# Patient Record
Sex: FEMALE | Race: WHITE | NOT HISPANIC OR LATINO | Employment: OTHER | URBAN - METROPOLITAN AREA
[De-identification: names, ages, dates, MRNs, and addresses within clinical notes are randomized per-mention and may not be internally consistent; named-entity substitution may affect disease eponyms.]

---

## 2017-01-05 ENCOUNTER — DOCUMENTATION ONLY (OUTPATIENT)
Dept: FAMILY MEDICINE | Facility: CLINIC | Age: 67
End: 2017-01-05

## 2017-01-06 ENCOUNTER — HOSPITAL ENCOUNTER (OUTPATIENT)
Dept: RADIOLOGY | Facility: CLINIC | Age: 67
Discharge: HOME | End: 2017-01-06
Attending: FAMILY MEDICINE
Payer: MEDICARE

## 2017-01-06 ENCOUNTER — OFFICE VISIT (OUTPATIENT)
Dept: FAMILY MEDICINE | Facility: CLINIC | Age: 67
End: 2017-01-06
Payer: MEDICARE

## 2017-01-06 VITALS
TEMPERATURE: 98 F | BODY MASS INDEX: 30.43 KG/M2 | HEART RATE: 70 BPM | HEIGHT: 60 IN | SYSTOLIC BLOOD PRESSURE: 130 MMHG | WEIGHT: 155 LBS | DIASTOLIC BLOOD PRESSURE: 78 MMHG

## 2017-01-06 DIAGNOSIS — Z23 FLU VACCINE NEED: ICD-10-CM

## 2017-01-06 DIAGNOSIS — Z12.31 ENCOUNTER FOR SCREENING MAMMOGRAM FOR MALIGNANT NEOPLASM OF BREAST: ICD-10-CM

## 2017-01-06 DIAGNOSIS — Z23 NEED FOR PNEUMOCOCCAL VACCINE: ICD-10-CM

## 2017-01-06 DIAGNOSIS — E66.9 OBESITY (BMI 30-39.9): ICD-10-CM

## 2017-01-06 DIAGNOSIS — K21.9 GASTROESOPHAGEAL REFLUX DISEASE WITHOUT ESOPHAGITIS: Primary | ICD-10-CM

## 2017-01-06 DIAGNOSIS — J31.0 CHRONIC RHINITIS: ICD-10-CM

## 2017-01-06 DIAGNOSIS — E78.5 HYPERLIPIDEMIA, UNSPECIFIED HYPERLIPIDEMIA TYPE: ICD-10-CM

## 2017-01-06 DIAGNOSIS — I10 ESSENTIAL HYPERTENSION: ICD-10-CM

## 2017-01-06 DIAGNOSIS — M85.80 AGE-RELATED BONE LOSS: ICD-10-CM

## 2017-01-06 DIAGNOSIS — E87.6 HYPOKALEMIA: ICD-10-CM

## 2017-01-06 DIAGNOSIS — Z96.22: ICD-10-CM

## 2017-01-06 PROCEDURE — 77067 SCR MAMMO BI INCL CAD: CPT | Mod: 26,,, | Performed by: RADIOLOGY

## 2017-01-06 PROCEDURE — 77067 SCR MAMMO BI INCL CAD: CPT | Mod: TC

## 2017-01-06 PROCEDURE — 99214 OFFICE O/P EST MOD 30 MIN: CPT | Mod: S$PBB,,, | Performed by: FAMILY MEDICINE

## 2017-01-06 PROCEDURE — 99999 PR PBB SHADOW E&M-EST. PATIENT-LVL III: CPT | Mod: PBBFAC,,, | Performed by: FAMILY MEDICINE

## 2017-01-06 PROCEDURE — 77063 BREAST TOMOSYNTHESIS BI: CPT | Mod: 26,,, | Performed by: RADIOLOGY

## 2017-01-06 RX ORDER — LOSARTAN POTASSIUM 100 MG/1
100 TABLET ORAL DAILY
Qty: 90 TABLET | Refills: 3 | Status: SHIPPED | OUTPATIENT
Start: 2017-01-06 | End: 2017-12-27 | Stop reason: SDUPTHER

## 2017-01-06 RX ORDER — POTASSIUM CHLORIDE 750 MG/1
10 TABLET, EXTENDED RELEASE ORAL 2 TIMES DAILY
Qty: 180 TABLET | Refills: 4 | Status: SHIPPED | OUTPATIENT
Start: 2017-01-06 | End: 2018-03-14 | Stop reason: SDUPTHER

## 2017-01-06 RX ORDER — RALOXIFENE HYDROCHLORIDE 60 MG/1
60 TABLET, FILM COATED ORAL DAILY
Qty: 90 TABLET | Refills: 3 | Status: SHIPPED | OUTPATIENT
Start: 2017-01-06 | End: 2017-10-03 | Stop reason: SDUPTHER

## 2017-01-06 RX ORDER — OMEPRAZOLE 20 MG/1
20 CAPSULE, DELAYED RELEASE ORAL 2 TIMES DAILY
Qty: 180 CAPSULE | Refills: 1 | Status: SHIPPED | OUTPATIENT
Start: 2017-01-06 | End: 2017-07-01 | Stop reason: SDUPTHER

## 2017-01-06 RX ORDER — FLUTICASONE PROPIONATE 50 MCG
1 SPRAY, SUSPENSION (ML) NASAL DAILY
Qty: 16 G | Status: SHIPPED | OUTPATIENT
Start: 2017-01-06 | End: 2017-03-06

## 2017-01-06 RX ORDER — METOPROLOL SUCCINATE 50 MG/1
50 TABLET, EXTENDED RELEASE ORAL DAILY
Qty: 90 TABLET | Refills: 3 | Status: SHIPPED | OUTPATIENT
Start: 2017-01-06 | End: 2017-10-03 | Stop reason: SDUPTHER

## 2017-01-06 RX ORDER — SIMVASTATIN 40 MG/1
40 TABLET, FILM COATED ORAL NIGHTLY
Qty: 90 TABLET | Refills: 3 | Status: SHIPPED | OUTPATIENT
Start: 2017-01-06 | End: 2017-10-03 | Stop reason: SDUPTHER

## 2017-01-06 NOTE — PROGRESS NOTES
Subjective:       Patient ID: Ella Canales is a 66 y.o. female.    Chief Complaint: Establish Care and Hypertension    HPIPatient is here to establish care and f/u chronic conditions  Patient Active Problem List   Diagnosis    HTN (hypertension)    Hyperlipidemia    Age-related bone loss    GERD (gastroesophageal reflux disease)    At risk for heart disease    Hypokalemia    Obesity (BMI 30-39.9)    Anemia    Arthritis    Post-traumatic osteoarthritis of right knee    Post-traumatic osteoarthritis of left knee    Genu varum of right lower extremity    Genu varum of left lower extremity    Chronic rhinitis    Patent pressure equalization tube on right side   Patient c/o persistent chronic sinus drainage, PND and cough for > 1 year.  No h/o previous sinus problems.  Had allergy testing-negative.  Has seen ENT Dr. Aurelio Lu and had sinus surgery summer 2016 which did not help.  Uses flonase and astelin NS.  Allegra was helping but told to stop it after sinus surgery.  Had PFTs wnl.  Has tube right ear recently.  Has chronic GERD-uncontrolled on once daily omeprazole    Card Dr. Ellis did eval for abnormal ecg before sinus surgery.  Had angiogram showing small vessels but no blockages    Review of Systems   Constitutional: Negative for fatigue and unexpected weight change.   Respiratory: Negative for chest tightness and shortness of breath.    Cardiovascular: Negative for chest pain, palpitations and leg swelling.   Gastrointestinal: Negative for abdominal pain.   Endocrine: Negative for polydipsia, polyphagia and polyuria.   Musculoskeletal: Negative for arthralgias.   Neurological: Negative for dizziness, syncope, light-headedness and headaches.       Objective:      Physical Exam   Constitutional: She appears well-developed and well-nourished.   HENT:   Right Ear: Tympanic membrane and ear canal normal.   Left Ear: Tympanic membrane and ear canal normal.   Ears:    Nose: Mucosal edema and  rhinorrhea present. Right sinus exhibits no maxillary sinus tenderness and no frontal sinus tenderness. Left sinus exhibits no maxillary sinus tenderness and no frontal sinus tenderness.   Mouth/Throat: Posterior oropharyngeal erythema present. No oropharyngeal exudate, posterior oropharyngeal edema or tonsillar abscesses.   Cardiovascular: Normal rate, regular rhythm and normal heart sounds.    Pulmonary/Chest: Effort normal and breath sounds normal.   Skin: Skin is warm and dry.   Psychiatric: She has a normal mood and affect.   Nursing note and vitals reviewed.      Assessment:       1. Gastroesophageal reflux disease without esophagitis    2. Essential hypertension    3. Hypokalemia    4. Hyperlipidemia, unspecified hyperlipidemia type    5. Obesity (BMI 30-39.9)    6. Chronic rhinitis    7. Age-related bone loss    8. Encounter for screening mammogram for malignant neoplasm of breast    9. Flu vaccine need    10. Need for pneumococcal vaccine    11. Patent pressure equalization tube on right side        Plan:       1. Essential hypertension  Controlled on current medications.  Continue current medications.    - losartan (COZAAR) 100 MG tablet; Take 1 tablet (100 mg total) by mouth once daily.  Dispense: 90 tablet; Refill: 3  - metoprolol succinate (TOPROL-XL) 50 MG 24 hr tablet; Take 1 tablet (50 mg total) by mouth once daily.  Dispense: 90 tablet; Refill: 3    2. Hypokalemia  Stable condition.  Continue current medications.  Will adjust based on lab findings or if condition changes.    - potassium chloride SA (K-DUR,KLOR-CON) 10 MEQ tablet; Take 1 tablet (10 mEq total) by mouth 2 (two) times daily.  Dispense: 180 tablet; Refill: 4    3. Hyperlipidemia, unspecified hyperlipidemia type  Stable condition.  Continue current medications.  Will adjust based on lab findings or if condition changes.    - simvastatin (ZOCOR) 40 MG tablet; Take 1 tablet (40 mg total) by mouth every evening.  Dispense: 90 tablet; Refill:  3  - CBC auto differential; Future  - Comprehensive metabolic panel; Future  - Lipid panel; Future    4. Gastroesophageal reflux disease without esophagitis  Uncontrolled.  Increase to  - omeprazole (PRILOSEC) 20 MG capsule; Take 1 capsule (20 mg total) by mouth 2 (two) times daily.  Dispense: 180 capsule; Refill: 1  Counseled patient on prevention of reflux with changes in diet and behavior.  I recommended avoidance of greasy and spicy foods, caffeine and eating within 3 hours of bedtime.  I counseled the patient to avoid eating large meals and instead eating more frequent small meals.  I also recommended weight loss and elevation of the head of the bed by 6 inches.  If symptoms persist after these changes medication may be needed to control GERD.      5. Obesity (BMI 30-39.9)  Counseled patient on his ideal body weight, health consequences of being obese and current recommendations including weekly exercise and a heart healthy diet.  Current BMI is:Estimated body mass index is 30.27 kg/(m^2) as calculated from the following:    Height as of this encounter: 5' (1.524 m).    Weight as of this encounter: 70.3 kg (154 lb 15.7 oz)..  Patient is aware that ideal BMI < 25 or Weight in (lb) to have BMI = 25: 127.7.        6. Chronic rhinitis  Suspect GERD related -laryngopharyngeal reflux .  Control GERD with Bid ppi.  Cont astelin and :  - fluticasone (FLONASE) 50 mcg/actuation nasal spray; 1 spray by Each Nare route once daily.  Dispense: 16 g; Refill: prn    7. Age-related bone loss  Screen and treat as indicated:      8. Encounter for screening mammogram for malignant neoplasm of breast  Screen and treat as indicated:      9. Flu vaccine need  Immunize today.  Counseled patient on risks, benefits and side effects.  Patient elected to proceed with vaccination.    - Influenza - High Dose (65+) (PF) (IM)    10. Need for pneumococcal vaccine  Immunize today.  Counseled patient on risks, benefits and side effects.  Patient  elected to proceed with vaccination.    - Pneumococcal Conjugate Vaccine (13 Valent) (IM)    11. Patent pressure equalization tube on right side  Cont care under ENT    [facility] goal documentation:  Patient readiness: acceptance and barriers:readiness  During the course of the visit the patient was educated and counseled about the following: Hypertension:   Dietary sodium restriction.  Regular aerobic exercise.  Obesity:   General weight loss/lifestyle modification strategies discussed (elicit support from others; identify saboteurs; non-food rewards, etc).  Goals: Hypertension: Reduce Blood Pressure and Obesity: Reduce calorie intake and BMI  Goal/Outcomes met:Hypertension  The following self management tools provided:none  Patient Instructions (the written plan) was given to the patient/family: Yes  Time spent with patient: 40 minutes    Patient with be reevaluated in 3 months or sooner prn.  4 weeks with Winsome Damon PA-C to f/u rhinitis/cough due to LPR disease uncontrolled and trial of PPI bid.  If sx do not improve consider GI referral for endoscopy    Greater than 50% of this visit was spent counseling as described in above documentation:Yes

## 2017-01-06 NOTE — PATIENT INSTRUCTIONS
Controlling High Blood Pressure  High blood pressure (hypertension) is often called the silent killer. This is because many people who have it dont know it. High blood pressure is defined as 140/90 mm Hg or higher. Know your blood pressure and remember to check it regularly. Doing so can save your life. Here are some things you can do to help control your blood pressure.    Choose heart-healthy foods  · Select low-salt, low-fat foods. Limit sodium intake to 2,400 mg per day or the amount suggested by your healthcare provider.  · Limit canned, dried, cured, packaged, and fast foods. These can contain a lot of salt.  · Eat 8 to 10 servings of fruits and vegetables every day.  · Choose lean meats, fish, or chicken.  · Eat whole-grain pasta, brown rice, and beans.  · Eat 2 to 3 servings of low-fat or fat-free dairy products.  · Ask your doctor about the DASH eating plan. This plan helps reduce blood pressure.  · When you go to a restaurant, ask that your meal be prepared with no added salt.  Maintain a healthy weight  · Ask your healthcare provider how many calories to eat a day. Then stick to that number.  · Ask your healthcare provider what weight range is healthiest for you. If you are overweight, a weight loss of only 3% to 5% of your body weight can help lower blood pressure. Generally, a good weight loss goal is to lose 10% of your body weight in a year.  · Limit snacks and sweets.  · Get regular exercise.  Get up and get active  · Choose activities you enjoy. Find ones you can do with friends or family. This includes bicycling, dancing, walking, and jogging.  · Park farther away from building entrances.  · Use stairs instead of the elevator.  · When you can, walk or bike instead of driving.  · [place]oit leaves, garden, or do household repairs.  · Be active at a moderate to vigorous level of physical activity for at least 40 minutes for a minimum of 3 to 4 days a week.   Manage stress  · Make time to relax and enjoy  life. Find time to laugh.  · Communicate your concerns with your loved ones and your healthcare provider.  · Visit with family and friends, and keep up with hobbies.  Limit alcohol and quit smoking  · Men should have no more than 2 drinks per day.  · Women should have no more than 1 drink per day.  · Talk with your healthcare provider about quitting smoking. Smoking significantly increases your risk for heart disease and stroke. Ask your healthcare provider about community smoking cessation programs and other options.  Medicines  If lifestyle changes arent enough, your healthcare provider may prescribe high blood pressure medicine. Take all medicines as prescribed. If you have any questions about your medicines, ask your healthcare provider before stopping or changing them.   © 7529-3720 [employer]. [address] [place] [address], [place] [address]. All rights reserved. This information is not intended as a substitute for professional medical care. Always follow your healthcare professional's instructions.        Walking for Fitness  Fitness walking has something for everyone, even people who are already fit. Walking is one of the safest ways to condition your body aerobically. It can boost energy, help you lose weight, and reduce stress.    Physical benefits  · Walking strengthens your heart and lungs, and tones your muscles.  · When walking, your feet land with less impact than in other sports. This reduces chances of muscle, bone, and joint injury.  · Regular walking improves your cholesterol levels and lowers your risk of heart disease. And it helps you control your blood sugar if you have diabetes.  · Walking is a weight-bearing activity, which helps maintain bone density. This can help prevent osteoporosis.  Personal rewards  · Taking walks can help you relax and manage stress. And fitness walking may make you feel better about yourself.  · Walking can help you sleep better at night and make you less  likely to be depressed.  · Regular walking may help maintain your memory as you get older.  · Walking is a great way to spend extra time with friends and family members. Be sure to invite your dog along!  Q&A about fitness walking  Q: Will walking keep me fit?  A: Yes. Regular walking at the right pace gives you all the benefits of other aerobic activities, such as jogging and swimming.  Q: Will walking help me lose weight and keep it off?  A: Yes. Per mile, walking can burn as many calories as jogging. Your health care provider can help work walking into your weight-loss plan.  Q: Is walking safe for my health?  A: Yes. Walking is safe if you have high blood pressure, diabetes, heart disease, or other conditions. Talk to your health care provider before you start.  © 1409-5414 [employer]. [address] [place] [address]. All rights reserved. This information is not intended as a substitute for professional medical care. Always follow your healthcare professional's instructions.        Weight Management: Getting Started  Healthy bodies come in all shapes and sizes. Not all bodies are made to be thin. For some people, a healthy weight is higher than the average weight listed on weight charts. Your healthcare provider can help you decide on a healthy weight for you.    Reasons to lose weight  Losing weight can help with some health problems, such as high blood pressure, heart disease, diabetes, sleep apnea, and arthritis. You may also feel more energy.  Set your long-term goal  Your goal doesn't even have to be a specific weight. You may decide on a fitness goal (such as being able to walk 10 miles a week), or a health goal (such as lowering your blood pressure). Choose a goal that is measurable and reasonable, so you know when you've reached it. A goal of reaching a BMI of less than 25 is not always reasonable (or possible).   Make an action plan  Habits dont change overnight. Setting your  goals too high can leave you feeling discouraged if you cant reach them. Be realistic. Choose one or two small changes you can make now. Set an action plan for how you are going to make these changes. When you can stick to this plan, keep making a few more small changes. Taking small steps will help you stay on the path to success.  Track your progress  Write down your goals. Then, keep a daily record of your progress. Write down what you eat and how active you are. This record lets you look back on how much youve done. It may also help when youre feeling frustrated. Reward yourself for success. Even if you dont reach every goal, give yourself credit for what you do get done.  Get support  Encouragement from others can help make losing weight easier. Ask your family members and friends for support. They may even want to join you. Also look to your healthcare provider, registered dietitian, and  for help. Your local hospital can give you more information about nutrition, exercise, and weight loss.  © 2468-5967 [employer]. [address] [place] [address], [place] [address]. All rights reserved. This information is not intended as a substitute for professional medical care. Always follow your healthcare professional's instructions.

## 2017-01-06 NOTE — MR AVS SNAPSHOT
[facility]  [address]  [place] [address]  Phone: [phone]  Fax: [phone]                  Ella Canales   2017 9:00 AM   Office Visit    Description:  Female : 1950   Provider:  Adry Damian MD   Department:  [place] [facility]           Reason for Visit     Establish Care     Hypertension           Diagnoses this Visit        Comments    Gastroesophageal reflux disease without esophagitis    -  Primary     Essential hypertension         Hypokalemia         Hyperlipidemia, unspecified hyperlipidemia type         Obesity (BMI 30-39.9)         Chronic rhinitis         Age-related bone loss         Encounter for screening mammogram for malignant neoplasm of breast         Flu vaccine need         Need for pneumococcal vaccine                To Do List           Future Appointments        Provider Department Dept Phone    2017 10:00 AM SLIC MAMMO1 [facility] [place] [facility]- Mammography [phone]    2017 1:00 PM LAB, ERIC SAT [facility] [place] [facility] - Lab [phone]    2017 8:00 AM Britni Turpin PT [facility] [phone]    2/3/2017 8:40 AM Winsome Damon PA-C [facility] [phone]    2017 11:30 AM Ronel Urban PA-C [place] [facility] [phone]      Your Future Surgeries/Procedures     2017   Surgery with Angie Mulligan MD   [facility]    [address] [place] [id number] [phone]              Goals (5 Years of Data)     None      Follow-Up and Disposition     Return in about 3 months (around 2017).       These Medications        Disp Refills Start End    fluticasone (FLONASE) 50 mcg/actuation nasal spray 16 g prn 2017     1 spray by Each Nare route once daily. - Each Nare    Pharmacy: [facility] [id number] - [place] - [address] AT  [facility] Ph #: [phone]       losartan  (COZAAR) 100 MG tablet 90 tablet 3 1/6/2017     Take 1 tablet (100 mg total) by mouth once daily. - Oral    Pharmacy: [facility] [address] [place] [address] [address] AT [facility] Ph #: [phone]       metoprolol succinate (TOPROL-XL) 50 MG 24 hr tablet 90 tablet 3 1/6/2017     Take 1 tablet (50 mg total) by mouth once daily. - Oral    Pharmacy: [facility] [address] AT [facility] Ph #: [phone]       omeprazole (PRILOSEC) 20 MG capsule 180 capsule 1 1/6/2017     Take 1 capsule (20 mg total) by mouth 2 (two) times daily. - Oral    Pharmacy: [facility] [address] [facility] Ph #: [phone]       raloxifene (EVISTA) 60 mg tablet 90 tablet 3 1/6/2017     Take 1 tablet (60 mg total) by mouth once daily. - Oral    Pharmacy: [facility] [address] [facility] Ph #: [phone]       potassium chloride SA (K-DUR,KLOR-CON) 10 MEQ tablet 180 tablet 4 1/6/2017     Take 1 tablet (10 mEq total) by mouth 2 (two) times daily. - Oral    Pharmacy: [facility] [address] [facility] Ph #: [phone]       simvastatin (ZOCOR) 40 MG tablet 90 tablet 3 1/6/2017     Take 1 tablet (40 mg total) by mouth every evening. - Oral    Pharmacy: [facility] [address] [place] [address] [address] AT [facility] Ph #: [phone]         [facility]     [facility] On Call Nurse Care Line - 24/7 Assistance  Registered nurses in the [facility] On Call Center provide clinical advisement, health education, appointment booking, and other advisory services.  Call for this free service at [phone].             Medications           Message regarding Medications     Verify the changes and/or additions to your medication regime listed below are the same  as discussed with your clinician today.  If any of these changes or additions are incorrect, please notify your healthcare provider.        CHANGE how you are taking these medications     Start Taking Instead of    fluticasone (FLONASE) 50 mcg/actuation nasal spray fluticasone (FLONASE) 50 mcg/actuation nasal spray    Dosage:  1 spray by Each Nare route once daily. Dosage:  SPRAY ONCE IN EACH NOSTRIL EVERY DAY    Reason for Change:  Reorder     losartan (COZAAR) 100 MG tablet losartan (COZAAR) 100 MG tablet    Dosage:  Take 1 tablet (100 mg total) by mouth once daily. Dosage:  TAKE 1 TABLET BY MOUTH EVERY DAY    Reason for Change:  Reorder     metoprolol succinate (TOPROL-XL) 50 MG 24 hr tablet metoprolol succinate (TOPROL-XL) 50 MG 24 hr tablet    Dosage:  Take 1 tablet (50 mg total) by mouth once daily. Dosage:  TAKE 1 TABLET BY MOUTH EVERY DAY    Reason for Change:  Reorder     omeprazole (PRILOSEC) 20 MG capsule omeprazole (PRILOSEC) 20 MG capsule    Dosage:  Take 1 capsule (20 mg total) by mouth 2 (two) times daily. Dosage:  TAKE 1 CAPSULE BY MOUTH EVERY DAY    Reason for Change:  Reorder     raloxifene (EVISTA) 60 mg tablet raloxifene (EVISTA) 60 mg tablet    Dosage:  Take 1 tablet (60 mg total) by mouth once daily. Dosage:  TAKE 1 TABLET BY MOUTH EVERY DAY    Reason for Change:  Reorder       STOP taking these medications     UNABLE TO FIND Compound Antibiotic Gentamicin/mupirocin/mometasone           Verify that the below list of medications is an accurate representation of the medications you are currently taking.  If none reported, the list may be blank. If incorrect, please contact your healthcare provider. Carry this list with you in case of emergency.           Current Medications     fluticasone (FLONASE) 50 mcg/actuation nasal spray 1 spray by Each Nare route once daily.    glucosamine-chondroit-vit C-Mn (GLUCOSAMINE-CHONDROITIN MAX ST) 500-400 mg Cap Twice a day    losartan (COZAAR) 100 MG tablet  Take 1 tablet (100 mg total) by mouth once daily.    metoprolol succinate (TOPROL-XL) 50 MG 24 hr tablet Take 1 tablet (50 mg total) by mouth once daily.    omeprazole (PRILOSEC) 20 MG capsule Take 1 capsule (20 mg total) by mouth 2 (two) times daily.    potassium chloride SA (K-DUR,KLOR-CON) 10 MEQ tablet Take 1 tablet (10 mEq total) by mouth 2 (two) times daily.    raloxifene (EVISTA) 60 mg tablet Take 1 tablet (60 mg total) by mouth once daily.    simvastatin (ZOCOR) 40 MG tablet Take 1 tablet (40 mg total) by mouth every evening.           Clinical Reference Information           Vital Signs - Last Recorded  Most recent update: 1/6/2017  9:11 AM by Anjali Baez MA    BP Pulse Temp Ht Wt LMP    130/78 (BP Location: Left arm, Patient Position: Sitting, BP Method: Manual) 70 98 °F (36.7 °C) (Oral) 5' (1.524 m) 70.3 kg (154 lb 15.7 oz) 04/03/2012    BMI                30.27 kg/m2          Blood Pressure          Most Recent Value    BP  130/78      Allergies as of 1/6/2017     Ace Inhibitors      Immunizations Administered on Date of Encounter - 1/6/2017     Name Date Dose VIS Date Route    Influenza - High Dose 1/6/2017 0.5 mL 8/7/2015 Intramuscular    Pneumococcal Conjugate - 13 Valent 1/6/2017 0.5 mL 11/5/2015 Intramuscular      Orders Placed During Today's Visit      Normal Orders This Visit    Influenza - High Dose (65+) (PF) (IM)     Pneumococcal Conjugate Vaccine (13 Valent) (IM)     Future Labs/Procedures Expected by Expires    CBC auto differential  1/6/2017 3/7/2018    Comprehensive metabolic panel  1/6/2017 3/7/2018    Lipid panel  1/6/2017 3/7/2018    Mammo Digital Screening Bilateral With CAD  1/6/2017 3/6/2018      Instructions      Controlling High Blood Pressure  High blood pressure (hypertension) is often called the silent killer. This is because many people who have it dont know it. High blood pressure is defined as 140/90 mm Hg or higher. Know your blood pressure and remember to check it  regularly. Doing so can save your life. Here are some things you can do to help control your blood pressure.    Choose heart-healthy foods  · Select low-salt, low-fat foods. Limit sodium intake to 2,400 mg per day or the amount suggested by your healthcare provider.  · Limit canned, dried, cured, packaged, and fast foods. These can contain a lot of salt.  · Eat 8 to 10 servings of fruits and vegetables every day.  · Choose lean meats, fish, or chicken.  · Eat whole-grain pasta, brown rice, and beans.  · Eat 2 to 3 servings of low-fat or fat-free dairy products.  · Ask your doctor about the DASH eating plan. This plan helps reduce blood pressure.  · When you go to a restaurant, ask that your meal be prepared with no added salt.  Maintain a healthy weight  · Ask your healthcare provider how many calories to eat a day. Then stick to that number.  · Ask your healthcare provider what weight range is healthiest for you. If you are overweight, a weight loss of only 3% to 5% of your body weight can help lower blood pressure. Generally, a good weight loss goal is to lose 10% of your body weight in a year.  · Limit snacks and sweets.  · Get regular exercise.  Get up and get active  · Choose activities you enjoy. Find ones you can do with friends or family. This includes bicycling, dancing, walking, and jogging.  · Park farther away from building entrances.  · Use stairs instead of the elevator.  · When you can, walk or bike instead of driving.  · Paulina leaves, garden, or do household repairs.  · Be active at a moderate to vigorous level of physical activity for at least 40 minutes for a minimum of 3 to 4 days a week.   Manage stress  · Make time to relax and enjoy life. Find time to laugh.  · Communicate your concerns with your loved ones and your healthcare provider.  · Visit with family and friends, and keep up with hobbies.  Limit alcohol and quit smoking  · Men should have no more than 2 drinks per day.  · Women should  have no more than 1 drink per day.  · Talk with your healthcare provider about quitting smoking. Smoking significantly increases your risk for heart disease and stroke. Ask your healthcare provider about community smoking cessation programs and other options.  Medicines  If lifestyle changes arent enough, your healthcare provider may prescribe high blood pressure medicine. Take all medicines as prescribed. If you have any questions about your medicines, ask your healthcare provider before stopping or changing them.   © 4958-3009 [employer]. [address] [place] [address], [place] [address]. All rights reserved. This information is not intended as a substitute for professional medical care. Always follow your healthcare professional's instructions.        Walking for Fitness  Fitness walking has something for everyone, even people who are already fit. Walking is one of the safest ways to condition your body aerobically. It can boost energy, help you lose weight, and reduce stress.    Physical benefits  · Walking strengthens your heart and lungs, and tones your muscles.  · When walking, your feet land with less impact than in other sports. This reduces chances of muscle, bone, and joint injury.  · Regular walking improves your cholesterol levels and lowers your risk of heart disease. And it helps you control your blood sugar if you have diabetes.  · Walking is a weight-bearing activity, which helps maintain bone density. This can help prevent osteoporosis.  Personal rewards  · Taking walks can help you relax and manage stress. And fitness walking may make you feel better about yourself.  · Walking can help you sleep better at night and make you less likely to be depressed.  · Regular walking may help maintain your memory as you get older.  · Walking is a great way to spend extra time with friends and family members. Be sure to invite your dog along!  Q&A about fitness walking  Q: Will walking keep me fit?  A:  Yes. Regular walking at the right pace gives you all the benefits of other aerobic activities, such as jogging and swimming.  Q: Will walking help me lose weight and keep it off?  A: Yes. Per mile, walking can burn as many calories as jogging. Your health care provider can help work walking into your weight-loss plan.  Q: Is walking safe for my health?  A: Yes. Walking is safe if you have high blood pressure, diabetes, heart disease, or other conditions. Talk to your health care provider before you start.  © 9330-5037 [employer]. [address] [place] [address]. All rights reserved. This information is not intended as a substitute for professional medical care. Always follow your healthcare professional's instructions.        Weight Management: Getting Started  Healthy bodies come in all shapes and sizes. Not all bodies are made to be thin. For some people, a healthy weight is higher than the average weight listed on weight charts. Your healthcare provider can help you decide on a healthy weight for you.    Reasons to lose weight  Losing weight can help with some health problems, such as high blood pressure, heart disease, diabetes, sleep apnea, and arthritis. You may also feel more energy.  Set your long-term goal  Your goal doesn't even have to be a specific weight. You may decide on a fitness goal (such as being able to walk 10 miles a week), or a health goal (such as lowering your blood pressure). Choose a goal that is measurable and reasonable, so you know when you've reached it. A goal of reaching a BMI of less than 25 is not always reasonable (or possible).   Make an action plan  Habits dont change overnight. Setting your goals too high can leave you feeling discouraged if you cant reach them. Be realistic. Choose one or two small changes you can make now. Set an action plan for how you are going to make these changes. When you can stick to this plan, keep making a few more small  changes. Taking small steps will help you stay on the path to success.  Track your progress  Write down your goals. Then, keep a daily record of your progress. Write down what you eat and how active you are. This record lets you look back on how much youve done. It may also help when youre feeling frustrated. Reward yourself for success. Even if you dont reach every goal, give yourself credit for what you do get done.  Get support  Encouragement from others can help make losing weight easier. Ask your family members and friends for support. They may even want to join you. Also look to your healthcare provider, registered dietitian, and  for help. Your local hospital can give you more information about nutrition, exercise, and weight loss.  © 8577-8999 [employer]. [address] [place] [address], [place] [address]. All rights reserved. This information is not intended as a substitute for professional medical care. Always follow your healthcare professional's instructions.

## 2017-01-06 NOTE — PROGRESS NOTES
2 patient identifiers used (name and ). Administered Flu vaccine IM. Patient tolerated well, no bleeding at insertion site noted. Pain scale 0/10. Aseptic technique maintained. Immunization information given to patient. Advised patient to remain in clinic for 15 minutes to monitor for reaction. No AR noted. 2 patient identifiers used (name and ). Administered Prevnar 13 vaccine IM. Patient tolerated well, no bleeding at insertion site noted. Pain scale 1/10. Aseptic technique maintained. Immunization information given to patient.

## 2017-01-09 ENCOUNTER — CLINICAL SUPPORT (OUTPATIENT)
Dept: REHABILITATION | Facility: HOSPITAL | Age: 67
End: 2017-01-09
Attending: ORTHOPAEDIC SURGERY
Payer: MEDICARE

## 2017-01-09 DIAGNOSIS — M17.31 POST-TRAUMATIC OSTEOARTHRITIS OF RIGHT KNEE: ICD-10-CM

## 2017-01-09 DIAGNOSIS — M25.561 RIGHT KNEE PAIN, UNSPECIFIED CHRONICITY: Primary | ICD-10-CM

## 2017-01-09 DIAGNOSIS — M17.32 POST-TRAUMATIC OSTEOARTHRITIS OF LEFT KNEE: ICD-10-CM

## 2017-01-09 DIAGNOSIS — M25.562 LEFT KNEE PAIN, UNSPECIFIED CHRONICITY: ICD-10-CM

## 2017-01-09 PROCEDURE — G8978 MOBILITY CURRENT STATUS: HCPCS | Mod: CL,PN

## 2017-01-09 PROCEDURE — G8979 MOBILITY GOAL STATUS: HCPCS | Mod: CK,PN

## 2017-01-09 PROCEDURE — 97161 PT EVAL LOW COMPLEX 20 MIN: CPT | Mod: PN

## 2017-01-09 NOTE — PLAN OF CARE
TIME RECORD    01/09/2017    Start Time:  0810  Stop Time:  0900    PROCEDURES:    TIMED  Procedure Time Min.    Start:  Stop:     Start:  Stop:     Start:  Stop:     Start:  Stop:          UNTIMED  Procedure Time Min.   Evaluation Start:0810  Stop:0900 50    Start:  Stop:      Total Timed Minutes:  0  Total Timed Units:  0  Total Untimed Units:  1  Charges Billed/# of units:  1    OUTPATIENT PHYSICAL THERAPY   PATIENT EVALUATION  Onset Date: chronic  Problem List Items Addressed This Visit     Right knee pain - Primary    Post-traumatic osteoarthritis of right knee    Left knee pain    Post-traumatic osteoarthritis of left knee        Treatment Diagnosis: Impaired functional mobility with bilateral knee pain    Past Medical History   Diagnosis Date    Arthritis      bilateral knees    GERD (gastroesophageal reflux disease)     Hyperlipidemia     Hypertension     Osteopenia        Past Surgical History   Procedure Laterality Date    Fracture surgery       ORIF right arm.    Ectopic pregnancy surgery      Tubal ligation         has a current medication list which includes the following prescription(s): fluticasone, glucosamine-chondroit-vit c-mn, losartan, metoprolol succinate, omeprazole, potassium chloride sa, raloxifene, and simvastatin.    Precautions: HTN, hx of skin CA; left knee scope 2000  Surgical history: see above  Prior Therapy: yes, for arm  History of Present Illness: Patient is a 65 yo female who presents to physical therapy with c/o chronic bilateral knee pain. States will be getting left knee replacement in February followed by eventual right knee replacement.   Prior Level of Function: Independent  Social History:    Living environment: 2 story house with bedroom on first floor    Stairs to enter home: none    Railings:    Bathroom setup: walk-in shower with built in seat   Employment: none   Transportation: drives SUV   Leisure activities/hobbies: none at this time, used to walk or run,  "plays    Current level of function: Independent  Current equipment: none  Functional Deficits Leading to Referral/Nature of Injury: walking, squatting, standing, sitting too long, too much activity  Patient Therapy Goals: "My goal is to be able to function. To be able to walk distances. Do everyday things without pain."      Subjective     Ella Canales states she is hoping both her knees can be replaced this year.    Pain:  Location: bilateral medial knees  Description: dull, sharp, stiffness  Activities Which Increase Pain: Sitting, Standing, Bending, Walking and Getting out of bed/chair  Activities Which Decrease Pain: ice and rest  Pain Scale: 2/10 at best 3-4/10 now  8/10 at worst    Numbness/Paraesthesias: none    Objective     Posture: Fwd head position, rounded shoulders, decreased WB LLE vs RLE in standing with decreased bilateral knee extensin.  Palpation: Tender to palpation medial joint line of bilateral knees  Sensation: intact to LT  DTRs:   Range of Motion/Strength:    Lower Extremity Range of Motion:  Right Lower Extremity: knee PROM 8-115*  Left Lower Extremity: *    Lower Extremity Strength:  Right Lower Extremity: hip flex 4+/5, knee ext 4+/5, knee flex 4+/5, ankle 5/5  Left Lower Extremity: hip flex 4+/5, knee ext 4/5, knee flex 4+/5, ankle 5/5        Flexibility: Tightness present in bilateral gastrocs, hamstrings left 157*, right 160*  Gait: antalgic with decreased floor clearance, decreased knee ROM bilateral  Bed Mobility: Supine<>sit Independent  Transfers: Sit<>stand Independent    Functional Outcome Measure: Lower Extremity Functional Scale (LEFS): 27/80  G code tool used: LEFS  Current modifier: CL  Goal modifier: CK  Treatment: Educated on role/goal PT, POC.  Pt verbalizing understanding and agreement.     Assessment       Initial Assessment (Pertinent finding, problem list and factors affecting outcome): Patient is a 65 yo female presenting to PT with c/o chronic bilateral " knee pain. Will be getting left TKA in February followed by eventual right TKA. Notable impairments include decreased ROM, decreased strength, gait abnormality, and impaired functional mobility. Patient would benefit from skilled PT to address notable impairments and improve functional mobility to PLOF.    Rehab Potiential: good    Short Term Goals (1 Weeks): 1) Pt will initiate HEP 2) Patient will improve hamstring flexibility by 3*   Long Term Goals (3 Weeks): 1) Pt will be I with HEP 2) Pt will return to community ambulation with strength 4+ or >    Plan     Certification Period: 01/09/ to 02/02/17   Recommended Treatment Pl/an: 2 times per week for 3 weeks: Gait Training, Group Therapy, Manual Therapy, Moist Heat/ Ice, Patient Education, Therapeutic Activites and Therapeutic Exercise  Other Recommendations:    Thank you for referral.    Therapist: Britni Turpin, PT    I CERTIFY THE NEED FOR THESE SERVICES FURNISHED UNDER THIS PLAN OF TREATMENT AND WHILE UNDER MY CARE    Physician's comments: ________________________________________________________________________________________________________________________________________________      Physician's Name: ___________________________________

## 2017-01-11 ENCOUNTER — CLINICAL SUPPORT (OUTPATIENT)
Dept: REHABILITATION | Facility: HOSPITAL | Age: 67
End: 2017-01-11
Attending: ORTHOPAEDIC SURGERY
Payer: MEDICARE

## 2017-01-11 DIAGNOSIS — M17.31 POST-TRAUMATIC OSTEOARTHRITIS OF RIGHT KNEE: ICD-10-CM

## 2017-01-11 DIAGNOSIS — M25.562 LEFT KNEE PAIN, UNSPECIFIED CHRONICITY: ICD-10-CM

## 2017-01-11 DIAGNOSIS — M25.561 RIGHT KNEE PAIN, UNSPECIFIED CHRONICITY: ICD-10-CM

## 2017-01-11 DIAGNOSIS — M17.32 POST-TRAUMATIC OSTEOARTHRITIS OF LEFT KNEE: ICD-10-CM

## 2017-01-11 PROCEDURE — 97110 THERAPEUTIC EXERCISES: CPT | Mod: PN

## 2017-01-12 NOTE — PROGRESS NOTES
"Name: Ella Santana [facility] Number: [id number]  Date of Treatment: 1/11/2017   Diagnosis:   Encounter Diagnoses   Name Primary?    Left knee pain, unspecified chronicity     Right knee pain, unspecified chronicity     Post-traumatic osteoarthritis of left knee     Post-traumatic osteoarthritis of right knee        Time in: 0910  Time Out: 0950  Total Treatment Time: 40  Group Time: 0      Subjective:    Ella reports knee pain with mobility.  Patient reports their pain to be 1-2/10 on a 0-10 scale with 0 being no pain and 10 being the worst pain imaginable.    Objective    Ella received therapeutic exercises to develop strength, endurance and flexibility for 40 minutes including:     Bike Lv3 10'  Hamstring stretch 3x30" in sit  Gastroc stretch 3x30" 1/2 roll  Knee flexion stretch 3x30" in sit B  LAQ 3x10 B  Quad sets with towel under ankles 3x10  Heel slide 3x10 B  SLR 3x10 B      Written Home Exercises Provided: Yes    Pt demo good understanding of the education provided. Ella demonstrated good return demonstration of activities.     Assessment:     Pt will continue to benefit from skilled PT intervention. Medical Necessity is demonstrated by:  Pain limits function of effected part for some activities, Requires skilled supervision to complete and progress HEP and Weakness.    Plan:    Continue with established Plan of Care towards PT goals.   "

## 2017-01-17 ENCOUNTER — CLINICAL SUPPORT (OUTPATIENT)
Dept: REHABILITATION | Facility: HOSPITAL | Age: 67
End: 2017-01-17
Attending: ORTHOPAEDIC SURGERY
Payer: MEDICARE

## 2017-01-17 DIAGNOSIS — M25.562 LEFT KNEE PAIN, UNSPECIFIED CHRONICITY: ICD-10-CM

## 2017-01-17 DIAGNOSIS — M25.561 RIGHT KNEE PAIN, UNSPECIFIED CHRONICITY: ICD-10-CM

## 2017-01-17 DIAGNOSIS — M17.31 POST-TRAUMATIC OSTEOARTHRITIS OF RIGHT KNEE: ICD-10-CM

## 2017-01-17 DIAGNOSIS — M17.32 POST-TRAUMATIC OSTEOARTHRITIS OF LEFT KNEE: ICD-10-CM

## 2017-01-17 PROCEDURE — 97110 THERAPEUTIC EXERCISES: CPT | Mod: PN

## 2017-01-17 NOTE — PROGRESS NOTES
"Name: Ella Santana [facility] Number: [id number]  Date of Treatment: 01/17/2017   Diagnosis:   Encounter Diagnoses   Name Primary?    Left knee pain, unspecified chronicity     Right knee pain, unspecified chronicity     Post-traumatic osteoarthritis of left knee     Post-traumatic osteoarthritis of right knee        Time in: 0900  Time Out: 0940  Total Treatment Time: 40  Group Time: 0      Subjective:    Ella reports increased B knee pain, reports been packing daily as well as HEP.  Patient reports their pain to be 9/10 on a 0-10 scale with 0 being no pain and 10 being the worst pain imaginable.    Objective    Ella received therapeutic exercises to develop strength, endurance and flexibility for 40 minutes including:     Bike Lv3 10'  Hamstring stretch 3x30" in sit  Gastroc stretch 3x30" 1/2 roll  Knee flexion stretch 3x30" in sit B  Hamstring stretch supine with strap 1x30" B  IT band stretch supine with strap 3x30" B  LAQ 3x10 B  Quad sets with small bolster under knees 3x10  Heel slide 3x10 1# B  SLR 3x10 1# B    Written Home Exercises Provided: Yes    Pt demo good understanding of the education provided. Ella demonstrated good return demonstration of activities.     Assessment:     Pt will continue to benefit from skilled PT intervention. Medical Necessity is demonstrated by:  Pain limits function of effected part for some activities, Unable to participate fully in daily activities, Requires skilled supervision to complete and progress HEP and Weakness.    Patient is making good progress towards established goals.    Plan:    Continue with established Plan of Care towards PT goals.   "

## 2017-01-19 ENCOUNTER — CLINICAL SUPPORT (OUTPATIENT)
Dept: REHABILITATION | Facility: HOSPITAL | Age: 67
End: 2017-01-19
Attending: ORTHOPAEDIC SURGERY
Payer: MEDICARE

## 2017-01-19 DIAGNOSIS — M17.31 POST-TRAUMATIC OSTEOARTHRITIS OF RIGHT KNEE: ICD-10-CM

## 2017-01-19 DIAGNOSIS — M25.562 LEFT KNEE PAIN, UNSPECIFIED CHRONICITY: ICD-10-CM

## 2017-01-19 DIAGNOSIS — M25.561 RIGHT KNEE PAIN, UNSPECIFIED CHRONICITY: ICD-10-CM

## 2017-01-19 DIAGNOSIS — M17.32 POST-TRAUMATIC OSTEOARTHRITIS OF LEFT KNEE: ICD-10-CM

## 2017-01-19 PROCEDURE — 97150 GROUP THERAPEUTIC PROCEDURES: CPT | Mod: PN

## 2017-01-23 ENCOUNTER — CLINICAL SUPPORT (OUTPATIENT)
Dept: REHABILITATION | Facility: HOSPITAL | Age: 67
End: 2017-01-23
Attending: ORTHOPAEDIC SURGERY
Payer: MEDICARE

## 2017-01-23 DIAGNOSIS — M25.561 RIGHT KNEE PAIN, UNSPECIFIED CHRONICITY: ICD-10-CM

## 2017-01-23 DIAGNOSIS — M17.31 POST-TRAUMATIC OSTEOARTHRITIS OF RIGHT KNEE: ICD-10-CM

## 2017-01-23 DIAGNOSIS — M25.562 LEFT KNEE PAIN, UNSPECIFIED CHRONICITY: ICD-10-CM

## 2017-01-23 DIAGNOSIS — M17.32 POST-TRAUMATIC OSTEOARTHRITIS OF LEFT KNEE: ICD-10-CM

## 2017-01-23 PROCEDURE — 97110 THERAPEUTIC EXERCISES: CPT | Mod: PN

## 2017-01-23 NOTE — PROGRESS NOTES
"Name: Ella Santana [facility] Number: [id number]  Date of Treatment: 01/23/2017   Diagnosis:   Encounter Diagnoses   Name Primary?    Left knee pain, unspecified chronicity     Right knee pain, unspecified chronicity     Post-traumatic osteoarthritis of left knee     Post-traumatic osteoarthritis of right knee        Time in: 0900  Time Out: 0944  Total Treatment Time: 44  Group Time: 0      Subjective:    Ella reports knee pain and back pain, and doing HEP almost daily.  Patient reports their pain to be 6/10 on a 0-10 scale with 0 being no pain and 10 being the worst pain imaginable.    Objective    Ella received therapeutic exercises to develop strength, endurance and flexibility for 44 minutes including:     Bike Lv3 10'  Hamstring stretch 3x30" in sit  Gastroc stretch 3x30" 1/2 roll  HR/TR // bars 3x10  LAQ 3x10 2# B  Quad sets with small bolster under knees 3x10 1# B  SLR and hip abd/add 3x10 1# B  Piriformis stretch in sit 3x30"  PPT 3x10  LTR 3x10 without Tball    Written Home Exercises Provided: yes    Pt demo good understanding of the education provided. Ella demonstrated good return demonstration of activities.     Assessment:     Pt will continue to benefit from skilled PT intervention. Medical Necessity is demonstrated by:  Pain limits function of effected part for some activities, Unable to participate fully in daily activities, Requires skilled supervision to complete and progress HEP and Weakness.    Patient is making good progress towards established goals.    Plan:    Continue with established Plan of Care towards PT goals.   "

## 2017-01-25 NOTE — PROGRESS NOTES
"Name: Ella Canales  Date:   01/19/2017  Primary Diagnosis: .  Problem List Items Addressed This Visit     Right knee pain    Post-traumatic osteoarthritis of right knee    Left knee pain    Post-traumatic osteoarthritis of left knee          Time in: 0910  Time Out: 0945  Total Treatment Time: 35  Group Time: 35      Subjective:    Patient reports painful knees since she has been doing a lot of packing and moving around the last few days.  Patient reports their pain to be 8/10 in the knees on a 0-10 scale with 0 being no pain and 10 being the worst pain imaginable.    Objective    Patient received group therapy to address strength, endurance, ROM and flexibility with 1 other patients with activities as follows:     Patient received therapeutic exercises to develop strength, endurance, ROM and flexibility for 35 minutes including:   Standing HR/TR 3x10  Bike x 10 min  Seated hamstring stretch 3/30" B  Standing gastroc stretch 3/30" B  QS 3x10 with towel roll for feedback  SLR 1# 3x10  Supine hamstring stretch c/ strap 3/30" B    Assessment:     Patient tolerating treatment fairly. Pt not feeling well and leaving early from session.    Pt will continue to benefit from skilled PT intervention. Medical Necessity is demonstrated by:  Pain limits function of effected part for some activities and Weakness.    Patient is making good progress towards established goals.    Plan:  Continue with established Plan of Care towards PT goals.     "

## 2017-01-30 ENCOUNTER — DOCUMENTATION ONLY (OUTPATIENT)
Dept: FAMILY MEDICINE | Facility: CLINIC | Age: 67
End: 2017-01-30

## 2017-01-30 NOTE — PROGRESS NOTES
Pre-Visit Chart Review  For Appointment Scheduled on 02/03/2017    Health Maintenance Due   Topic Date Due    Colonoscopy  04/03/2017

## 2017-02-02 ENCOUNTER — DOCUMENTATION ONLY (OUTPATIENT)
Dept: FAMILY MEDICINE | Facility: CLINIC | Age: 67
End: 2017-02-02

## 2017-02-02 ENCOUNTER — TELEPHONE (OUTPATIENT)
Dept: SPORTS MEDICINE | Facility: CLINIC | Age: 67
End: 2017-02-02

## 2017-02-02 NOTE — TELEPHONE ENCOUNTER
Called patient to see if she received surgery clearance patient stated she was calling family doctor to see. I also informed patient she will not be able to do pre-op without it..

## 2017-02-06 ENCOUNTER — OFFICE VISIT (OUTPATIENT)
Dept: SPORTS MEDICINE | Facility: CLINIC | Age: 67
End: 2017-02-06
Payer: MEDICARE

## 2017-02-06 ENCOUNTER — HOSPITAL ENCOUNTER (OUTPATIENT)
Dept: PREADMISSION TESTING | Facility: OTHER | Age: 67
Discharge: HOME | End: 2017-02-06
Attending: ORTHOPAEDIC SURGERY
Payer: MEDICARE

## 2017-02-06 ENCOUNTER — ANESTHESIA EVENT (OUTPATIENT)
Dept: SURGERY | Facility: OTHER | Age: 67
DRG: 470 | End: 2017-02-06
Payer: MEDICARE

## 2017-02-06 VITALS
TEMPERATURE: 98 F | RESPIRATION RATE: 16 BRPM | OXYGEN SATURATION: 99 % | BODY MASS INDEX: 30.23 KG/M2 | WEIGHT: 154 LBS | HEART RATE: 72 BPM | SYSTOLIC BLOOD PRESSURE: 140 MMHG | DIASTOLIC BLOOD PRESSURE: 82 MMHG | HEIGHT: 60 IN

## 2017-02-06 VITALS
SYSTOLIC BLOOD PRESSURE: 149 MMHG | HEIGHT: 60 IN | HEART RATE: 78 BPM | WEIGHT: 154 LBS | DIASTOLIC BLOOD PRESSURE: 86 MMHG | BODY MASS INDEX: 30.23 KG/M2

## 2017-02-06 DIAGNOSIS — M12.562 TRAUMATIC ARTHRITIS OF LEFT KNEE: Primary | ICD-10-CM

## 2017-02-06 DIAGNOSIS — M21.162 GENU VARUM OF LEFT LOWER EXTREMITY: ICD-10-CM

## 2017-02-06 LAB
ABO + RH BLD: NORMAL
BLD GP AB SCN CELLS X3 SERPL QL: NORMAL

## 2017-02-06 PROCEDURE — 86901 BLOOD TYPING SEROLOGIC RH(D): CPT

## 2017-02-06 PROCEDURE — 86900 BLOOD TYPING SEROLOGIC ABO: CPT

## 2017-02-06 PROCEDURE — 99499 UNLISTED E&M SERVICE: CPT | Mod: S$PBB,,, | Performed by: PHYSICIAN ASSISTANT

## 2017-02-06 PROCEDURE — 99999 PR PBB SHADOW E&M-EST. PATIENT-LVL IV: CPT | Mod: PBBFAC,,, | Performed by: PHYSICIAN ASSISTANT

## 2017-02-06 PROCEDURE — 36415 COLL VENOUS BLD VENIPUNCTURE: CPT

## 2017-02-06 RX ORDER — MIDAZOLAM HYDROCHLORIDE 5 MG/ML
5 INJECTION INTRAMUSCULAR; INTRAVENOUS
Status: DISCONTINUED | OUTPATIENT
Start: 2017-02-06 | End: 2017-02-07 | Stop reason: HOSPADM

## 2017-02-06 RX ORDER — FAMOTIDINE 20 MG/1
20 TABLET, FILM COATED ORAL
Status: CANCELLED | OUTPATIENT
Start: 2017-02-06 | End: 2017-02-06

## 2017-02-06 RX ORDER — SODIUM CHLORIDE, SODIUM LACTATE, POTASSIUM CHLORIDE, CALCIUM CHLORIDE 600; 310; 30; 20 MG/100ML; MG/100ML; MG/100ML; MG/100ML
INJECTION, SOLUTION INTRAVENOUS CONTINUOUS
Status: CANCELLED | OUTPATIENT
Start: 2017-02-06

## 2017-02-06 RX ORDER — FEXOFENADINE HCL 60 MG
60 TABLET ORAL DAILY
COMMUNITY
End: 2023-02-05

## 2017-02-06 RX ORDER — OXYCODONE AND ACETAMINOPHEN 10; 325 MG/1; MG/1
1 TABLET ORAL EVERY 6 HOURS PRN
Qty: 60 TABLET | Refills: 0 | Status: ON HOLD | OUTPATIENT
Start: 2017-02-06 | End: 2017-02-28

## 2017-02-06 RX ORDER — PROMETHAZINE HYDROCHLORIDE 25 MG/1
25 TABLET ORAL EVERY 6 HOURS PRN
Qty: 30 TABLET | Refills: 0 | Status: SHIPPED | OUTPATIENT
Start: 2017-02-06 | End: 2017-03-06

## 2017-02-06 NOTE — ANESTHESIA PREPROCEDURE EVALUATION
02/06/2017  Ella Canales is a 66 y.o., female.    [facility] Anesthesia Evaluation    I have reviewed the Patient Summary Reports.    I have reviewed the Nursing Notes.   I have reviewed the Medications.     Review of Systems  Anesthesia Hx:  No problems with previous Anesthesia  Denies Family Hx of Anesthesia complications.   Denies Personal Hx of Anesthesia complications.   Social:  Non-Smoker    Hematology/Oncology:  Hematology Normal   Oncology Normal     EENT/Dental:   chronic allergic rhinitis   Cardiovascular:   Hypertension, well controlled Normal angiogram 2016   Pulmonary:  Pulmonary Normal    Renal/:  Renal/ Normal     Hepatic/GI:   GERD, well controlled    Musculoskeletal:  Musculoskeletal Normal    Neurological:  Neurology Normal    Endocrine:  Endocrine Normal        Physical Exam  General:  Well nourished    Airway/Jaw/Neck:  Airway Findings: Mouth Opening: Normal Tongue: Normal  General Airway Assessment: Adult  Mallampati: II  TM Distance: Normal, at least 6 cm         Dental:  Dental Findings: upper front caps             Anesthesia Plan  Type of Anesthesia, risks & benefits discussed:  Anesthesia Type:  general  Patient's Preference:   Intra-op Monitoring Plan:   Intra-op Monitoring Plan Comments:   Post Op Pain Control Plan: single-shot nerve block  Post Op Pain Control Plan Comments:   Induction:   IV  Beta Blocker:         Informed Consent: Patient understands risks and agrees with Anesthesia plan.  Questions answered. Anesthesia consent signed with patient.  ASA Score: 2     Day of Surgery Review of History & Physical:    H&P update referred to the surgeon.         Ready For Surgery From Anesthesia Perspective.

## 2017-02-06 NOTE — H&P
Ella Canales  is here for a completion of her perioperative paperwork. she  Is scheduled to undergo 1. Left total knee replacement (Conformis iTotal) on 2/14/2017.  She is a healthy individual and does need clearance for this procedure. Cleared by from Dr. Fredy Ellis , cardiology . Dr. Mulligan states that he is ok with this clearance and not a PCP clearance as well.    Risks, indications and benefits of the surgical procedure were discussed with the patient. All questions with regard to surgery, rehab, expected return to functional activities, activities of daily living and recreational endeavors were answered to her satisfaction.    Once no other questions were asked, a brief history and physical exam was then performed.      PHYSICAL EXAM:  GEN: A&Ox3, WD WN NAD  HEENT: WNL  CHEST: CTAB, no W/R/R  HEART: RRR, no M/R/G  ABD: Soft, NT ND, BS x4 QUADS  MS; See Epic  NEURO: CN II-XII intact       The surgical consent was then reviewed with the patient, who agreed with all the contents of the consent form and it was signed. she was then given the [facility] surgery packet to bring with her to [facility] for the anesthesia portion of her perioperative paperwork.     PHYSICAL THERAPY:  She was also instructed regarding physical therapy and will begin on  POD #1 at [facility].     POST OP CARE:instructions were reviewed including care of the wound and dressing after surgery and when she can shower.     PAIN MANAGEMENT: Ella Canales was also given her pain management regime, which includes the TENS unit given to her by Raheel Hammonds along with the education required for its use. She was also instructed regarding the Polar ice unit that will be in place after surgery and her postoperative pain medications.     PAIN MEDICATION:  Percocet 10/325mg 1 po q 4-6 hours prn pain  Phenergan 25 mg one p.o. q.4-6 hours p.r.n. nausea and vomiting.  Cannot take ASA due to ulcer    As there were no other questions to be asked,  she was given my business card along with Angie Mulligan MD business card if she has any questions or concerns prior to surgery or in the postop period.

## 2017-02-06 NOTE — DISCHARGE INSTRUCTIONS
PRE-ADMIT TESTING -  [phone]    [address]  [facility]        OUTPATIENT SURGERY UNIT - [phone]    Your surgery has been scheduled at [facility]. We are pleased to have the opportunity to serve you. For Further Information please call [phone].    On the day of surgery please report to the Information Desk on the 1st floor.    CONTACT YOUR PHYSICIAN'S OFFICE THE DAY PRIOR TO YOUR SURGERY TO OBTAIN YOUR ARRIVAL TIME.     The evening before surgery do not eat anything after 9 p.m. ( this includes hard candy, chewing gum and mints).  You may have GATORADE, POWERADE AND WATER FROM 9 p.m. until leaving home to come to the hospital.   DO NOT DRINK ANY LIQUIDS ON THE WAY TO THE HOSPITAL.     SPECIAL MEDICATION INSTRUCTIONS: TAKE medications checked off by the Anesthesiologist on your Medication List.    Angiogram Patients: Take medications as instructed by your physician, including aspirin.     Surgery Patients:    If you take ASPIRIN - Your PHYSICIAN/SURGEON will need to inform you IF/OR when you need to stop taking aspirin prior to your surgery.     Do Not take any medications containing IBUPROFEN.  Do Not Wear any make-up or dark nail polish   (especially eye make-up) to surgery. If you come to surgery with makeup on you will be required to remove the makeup or nail polish.    Do not shave your surgical area at least 5 days prior to your surgery. The surgical prep will be performed at the hospital according to Infection Control regulations.    Leave all valuables at home.   Do Not wear any jewelry or watches, including any metal in body piercings.  Contact Lens must be removed before surgery. Either do not wear the contact lens or bring a case and solution for storage.  Please bring a container for eyeglasses or dentures as required.  Bring any paperwork your physician has provided, such as consent forms,  history and physicals, doctor's orders, etc.   Bring comfortable  clothes that are loose fitting to wear upon discharge. Take into consideration the type of surgery being performed.  Maintain your diet as advised per your physician the day prior to surgery.      Adequate rest the night before surgery is advised.   Park in the Parking lot behind the hospital or in the [facility] across the street from the parking lot. Parking is complimentary.  If you will be discharged the same day as your procedure, please arrange for a responsible adult to drive you home or to accompany you if traveling by taxi.   YOU WILL NOT BE PERMITTED TO DRIVE OR TO LEAVE THE HOSPITAL ALONE AFTER SURGERY.   It is strongly recommended that you arrange for someone to remain with you for the first 24 hrs following your surgery.       Thank you for your cooperation.  The Staff of [facility].        Bathing Instructions                                                                 Please shower the evening before and morning of your procedure with    ANTIBACTERIAL SOAP. ( DIAL, etc )  Concentrate on the surgical area   for at least 3 minutes and rinse completely. Dry off as usual.                            No lotions or creams.

## 2017-02-06 NOTE — MR AVS SNAPSHOT
[facility]  [address]  [facility] [address]  Phone: [phone]                  Ella Canales   2017 11:30 AM   Appointment    Description:  Female : 1950   Provider:  Ronel Urban PA-C   Department:  [facility]                To Do List           Future Appointments        Provider Department Dept Phone    2017 11:30 AM Ronel Urban PA-C [facility] [phone]    2017 1:00 PM PRE-ADMIT, [facility] [phone]    2017 2:00 PM Adry Damian MD [facility] [phone]    2017 11:15 AM Ronel Urban PA-C [facility] [phone]    3/27/2017 11:15 AM Angie Mulligan MD [facility] [phone]      Your Future Surgeries/Procedures     2017   Surgery with Angie Mulligan MD   [facility] ([facility])    [address]  [facility] [address] [phone]              Goals (5 Years of Data)     None      [facility]     [facility] Care [facility] - 24/7 Assistance  Registered nurses in the [facility] provide clinical advisement, health education, appointment booking, and other advisory services.  Call for this free service at [phone].             Medications           Message regarding Medications     Verify the changes and/or additions to your medication regime listed below are the same as discussed with your clinician today.  If any of these changes or additions are incorrect, please notify your healthcare provider.             Verify that the below list of medications is an accurate representation of the medications you are currently taking.  If none reported, the list may be blank. If incorrect, please contact your healthcare provider. Carry this list with you in case of emergency.           Current Medications     fluticasone (FLONASE) 50  mcg/actuation nasal spray 1 spray by Each Nare route once daily.    glucosamine-chondroit-vit C-Mn (GLUCOSAMINE-CHONDROITIN MAX ST) 500-400 mg Cap Twice a day    losartan (COZAAR) 100 MG tablet Take 1 tablet (100 mg total) by mouth once daily.    metoprolol succinate (TOPROL-XL) 50 MG 24 hr tablet Take 1 tablet (50 mg total) by mouth once daily.    omeprazole (PRILOSEC) 20 MG capsule Take 1 capsule (20 mg total) by mouth 2 (two) times daily.    potassium chloride SA (K-DUR,KLOR-CON) 10 MEQ tablet Take 1 tablet (10 mEq total) by mouth 2 (two) times daily.    raloxifene (EVISTA) 60 mg tablet Take 1 tablet (60 mg total) by mouth once daily.    simvastatin (ZOCOR) 40 MG tablet Take 1 tablet (40 mg total) by mouth every evening.           Clinical Reference Information           Your Vitals Were     Last Period                   04/03/2012           Allergies as of 2/6/2017     Ace Inhibitors      Immunizations Administered on Date of Encounter - 2/6/2017     None      Language Assistance Services     ATTENTION: Language assistance services are available, free of charge. Please call [phone].      ATENCIÓN: Si spikela dalia, tiene a priest disposición servicios gratuitos de asistencia lingüística. Llame al [phone].     MARCE Ý: N?u b?n nói Ti?ng Vi?t, có các d?ch v? h? tr? ngôn ng? mi?n phí dành cho b?n. G?i s? [phone].         [facility] complies with applicable Federal civil rights laws and does not discriminate on the basis of race, color, national origin, age, disability, or sex.

## 2017-02-06 NOTE — IP AVS SNAPSHOT
[facility] [place] [facility] ([place])  [address] [place], [place] [id number]  Phone: [phone]           Patient Discharge Instructions    Our goal is to set you up for success. This packet includes information on your condition, medications, and your home care. It will help you to care for yourself so you don't get sicker.     Please ask your nurse if you have any questions.        There are many details to remember when preparing for your surgery. Here is what you will need to do, please ask your nurse if there are more specific instructions and if you have any questions:    1. 24 hours before procedure Do not smoke or drink alcoholic beverages 24 hours prior to your procedure    2. Eating before procedure Do not eat or drink anything 8 hours before your procedure - this includes gum, mints, and candy.     3. Day of procedure Please remove all jewelry for the procedure. If you wear contact lenses, dentures, hearing aids or glasses, bring a container to put them in during your surgery and give to a family member for safekeeping.  If your doctor has scheduled you for an overnight stay, bring a small overnight bag with any personal items that you need.    4. After procedure Make arrangements in advance for transportation home by a responsible adult. It is not safe to drive a vehicle during the 24 hours following surgery.     PLEASE NOTE: You may be contacted the day before your surgery to confirm your surgery date and arrival time. The Surgery schedule has many variables which may affect the time of your surgery case. Family members should be available if your surgery time changes.                [facility]  Unless otherwise directed by your provider, please contact [facility], our nurse care line that is available for 24/7 assistance.     [phone] (toll-free)    Registered nurses in the [facility] provide clinical advisement, health education, appointment booking, and other  advisory services.                    ** Verify the list of medication(s) below is accurate and up to date. Carry this with you in case of emergency. If your medications have changed, please notify your healthcare provider.             Medication List      TAKE these medications        Additional Info                      fexofenadine 60 MG tablet   Commonly known as:  ALLEGRA   Refills:  0   Dose:  60 mg    Instructions:  Take 60 mg by mouth once daily.     Begin Date    AM    Noon    PM    Bedtime       fluticasone 50 mcg/actuation nasal spray   Commonly known as:  FLONASE   Quantity:  16 g   Refills:  prn   Dose:  1 spray    Instructions:  1 spray by Each Nare route once daily.     Begin Date    AM    Noon    PM    Bedtime       GLUCOSAMINE-CHONDROITIN MAX -400 mg Cap   Refills:  0   Generic drug:  glucosamine-chondroit-vit C-Mn    Instructions:  Twice a day     Begin Date    AM    Noon    PM    Bedtime       losartan 100 MG tablet   Commonly known as:  COZAAR   Quantity:  90 tablet   Refills:  3   Dose:  100 mg    Instructions:  Take 1 tablet (100 mg total) by mouth once daily.     Begin Date    AM    Noon    PM    Bedtime       metoprolol succinate 50 MG 24 hr tablet   Commonly known as:  TOPROL-XL   Quantity:  90 tablet   Refills:  3   Dose:  50 mg    Instructions:  Take 1 tablet (50 mg total) by mouth once daily.     Begin Date    AM    Noon    PM    Bedtime       omeprazole 20 MG capsule   Commonly known as:  PRILOSEC   Quantity:  180 capsule   Refills:  1   Dose:  20 mg    Instructions:  Take 1 capsule (20 mg total) by mouth 2 (two) times daily.     Begin Date    AM    Noon    PM    Bedtime       oxycodone-acetaminophen  mg per tablet   Commonly known as:  PERCOCET   Quantity:  60 tablet   Refills:  0   Dose:  1 tablet    Instructions:  Take 1 tablet by mouth every 6 (six) hours as needed for Pain.     Begin Date    AM    Noon    PM    Bedtime       potassium chloride SA 10 MEQ tablet   Commonly  known as:  K-DUR,KLOR-CON   Quantity:  180 tablet   Refills:  4   Dose:  10 mEq    Instructions:  Take 1 tablet (10 mEq total) by mouth 2 (two) times daily.     Begin Date    AM    Noon    PM    Bedtime       promethazine 25 MG tablet   Commonly known as:  PHENERGAN   Quantity:  30 tablet   Refills:  0   Dose:  25 mg    Instructions:  Take 1 tablet (25 mg total) by mouth every 6 (six) hours as needed for Nausea.     Begin Date    AM    Noon    PM    Bedtime       raloxifene 60 mg tablet   Commonly known as:  EVISTA   Quantity:  90 tablet   Refills:  3   Dose:  60 mg    Instructions:  Take 1 tablet (60 mg total) by mouth once daily.     Begin Date    AM    Noon    PM    Bedtime       simvastatin 40 MG tablet   Commonly known as:  ZOCOR   Quantity:  90 tablet   Refills:  3   Dose:  40 mg    Instructions:  Take 1 tablet (40 mg total) by mouth every evening.     Begin Date    AM    Noon    PM    Bedtime                  Please bring to all follow up appointments:    1. A copy of your discharge instructions.  2. All medicines you are currently taking in their original bottles.  3. Identification and insurance card.    Please arrive 15 minutes ahead of scheduled appointment time.    Please call 24 hours in advance if you must reschedule your appointment and/or time.        Your Scheduled Appointments     Feb 09, 2017  2:00 PM CST   Established Patient Visit with Adry Damian MD   [address] [address]   [phone]            Feb 27, 2017 11:15 AM CST   Post OP with Ronel Urban PA-C   [facility]    [address]  [facility] [address]   [phone]            Mar 27, 2017 11:15 AM CDT   Post OP with Angie Mulligan MD   [facility] ([place])    [address]  [facility] [address]   [phone]            Apr 24, 2017  8:40 AM CDT   Established Patient Visit with Adry Damian MD    [facility] [place]    [address] [place] [address] [phone]              Your Future Surgeries/Procedures     Feb 14, 2017   Surgery with Angie Mulligan MD   [facility] ([facility]    [address]  [place] [address]   [phone]                  Discharge Instructions       PRE-ADMIT TESTING -  [phone]    [address]  [facility]        OUTPATIENT SURGERY UNIT - [phone]    Your surgery has been scheduled at [facility]. We are pleased to have the opportunity to serve you. For Further Information please call [phone].    On the day of surgery please report to the Information Desk on the 1st floor.    CONTACT YOUR PHYSICIAN'S OFFICE THE DAY PRIOR TO YOUR SURGERY TO OBTAIN YOUR ARRIVAL TIME.     The evening before surgery do not eat anything after 9 p.m. ( this includes hard candy, chewing gum and mints).  You may have GATORADE, POWERADE AND WATER FROM 9 p.m. until leaving home to come to the hospital.   DO NOT DRINK ANY LIQUIDS ON THE WAY TO THE HOSPITAL.     SPECIAL MEDICATION INSTRUCTIONS: TAKE medications checked off by the Anesthesiologist on your Medication List.    Angiogram Patients: Take medications as instructed by your physician, including aspirin.     Surgery Patients:    If you take ASPIRIN - Your PHYSICIAN/SURGEON will need to inform you IF/OR when you need to stop taking aspirin prior to your surgery.     Do Not take any medications containing IBUPROFEN.  Do Not Wear any make-up or dark nail polish   (especially eye make-up) to surgery. If you come to surgery with makeup on you will be required to remove the makeup or nail polish.    Do not shave your surgical area at least 5 days prior to your surgery. The surgical prep will be performed at the hospital according to Infection Control regulations.    Leave all valuables at home.   Do Not wear any jewelry or watches, including any  metal in body piercings.  Contact Lens must be removed before surgery. Either do not wear the contact lens or bring a case and solution for storage.  Please bring a container for eyeglasses or dentures as required.  Bring any paperwork your physician has provided, such as consent forms,  history and physicals, doctor's orders, etc.   Bring comfortable clothes that are loose fitting to wear upon discharge. Take into consideration the type of surgery being performed.  Maintain your diet as advised per your physician the day prior to surgery.      Adequate rest the night before surgery is advised.   Park in the Parking lot behind the hospital or in the [employer] across the street from the parking lot. Parking is complimentary.  If you will be discharged the same day as your procedure, please arrange for a responsible adult to drive you home or to accompany you if traveling by taxi.   YOU WILL NOT BE PERMITTED TO DRIVE OR TO LEAVE THE HOSPITAL ALONE AFTER SURGERY.   It is strongly recommended that you arrange for someone to remain with you for the first 24 hrs following your surgery.       Thank you for your cooperation.  The Staff of [facility].        Bathing Instructions                                                                 Please shower the evening before and morning of your procedure with    ANTIBACTERIAL SOAP. ( DIAL, etc )  Concentrate on the surgical area   for at least 3 minutes and rinse completely. Dry off as usual.                            No lotions or creams.                        Admission Information     Date & Time Provider Department CSN    2/6/2017  1:00 PM Angie Mulligan MD [facility] [id number]      Care Providers     Provider Role Specialty Primary office phone    Angie Mulligan MD Attending Provider Sports Medicine [phone]      Your Vitals Were     BP Pulse Temp Resp Height Weight    140/82 72 98 °F (36.7 °C) 16 5' (1.524 m)  69.9 kg (154 lb)    Last Period SpO2 BMI          04/03/2012 99% 30.08 kg/m2        Recent Lab Values     No lab values to display.      Allergies as of 2/6/2017        Reactions    Ace Inhibitors     Other reaction(s): cough      Advance Directives     An advance directive is a document which, in the event you are no longer able to make decisions for yourself, tells your healthcare team what kind of treatment you do or do not want to receive, or who you would like to make those decisions for you.  If you do not currently have an advance directive, [facility] encourages you to create one.  For more information call:  [phone]), [phone] ([phone]),  or log on to [email or web address].        Language Assistance Services     ATTENTION: Language assistance services are available, free of charge. Please call [phone].      ATENCIÓN: Si habla español, tiene a priest disposición servicios gratuitos de asistencia lingüística. Llame al [phone].     CHÚ Ý: N?u b?n nói Ti?ng Vi?t, có các d?ch v? h? tr? ngôn ng? mi?n phí dành cho b?n. G?i s? [phone].         [facility] complies with applicable Federal civil rights laws and does not discriminate on the basis of race, color, national origin, age, disability, or sex.

## 2017-02-07 ENCOUNTER — TELEPHONE (OUTPATIENT)
Dept: SPORTS MEDICINE | Facility: CLINIC | Age: 67
End: 2017-02-07

## 2017-02-07 NOTE — TELEPHONE ENCOUNTER
----- Message from Laura Maynard MA sent at 2/7/2017 12:16 PM CST -----  Contact: self/home      ----- Message -----     From: Dm Ayers     Sent: 2/7/2017  10:13 AM       To: Wilmar Rodney Staff    Pt would like to speak with you regarding paperwork that she needs for her upcoming sx.

## 2017-02-08 ENCOUNTER — TELEPHONE (OUTPATIENT)
Dept: FAMILY MEDICINE | Facility: CLINIC | Age: 67
End: 2017-02-08

## 2017-02-08 ENCOUNTER — TELEPHONE (OUTPATIENT)
Dept: SPORTS MEDICINE | Facility: CLINIC | Age: 67
End: 2017-02-08

## 2017-02-08 ENCOUNTER — DOCUMENTATION ONLY (OUTPATIENT)
Dept: FAMILY MEDICINE | Facility: CLINIC | Age: 67
End: 2017-02-08

## 2017-02-08 NOTE — TELEPHONE ENCOUNTER
----- Message from Cris Mulligan LPN sent at 2/7/2017  9:58 AM CST -----  Contact: same  She just saw sports medicine for preop stuff. I think she needs a note from you.   ----- Message -----     From: Carl TOMPKINS Yeimy     Sent: 2/2/2017   3:01 PM       To: Nati NUNEZ Staff    Patient called in and wanted to make sure that Dr. Damian had the chance to put her pre op clearance in system so patient can have her surgery 2/14/17 at [facility].  Patient stated that she had spoken to Dr. Damian about that and would need it in system by Monday 2/6/17.    Patient call back number is [phone]

## 2017-02-08 NOTE — TELEPHONE ENCOUNTER
----- Message from Robert Mulligan sent at 2/8/2017  2:52 PM CST -----  Contact: patient  Pt request a call regarding her PRE OP appointments for her sx

## 2017-02-08 NOTE — TELEPHONE ENCOUNTER
----- Message from Cris Mulligan LPN sent at 2/7/2017  9:58 AM CST -----  Contact: same  She just saw sports medicine for preop stuff. I think she needs a note from you.   ----- Message -----     From: Carl TOMPKINS Yeimy     Sent: 2/2/2017   3:01 PM       To: Nati NUNEZ Staff    Patient called in and wanted to make sure that Dr. Damian had the chance to put her pre op clearance in system so patient can have her surgery 2/14/17 at [facility].  Patient stated that she had spoken to Dr. Damian about that and would need it in system by Monday 2/6/17.    Patient call back number is [phone]

## 2017-02-08 NOTE — PROGRESS NOTES
Pre-Visit Chart Review  For Appointment Scheduled on 2-9-17    Health Maintenance Due   Topic Date Due    Colonoscopy  04/03/2017

## 2017-02-09 ENCOUNTER — TELEPHONE (OUTPATIENT)
Dept: FAMILY MEDICINE | Facility: CLINIC | Age: 67
End: 2017-02-09

## 2017-02-09 NOTE — TELEPHONE ENCOUNTER
Advised patient pre-op letter was ready. She requested I fax it to Dr. Mulligan at [facility]. I did fax it to them today.

## 2017-02-14 ENCOUNTER — ANESTHESIA (OUTPATIENT)
Dept: SURGERY | Facility: OTHER | Age: 67
DRG: 470 | End: 2017-02-14
Payer: MEDICARE

## 2017-02-14 PROBLEM — M12.562 TRAUMATIC ARTHRITIS OF LEFT KNEE: Status: ACTIVE | Noted: 2017-02-14

## 2017-02-14 PROCEDURE — 63600175 PHARM REV CODE 636 W HCPCS: Performed by: PHYSICIAN ASSISTANT

## 2017-02-14 PROCEDURE — 63600175 PHARM REV CODE 636 W HCPCS: Performed by: NURSE ANESTHETIST, CERTIFIED REGISTERED

## 2017-02-14 PROCEDURE — 25000003 PHARM REV CODE 250: Performed by: ANESTHESIOLOGY

## 2017-02-14 PROCEDURE — 25000003 PHARM REV CODE 250: Performed by: NURSE ANESTHETIST, CERTIFIED REGISTERED

## 2017-02-14 PROCEDURE — 63600175 PHARM REV CODE 636 W HCPCS: Performed by: ANESTHESIOLOGY

## 2017-02-14 RX ORDER — GLYCOPYRROLATE 0.2 MG/ML
INJECTION INTRAMUSCULAR; INTRAVENOUS
Status: DISCONTINUED | OUTPATIENT
Start: 2017-02-14 | End: 2017-02-14

## 2017-02-14 RX ORDER — NEOSTIGMINE METHYLSULFATE 1 MG/ML
INJECTION, SOLUTION INTRAVENOUS
Status: DISCONTINUED | OUTPATIENT
Start: 2017-02-14 | End: 2017-02-14

## 2017-02-14 RX ORDER — LIDOCAINE HCL/PF 100 MG/5ML
SYRINGE (ML) INTRAVENOUS
Status: DISCONTINUED | OUTPATIENT
Start: 2017-02-14 | End: 2017-02-14

## 2017-02-14 RX ORDER — MIDAZOLAM HYDROCHLORIDE 1 MG/ML
INJECTION INTRAMUSCULAR; INTRAVENOUS
Status: DISCONTINUED | OUTPATIENT
Start: 2017-02-14 | End: 2017-02-14

## 2017-02-14 RX ORDER — TRANEXAMIC ACID 100 MG/ML
INJECTION, SOLUTION INTRAVENOUS
Status: DISCONTINUED | OUTPATIENT
Start: 2017-02-14 | End: 2017-02-14

## 2017-02-14 RX ORDER — ONDANSETRON 2 MG/ML
INJECTION INTRAMUSCULAR; INTRAVENOUS
Status: DISCONTINUED | OUTPATIENT
Start: 2017-02-14 | End: 2017-02-14

## 2017-02-14 RX ORDER — PROPOFOL 10 MG/ML
VIAL (ML) INTRAVENOUS
Status: DISCONTINUED | OUTPATIENT
Start: 2017-02-14 | End: 2017-02-14

## 2017-02-14 RX ORDER — FENTANYL CITRATE 50 UG/ML
INJECTION, SOLUTION INTRAMUSCULAR; INTRAVENOUS
Status: DISCONTINUED | OUTPATIENT
Start: 2017-02-14 | End: 2017-02-14

## 2017-02-14 RX ORDER — ROCURONIUM BROMIDE 10 MG/ML
INJECTION, SOLUTION INTRAVENOUS
Status: DISCONTINUED | OUTPATIENT
Start: 2017-02-14 | End: 2017-02-14

## 2017-02-14 RX ADMIN — ROCURONIUM BROMIDE 10 MG: 10 INJECTION, SOLUTION INTRAVENOUS at 04:02

## 2017-02-14 RX ADMIN — CEFAZOLIN SODIUM 2 G: 2 SOLUTION INTRAVENOUS at 04:02

## 2017-02-14 RX ADMIN — FENTANYL CITRATE 50 MCG: 50 INJECTION, SOLUTION INTRAMUSCULAR; INTRAVENOUS at 06:02

## 2017-02-14 RX ADMIN — FENTANYL CITRATE 100 MCG: 50 INJECTION, SOLUTION INTRAMUSCULAR; INTRAVENOUS at 02:02

## 2017-02-14 RX ADMIN — GLYCOPYRROLATE 0.8 MG: 0.2 INJECTION, SOLUTION INTRAMUSCULAR; INTRAVENOUS at 06:02

## 2017-02-14 RX ADMIN — LIDOCAINE HYDROCHLORIDE 75 MG: 20 INJECTION, SOLUTION INTRAVENOUS at 04:02

## 2017-02-14 RX ADMIN — GLYCOPYRROLATE 0.2 MG: 0.2 INJECTION, SOLUTION INTRAMUSCULAR; INTRAVENOUS at 04:02

## 2017-02-14 RX ADMIN — MIDAZOLAM HYDROCHLORIDE 2 MG: 1 INJECTION, SOLUTION INTRAMUSCULAR; INTRAVENOUS at 02:02

## 2017-02-14 RX ADMIN — CARBOXYMETHYLCELLULOSE SODIUM 2 DROP: 2.5 SOLUTION/ DROPS OPHTHALMIC at 04:02

## 2017-02-14 RX ADMIN — ONDANSETRON 4 MG: 2 INJECTION INTRAMUSCULAR; INTRAVENOUS at 05:02

## 2017-02-14 RX ADMIN — TRANEXAMIC ACID 1000 MG: 100 INJECTION, SOLUTION INTRAVENOUS at 04:02

## 2017-02-14 RX ADMIN — MIDAZOLAM HYDROCHLORIDE 2 MG: 1 INJECTION, SOLUTION INTRAMUSCULAR; INTRAVENOUS at 03:02

## 2017-02-14 RX ADMIN — ROCURONIUM BROMIDE 40 MG: 10 INJECTION, SOLUTION INTRAVENOUS at 04:02

## 2017-02-14 RX ADMIN — PROPOFOL 150 MG: 10 INJECTION, EMULSION INTRAVENOUS at 04:02

## 2017-02-14 RX ADMIN — NEOSTIGMINE METHYLSULFATE 5 MG: 1 INJECTION INTRAVENOUS at 06:02

## 2017-02-14 RX ADMIN — FENTANYL CITRATE 100 MCG: 50 INJECTION, SOLUTION INTRAMUSCULAR; INTRAVENOUS at 06:02

## 2017-02-14 RX ADMIN — SODIUM CHLORIDE, SODIUM LACTATE, POTASSIUM CHLORIDE, AND CALCIUM CHLORIDE: 600; 310; 30; 20 INJECTION, SOLUTION INTRAVENOUS at 02:02

## 2017-02-14 RX ADMIN — FENTANYL CITRATE 100 MCG: 50 INJECTION, SOLUTION INTRAMUSCULAR; INTRAVENOUS at 04:02

## 2017-02-14 NOTE — ANESTHESIA PROCEDURE NOTES
Peripheral    Patient location during procedure: holding area   Block not for primary anesthetic.  Reason for block: at surgeon's request and post-op pain management   Post-op Pain Location: knee pain  Start time: 2/14/2017 2:25 PM  Timeout: 2/14/2017 2:20 PM   End time: 2/14/2017 2:40 PM  Staffing  Anesthesiologist: MARLINE BENJAMIN  Performed by: anesthesiologist   Preanesthetic Checklist  Completed: patient identified, site marked, surgical consent, pre-op evaluation, timeout performed, IV checked, risks and benefits discussed and monitors and equipment checked  Peripheral Block  Patient position: supine  Prep: ChloraPrep and site prepped and draped  Patient monitoring: heart rate, cardiac monitor, continuous pulse ox and frequent blood pressure checks  Block type: adductor canal  Laterality: left  Injection technique: single shot  Needle  Needle gauge: 22 G  Needle length: 3.5 in  Needle localization: anatomical landmarks, nerve stimulator and ultrasound guidance   -ultrasound image captured on disc.  Assessment  Injection assessment: negative aspiration, negative parasthesia and local visualized surrounding nerve  Paresthesia pain: none  Heart rate change: no  Slow fractionated injection: yes  Medications:  Bolus administered: 30 mL of 0.5 ropivacaine

## 2017-02-15 NOTE — ANESTHESIA POSTPROCEDURE EVALUATION
Anesthesia Post Evaluation    Patient: Ella Canales    Procedure(s) Performed: Procedure(s) (LRB):  REPLACEMENT-KNEE-TOTAL (Left)    Final Anesthesia Type: general  Patient location during evaluation: PACU  Patient participation: Yes- Able to Participate  Level of consciousness: awake and alert  Post-procedure vital signs: reviewed and stable  Pain management: adequate  Airway patency: patent  PONV status at discharge: No PONV  Anesthetic complications: no      Cardiovascular status: hemodynamically stable  Respiratory status: unassisted  Hydration status: euvolemic  Follow-up not needed.        Visit Vitals    BP (!) 161/68 (BP Location: Right arm, Patient Position: Lying, BP Method: Automatic)    Pulse 72    Temp 36.7 °C (98 °F) (Oral)    Resp 16    Ht 5' (1.524 m)    Wt 69.9 kg (154 lb)    LMP 04/03/2012    SpO2 99%    Breastfeeding No    BMI 30.08 kg/m2       Pain/Oj Score: Pain Assessment Performed: Yes (2/14/2017  6:40 PM)  Presence of Pain: complains of pain/discomfort (2/14/2017  6:40 PM)  Pain Rating Prior to Med Admin: 7 (2/14/2017  6:57 PM)  Oj Score: 8 (2/14/2017  6:40 PM)

## 2017-02-15 NOTE — TRANSFER OF CARE
Anesthesia Transfer of Care Note    Patient: Ella Canales    Procedure(s) Performed: Procedure(s) (LRB):  REPLACEMENT-KNEE-TOTAL (Left)    Patient location: PACU    Anesthesia Type: general    Transport from OR: Transported from OR on 2-3 L/min O2 by NC with adequate spontaneous ventilation    Post pain: adequate analgesia    Post assessment: no apparent anesthetic complications    Post vital signs: stable    Level of consciousness: awake    Nausea/Vomiting: no nausea/vomiting    Complications: none          Last vitals:   Visit Vitals    BP (!) 161/68 (BP Location: Right arm, Patient Position: Lying, BP Method: Automatic)    Pulse 72    Temp 36.7 °C (98 °F) (Oral)    Resp 16    Ht 5' (1.524 m)    Wt 69.9 kg (154 lb)    LMP 04/03/2012    SpO2 99%    Breastfeeding No    BMI 30.08 kg/m2

## 2017-02-16 ENCOUNTER — TELEPHONE (OUTPATIENT)
Dept: SPORTS MEDICINE | Facility: CLINIC | Age: 67
End: 2017-02-16

## 2017-02-16 NOTE — TELEPHONE ENCOUNTER
----- Message from Jazmine Turpin sent at 2/16/2017  3:11 PM CST -----  Contact: Pt's cousin Huyen Matson would like the nurse to give her call back regarding patient having a 100.5 fever on last night.    Pt was given Tylenol and it went away.  Fever has come back today.    Please call Ms. Matson at [phone].        Thanks!

## 2017-02-17 ENCOUNTER — TELEPHONE (OUTPATIENT)
Dept: SPORTS MEDICINE | Facility: CLINIC | Age: 67
End: 2017-02-17

## 2017-02-17 DIAGNOSIS — Z96.652 STATUS POST TOTAL LEFT KNEE REPLACEMENT: Primary | ICD-10-CM

## 2017-02-17 NOTE — TELEPHONE ENCOUNTER
----- Message from Albania Gould sent at 2/17/2017  8:46 AM CST -----  Contact: self@ home  Pt is calling to speak with ghislaine she stated that its important.

## 2017-02-22 ENCOUNTER — TELEPHONE (OUTPATIENT)
Dept: FAMILY MEDICINE | Facility: CLINIC | Age: 67
End: 2017-02-22

## 2017-02-22 NOTE — TELEPHONE ENCOUNTER
----- Message from Adry Damian MD sent at 2/21/2017 10:42 AM CST -----  Notify patient: Your labs are all essentially in the normal range including: blood sugar, kidney function, liver function, blood cell counts, and cholesterol levels.

## 2017-02-24 ENCOUNTER — TELEPHONE (OUTPATIENT)
Dept: ADMINISTRATIVE | Facility: CLINIC | Age: 67
End: 2017-02-24

## 2017-02-24 ENCOUNTER — HOSPITAL ENCOUNTER (INPATIENT)
Facility: HOSPITAL | Age: 67
LOS: 2 days | Discharge: HOME HEALTH | DRG: 857 | End: 2017-02-28
Attending: EMERGENCY MEDICINE | Admitting: ORTHOPAEDIC SURGERY
Payer: MEDICARE

## 2017-02-24 ENCOUNTER — TELEPHONE (OUTPATIENT)
Dept: SPORTS MEDICINE | Facility: CLINIC | Age: 67
End: 2017-02-24

## 2017-02-24 ENCOUNTER — HOSPITAL ENCOUNTER (EMERGENCY)
Facility: HOSPITAL | Age: 67
Discharge: OTHER INSTITUTION | End: 2017-02-24
Attending: EMERGENCY MEDICINE
Payer: MEDICARE

## 2017-02-24 VITALS
WEIGHT: 145 LBS | DIASTOLIC BLOOD PRESSURE: 56 MMHG | OXYGEN SATURATION: 97 % | RESPIRATION RATE: 16 BRPM | TEMPERATURE: 98 F | BODY MASS INDEX: 33.56 KG/M2 | SYSTOLIC BLOOD PRESSURE: 128 MMHG | HEIGHT: 55 IN | HEART RATE: 89 BPM

## 2017-02-24 DIAGNOSIS — M25.562 LEFT KNEE PAIN: ICD-10-CM

## 2017-02-24 DIAGNOSIS — M12.562 TRAUMATIC ARTHRITIS OF LEFT KNEE: ICD-10-CM

## 2017-02-24 DIAGNOSIS — T81.40XA POST OP INFECTION: Primary | ICD-10-CM

## 2017-02-24 DIAGNOSIS — I10 ESSENTIAL HYPERTENSION: ICD-10-CM

## 2017-02-24 DIAGNOSIS — L03.116 CELLULITIS OF LEFT LOWER EXTREMITY: Primary | ICD-10-CM

## 2017-02-24 DIAGNOSIS — M25.569 KNEE PAIN: ICD-10-CM

## 2017-02-24 DIAGNOSIS — M21.162 GENU VARUM OF LEFT LOWER EXTREMITY: ICD-10-CM

## 2017-02-24 LAB
ABO + RH BLD: NORMAL
ALBUMIN SERPL BCP-MCNC: 3 G/DL
ALP SERPL-CCNC: 215 U/L
ALT SERPL W/O P-5'-P-CCNC: 41 U/L
ANION GAP SERPL CALC-SCNC: 10 MMOL/L
APTT BLDCRRT: 22.3 SEC
AST SERPL-CCNC: 31 U/L
BACTERIA #/AREA URNS AUTO: NORMAL /HPF
BASOPHILS # BLD AUTO: 0 K/UL
BASOPHILS NFR BLD: 0.6 %
BILIRUB SERPL-MCNC: 0.4 MG/DL
BILIRUB UR QL STRIP: NEGATIVE
BLD GP AB SCN CELLS X3 SERPL QL: NORMAL
BUN SERPL-MCNC: 9 MG/DL
CALCIUM SERPL-MCNC: 9.5 MG/DL
CHLORIDE SERPL-SCNC: 102 MMOL/L
CLARITY UR REFRACT.AUTO: CLEAR
CO2 SERPL-SCNC: 26 MMOL/L
COLOR UR AUTO: YELLOW
CREAT SERPL-MCNC: 0.7 MG/DL
CRP SERPL-MCNC: 108.4 MG/L
DIFFERENTIAL METHOD: ABNORMAL
EOSINOPHIL # BLD AUTO: 0 K/UL
EOSINOPHIL NFR BLD: 0.7 %
ERYTHROCYTE [DISTWIDTH] IN BLOOD BY AUTOMATED COUNT: 14.9 %
ERYTHROCYTE [SEDIMENTATION RATE] IN BLOOD BY WESTERGREN METHOD: 131 MM/HR
EST. GFR  (AFRICAN AMERICAN): >60 ML/MIN/1.73 M^2
EST. GFR  (NON AFRICAN AMERICAN): >60 ML/MIN/1.73 M^2
GLUCOSE SERPL-MCNC: 113 MG/DL
GLUCOSE UR QL STRIP: NEGATIVE
HCT VFR BLD AUTO: 26.4 %
HGB BLD-MCNC: 8.5 G/DL
HGB UR QL STRIP: NEGATIVE
INR PPP: 1
KETONES UR QL STRIP: ABNORMAL
LEUKOCYTE ESTERASE UR QL STRIP: ABNORMAL
LYMPHOCYTES # BLD AUTO: 1 K/UL
LYMPHOCYTES NFR BLD: 16.7 %
MCH RBC QN AUTO: 27.5 PG
MCHC RBC AUTO-ENTMCNC: 32.3 %
MCV RBC AUTO: 85 FL
MICROSCOPIC COMMENT: NORMAL
MONOCYTES # BLD AUTO: 0.6 K/UL
MONOCYTES NFR BLD: 9.2 %
NEUTROPHILS # BLD AUTO: 4.6 K/UL
NEUTROPHILS NFR BLD: 72.8 %
NITRITE UR QL STRIP: NEGATIVE
PH UR STRIP: 6 [PH] (ref 5–8)
PLATELET # BLD AUTO: 496 K/UL
PMV BLD AUTO: 6.9 FL
POTASSIUM SERPL-SCNC: 4.4 MMOL/L
PREALB SERPL-MCNC: 13 MG/DL
PROCALCITONIN SERPL IA-MCNC: 0.45 NG/ML
PROT SERPL-MCNC: 6.9 G/DL
PROT UR QL STRIP: NEGATIVE
PROTHROMBIN TIME: 11 SEC
RBC # BLD AUTO: 3.09 M/UL
RBC #/AREA URNS AUTO: 1 /HPF (ref 0–4)
SODIUM SERPL-SCNC: 138 MMOL/L
SP GR UR STRIP: 1.02 (ref 1–1.03)
SQUAMOUS #/AREA URNS AUTO: 1 /HPF
TRANSFERRIN SERPL-MCNC: 277 MG/DL
URN SPEC COLLECT METH UR: ABNORMAL
UROBILINOGEN UR STRIP-ACNC: 2 EU/DL
WBC # BLD AUTO: 6.3 K/UL
WBC #/AREA URNS AUTO: 3 /HPF (ref 0–5)

## 2017-02-24 PROCEDURE — 84134 ASSAY OF PREALBUMIN: CPT

## 2017-02-24 PROCEDURE — 80053 COMPREHEN METABOLIC PANEL: CPT

## 2017-02-24 PROCEDURE — 87102 FUNGUS ISOLATION CULTURE: CPT

## 2017-02-24 PROCEDURE — 0S9D3ZX DRAINAGE OF LEFT KNEE JOINT, PERCUTANEOUS APPROACH, DIAGNOSTIC: ICD-10-PCS | Performed by: ORTHOPAEDIC SURGERY

## 2017-02-24 PROCEDURE — 85610 PROTHROMBIN TIME: CPT

## 2017-02-24 PROCEDURE — 87116 MYCOBACTERIA CULTURE: CPT

## 2017-02-24 PROCEDURE — 86901 BLOOD TYPING SEROLOGIC RH(D): CPT

## 2017-02-24 PROCEDURE — 86140 C-REACTIVE PROTEIN: CPT

## 2017-02-24 PROCEDURE — 0SBD0ZX EXCISION OF LEFT KNEE JOINT, OPEN APPROACH, DIAGNOSTIC: ICD-10-PCS | Performed by: ORTHOPAEDIC SURGERY

## 2017-02-24 PROCEDURE — 84466 ASSAY OF TRANSFERRIN: CPT

## 2017-02-24 PROCEDURE — 89060 EXAM SYNOVIAL FLUID CRYSTALS: CPT

## 2017-02-24 PROCEDURE — 87070 CULTURE OTHR SPECIMN AEROBIC: CPT

## 2017-02-24 PROCEDURE — 93010 ELECTROCARDIOGRAM REPORT: CPT | Mod: ,,, | Performed by: INTERNAL MEDICINE

## 2017-02-24 PROCEDURE — 85025 COMPLETE CBC W/AUTO DIFF WBC: CPT

## 2017-02-24 PROCEDURE — 99285 EMERGENCY DEPT VISIT HI MDM: CPT | Mod: 25,27

## 2017-02-24 PROCEDURE — 86900 BLOOD TYPING SEROLOGIC ABO: CPT

## 2017-02-24 PROCEDURE — 36415 COLL VENOUS BLD VENIPUNCTURE: CPT

## 2017-02-24 PROCEDURE — 87015 SPECIMEN INFECT AGNT CONCNTJ: CPT

## 2017-02-24 PROCEDURE — 85651 RBC SED RATE NONAUTOMATED: CPT

## 2017-02-24 PROCEDURE — 87075 CULTR BACTERIA EXCEPT BLOOD: CPT

## 2017-02-24 PROCEDURE — 0JBP0ZX EXCISION OF LEFT LOWER LEG SUBCUTANEOUS TISSUE AND FASCIA, OPEN APPROACH, DIAGNOSTIC: ICD-10-PCS | Performed by: ORTHOPAEDIC SURGERY

## 2017-02-24 PROCEDURE — 0S9D00Z DRAINAGE OF LEFT KNEE JOINT WITH DRAINAGE DEVICE, OPEN APPROACH: ICD-10-PCS | Performed by: ORTHOPAEDIC SURGERY

## 2017-02-24 PROCEDURE — 81001 URINALYSIS AUTO W/SCOPE: CPT

## 2017-02-24 PROCEDURE — 84145 PROCALCITONIN (PCT): CPT

## 2017-02-24 PROCEDURE — 85730 THROMBOPLASTIN TIME PARTIAL: CPT

## 2017-02-24 PROCEDURE — 0JDP0ZZ EXTRACTION OF LEFT LOWER LEG SUBCUTANEOUS TISSUE AND FASCIA, OPEN APPROACH: ICD-10-PCS | Performed by: ORTHOPAEDIC SURGERY

## 2017-02-24 PROCEDURE — 99284 EMERGENCY DEPT VISIT MOD MDM: CPT

## 2017-02-24 PROCEDURE — 89051 BODY FLUID CELL COUNT: CPT

## 2017-02-24 PROCEDURE — 93005 ELECTROCARDIOGRAM TRACING: CPT

## 2017-02-24 PROCEDURE — 99285 EMERGENCY DEPT VISIT HI MDM: CPT | Mod: ,,, | Performed by: EMERGENCY MEDICINE

## 2017-02-24 PROCEDURE — 87086 URINE CULTURE/COLONY COUNT: CPT

## 2017-02-24 PROCEDURE — G0378 HOSPITAL OBSERVATION PER HR: HCPCS

## 2017-02-24 RX ORDER — SODIUM CHLORIDE 9 MG/ML
INJECTION, SOLUTION INTRAVENOUS CONTINUOUS
Status: DISCONTINUED | OUTPATIENT
Start: 2017-02-25 | End: 2017-02-24

## 2017-02-24 RX ORDER — DIPHENHYDRAMINE HCL 25 MG
25 CAPSULE ORAL EVERY 6 HOURS PRN
Status: DISCONTINUED | OUTPATIENT
Start: 2017-02-25 | End: 2017-02-28 | Stop reason: HOSPADM

## 2017-02-24 RX ORDER — SODIUM CHLORIDE 0.9 % (FLUSH) 0.9 %
3 SYRINGE (ML) INJECTION EVERY 8 HOURS
Status: DISCONTINUED | OUTPATIENT
Start: 2017-02-25 | End: 2017-02-28 | Stop reason: HOSPADM

## 2017-02-24 RX ORDER — PANTOPRAZOLE SODIUM 40 MG/1
40 TABLET, DELAYED RELEASE ORAL DAILY
Status: DISCONTINUED | OUTPATIENT
Start: 2017-02-25 | End: 2017-02-28 | Stop reason: HOSPADM

## 2017-02-24 RX ORDER — HYDRALAZINE HYDROCHLORIDE 25 MG/1
25 TABLET, FILM COATED ORAL EVERY 6 HOURS PRN
Status: DISCONTINUED | OUTPATIENT
Start: 2017-02-25 | End: 2017-02-28 | Stop reason: HOSPADM

## 2017-02-24 RX ORDER — ONDANSETRON 8 MG/1
8 TABLET, ORALLY DISINTEGRATING ORAL EVERY 8 HOURS PRN
Status: DISCONTINUED | OUTPATIENT
Start: 2017-02-25 | End: 2017-02-28 | Stop reason: HOSPADM

## 2017-02-24 RX ORDER — LOSARTAN POTASSIUM 25 MG/1
100 TABLET ORAL DAILY
Status: DISCONTINUED | OUTPATIENT
Start: 2017-02-25 | End: 2017-02-28 | Stop reason: HOSPADM

## 2017-02-24 RX ORDER — RAMELTEON 8 MG/1
8 TABLET ORAL NIGHTLY PRN
Status: DISCONTINUED | OUTPATIENT
Start: 2017-02-25 | End: 2017-02-28 | Stop reason: HOSPADM

## 2017-02-24 RX ORDER — RALOXIFENE HYDROCHLORIDE 60 MG/1
60 TABLET, FILM COATED ORAL DAILY
Status: DISCONTINUED | OUTPATIENT
Start: 2017-02-25 | End: 2017-02-28 | Stop reason: HOSPADM

## 2017-02-24 RX ORDER — DIPHENHYDRAMINE HYDROCHLORIDE 50 MG/ML
25 INJECTION INTRAMUSCULAR; INTRAVENOUS EVERY 4 HOURS PRN
Status: DISCONTINUED | OUTPATIENT
Start: 2017-02-25 | End: 2017-02-28 | Stop reason: HOSPADM

## 2017-02-24 RX ORDER — ACETAMINOPHEN 325 MG/1
650 TABLET ORAL EVERY 8 HOURS PRN
Status: DISCONTINUED | OUTPATIENT
Start: 2017-02-25 | End: 2017-02-28 | Stop reason: HOSPADM

## 2017-02-24 RX ORDER — OXYCODONE HYDROCHLORIDE 5 MG/1
10 TABLET ORAL EVERY 4 HOURS PRN
Status: DISCONTINUED | OUTPATIENT
Start: 2017-02-25 | End: 2017-02-25

## 2017-02-24 RX ORDER — METOPROLOL SUCCINATE 50 MG/1
50 TABLET, EXTENDED RELEASE ORAL DAILY
Status: DISCONTINUED | OUTPATIENT
Start: 2017-02-25 | End: 2017-02-25

## 2017-02-24 RX ORDER — POTASSIUM CHLORIDE 750 MG/1
10 CAPSULE, EXTENDED RELEASE ORAL 2 TIMES DAILY
Status: DISCONTINUED | OUTPATIENT
Start: 2017-02-25 | End: 2017-02-28 | Stop reason: HOSPADM

## 2017-02-24 RX ORDER — SIMVASTATIN 40 MG/1
40 TABLET, FILM COATED ORAL NIGHTLY
Status: DISCONTINUED | OUTPATIENT
Start: 2017-02-25 | End: 2017-02-28 | Stop reason: HOSPADM

## 2017-02-24 RX ORDER — ACETAMINOPHEN 325 MG/1
650 TABLET ORAL EVERY 4 HOURS PRN
Status: DISCONTINUED | OUTPATIENT
Start: 2017-02-25 | End: 2017-02-28 | Stop reason: HOSPADM

## 2017-02-24 RX ORDER — OXYCODONE HYDROCHLORIDE 5 MG/1
5 TABLET ORAL EVERY 4 HOURS PRN
Status: DISCONTINUED | OUTPATIENT
Start: 2017-02-25 | End: 2017-02-25

## 2017-02-24 NOTE — TELEPHONE ENCOUNTER
S/w Marcela the nurse with  for the patient and she said that the pt's knee is warm to touch and red with pitting edema. Advised her to have the patient go to the ED so they can look at her knee and they may put her on antibiotics. She said she will call the patient and let her know. She also said if the pt does not get admitted would we still want her to go out to see her tomorrow, I explained to her that we would still want her to check on the patient tomorrow.

## 2017-02-24 NOTE — IP AVS SNAPSHOT
[facility]  [address]  [facility] [address]  Phone: [phone]           Patient Discharge Instructions     Our goal is to set you up for success. This packet includes information on your condition, medications, and your home care. It will help you to care for yourself so you don't get sicker and need to go back to the hospital.     Please ask your nurse if you have any questions.        There are many details to remember when preparing to leave the hospital. Here is what you will need to do:    1. Take your medicine. If you are prescribed medications, review your Medication List in the following pages. You may have new medications to  at the pharmacy and others that you'll need to stop taking. Review the instructions for how and when to take your medications. Talk with your doctor or nurses if you are unsure of what to do.     2. Go to your follow-up appointments. Specific follow-up information is listed in the following pages. Your may be contacted by a transition nurse or clinical provider about future appointments. Be sure we have all of the phone numbers to reach you, if needed. Please contact your provider's office if you are unable to make an appointment.     3. Watch for warning signs. Your doctor or nurse will give you detailed warning signs to watch for and when to call for assistance. These instructions may also include educational information about your condition. If you experience any of warning signs to your health, call your doctor.               [facility]  Unless otherwise directed by your provider, please contact [facility], our nurse care line that is available for 24/7 assistance.     [phone] (toll-free)    Registered nurses in the [facility] provide clinical advisement, health education, appointment booking, and other advisory services.                    ** Verify the list of medication(s) below is accurate and up  to date. Carry this with you in case of emergency. If your medications have changed, please notify your healthcare provider.             Medication List      START taking these medications        Additional Info                      ciprofloxacin HCl 750 MG tablet   Commonly known as:  CIPRO   Quantity:  60 tablet   Refills:  1   Dose:  750 mg   Indications:  Bone/Joint    Last time this was given:  750 mg on 2/28/2017  9:19 AM   Instructions:  Take 1 tablet (750 mg total) by mouth every 12 (twelve) hours.     Begin Date    AM    Noon    PM    Bedtime       dextrose 5 % SolP 250 mL with vancomycin 1,000 mg SolR 1,500 mg   Refills:  0   Dose:  1500 mg   Indications:  Bone/Joint    Last time this was given:  1,500 mg on 2/28/2017  2:18 PM   Instructions:  Inject 1,500 mg into the vein every 12 (twelve) hours.     Begin Date    AM    Noon    PM    Bedtime         CHANGE how you take these medications        Additional Info                      metoprolol succinate 50 MG 24 hr tablet   Commonly known as:  TOPROL-XL   Quantity:  90 tablet   Refills:  3   Dose:  50 mg   What changed:  when to take this    Last time this was given:  50 mg on 2/27/2017  9:59 PM   Instructions:  Take 1 tablet (50 mg total) by mouth once daily.     Begin Date    AM    Noon    PM    Bedtime       * rivaroxaban 10 mg Tab   Commonly known as:  XARELTO   Quantity:  12 tablet   Refills:  0   Dose:  10 mg   What changed:  Another medication with the same name was added. Make sure you understand how and when to take each.    Last time this was given:  10 mg on 2/28/2017  9:21 AM   Instructions:  Take 1 tablet (10 mg total) by mouth once daily.     Begin Date    AM    Noon    PM    Bedtime       * rivaroxaban 10 mg Tab   Commonly known as:  XARELTO   Quantity:  30 tablet   Refills:  0   Dose:  10 mg   What changed:  You were already taking a medication with the same name, and this prescription was added. Make sure you understand how and when to take  each.    Last time this was given:  10 mg on 2/28/2017  9:21 AM   Instructions:  Take 1 tablet (10 mg total) by mouth once daily.     Begin Date    AM    Noon    PM    Bedtime       * Notice:  This list has 2 medication(s) that are the same as other medications prescribed for you. Read the directions carefully, and ask your doctor or other care provider to review them with you.      CONTINUE taking these medications        Additional Info                      fexofenadine 60 MG tablet   Commonly known as:  ALLEGRA   Refills:  0   Dose:  60 mg    Instructions:  Take 60 mg by mouth once daily.     Begin Date    AM    Noon    PM    Bedtime       fluticasone 50 mcg/actuation nasal spray   Commonly known as:  FLONASE   Quantity:  16 g   Refills:  prn   Dose:  1 spray    Instructions:  1 spray by Each Nare route once daily.     Begin Date    AM    Noon    PM    Bedtime       GLUCOSAMINE-CHONDROITIN MAX -400 mg Cap   Refills:  0   Generic drug:  glucosamine-chondroit-vit C-Mn    Instructions:  Twice a day     Begin Date    AM    Noon    PM    Bedtime       losartan 100 MG tablet   Commonly known as:  COZAAR   Quantity:  90 tablet   Refills:  3   Dose:  100 mg    Last time this was given:  100 mg on 2/28/2017  9:24 AM   Instructions:  Take 1 tablet (100 mg total) by mouth once daily.     Begin Date    AM    Noon    PM    Bedtime       omeprazole 20 MG capsule   Commonly known as:  PRILOSEC   Quantity:  180 capsule   Refills:  1   Dose:  20 mg    Instructions:  Take 1 capsule (20 mg total) by mouth 2 (two) times daily.     Begin Date    AM    Noon    PM    Bedtime       oxycodone-acetaminophen  mg per tablet   Commonly known as:  PERCOCET   Quantity:  60 tablet   Refills:  0   Dose:  1 tablet    Instructions:  Take 1 tablet by mouth every 6 (six) hours as needed for Pain.     Begin Date    AM    Noon    PM    Bedtime       potassium chloride SA 10 MEQ tablet   Commonly known as:  K-DUR,KLOR-CON   Quantity:  180  tablet   Refills:  4   Dose:  10 mEq    Instructions:  Take 1 tablet (10 mEq total) by mouth 2 (two) times daily.     Begin Date    AM    Noon    PM    Bedtime       promethazine 25 MG tablet   Commonly known as:  PHENERGAN   Quantity:  30 tablet   Refills:  0   Dose:  25 mg    Instructions:  Take 1 tablet (25 mg total) by mouth every 6 (six) hours as needed for Nausea.     Begin Date    AM    Noon    PM    Bedtime       raloxifene 60 mg tablet   Commonly known as:  EVISTA   Quantity:  90 tablet   Refills:  3   Dose:  60 mg    Last time this was given:  60 mg on 2/28/2017  9:21 AM   Instructions:  Take 1 tablet (60 mg total) by mouth once daily.     Begin Date    AM    Noon    PM    Bedtime       simvastatin 40 MG tablet   Commonly known as:  ZOCOR   Quantity:  90 tablet   Refills:  3   Dose:  40 mg    Last time this was given:  40 mg on 2/27/2017  9:59 PM   Instructions:  Take 1 tablet (40 mg total) by mouth every evening.     Begin Date    AM    Noon    PM    Bedtime            Where to Get Your Medications      These medications were sent to [facility] [id number]  [place], [place] - [address] AT [facility]  [address], [place] [address]     Phone:  [phone]     ciprofloxacin HCl 750 MG tablet    rivaroxaban 10 mg Tab         You can get these medications from any pharmacy     Bring a paper prescription for each of these medications     oxycodone-acetaminophen  mg per tablet         Information about where to get these medications is not yet available     ! Ask your nurse or doctor about these medications     dextrose 5 % SolP 250 mL with vancomycin 1,000 mg SolR 1,500 mg                  Please bring to all follow up appointments:    1. A copy of your discharge instructions.  2. All medicines you are currently taking in their original bottles.  3. Identification and insurance card.    Please arrive 15 minutes ahead of scheduled appointment time.    Please call 24  hours in advance if you must reschedule your appointment and/or time.        Your Scheduled Appointments     Mar 27, 2017 11:15 AM CDT   Post OP with Angie Mulligan MD   [facility]    [address]  [facility] [address]   [phone]            Apr 24, 2017  8:40 AM CDT   Established Patient Visit with Adry Damian MD   [facility] ([address] [id number] [phone]              Follow-up Information     Follow up with [facility].    Specialty:  Home Health Services    Why:  Home Health    Contact information:    [address]  [facility] [address]  [phone]          Discharge Instructions     Future Orders    Activity as tolerated     Call MD for:  difficulty breathing or increased cough     Call MD for:  persistent dizziness, light-headedness, or visual disturbances     Call MD for:  persistent nausea and vomiting or diarrhea     Call MD for:  redness, tenderness, or signs of infection (pain, swelling, redness, odor or green/yellow discharge around incision site)     Call MD for:  severe persistent headache     Call MD for:  severe uncontrolled pain     Call MD for:  temperature >100.4     Call MD for:  worsening rash     Diet general     Questions:    Total calories:      Fat restriction, if any:      Protein restriction, if any:      Na restriction, if any:      Fluid restriction:      Additional restrictions:      Leave dressing on - Keep it clean, dry, and intact until clinic visit     Sponge bath only until clinic visit         Primary Diagnosis     Your primary diagnosis was:  Bacterial Skin Infection Of Leg      Admission Information     Date & Time Provider Department CSN    2/24/2017  8:55 PM Patel Denise MD [facility] [id number]      Care Providers     Provider Role Specialty Primary office phone    Patel Denise MD Attending Provider Orthopedic Surgery  [phone]    Patel Denise MD Surgeon  Orthopedic Surgery [phone]      Your Vitals Were     BP                   110/60 (BP Location: Left arm, Patient Position: Sitting, BP Method: Automatic)           Recent Lab Values     No lab values to display.      Pending Labs     Order Current Status    Fungus culture In process    Fungus culture In process    Fungus culture In process    Fungus culture In process    Specimen to Pathology - Surgery In process    AFB Culture & Smear Preliminary result    AFB Culture & Smear Preliminary result    AFB Culture & Smear Preliminary result    AFB Culture & Smear Preliminary result    Aerobic culture Preliminary result    Aerobic culture Preliminary result    Aerobic culture Preliminary result    Aerobic culture Preliminary result    Culture, Anaerobe Preliminary result    Culture, Anaerobe Preliminary result    Culture, Anaerobe Preliminary result    Culture, Anaerobe Preliminary result      Allergies as of 2/28/2017        Reactions    Ace Inhibitors     Other reaction(s): cough      Advance Directives     An advance directive is a document which, in the event you are no longer able to make decisions for yourself, tells your healthcare team what kind of treatment you do or do not want to receive, or who you would like to make those decisions for you.  If you do not currently have an advance directive, [facility] encourages you to create one.  For more information call:  [phone]), [phone] ([phone]),  or log on to [email or web address].        Language Assistance Services     ATTENTION: Language assistance services are available, free of charge. Please call [phone].      ATENCIÓN: Si habla español, tiene a priest disposición servicios gratuitos de asistencia lingüística. Llame al [phone].     CHÚ Ý: N?u b?n nói Ti?ng Vi?t, có các d?ch v? h? tr? ngôn ng? mi?n phí dành cho b?n. G?i s? [phone].        [employer] Information             [facility] complies with applicable Federal civil rights laws and does not discriminate on the basis of race, color, national origin, age, disability, or sex.

## 2017-02-24 NOTE — ED PROVIDER NOTES
Encounter Date: 2/24/2017       History     Chief Complaint   Patient presents with    Knee Pain    Post-op Problem    Cellulitis     Review of patient's allergies indicates:   Allergen Reactions    Ace inhibitors      Other reaction(s): cough     HPI Comments: 66-year-old female with hypertension and high cholesterol and arthritis presents to the emergency room complaining of left knee erythema and pain for several days.  Recent knee replacement by Dr. Mulligan at [facility].  No fevers or chills.  No calf pain or swelling.  No chest pain shortness of breath abdominal pain nausea or vomiting.  No aggravating or alleviating factors.  Subjective fevers at home.    The history is provided by the patient.     Past Medical History:   Diagnosis Date    Arthritis     bilateral knees    GERD (gastroesophageal reflux disease)     Hyperlipidemia     Hypertension     Osteopenia      Past Surgical History:   Procedure Laterality Date    ECTOPIC PREGNANCY SURGERY      fess      FRACTURE SURGERY      ORIF right arm.    TUBAL LIGATION       Family History   Problem Relation Age of Onset    Heart disease Mother     Heart disease Father     Heart disease Brother 47     cabg     Social History   Substance Use Topics    Smoking status: Never Smoker    Smokeless tobacco: None    Alcohol use Yes      Comment: occasional     Review of Systems   Constitutional: Negative for fever.   Respiratory: Negative for shortness of breath.    Gastrointestinal: Negative for abdominal pain.   Skin: Positive for rash.   All other systems reviewed and are negative.      Physical Exam   Initial Vitals   BP Pulse Resp Temp SpO2   02/24/17 1255 02/24/17 1255 02/24/17 1255 02/24/17 1255 02/24/17 1255   124/61 100 18 98.3 °F (36.8 °C) 96 %     Physical Exam    Nursing note and vitals reviewed.  Constitutional: She appears well-developed and well-nourished.   HENT:   Head: Normocephalic and atraumatic.   Eyes: EOM are normal.   Neck: Normal  range of motion. Neck supple.   Cardiovascular: Normal rate, regular rhythm and normal heart sounds.   Pulmonary/Chest: Breath sounds normal. No respiratory distress. She has no wheezes. She has no rhonchi. She has no rales.   Musculoskeletal:        Left knee: She exhibits decreased range of motion and swelling.        Legs:  Patient has an area of erythema to the knee about 8 cm vertically and also extending medially and laterally.    Neurological: She is alert and oriented to person, place, and time.   Skin: Skin is warm and dry.   Psychiatric: She has a normal mood and affect. Her behavior is normal. Judgment and thought content normal.         ED Course   Procedures  Labs Reviewed   CBC W/ AUTO DIFFERENTIAL   COMPREHENSIVE METABOLIC PANEL             Medical Decision Making:   History:   Old Medical Records: I decided to obtain old medical records.  Clinical Tests:   Lab Tests: Ordered and Reviewed                   ED Course     Clinical Impression:   There were no encounter diagnoses.          66-year-old female about 10 days out from a left total knee presents to the ER with subjective fevers, left knee erythema and swelling and pain.  No calf swelling or pain.  Patient has almost circumferential erythema extending from the surgical operative site.  This is concerning for postoperative infection.  Case discussed with orthopedics at [facility] Dr. Frey who recommends ED to ED transfer.  Patient and family member feel comfortable driving to [place].  I do not believe an ambulance required for transfer.  I did discuss the possible [place] traffic and patient feels comfortable going POV. No antibiotics in ED per ortho request.                 Ced Webb MD  02/24/17 6065       Ced Webb MD  02/24/17 4619

## 2017-02-25 ENCOUNTER — ANESTHESIA EVENT (OUTPATIENT)
Dept: SURGERY | Facility: HOSPITAL | Age: 67
DRG: 857 | End: 2017-02-25
Payer: MEDICARE

## 2017-02-25 ENCOUNTER — SURGERY (OUTPATIENT)
Age: 67
End: 2017-02-25

## 2017-02-25 ENCOUNTER — ANESTHESIA (OUTPATIENT)
Dept: SURGERY | Facility: HOSPITAL | Age: 67
DRG: 857 | End: 2017-02-25
Payer: MEDICARE

## 2017-02-25 LAB
APPEARANCE FLD: NORMAL
BODY FLD TYPE: NORMAL
BODY FLD TYPE: NORMAL
COLOR FLD: NORMAL
CRYSTALS FLD MICRO: NEGATIVE
GRAM STN SPEC: NORMAL
LYMPHOCYTES NFR FLD MANUAL: 5 %
MONOS+MACROS NFR FLD MANUAL: 5 %
NEUTROPHILS NFR FLD MANUAL: 90 %
WBC # FLD: 8345 /CU MM

## 2017-02-25 PROCEDURE — 36000705 HC OR TIME LEV I EA ADD 15 MIN: Performed by: ORTHOPAEDIC SURGERY

## 2017-02-25 PROCEDURE — 63600175 PHARM REV CODE 636 W HCPCS: Performed by: NURSE ANESTHETIST, CERTIFIED REGISTERED

## 2017-02-25 PROCEDURE — 71000039 HC RECOVERY, EACH ADD'L HOUR: Performed by: ORTHOPAEDIC SURGERY

## 2017-02-25 PROCEDURE — G0378 HOSPITAL OBSERVATION PER HR: HCPCS

## 2017-02-25 PROCEDURE — 87205 SMEAR GRAM STAIN: CPT

## 2017-02-25 PROCEDURE — 87015 SPECIMEN INFECT AGNT CONCNTJ: CPT

## 2017-02-25 PROCEDURE — 25000003 PHARM REV CODE 250: Performed by: STUDENT IN AN ORGANIZED HEALTH CARE EDUCATION/TRAINING PROGRAM

## 2017-02-25 PROCEDURE — 27331 EXPLORE/TREAT KNEE JOINT: CPT | Mod: LT,GC,, | Performed by: ORTHOPAEDIC SURGERY

## 2017-02-25 PROCEDURE — 63600175 PHARM REV CODE 636 W HCPCS

## 2017-02-25 PROCEDURE — 37000009 HC ANESTHESIA EA ADD 15 MINS: Performed by: ORTHOPAEDIC SURGERY

## 2017-02-25 PROCEDURE — D9220A PRA ANESTHESIA: Mod: ANES,,, | Performed by: ANESTHESIOLOGY

## 2017-02-25 PROCEDURE — 87102 FUNGUS ISOLATION CULTURE: CPT

## 2017-02-25 PROCEDURE — 88305 TISSUE EXAM BY PATHOLOGIST: CPT | Performed by: PATHOLOGY

## 2017-02-25 PROCEDURE — D9220A PRA ANESTHESIA: Mod: CRNA,,, | Performed by: NURSE ANESTHETIST, CERTIFIED REGISTERED

## 2017-02-25 PROCEDURE — 71000033 HC RECOVERY, INTIAL HOUR: Performed by: ORTHOPAEDIC SURGERY

## 2017-02-25 PROCEDURE — 36000704 HC OR TIME LEV I 1ST 15 MIN: Performed by: ORTHOPAEDIC SURGERY

## 2017-02-25 PROCEDURE — 88305 TISSUE EXAM BY PATHOLOGIST: CPT | Mod: 26,,, | Performed by: PATHOLOGY

## 2017-02-25 PROCEDURE — 27201423 OPTIME MED/SURG SUP & DEVICES STERILE SUPPLY: Performed by: ORTHOPAEDIC SURGERY

## 2017-02-25 PROCEDURE — 27100025 HC TUBING, SET FLUID WARMER: Performed by: NURSE ANESTHETIST, CERTIFIED REGISTERED

## 2017-02-25 PROCEDURE — 37000008 HC ANESTHESIA 1ST 15 MINUTES: Performed by: ORTHOPAEDIC SURGERY

## 2017-02-25 PROCEDURE — 99221 1ST HOSP IP/OBS SF/LOW 40: CPT | Mod: AI,57,GC, | Performed by: ORTHOPAEDIC SURGERY

## 2017-02-25 PROCEDURE — 63600175 PHARM REV CODE 636 W HCPCS: Performed by: ANESTHESIOLOGY

## 2017-02-25 PROCEDURE — 87075 CULTR BACTERIA EXCEPT BLOOD: CPT

## 2017-02-25 PROCEDURE — 25000003 PHARM REV CODE 250: Performed by: NURSE ANESTHETIST, CERTIFIED REGISTERED

## 2017-02-25 PROCEDURE — 87070 CULTURE OTHR SPECIMN AEROBIC: CPT

## 2017-02-25 PROCEDURE — 87116 MYCOBACTERIA CULTURE: CPT | Mod: 59

## 2017-02-25 RX ORDER — OXYCODONE HYDROCHLORIDE 5 MG/1
5 TABLET ORAL
Status: DISCONTINUED | OUTPATIENT
Start: 2017-02-25 | End: 2017-02-28 | Stop reason: HOSPADM

## 2017-02-25 RX ORDER — ASPIRIN 325 MG
325 TABLET ORAL 2 TIMES DAILY
Status: DISCONTINUED | OUTPATIENT
Start: 2017-02-25 | End: 2017-02-25

## 2017-02-25 RX ORDER — FENTANYL CITRATE 50 UG/ML
INJECTION, SOLUTION INTRAMUSCULAR; INTRAVENOUS
Status: DISCONTINUED | OUTPATIENT
Start: 2017-02-25 | End: 2017-02-25

## 2017-02-25 RX ORDER — MIDAZOLAM HYDROCHLORIDE 1 MG/ML
INJECTION, SOLUTION INTRAMUSCULAR; INTRAVENOUS
Status: DISCONTINUED | OUTPATIENT
Start: 2017-02-25 | End: 2017-02-25

## 2017-02-25 RX ORDER — HYDROMORPHONE HYDROCHLORIDE 1 MG/ML
0.2 INJECTION, SOLUTION INTRAMUSCULAR; INTRAVENOUS; SUBCUTANEOUS
Status: ACTIVE | OUTPATIENT
Start: 2017-02-25 | End: 2017-02-25

## 2017-02-25 RX ORDER — LIDOCAINE HYDROCHLORIDE 10 MG/ML
INJECTION, SOLUTION INTRAVENOUS
Status: DISCONTINUED | OUTPATIENT
Start: 2017-02-25 | End: 2017-02-25

## 2017-02-25 RX ORDER — HYDROMORPHONE HYDROCHLORIDE 1 MG/ML
0.2 INJECTION, SOLUTION INTRAMUSCULAR; INTRAVENOUS; SUBCUTANEOUS EVERY 5 MIN PRN
Status: DISCONTINUED | OUTPATIENT
Start: 2017-02-25 | End: 2017-02-25 | Stop reason: HOSPADM

## 2017-02-25 RX ORDER — ACETAMINOPHEN 10 MG/ML
1000 INJECTION, SOLUTION INTRAVENOUS ONCE
Status: COMPLETED | OUTPATIENT
Start: 2017-02-25 | End: 2017-02-25

## 2017-02-25 RX ORDER — HYDROMORPHONE HYDROCHLORIDE 1 MG/ML
INJECTION, SOLUTION INTRAMUSCULAR; INTRAVENOUS; SUBCUTANEOUS
Status: COMPLETED
Start: 2017-02-25 | End: 2017-02-25

## 2017-02-25 RX ORDER — ACETAMINOPHEN 10 MG/ML
INJECTION, SOLUTION INTRAVENOUS
Status: COMPLETED
Start: 2017-02-25 | End: 2017-02-25

## 2017-02-25 RX ORDER — METOPROLOL SUCCINATE 50 MG/1
50 TABLET, EXTENDED RELEASE ORAL NIGHTLY
Status: DISCONTINUED | OUTPATIENT
Start: 2017-02-25 | End: 2017-02-28 | Stop reason: HOSPADM

## 2017-02-25 RX ORDER — OXYCODONE HYDROCHLORIDE 5 MG/1
10 TABLET ORAL
Status: DISCONTINUED | OUTPATIENT
Start: 2017-02-25 | End: 2017-02-28 | Stop reason: HOSPADM

## 2017-02-25 RX ORDER — PHENYLEPHRINE HYDROCHLORIDE 10 MG/ML
INJECTION INTRAVENOUS
Status: DISCONTINUED | OUTPATIENT
Start: 2017-02-25 | End: 2017-02-25

## 2017-02-25 RX ORDER — ONDANSETRON 2 MG/ML
INJECTION INTRAMUSCULAR; INTRAVENOUS
Status: DISCONTINUED | OUTPATIENT
Start: 2017-02-25 | End: 2017-02-25

## 2017-02-25 RX ORDER — FLUCONAZOLE 150 MG/1
150 TABLET ORAL ONCE
Status: COMPLETED | OUTPATIENT
Start: 2017-02-25 | End: 2017-02-25

## 2017-02-25 RX ORDER — PROPOFOL 10 MG/ML
VIAL (ML) INTRAVENOUS
Status: DISCONTINUED | OUTPATIENT
Start: 2017-02-25 | End: 2017-02-25

## 2017-02-25 RX ADMIN — FENTANYL CITRATE 50 MCG: 50 INJECTION, SOLUTION INTRAMUSCULAR; INTRAVENOUS at 10:02

## 2017-02-25 RX ADMIN — HYDROMORPHONE HYDROCHLORIDE 0.2 MG: 1 INJECTION, SOLUTION INTRAMUSCULAR; INTRAVENOUS; SUBCUTANEOUS at 12:02

## 2017-02-25 RX ADMIN — PHENYLEPHRINE HYDROCHLORIDE 100 MCG: 10 INJECTION INTRAVENOUS at 10:02

## 2017-02-25 RX ADMIN — SODIUM CHLORIDE, SODIUM GLUCONATE, SODIUM ACETATE, POTASSIUM CHLORIDE, MAGNESIUM CHLORIDE, SODIUM PHOSPHATE, DIBASIC, AND POTASSIUM PHOSPHATE: .53; .5; .37; .037; .03; .012; .00082 INJECTION, SOLUTION INTRAVENOUS at 10:02

## 2017-02-25 RX ADMIN — OXYCODONE HYDROCHLORIDE 10 MG: 5 TABLET ORAL at 06:02

## 2017-02-25 RX ADMIN — ONDANSETRON 4 MG: 2 INJECTION INTRAMUSCULAR; INTRAVENOUS at 11:02

## 2017-02-25 RX ADMIN — CEFTRIAXONE 2 G: 1 INJECTION, SOLUTION INTRAVENOUS at 11:02

## 2017-02-25 RX ADMIN — VANCOMYCIN HYDROCHLORIDE 1 G: 1 INJECTION, POWDER, LYOPHILIZED, FOR SOLUTION INTRAVENOUS at 11:02

## 2017-02-25 RX ADMIN — PROPOFOL 40 MG: 10 INJECTION, EMULSION INTRAVENOUS at 11:02

## 2017-02-25 RX ADMIN — Medication 3 ML: at 10:02

## 2017-02-25 RX ADMIN — OXYCODONE HYDROCHLORIDE 10 MG: 5 TABLET ORAL at 03:02

## 2017-02-25 RX ADMIN — OXYCODONE HYDROCHLORIDE 10 MG: 5 TABLET ORAL at 12:02

## 2017-02-25 RX ADMIN — FENTANYL CITRATE 50 MCG: 50 INJECTION, SOLUTION INTRAMUSCULAR; INTRAVENOUS at 11:02

## 2017-02-25 RX ADMIN — ACETAMINOPHEN 1000 MG: 10 INJECTION, SOLUTION INTRAVENOUS at 12:02

## 2017-02-25 RX ADMIN — FLUCONAZOLE 150 MG: 150 TABLET ORAL at 05:02

## 2017-02-25 RX ADMIN — LIDOCAINE HYDROCHLORIDE 50 MG: 10 INJECTION, SOLUTION INTRAVENOUS at 10:02

## 2017-02-25 RX ADMIN — POTASSIUM CHLORIDE 10 MEQ: 750 CAPSULE, EXTENDED RELEASE ORAL at 10:02

## 2017-02-25 RX ADMIN — METOPROLOL SUCCINATE 50 MG: 50 TABLET, EXTENDED RELEASE ORAL at 10:02

## 2017-02-25 RX ADMIN — SIMVASTATIN 40 MG: 40 TABLET, FILM COATED ORAL at 10:02

## 2017-02-25 RX ADMIN — MIDAZOLAM HYDROCHLORIDE 2 MG: 1 INJECTION, SOLUTION INTRAMUSCULAR; INTRAVENOUS at 10:02

## 2017-02-25 RX ADMIN — HYDROMORPHONE HYDROCHLORIDE 1 MG: 1 INJECTION, SOLUTION INTRAMUSCULAR; INTRAVENOUS; SUBCUTANEOUS at 02:02

## 2017-02-25 RX ADMIN — PHENYLEPHRINE HYDROCHLORIDE 200 MCG: 10 INJECTION INTRAVENOUS at 11:02

## 2017-02-25 RX ADMIN — PROPOFOL 180 MG: 10 INJECTION, EMULSION INTRAVENOUS at 10:02

## 2017-02-25 RX ADMIN — OXYCODONE HYDROCHLORIDE 10 MG: 5 TABLET ORAL at 10:02

## 2017-02-25 RX ADMIN — FENTANYL CITRATE 100 MCG: 50 INJECTION, SOLUTION INTRAMUSCULAR; INTRAVENOUS at 12:02

## 2017-02-25 RX ADMIN — Medication 3 ML: at 06:02

## 2017-02-25 RX ADMIN — HYDROMORPHONE HYDROCHLORIDE 1 MG: 1 INJECTION, SOLUTION INTRAMUSCULAR; INTRAVENOUS; SUBCUTANEOUS at 01:02

## 2017-02-25 NOTE — ANESTHESIA POSTPROCEDURE EVALUATION
Anesthesia Post Evaluation    Patient: Ella Canales    Procedure(s) Performed: Procedure(s) (LRB):  INCISION AND DRAINAGE-KNEE (Left)    Final Anesthesia Type: general  Patient location during evaluation: PACU  Patient participation: Yes- Able to Participate  Level of consciousness: awake and alert  Post-procedure vital signs: reviewed and stable  Pain management: adequate  Airway patency: patent  PONV status at discharge: No PONV  Anesthetic complications: no      Cardiovascular status: hemodynamically stable  Respiratory status: unassisted, spontaneous ventilation and room air  Hydration status: euvolemic  Follow-up not needed.        Visit Vitals    /66    Pulse 78    Temp 36.8 °C (98.2 °F) (Axillary)    Resp 16    Ht 5' (1.524 m)    Wt 68.3 kg (150 lb 8 oz)    LMP 04/03/2012    SpO2 97%    Breastfeeding No    BMI 29.39 kg/m2       Pain/Oj Score: Pain Assessment Performed: Yes (2/25/2017 12:13 PM)  Presence of Pain: complains of pain/discomfort (2/25/2017 12:13 PM)  Pain Rating Prior to Med Admin: 6 (2/25/2017  3:40 PM)  Pain Rating Post Med Admin: 1 (2/25/2017  6:51 AM)  Oj Score: 10 (2/25/2017 12:13 PM)

## 2017-02-25 NOTE — UM SECONDARY REVIEW
Physician Advisor External    Level of Care Issue    Denied Observation- per Dr. Short, EHR ( [phone]) recommends upgrade to Inpatient

## 2017-02-25 NOTE — UM SECONDARY REVIEW
Physician Advisor External    Level of Care Issue    {[facility] CM Review Outcome:51291}     Sent to ehr as obs not meeting

## 2017-02-25 NOTE — CONSULTS
[facility]  Infectious Disease  Consult Note    Patient Name: Ella Canales  MRN: [id number]  Admission Date: 2/24/2017  Hospital Length of Stay: 0 days  Attending Physician: Patel Denise MD  Primary Care Provider: Adry Damian MD     Isolation Status: No active isolations    Patient information was obtained from past medical records.      Inpatient consult to Infectious Diseases  Consult performed by: SERA HITCHCOCK  Consult ordered by: RADHA PAYNE        Assessment/Plan:     ID consult received. Patient not present in room during rounds, still in OR. Will assess patient tomorrow with full recommendations to follow.   Continue Vancomycin and Ceftriaxone. Vanc trough goal 15-20. Anticipate 6 weeks of IV abx.  Intraop cultures pending  Discussed with ID staff.    Thank you for your consult. I will follow-up with patient. Please contact us if you have any additional questions.    Sera Hitchcock PA-C  Infectious Disease  [facility]  Pgr [phone]    Subjective:     Principal Problem: Cellulitis of left lower extremity    HPI:      No new subjective & objective note has been filed under this hospital service since the last note was generated.

## 2017-02-25 NOTE — PLAN OF CARE
Problem: Patient Care Overview  Goal: Plan of Care Review  Outcome: Ongoing (interventions implemented as appropriate)  Pt admitted during the night.care plan initiated.pt reports adequate pain relief.redness and swelling noted to left knee.ace wrap intact to left knee.neurovascular check to lle intact.lle elevated.awaiting results of culture to initiate antibiotics.safety precautions maintained.pt free of falls or injuries.NPO for poss surgery today.continue plan of care.

## 2017-02-25 NOTE — H&P
Orthopaedic Surgery  Consult Note    Ella Canales  02/24/2017    HPI:    CC: LLE redness and pain    Patient is a 66 y.o. female s/p L TKA by Dr. Mulligan 2/14/17 presents with worsening LLE redness and pain since surgery.  Describes pain as tightness.  Able to ambulate..  Reports fever to 100.1 2 days after surgery but none since.  Denies CP, SOB, n/v.  On xarelto.  Last dose this morning at 0930.    Past Medical History:   Diagnosis Date    Arthritis     bilateral knees    GERD (gastroesophageal reflux disease)     Hyperlipidemia     Hypertension     Osteopenia      Past Surgical History:   Procedure Laterality Date    ECTOPIC PREGNANCY SURGERY      fess      FRACTURE SURGERY      ORIF right arm.    TOTAL KNEE ARTHROPLASTY Left     TUBAL LIGATION       Family History   Problem Relation Age of Onset    Heart disease Mother     Heart disease Father     Heart disease Brother 47     cabg     Social History     Social History    Marital status:      Spouse name: N/A    Number of children: N/A    Years of education: N/A     Occupational History    Not on file.     Social History Main Topics    Smoking status: Never Smoker    Smokeless tobacco: Not on file    Alcohol use Yes      Comment: occasional    Drug use: No    Sexual activity: No     Other Topics Concern    Not on file     Social History Narrative     No current facility-administered medications on file prior to encounter.      Current Outpatient Prescriptions on File Prior to Encounter   Medication Sig    fexofenadine (ALLEGRA) 60 MG tablet Take 60 mg by mouth once daily.    fluticasone (FLONASE) 50 mcg/actuation nasal spray 1 spray by Each Nare route once daily.    glucosamine-chondroit-vit C-Mn (GLUCOSAMINE-CHONDROITIN MAX ST) 500-400 mg Cap Twice a day    losartan (COZAAR) 100 MG tablet Take 1 tablet (100 mg total) by mouth once daily.    metoprolol succinate (TOPROL-XL) 50 MG 24 hr tablet Take 1 tablet (50 mg total) by  mouth once daily. (Patient taking differently: Take 50 mg by mouth 2 (two) times daily. )    omeprazole (PRILOSEC) 20 MG capsule Take 1 capsule (20 mg total) by mouth 2 (two) times daily.    oxycodone-acetaminophen (PERCOCET)  mg per tablet Take 1 tablet by mouth every 6 (six) hours as needed for Pain.    potassium chloride SA (K-DUR,KLOR-CON) 10 MEQ tablet Take 1 tablet (10 mEq total) by mouth 2 (two) times daily.    promethazine (PHENERGAN) 25 MG tablet Take 1 tablet (25 mg total) by mouth every 6 (six) hours as needed for Nausea.    raloxifene (EVISTA) 60 mg tablet Take 1 tablet (60 mg total) by mouth once daily.    simvastatin (ZOCOR) 40 MG tablet Take 1 tablet (40 mg total) by mouth every evening.    rivaroxaban (XARELTO) 10 mg Tab Take 1 tablet (10 mg total) by mouth once daily.       Review of Systems:    Patient denies constitutional symptoms, cardiac symptoms, respiratory symptoms, GI symptoms, psychiatric symptoms, endocrine related symptoms.  The remainder of the musculoskeletal ROS is included in the HPI.    Physical Exam:    Temp:  [98.1 °F (36.7 °C)-98.3 °F (36.8 °C)] 98.1 °F (36.7 °C)  Pulse:  [] 109  Resp:  [16-18] 16  SpO2:  [96 %-100 %] 100 %  BP: (124-128)/(56-61) 128/56    PE:    AA&O x 4.  NAD  HEENT:  NCAT, sclera nonicteric  Lungs:  Respirations are equal and unlabored.  CV:  2+ bilateral upper and lower extremity pulses.  Skin:  Intact throughout.    MSK:  LLE: Midline incision c/d/i, no ecchymoses, 2+ pitting edema toes to above ankle; swelling and erythema from inferior knee down proximal half of leg; TTP at all areas of erythema; AROM to 80 degrees of flexion; No significant pain with PROM from 0 to 70 degrees of flexion;  Pain at extremes of flexion; SILT T/SP/DP; grossly motor intact T/SP/DP; brisk cap refill, warm well perfused; compartments soft and compressible, DP palpable    Diagnostic Results:  ESR pending, , WBC 6, Hb 8.5    XR shows cemented L TKA with  no evidence of loosening.  Subcutaneous gas noted.    Joint fluid: WBC pending, crystals pending, cultures pending    A/P:  66 y.o. female with LLE cellulitis.  Exam with low suspicion for periprosthetic joint infection.  Due to elevated CRP in TKA pt, L knee was aspirated and labs sent for analysis.  Will admit to ortho overnight.  If joint is infected, will likely perform I&D of L knee with polyethylene exchange.  If joint is not infected, will treat with abx for cellulitis.    PROCEDURE NOTE:  After verbal consent was obtained, patient's left knee prepped with chlorhexidine.  An 18 gauge needle was used to aspirate the left knee joint using a superolateral approach.  Approximately 1 mL of clear joint fluid was aspirated.  Fluid was sent to the lab for analysis.  Area was cleansed and dressed.  Patient tolerated procedure with no complications and minimal blood loss.

## 2017-02-25 NOTE — NURSING TRANSFER
Nursing Transfer Note      2/25/2017     Transfer To: 524    Transfer via bed    Transfer with n/a    Transported by pct and rn    Medicines sent: n/a    Chart send with patient: Yes    Notified: son

## 2017-02-25 NOTE — OP NOTE
DATE OF PROCEDURE:  02/24/2017    PREOPERATIVE DIAGNOSIS:  Possible septic arthritis, left total knee replacement.    POSTOPERATIVE DIAGNOSIS:  Possible septic arthritis, left total knee   replacement.     PROCEDURE:  Arthrotomy, left knee, with irrigation and debridement (CPT #89533).    SURGEON:  Patel Denise M.D.    ASSISTANTS:  Andrey Ya M.D. (RES); Anant Issa M.D. (RES)    ANESTHESIA:  General.    ESTIMATED BLOOD LOSS:  Less than 50 mL.    SPECIMEN:  Cultures were taken from both the knee joint itself and a   subcutaneous pocket at the distal incision.    INDICATIONS:  The patient is a 66-year-old female who is almost two weeks status   post left knee replacement.  She presented with the acute onset of erythema in   the left knee.  She had significantly elevated inflammatory markers as well.    Aspirate of the knee revealed left shift in the white blood cell count as well.    Because of the potential for an acute early infection, it was recommended to   proceed with irrigation and debridement with deep cultures obtained to minimize   risk of continued infection and decrease the risk of prosthetic colonization   necessitating explantation in the future.  Risks and complications were   discussed and she elected to proceed.    DESCRIPTION OF PROCEDURE:  The patient was taken to the Operating Room where   general anesthesia was administered by the Anesthesia Department.  The Dermabond   tape was removed and there was noted to be drainage from the very inferior   aspect of the incision.  There was some fluctuance noted there as well.  The   left lower extremity was then sterilely prepped and draped in a normal fashion.    The inferior portion of the wound which had the drainage was sealed with ioban following prepping   and draping.    We first began with arthrotomy to irrigate the wound and obtain cultures of the   knee joint itself.  This was done before entering the subcutaneous cavity to   avoid potential  cross contamination.  A 4 cm longitudinal incision was made   through the very proximal 4 cm of the wound.  Subcutaneous tissue was dissected   with electrocautery until the quadriceps tendon was identified.  A longitudinal   rent in the tendon was made using a scalpel.  She was noted to have fairly   minimal fluid.  This had a very minimally cloudy appearance to it.    Intraoperative cultures were obtained.  The knee joint itself was then irrigated   using pulse lavage with 6 L of solution.  Following this, the quadriceps tendon   was repaired with interrupted figure-of-eight sutures #1 antibiotic impregnated   Vicryl, subcutaneous tissue was closed with interrupted inverted stitches of   #3-0 antibiotic-impregnated Vicryl.  Skin was approximated using running   subcuticular stitch of #3-0 Monocryl followed by Dermabond.    Once the Dermabond sealed the wound, the inferior aspect of the wound was exposed and opened   for approximately 1.5 cm.  She was noted to have a significant seroma and soft   tissue cavity, which appeared to be supracapsular.  Immediate cultures were   obtained of this as well.  Once these cultures were obtained, IV antibiotics   were given in surgery.  There is a significant amount of debris within this as well which was probably the dermal graft.  This was removed completely to minimize the risk of residual foreign material which could act as a nidus for recurrent/residual infection.  This was both cultured and sent for pathology to evaluate for acute   inflammation.  The cavity was then probed.  Again, this appeared to be   superficial to the deep fascia.  It was thoroughly irrigated in a pulse lavage   fashion.  A TLS drain was then placed in the subcutaneous pocket.  The skin   itself was then closed with interrupted horizontal mattress sutures of #4-0   nylon.  Sterile dressings were applied.  General anesthesia was reversed and she   was returned to the Postanesthesia Care Unit in stable  condition.      MSM/HN  dd: 02/25/2017 11:49:08 (CST)  td: 02/25/2017 12:39:47 (CST)  Doc ID   #[id number]  Job ID #[id number]    CC:

## 2017-02-25 NOTE — NURSING TRANSFER
Nursing Transfer Note    Pt arrived from the er via wc accompanied by transporter. Aaox4.resp even and nonlabored.ace wrap intact to left knee.redness and swelling noted to left knee and lower leg region.elevated on a pillow.ext warm with brisk cap refill.2+ dp pulses.pt reports her pain 2/10.safety precautions implemented.vss,will monitor.

## 2017-02-25 NOTE — TRANSFER OF CARE
Anesthesia Transfer of Care Note    Patient: Ella Canales    Procedure(s) Performed: Procedure(s) (LRB):  INCISION AND DRAINAGE-KNEE (Left)    Patient location: PACU    Anesthesia Type: general    Transport from OR: Transported from OR on 6-10 L/min O2 by face mask with adequate spontaneous ventilation    Post pain: adequate analgesia    Post assessment: no apparent anesthetic complications    Post vital signs: stable    Level of consciousness: awake    Nausea/Vomiting: no nausea/vomiting    Complications: none          Last vitals:   Visit Vitals    /71 (BP Location: Left arm, Patient Position: Lying, BP Method: Automatic)    Pulse 91    Temp 35.7 °C (96.3 °F) (Oral)    Resp 16    Ht 5' (1.524 m)    Wt 68.3 kg (150 lb 8 oz)    LMP 04/03/2012    SpO2 95%    Breastfeeding No    BMI 29.39 kg/m2

## 2017-02-25 NOTE — ANESTHESIA RELEASE NOTE
Anesthesia Release from PACU Note    Patient: Ella Canales    Procedure(s) Performed: Procedure(s) (LRB):  INCISION AND DRAINAGE-KNEE (Left)    Anesthesia type: general    Post pain: Adequate analgesia    Post assessment: no apparent anesthetic complications, tolerated procedure well and no evidence of recall    Last Vitals:   Visit Vitals    /66    Pulse 78    Temp 36.8 °C (98.2 °F) (Axillary)    Resp 16    Ht 5' (1.524 m)    Wt 68.3 kg (150 lb 8 oz)    LMP 04/03/2012    SpO2 97%    Breastfeeding No    BMI 29.39 kg/m2       Post vital signs: stable    Level of consciousness: awake, alert  and oriented    Nausea/Vomiting: no nausea/no vomiting    Complications: none    Airway Patency: patent    Respiratory: unassisted    Cardiovascular: stable and blood pressure at baseline    Hydration: euvolemic

## 2017-02-25 NOTE — ED PROVIDER NOTES
Encounter Date: 2/24/2017       History     Chief Complaint   Patient presents with    Wound Infection     Total knee replacement 2/14/16. Deneis recent fever, chills.      Review of patient's allergies indicates:   Allergen Reactions    Ace inhibitors      Other reaction(s): cough     HPI Comments: Pt with a hx of HTN, s/p left TKR by Dr Mulligan, presents with spreading erythema to affected knee x 3 days. Denies fever or chills. Pain is controlled with percocet that she takes QID. No acute increase in pain recently. Pt was seen at [facility] and transferred for ortho assessment. At [facility], patient had labs which showed a WBC of 6. No associated calf pain, SOB, CP.    Past Medical History:   Diagnosis Date    Arthritis     bilateral knees    GERD (gastroesophageal reflux disease)     Hyperlipidemia     Hypertension     Osteopenia      Past Surgical History:   Procedure Laterality Date    ECTOPIC PREGNANCY SURGERY      fess      FRACTURE SURGERY      ORIF right arm.    TOTAL KNEE ARTHROPLASTY Left     TUBAL LIGATION       Family History   Problem Relation Age of Onset    Heart disease Mother     Heart disease Father     Heart disease Brother 47     cabg     Social History   Substance Use Topics    Smoking status: Never Smoker    Smokeless tobacco: None    Alcohol use Yes      Comment: occasional     Review of Systems   Constitutional: Negative for chills and fever.   HENT: Negative for rhinorrhea.    Eyes: Negative for redness.   Respiratory: Negative for shortness of breath.    Cardiovascular: Negative for chest pain.   Gastrointestinal: Negative for abdominal pain.   Endocrine: Negative for polyuria.   Genitourinary: Negative for frequency.   Musculoskeletal: Positive for arthralgias and joint swelling. Negative for back pain.   Allergic/Immunologic: Negative for immunocompromised state.   Neurological: Negative for weakness and numbness.   Psychiatric/Behavioral: Negative for confusion.        Physical Exam   Initial Vitals   BP Pulse Resp Temp SpO2   -- 02/24/17 1851 02/24/17 1851 02/24/17 1851 02/24/17 1851    109 16 98.1 °F (36.7 °C) 100 %     Physical Exam    Constitutional: She appears well-developed and well-nourished. She is not diaphoretic. No distress.   HENT:   Head: Normocephalic and atraumatic.   Eyes: Conjunctivae are normal.   Neck: Neck supple.   Cardiovascular: Normal rate, regular rhythm and normal heart sounds.   No murmur heard.  Pulmonary/Chest: No respiratory distress. She has no wheezes. She has no rhonchi. She has no rales.   Abdominal: Soft. Bowel sounds are normal. There is no tenderness. There is no rebound and no guarding.   Musculoskeletal:   Left knee swelling  Decreased rom   Neurological: She is alert and oriented to person, place, and time.   Skin: Skin is warm and dry. No abscess noted. There is erythema.   LLE anterior erythema from just about knee to mid shin. Incision is well approximated with no dehiscence or drainage. Area is warm and tender to touch   Psychiatric: She has a normal mood and affect.         ED Course   Procedures  Labs Reviewed   SEDIMENTATION RATE, MANUAL - Abnormal; Notable for the following:        Result Value    Sed Rate 131 (*)     All other components within normal limits    Narrative:     Receive In Basket/Push notification of result?->Yes   C-REACTIVE PROTEIN - Abnormal; Notable for the following:     .4 (*)     All other components within normal limits    Narrative:     Receive In Basket/Push notification of result?->Yes   PREALBUMIN - Abnormal; Notable for the following:     Prealbumin 13 (*)     All other components within normal limits    Narrative:     Receive In Basket/Push notification of result?->Yes   URINALYSIS - Abnormal; Notable for the following:     Ketones, UA 1+ (*)     Leukocytes, UA 2+ (*)     All other components within normal limits   PROCALCITONIN - Abnormal; Notable for the following:     Procalcitonin 0.45  (*)     All other components within normal limits    Narrative:     Receive In Basket/Push notification of result?->Yes   CULTURE, FUNGUS   APTT    Narrative:     Receive In Basket/Push notification of result?->Yes   PROTIME-INR    Narrative:     Receive In Basket/Push notification of result?->Yes   TRANSFERRIN    Narrative:     Receive In Basket/Push notification of result?->Yes   URINALYSIS MICROSCOPIC   WBC & DIFF,BODY FLUID   TYPE & SCREEN     EKG Readings: (Independently Interpreted)   NSR  No ischemia  Or arrythmias          Medical Decision Making:   History:   Old Medical Records: I decided to obtain old medical records.  Old Records Summarized: other records.       <> Summary of Records: Pt with HTN  S/p TKR 02/24  Transferred from [facility] for erythema and concern for post op infection  Initial Assessment:   Pt 2 wks s/p Left TKR  with erythema of left knee   Differential Diagnosis:   Cellulitis, septic arthritis    ED Management:  Will consult orthopedics              Attending Attestation:             Attending ED Notes:   Pt signed over to Dr Velásquez at shift change          ED Course     Clinical Impression:   The primary encounter diagnosis was Cellulitis of left lower extremity. Diagnoses of Knee pain and Essential hypertension were also pertinent to this visit.          Marcela Jackson MD  02/28/17 1047

## 2017-02-25 NOTE — ED TRIAGE NOTES
Ella Canales, a 66 y.o. female presents to the ED as transfer from [facility] with c/o infection to left knee after total right knee replacement on 2/14/17. Denies recent fever/chills.      Chief Complaint   Patient presents with    Wound Infection     Total knee replacement 2/14/16. Deneis recent fever, chills.      Review of patient's allergies indicates:   Allergen Reactions    Ace inhibitors      Other reaction(s): cough     Past Medical History:   Diagnosis Date    Arthritis     bilateral knees    GERD (gastroesophageal reflux disease)     Hyperlipidemia     Hypertension     Osteopenia

## 2017-02-25 NOTE — PROGRESS NOTES
ORTHO PROGRESS NOTE:    Ella Canales is a 66 y.o. female admitted on 2/24/2017  Hospital Day: 0  Post Op Day: * No surgery date entered *      The patient was seen and examined this morning at the bedside. No acute issues overnight and adequate control of pain on current regimen.      PHYSICAL EXAM:  Awake/alert/oriented x 3  No acute distress  AFVSS  Good inspiratory effort with unlabored breathing  Dressings c/d/i  NVI distally    Vitals:    02/24/17 1851 02/25/17 0038 02/25/17 0206   BP:  (!) 145/70 (!) 151/71   BP Location:  Right arm Left arm   Patient Position:  Sitting Lying   BP Method:   Automatic   Pulse: 109 95 97   Resp: 16 18 16   Temp: 98.1 °F (36.7 °C) 98.7 °F (37.1 °C) 98.4 °F (36.9 °C)   TempSrc: Oral Oral Oral   SpO2: 100% 97% 98%   Weight: 68 kg (150 lb)  68.3 kg (150 lb 8 oz)   Height: 5' (1.524 m)  5' (1.524 m)          Recent Labs  Lab 02/24/17  1411   CALCIUM 9.5   PROT 6.9      K 4.4   CO2 26      BUN 9   CREATININE 0.7       Recent Labs  Lab 02/24/17  1411   WBC 6.30   RBC 3.09*   HGB 8.5*   HCT 26.4*   *       Recent Labs  Lab 02/24/17  2142   INR 1.0   APTT 22.3     Joint fluid: WBC 8345, segs 90%    A/P: 66 y.o. 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  Spoke with pt and Dr. Mulligan regarding case.  Due to consequences of not treating PJI, we have recommended I&D L TKA.  Pt understands risks and benefits and wishes to proceed.

## 2017-02-25 NOTE — ANESTHESIA PREPROCEDURE EVALUATION
02/25/2017  Ella Canales is a 66 y.o., female c PmHx HTN, s/p left TKR, HLD, anemia, s/p left TKR 02.14.17 presents with knee pain.  She is scheduled for I&D left knee.      IV Access:  PIV 20g RF    Oxygen/Ventilatory Requirements:  RA    Infusions:         Last Airway:    Method of Intubation: Olivas; Inserted by: CRNA; Airway Device: Endotracheal Tube-Hi/Lo; Mask Ventilation: Easy; Intubated: Postinduction; Blade:  (# 3); Airway Device Size: 7.0; Style: Cuffed; Cuff Inflation: Minimal occlusive pressure; Inflation Amount: 5; Placement Verified By: Auscultation, Capnometry; Grade: Grade II; Complicating Factors: None; Intubation Findings: Positive EtCO2, Bilateral breath sounds;  Depth of Insertion: 21; Securment: Lips; Complications: None; Breath Sounds: Equal Bilateral; Insertion Attempts: 1; Removal Date: 02/14/17;  Removal Time: 1835    Patient Active Problem List   Diagnosis    HTN (hypertension)    Hyperlipidemia    Age-related bone loss    GERD (gastroesophageal reflux disease)    At risk for heart disease    Hypokalemia    Obesity (BMI 30-39.9)    Anemia    Arthritis    Right knee pain    Post-traumatic osteoarthritis of right knee    Left knee pain    Post-traumatic osteoarthritis of left knee    Genu varum of right lower extremity    Genu varum of left lower extremity    Chronic rhinitis    Patent pressure equalization tube on right side    Traumatic arthritis of left knee    Cellulitis of left lower extremity    Knee pain       Review of patient's allergies indicates:   Allergen Reactions    Ace inhibitors      Other reaction(s): cough       No current facility-administered medications on file prior to encounter.      Current Outpatient Prescriptions on File Prior to Encounter   Medication Sig Dispense Refill    fexofenadine (ALLEGRA) 60 MG tablet Take 60 mg by mouth  once daily.      fluticasone (FLONASE) 50 mcg/actuation nasal spray 1 spray by Each Nare route once daily. 16 g prn    glucosamine-chondroit-vit C-Mn (GLUCOSAMINE-CHONDROITIN MAX ST) 500-400 mg Cap Twice a day      losartan (COZAAR) 100 MG tablet Take 1 tablet (100 mg total) by mouth once daily. 90 tablet 3    metoprolol succinate (TOPROL-XL) 50 MG 24 hr tablet Take 1 tablet (50 mg total) by mouth once daily. (Patient taking differently: Take 50 mg by mouth 2 (two) times daily. ) 90 tablet 3    omeprazole (PRILOSEC) 20 MG capsule Take 1 capsule (20 mg total) by mouth 2 (two) times daily. 180 capsule 1    oxycodone-acetaminophen (PERCOCET)  mg per tablet Take 1 tablet by mouth every 6 (six) hours as needed for Pain. 60 tablet 0    potassium chloride SA (K-DUR,KLOR-CON) 10 MEQ tablet Take 1 tablet (10 mEq total) by mouth 2 (two) times daily. 180 tablet 4    promethazine (PHENERGAN) 25 MG tablet Take 1 tablet (25 mg total) by mouth every 6 (six) hours as needed for Nausea. 30 tablet 0    raloxifene (EVISTA) 60 mg tablet Take 1 tablet (60 mg total) by mouth once daily. 90 tablet 3    simvastatin (ZOCOR) 40 MG tablet Take 1 tablet (40 mg total) by mouth every evening. 90 tablet 3    rivaroxaban (XARELTO) 10 mg Tab Take 1 tablet (10 mg total) by mouth once daily. 12 tablet 0       Past Surgical History:   Procedure Laterality Date    ECTOPIC PREGNANCY SURGERY      fess      FRACTURE SURGERY      ORIF right arm.    TOTAL KNEE ARTHROPLASTY Left     TUBAL LIGATION         Social History     Social History    Marital status:      Spouse name: N/A    Number of children: N/A    Years of education: N/A     Occupational History    Not on file.     Social History Main Topics    Smoking status: Never Smoker    Smokeless tobacco: Not on file    Alcohol use Yes      Comment: occasional    Drug use: No    Sexual activity: No     Other Topics Concern    Not on file     Social History Narrative          Vital Signs Range (Last 24H):  Temp:  [36.7 °C (98.1 °F)-37.1 °C (98.7 °F)]   Pulse:  []   Resp:  [16-18]   BP: (124-151)/(56-71)   SpO2:  [96 %-100 %]       CBC:   Recent Labs      02/24/17   1411   WBC  6.30   RBC  3.09*   HGB  8.5*   HCT  26.4*   PLT  496*   MCV  85   MCH  27.5   MCHC  32.3       CMP:   Recent Labs      02/24/17   1411   NA  138   K  4.4   CL  102   CO2  26   BUN  9   CREATININE  0.7   GLU  113*   CALCIUM  9.5   ALBUMIN  3.0*   PROT  6.9   ALKPHOS  215*   ALT  41   AST  31   BILITOT  0.4       INR  Recent Labs      02/24/17   2142   INR  1.0   APTT  22.3           Diagnostic Studies:  None on file    EKG:  FEB-2010 09:27:53 EKG  System  Normal sinus rhythm  Nonspecific T wave abnormality  Abnormal ECG  No previous ECGs available  Confirmed by ERIC TREVIZO MD ([id number]) on 3/3/2010 1:51:11 PM    2D Echo:  None on file    [facility] Anesthesia Evaluation    I have reviewed the Patient Summary Reports.    I have reviewed the Nursing Notes.   I have reviewed the Medications.     Review of Systems  Anesthesia Hx:  No problems with previous Anesthesia  Denies Family Hx of Anesthesia complications.   Denies Personal Hx of Anesthesia complications.   Social:  Non-Smoker    Hematology/Oncology:  Hematology Normal   Oncology Normal     EENT/Dental:   chronic allergic rhinitis   Cardiovascular:   Hypertension, well controlled Normal angiogram 2016   Pulmonary:  Pulmonary Normal    Renal/:  Renal/ Normal     Hepatic/GI:   GERD, well controlled    Musculoskeletal:  Musculoskeletal Normal    Neurological:  Neurology Normal    Endocrine:  Endocrine Normal        Physical Exam  General:  Well nourished, Obesity    Airway/Jaw/Neck:  Airway Findings: Mouth Opening: Normal Tongue: Normal  General Airway Assessment: Adult  Mallampati: II  TM Distance: Normal, at least 6 cm      Dental:  Dental Findings: In tact   Chest/Lungs:  Chest/Lungs Findings: Normal Respiratory Rate      Heart/Vascular:  Heart Findings: Rate: Normal        Mental Status:  Mental Status Findings:  Alert and Oriented         Anesthesia Plan  Type of Anesthesia, risks & benefits discussed:  Anesthesia Type:  general  Patient's Preference:   Intra-op Monitoring Plan:   Intra-op Monitoring Plan Comments:   Post Op Pain Control Plan:   Post Op Pain Control Plan Comments:   Induction:   IV  Beta Blocker:  Patient is on a Beta-Blocker and has received one dose within the past 24 hours (No further documentation required).       Informed Consent: Patient understands risks and agrees with Anesthesia plan.  Questions answered. Anesthesia consent signed with patient.  ASA Score: 2     Day of Surgery Review of History & Physical:    H&P update referred to the surgeon.         Ready For Surgery From Anesthesia Perspective.

## 2017-02-26 LAB
BACTERIA UR CULT: NORMAL
BACTERIA UR CULT: NORMAL

## 2017-02-26 PROCEDURE — 99233 SBSQ HOSP IP/OBS HIGH 50: CPT | Mod: ,,, | Performed by: INTERNAL MEDICINE

## 2017-02-26 PROCEDURE — 25000003 PHARM REV CODE 250: Performed by: STUDENT IN AN ORGANIZED HEALTH CARE EDUCATION/TRAINING PROGRAM

## 2017-02-26 PROCEDURE — 63600175 PHARM REV CODE 636 W HCPCS: Performed by: STUDENT IN AN ORGANIZED HEALTH CARE EDUCATION/TRAINING PROGRAM

## 2017-02-26 PROCEDURE — 11000001 HC ACUTE MED/SURG PRIVATE ROOM

## 2017-02-26 RX ADMIN — OXYCODONE HYDROCHLORIDE 10 MG: 5 TABLET ORAL at 08:02

## 2017-02-26 RX ADMIN — OXYCODONE HYDROCHLORIDE 10 MG: 5 TABLET ORAL at 07:02

## 2017-02-26 RX ADMIN — OXYCODONE HYDROCHLORIDE 10 MG: 5 TABLET ORAL at 04:02

## 2017-02-26 RX ADMIN — CEFTRIAXONE 2 G: 2 INJECTION, SOLUTION INTRAVENOUS at 12:02

## 2017-02-26 RX ADMIN — Medication 3 ML: at 06:02

## 2017-02-26 RX ADMIN — METOPROLOL SUCCINATE 50 MG: 50 TABLET, EXTENDED RELEASE ORAL at 08:02

## 2017-02-26 RX ADMIN — OXYCODONE HYDROCHLORIDE 10 MG: 5 TABLET ORAL at 10:02

## 2017-02-26 RX ADMIN — ACETAMINOPHEN 650 MG: 325 TABLET ORAL at 04:02

## 2017-02-26 RX ADMIN — POTASSIUM CHLORIDE 10 MEQ: 750 CAPSULE, EXTENDED RELEASE ORAL at 11:02

## 2017-02-26 RX ADMIN — Medication 3 ML: at 09:02

## 2017-02-26 RX ADMIN — Medication: at 08:02

## 2017-02-26 RX ADMIN — OXYCODONE HYDROCHLORIDE 10 MG: 5 TABLET ORAL at 01:02

## 2017-02-26 RX ADMIN — LOSARTAN POTASSIUM 100 MG: 50 TABLET, FILM COATED ORAL at 09:02

## 2017-02-26 RX ADMIN — RALOXIFENE HYDROCHLORIDE 60 MG: 60 TABLET, FILM COATED ORAL at 09:02

## 2017-02-26 RX ADMIN — VANCOMYCIN HYDROCHLORIDE 1250 MG: 1 INJECTION, POWDER, LYOPHILIZED, FOR SOLUTION INTRAVENOUS at 10:02

## 2017-02-26 RX ADMIN — SIMVASTATIN 40 MG: 40 TABLET, FILM COATED ORAL at 08:02

## 2017-02-26 RX ADMIN — POTASSIUM CHLORIDE 10 MEQ: 750 CAPSULE, EXTENDED RELEASE ORAL at 08:02

## 2017-02-26 RX ADMIN — PANTOPRAZOLE SODIUM 40 MG: 40 TABLET, DELAYED RELEASE ORAL at 09:02

## 2017-02-26 RX ADMIN — VANCOMYCIN HYDROCHLORIDE 1250 MG: 1 INJECTION, POWDER, LYOPHILIZED, FOR SOLUTION INTRAVENOUS at 12:02

## 2017-02-26 NOTE — SUBJECTIVE & OBJECTIVE
Past Medical History:   Diagnosis Date    Arthritis     bilateral knees    GERD (gastroesophageal reflux disease)     Hyperlipidemia     Hypertension     Osteopenia        Past Surgical History:   Procedure Laterality Date    ECTOPIC PREGNANCY SURGERY      fess      FRACTURE SURGERY      ORIF right arm.    TOTAL KNEE ARTHROPLASTY Left     TUBAL LIGATION         Review of patient's allergies indicates:   Allergen Reactions    Ace inhibitors      Other reaction(s): cough       Medications:  Prescriptions Prior to Admission   Medication Sig    fexofenadine (ALLEGRA) 60 MG tablet Take 60 mg by mouth once daily.    fluticasone (FLONASE) 50 mcg/actuation nasal spray 1 spray by Each Nare route once daily.    glucosamine-chondroit-vit C-Mn (GLUCOSAMINE-CHONDROITIN MAX ST) 500-400 mg Cap Twice a day    losartan (COZAAR) 100 MG tablet Take 1 tablet (100 mg total) by mouth once daily.    metoprolol succinate (TOPROL-XL) 50 MG 24 hr tablet Take 1 tablet (50 mg total) by mouth once daily. (Patient taking differently: Take 50 mg by mouth 2 (two) times daily. )    omeprazole (PRILOSEC) 20 MG capsule Take 1 capsule (20 mg total) by mouth 2 (two) times daily.    oxycodone-acetaminophen (PERCOCET)  mg per tablet Take 1 tablet by mouth every 6 (six) hours as needed for Pain.    potassium chloride SA (K-DUR,KLOR-CON) 10 MEQ tablet Take 1 tablet (10 mEq total) by mouth 2 (two) times daily.    promethazine (PHENERGAN) 25 MG tablet Take 1 tablet (25 mg total) by mouth every 6 (six) hours as needed for Nausea.    raloxifene (EVISTA) 60 mg tablet Take 1 tablet (60 mg total) by mouth once daily.    simvastatin (ZOCOR) 40 MG tablet Take 1 tablet (40 mg total) by mouth every evening.    rivaroxaban (XARELTO) 10 mg Tab Take 1 tablet (10 mg total) by mouth once daily.     Antibiotics     Start     Stop Route Frequency Ordered    02/25/17 2815  vancomycin (VANCOCIN) 1,250 mg in dextrose 5 % 250 mL IVPB  (Vancomycin  IVPB with levels panel)      -- IV Every 12 hours (non-standard times) 02/25/17 1147    02/26/17 1145  cefTRIAXone (ROCEPHIN) 2 g in dextrose 5 % 50 mL IVPB      -- IV Every 24 hours (non-standard times) 02/25/17 1147        Antifungals     None        Antivirals     None           Immunization History   Administered Date(s) Administered    Influenza - High Dose 10/22/2013, 01/06/2017    Pneumococcal Conjugate - 13 Valent 01/06/2017    Pneumococcal Polysaccharide - 23 Valent 03/14/2014    Tdap 08/24/2011    Zoster 10/22/2014       Family History     Problem Relation (Age of Onset)    Heart disease Mother, Father, Brother (47)        Social History     Social History    Marital status:      Spouse name: N/A    Number of children: N/A    Years of education: N/A     Social History Main Topics    Smoking status: Never Smoker    Smokeless tobacco: None    Alcohol use Yes      Comment: occasional    Drug use: No    Sexual activity: No     Other Topics Concern    None     Social History Narrative     Review of Systems   Constitutional: Negative for chills and fever.   Respiratory: Negative for cough and shortness of breath.    Gastrointestinal: Negative for abdominal pain, diarrhea and vomiting.   Genitourinary: Negative for dysuria.   Musculoskeletal:        Left knee pain     Objective:     Vital Signs (Most Recent):  Temp: 98.4 °F (36.9 °C) (02/26/17 0800)  Pulse: 82 (02/26/17 0800)  Resp: 18 (02/26/17 0800)  BP: (!) 140/72 (02/26/17 0800)  SpO2: 95 % (02/26/17 0800) Vital Signs (24h Range):  Temp:  [97.5 °F (36.4 °C)-98.9 °F (37.2 °C)] 98.4 °F (36.9 °C)  Pulse:  [] 82  Resp:  [11-26] 18  SpO2:  [91 %-100 %] 95 %  BP: (116-172)/(56-77) 140/72     Weight: 68.3 kg (150 lb 8 oz)  Body mass index is 29.39 kg/(m^2).    Estimated Creatinine Clearance: 66.3 mL/min (based on Cr of 0.7).    Physical Exam   Constitutional: She appears well-developed and well-nourished. No distress.   HENT:   Head:  Normocephalic.   Eyes: Pupils are equal, round, and reactive to light.   Cardiovascular: Normal rate, regular rhythm and normal heart sounds.  Exam reveals no gallop and no friction rub.    No murmur heard.  Pulmonary/Chest: Effort normal. No respiratory distress. She has no wheezes. She has no rales. She exhibits no tenderness.   Musculoskeletal:   Left knee dressed.   Neurological: She is alert.   Skin: Skin is warm and dry. She is not diaphoretic.   Psychiatric: She has a normal mood and affect.   Nursing note and vitals reviewed.      Significant Labs: All pertinent labs within the past 24 hours have been reviewed.    Significant Imaging: I have reviewed all pertinent imaging results/findings within the past 24 hours.

## 2017-02-26 NOTE — PROGRESS NOTES
ORTHO PROGRESS NOTE:    Ella Canales is a 66 y.o. female admitted on 2/24/2017  Hospital Day: 0  Post Op Day: * No surgery date entered *      The patient was seen and examined this morning at the bedside. No acute issues overnight and adequate control of pain on current regimen.      PHYSICAL EXAM:  Awake/alert/oriented x 3  No acute distress  AFVSS  Good inspiratory effort with unlabored breathing  Dressings c/d/i  NVI distally    Vitals:    02/25/17 1650 02/25/17 2004 02/26/17 0024 02/26/17 0400   BP: (!) 149/69 130/67 (!) 140/67 (!) 117/56   BP Location: Left arm Left arm Right arm Right arm   Patient Position: Lying Lying Lying Lying   BP Method: Automatic Automatic Automatic Automatic   Pulse: 83 95 91 88   Resp: 16 16 17 19   Temp: 97.5 °F (36.4 °C) 98.2 °F (36.8 °C) 98.9 °F (37.2 °C) 98.5 °F (36.9 °C)   TempSrc: Oral Oral Oral Oral   SpO2: 97% 96% (!) 94% (!) 94%   Weight:       Height:         I/O last 3 completed shifts:  In: 1964 [P.O.:1094; I.V.:620; IV Piggyback:250]  Out: 305 [Urine:300; Drains:5]    Recent Labs  Lab 02/24/17  1411   CALCIUM 9.5   PROT 6.9      K 4.4   CO2 26      BUN 9   CREATININE 0.7       Recent Labs  Lab 02/24/17  1411   WBC 6.30   RBC 3.09*   HGB 8.5*   HCT 26.4*   *       Recent Labs  Lab 02/24/17  2142   INR 1.0   APTT 22.3     Joint fluid: WBC 8345, segs 90%    A/P: 66 y.o. female with concern for periprosthetic joint infection L TKA  -- Pain control  -- PT/OT: WBAT  -- DVT prophylaxis: xarelto starting tomorrow  -- Antibiotics: vanc and rocephin    -- Dispo: f/u PT and ID recs, will likely need 6 weeks abx and PICC

## 2017-02-26 NOTE — ASSESSMENT & PLAN NOTE
66 y.o. female with recent total left knee replacement 2/14 now s/p arthroscopic I&D by Dr. 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     Seen and discussed with ID staff. Will follow with you.

## 2017-02-26 NOTE — CONSULTS
[facility]  Infectious Disease  Consult Note    Patient Name: Ella Canales  MRN: [id number]  Admission Date: 2/24/2017  Hospital Length of Stay: 0 days  Attending Physician: Patel Denise MD  Primary Care Provider: Adry Damian MD     Isolation Status: No active isolations    Patient information was obtained from patient and past medical records.      Consults  Assessment/Plan:     Knee pain  66 y.o. female with recent total left knee replacement 2/14 now s/p arthroscopic I&D by Dr. 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     Seen and discussed with ID staff. Will follow with you.       Thank you for your consult. I will follow-up with patient. Please contact us if you have any additional questions.    Steve Patterson PA-C  Infectious Disease  [facility]  Pgr [phone]    Subjective:     Principal Problem: Cellulitis of left lower extremity    HPI: Patient is a 66 y.o. female s/p L TKA by Dr. Mulligan 2/14/17 presents with worsening LLE redness and pain since surgery.  Pt reports having low grade fever or 100.1 2 days after surgery. Denies having any other acute symptoms. Pt was seen by orthopedic surgery and had a joint aspiration which showed WBC 8.3k, but 90% segs. CRP and ESR are elevated. It was there to proceed with arthrotomy with I&D which she had 2/25/17. Pt now s/p arthrotomy, had a dermal graft which was removed. Hardware still in place.     Past Medical History:   Diagnosis Date    Arthritis     bilateral knees    GERD (gastroesophageal reflux disease)     Hyperlipidemia     Hypertension     Osteopenia        Past Surgical History:   Procedure Laterality Date    ECTOPIC PREGNANCY SURGERY      fess       FRACTURE SURGERY      ORIF right arm.    TOTAL KNEE ARTHROPLASTY Left     TUBAL LIGATION         Review of patient's allergies indicates:   Allergen Reactions    Ace inhibitors      Other reaction(s): cough       Medications:  Prescriptions Prior to Admission   Medication Sig    fexofenadine (ALLEGRA) 60 MG tablet Take 60 mg by mouth once daily.    fluticasone (FLONASE) 50 mcg/actuation nasal spray 1 spray by Each Nare route once daily.    glucosamine-chondroit-vit C-Mn (GLUCOSAMINE-CHONDROITIN MAX ST) 500-400 mg Cap Twice a day    losartan (COZAAR) 100 MG tablet Take 1 tablet (100 mg total) by mouth once daily.    metoprolol succinate (TOPROL-XL) 50 MG 24 hr tablet Take 1 tablet (50 mg total) by mouth once daily. (Patient taking differently: Take 50 mg by mouth 2 (two) times daily. )    omeprazole (PRILOSEC) 20 MG capsule Take 1 capsule (20 mg total) by mouth 2 (two) times daily.    oxycodone-acetaminophen (PERCOCET)  mg per tablet Take 1 tablet by mouth every 6 (six) hours as needed for Pain.    potassium chloride SA (K-DUR,KLOR-CON) 10 MEQ tablet Take 1 tablet (10 mEq total) by mouth 2 (two) times daily.    promethazine (PHENERGAN) 25 MG tablet Take 1 tablet (25 mg total) by mouth every 6 (six) hours as needed for Nausea.    raloxifene (EVISTA) 60 mg tablet Take 1 tablet (60 mg total) by mouth once daily.    simvastatin (ZOCOR) 40 MG tablet Take 1 tablet (40 mg total) by mouth every evening.    rivaroxaban (XARELTO) 10 mg Tab Take 1 tablet (10 mg total) by mouth once daily.     Antibiotics     Start     Stop Route Frequency Ordered    02/25/17 7446  vancomycin (VANCOCIN) 1,250 mg in dextrose 5 % 250 mL IVPB  (Vancomycin IVPB with levels panel)      -- IV Every 12 hours (non-standard times) 02/25/17 1147    02/26/17 1145  cefTRIAXone (ROCEPHIN) 2 g in dextrose 5 % 50 mL IVPB      -- IV Every 24 hours (non-standard times) 02/25/17 1147        Antifungals     None        Antivirals     None            Immunization History   Administered Date(s) Administered    Influenza - High Dose 10/22/2013, 01/06/2017    Pneumococcal Conjugate - 13 Valent 01/06/2017    Pneumococcal Polysaccharide - 23 Valent 03/14/2014    Tdap 08/24/2011    Zoster 10/22/2014       Family History     Problem Relation (Age of Onset)    Heart disease Mother, Father, Brother (47)        Social History     Social History    Marital status:      Spouse name: N/A    Number of children: N/A    Years of education: N/A     Social History Main Topics    Smoking status: Never Smoker    Smokeless tobacco: None    Alcohol use Yes      Comment: occasional    Drug use: No    Sexual activity: No     Other Topics Concern    None     Social History Narrative     Review of Systems   Constitutional: Negative for chills and fever.   Respiratory: Negative for cough and shortness of breath.    Gastrointestinal: Negative for abdominal pain, diarrhea and vomiting.   Genitourinary: Negative for dysuria.   Musculoskeletal:        Left knee pain     Objective:     Vital Signs (Most Recent):  Temp: 98.4 °F (36.9 °C) (02/26/17 0800)  Pulse: 82 (02/26/17 0800)  Resp: 18 (02/26/17 0800)  BP: (!) 140/72 (02/26/17 0800)  SpO2: 95 % (02/26/17 0800) Vital Signs (24h Range):  Temp:  [97.5 °F (36.4 °C)-98.9 °F (37.2 °C)] 98.4 °F (36.9 °C)  Pulse:  [] 82  Resp:  [11-26] 18  SpO2:  [91 %-100 %] 95 %  BP: (116-172)/(56-77) 140/72     Weight: 68.3 kg (150 lb 8 oz)  Body mass index is 29.39 kg/(m^2).    Estimated Creatinine Clearance: 66.3 mL/min (based on Cr of 0.7).    Physical Exam   Constitutional: She appears well-developed and well-nourished. No distress.   HENT:   Head: Normocephalic.   Eyes: Pupils are equal, round, and reactive to light.   Cardiovascular: Normal rate, regular rhythm and normal heart sounds.  Exam reveals no gallop and no friction rub.    No murmur heard.  Pulmonary/Chest: Effort normal. No respiratory distress. She has no  wheezes. She has no rales. She exhibits no tenderness.   Musculoskeletal:   Left knee dressed.   Neurological: She is alert.   Skin: Skin is warm and dry. She is not diaphoretic.   Psychiatric: She has a normal mood and affect.   Nursing note and vitals reviewed.      Significant Labs: All pertinent labs within the past 24 hours have been reviewed.    Significant Imaging: I have reviewed all pertinent imaging results/findings within the past 24 hours.

## 2017-02-26 NOTE — PLAN OF CARE
Problem: Patient Care Overview  Goal: Plan of Care Review  Outcome: Ongoing (interventions implemented as appropriate)  Pt is AAOx4. VSS. No falls/injury as pt calls for assistance when needed. Fall precautions remain in place. No s/s of skin breakdown as pt is independent with re-positioning self. Pain being monitored and controlled with PRN meds. Antibiotics given as scheduled. Bed in lowest position. Call light within reach. Will continue to monitor.

## 2017-02-27 PROBLEM — R21 RASH: Status: ACTIVE | Noted: 2017-02-27

## 2017-02-27 PROBLEM — T81.49XA POSTOPERATIVE WOUND INFECTION: Status: ACTIVE | Noted: 2017-02-24

## 2017-02-27 LAB
ANION GAP SERPL CALC-SCNC: 10 MMOL/L
BASOPHILS # BLD AUTO: 0.02 K/UL
BASOPHILS NFR BLD: 0.4 %
BUN SERPL-MCNC: 8 MG/DL
CALCIUM SERPL-MCNC: 9.3 MG/DL
CHLORIDE SERPL-SCNC: 102 MMOL/L
CO2 SERPL-SCNC: 26 MMOL/L
CREAT SERPL-MCNC: 0.8 MG/DL
DIFFERENTIAL METHOD: ABNORMAL
EOSINOPHIL # BLD AUTO: 0.1 K/UL
EOSINOPHIL NFR BLD: 1.6 %
ERYTHROCYTE [DISTWIDTH] IN BLOOD BY AUTOMATED COUNT: 13.9 %
EST. GFR  (AFRICAN AMERICAN): >60 ML/MIN/1.73 M^2
EST. GFR  (NON AFRICAN AMERICAN): >60 ML/MIN/1.73 M^2
GLUCOSE SERPL-MCNC: 124 MG/DL
HCT VFR BLD AUTO: 28.6 %
HGB BLD-MCNC: 9.1 G/DL
LYMPHOCYTES # BLD AUTO: 1.1 K/UL
LYMPHOCYTES NFR BLD: 22 %
MCH RBC QN AUTO: 27.6 PG
MCHC RBC AUTO-ENTMCNC: 31.8 %
MCV RBC AUTO: 87 FL
MONOCYTES # BLD AUTO: 0.5 K/UL
MONOCYTES NFR BLD: 10.4 %
NEUTROPHILS # BLD AUTO: 3.3 K/UL
NEUTROPHILS NFR BLD: 65 %
PLATELET # BLD AUTO: 515 K/UL
PMV BLD AUTO: 8.9 FL
POTASSIUM SERPL-SCNC: 4 MMOL/L
RBC # BLD AUTO: 3.3 M/UL
SODIUM SERPL-SCNC: 138 MMOL/L
VANCOMYCIN TROUGH SERPL-MCNC: 12.3 UG/ML
WBC # BLD AUTO: 5 K/UL

## 2017-02-27 PROCEDURE — 85025 COMPLETE CBC W/AUTO DIFF WBC: CPT

## 2017-02-27 PROCEDURE — 63600175 PHARM REV CODE 636 W HCPCS: Performed by: STUDENT IN AN ORGANIZED HEALTH CARE EDUCATION/TRAINING PROGRAM

## 2017-02-27 PROCEDURE — 80048 BASIC METABOLIC PNL TOTAL CA: CPT

## 2017-02-27 PROCEDURE — 97162 PT EVAL MOD COMPLEX 30 MIN: CPT

## 2017-02-27 PROCEDURE — 11000001 HC ACUTE MED/SURG PRIVATE ROOM

## 2017-02-27 PROCEDURE — 76937 US GUIDE VASCULAR ACCESS: CPT

## 2017-02-27 PROCEDURE — 25000003 PHARM REV CODE 250: Performed by: STUDENT IN AN ORGANIZED HEALTH CARE EDUCATION/TRAINING PROGRAM

## 2017-02-27 PROCEDURE — 97110 THERAPEUTIC EXERCISES: CPT

## 2017-02-27 PROCEDURE — 36415 COLL VENOUS BLD VENIPUNCTURE: CPT

## 2017-02-27 PROCEDURE — 99233 SBSQ HOSP IP/OBS HIGH 50: CPT | Mod: ,,, | Performed by: INTERNAL MEDICINE

## 2017-02-27 PROCEDURE — 25000003 PHARM REV CODE 250: Performed by: PHYSICIAN ASSISTANT

## 2017-02-27 PROCEDURE — 25000003 PHARM REV CODE 250: Performed by: ORTHOPAEDIC SURGERY

## 2017-02-27 PROCEDURE — C1751 CATH, INF, PER/CENT/MIDLINE: HCPCS

## 2017-02-27 PROCEDURE — 36569 INSJ PICC 5 YR+ W/O IMAGING: CPT

## 2017-02-27 PROCEDURE — 80202 ASSAY OF VANCOMYCIN: CPT

## 2017-02-27 PROCEDURE — 02HV33Z INSERTION OF INFUSION DEVICE INTO SUPERIOR VENA CAVA, PERCUTANEOUS APPROACH: ICD-10-PCS | Performed by: ORTHOPAEDIC SURGERY

## 2017-02-27 PROCEDURE — 97535 SELF CARE MNGMENT TRAINING: CPT

## 2017-02-27 PROCEDURE — 97165 OT EVAL LOW COMPLEX 30 MIN: CPT

## 2017-02-27 RX ORDER — SODIUM CHLORIDE 0.9 % (FLUSH) 0.9 %
10 SYRINGE (ML) INJECTION
Status: DISCONTINUED | OUTPATIENT
Start: 2017-02-27 | End: 2017-02-28 | Stop reason: HOSPADM

## 2017-02-27 RX ORDER — CIPROFLOXACIN 750 MG/1
750 TABLET, FILM COATED ORAL EVERY 12 HOURS
Status: DISCONTINUED | OUTPATIENT
Start: 2017-02-27 | End: 2017-02-28 | Stop reason: HOSPADM

## 2017-02-27 RX ORDER — SODIUM CHLORIDE 0.9 % (FLUSH) 0.9 %
10 SYRINGE (ML) INJECTION EVERY 6 HOURS
Status: DISCONTINUED | OUTPATIENT
Start: 2017-02-27 | End: 2017-02-28 | Stop reason: HOSPADM

## 2017-02-27 RX ADMIN — OXYCODONE HYDROCHLORIDE 10 MG: 5 TABLET ORAL at 12:02

## 2017-02-27 RX ADMIN — POTASSIUM CHLORIDE 10 MEQ: 750 CAPSULE, EXTENDED RELEASE ORAL at 09:02

## 2017-02-27 RX ADMIN — DIPHENHYDRAMINE HYDROCHLORIDE 25 MG: 25 CAPSULE ORAL at 09:02

## 2017-02-27 RX ADMIN — Medication 3 ML: at 01:02

## 2017-02-27 RX ADMIN — RALOXIFENE HYDROCHLORIDE 60 MG: 60 TABLET, FILM COATED ORAL at 01:02

## 2017-02-27 RX ADMIN — PANTOPRAZOLE SODIUM 40 MG: 40 TABLET, DELAYED RELEASE ORAL at 09:02

## 2017-02-27 RX ADMIN — VANCOMYCIN HYDROCHLORIDE 1250 MG: 1 INJECTION, POWDER, LYOPHILIZED, FOR SOLUTION INTRAVENOUS at 12:02

## 2017-02-27 RX ADMIN — LOSARTAN POTASSIUM 100 MG: 50 TABLET, FILM COATED ORAL at 09:02

## 2017-02-27 RX ADMIN — SODIUM CHLORIDE, PRESERVATIVE FREE 10 ML: 5 INJECTION INTRAVENOUS at 08:02

## 2017-02-27 RX ADMIN — OXYCODONE HYDROCHLORIDE 5 MG: 5 TABLET ORAL at 02:02

## 2017-02-27 RX ADMIN — OXYCODONE HYDROCHLORIDE 10 MG: 5 TABLET ORAL at 10:02

## 2017-02-27 RX ADMIN — Medication 3 ML: at 10:02

## 2017-02-27 RX ADMIN — CIPROFLOXACIN HYDROCHLORIDE 750 MG: 750 TABLET, FILM COATED ORAL at 09:02

## 2017-02-27 RX ADMIN — OXYCODONE HYDROCHLORIDE 10 MG: 5 TABLET ORAL at 04:02

## 2017-02-27 RX ADMIN — RIVAROXABAN 10 MG: 10 TABLET, FILM COATED ORAL at 01:02

## 2017-02-27 RX ADMIN — DIPHENHYDRAMINE HYDROCHLORIDE 25 MG: 25 CAPSULE ORAL at 02:02

## 2017-02-27 RX ADMIN — OXYCODONE HYDROCHLORIDE 10 MG: 5 TABLET ORAL at 06:02

## 2017-02-27 RX ADMIN — ACETAMINOPHEN 650 MG: 325 TABLET ORAL at 07:02

## 2017-02-27 RX ADMIN — Medication 3 ML: at 06:02

## 2017-02-27 RX ADMIN — SIMVASTATIN 40 MG: 40 TABLET, FILM COATED ORAL at 09:02

## 2017-02-27 RX ADMIN — METOPROLOL SUCCINATE 50 MG: 50 TABLET, EXTENDED RELEASE ORAL at 09:02

## 2017-02-27 RX ADMIN — VANCOMYCIN HYDROCHLORIDE 1250 MG: 1 INJECTION, POWDER, LYOPHILIZED, FOR SOLUTION INTRAVENOUS at 11:02

## 2017-02-27 RX ADMIN — OXYCODONE HYDROCHLORIDE 10 MG: 5 TABLET ORAL at 07:02

## 2017-02-27 NOTE — PROGRESS NOTES
ORTHO PROGRESS NOTE:    Ella Canales is a 66 y.o. female admitted on 2/24/2017  Hospital Day: 1  Post Op Day: 2    The patient was seen and examined this morning at the bedside. No acute issues overnight and adequate control of pain on current regimen.      Patient has not sufficiently ambulated yet    PHYSICAL EXAM:  Awake/alert/oriented x 3  No acute distress  AFVSS  Good inspiratory effort with unlabored breathing  Dressings c/d/i  NVI distally    Vitals:    02/26/17 1540 02/26/17 2003 02/27/17 0012 02/27/17 0410   BP: (!) 120/58 (!) 140/78 (!) 118/59 (!) 127/58   BP Location: Right arm Right arm Right arm Right arm   Patient Position: Lying Lying Sitting Lying   BP Method: Automatic Automatic Automatic Automatic   Pulse: 90 100 87 83   Resp: 18 19 17 14   Temp: 98.9 °F (37.2 °C) 98.7 °F (37.1 °C) 98.6 °F (37 °C) 98.1 °F (36.7 °C)   TempSrc: Oral Oral Oral Oral   SpO2: (!) 94% 96% 95% (!) 94%   Weight:       Height:         I/O last 3 completed shifts:  In: 2744 [P.O.:1574; I.V.:620; IV Piggyback:550]  Out: 305 [Urine:300; Drains:5]    Recent Labs  Lab 02/24/17  1411   CALCIUM 9.5   PROT 6.9      K 4.4   CO2 26      BUN 9   CREATININE 0.7       Recent Labs  Lab 02/24/17  1411   WBC 6.30   RBC 3.09*   HGB 8.5*   HCT 26.4*   *       Recent Labs  Lab 02/24/17  2142   INR 1.0   APTT 22.3         A/P: 66 y.o. female POD 2 s/p Irrigation and debridement of the Left total knee arthroplasty    Cultures: NGTD  Continue BS Abx  PT/OT - WBAT  Dressings down today  Drain out likely tomorrow - currently with serosanguinous drainage      -- Dispo: f/u PT and ID recs, will likely need 6 weeks abx and PICC, will discuss definitive management plan with primary surgeon today

## 2017-02-27 NOTE — PLAN OF CARE
Left TKA done 2/14/17. Patient readmitted for an I&D of previous left TKA. Patient is currently post-op day #2. PT/OT ordered to eval and treat. PT/OT recs pending. SW and CM will continue to follow for any additional needs. Plan A to discharge home with home health as soon as medically stable. Plan B to discharge to SNF.      02/27/17 0805   Readmission Questionnaire   At the time of your discharge, did someone talk to you about what your health problems were? Yes   At the time of discharge, did someone talk to you about what to watch out for regarding worsening of your health problem? Yes   At the time of discharge, did someone talk to you about what to do if you experienced worsening of your health problem? Yes   At the time of discharge, did someone talk to you about which medication to take when you left the hospital and which ones to stop taking? Yes   At the time of discharge, did someone talk to you about when and where to follow up with a doctor after you left the hospital? Yes   What do you believe caused you to be sick enough to be re-admitted? infection   How often do you need to have someone help you when you read instructions, pamphlets, or other written material from your doctor or pharmacy? Rarely   Do you have any problems affording any of  your prescribed medications? No   Do you have problems obtaining/receiving your medications? No   Does the patient have transportation to healthcare appointments? Yes   Lives With alone   Living Arrangements house   Does the patient have family/friends to help with healtcare needs after discharge? yes   Who are your caregiver(s) and their phone number(s)? daughter- Anitha [facility] [phone]; son- Clarence Worrell [phone]   Does your caregiver provide all the help you need? Yes   Are you currently feeling confused? No   Are you currently having problems thinking? No   Are you currently having memory problems? No   Have you felt down, depressed, or hopeless? 0    Have you felt little interest or pleasure in doing things? 0   In the last 7 days, my sleep quality was: good

## 2017-02-27 NOTE — PLAN OF CARE
Problem: Occupational Therapy Goal  Goal: Occupational Therapy Goal  Goals to be met by: 03/05    Patient will increase functional independence with ADLs by performing:    UE Dressing with Supervision.  LE Dressing with Supervision with AD as needed.  Grooming while standing with Supervision.  Toileting from bedside commode with Supervision for hygiene and clothing management.   Stand pivot transfers with Supervision.  Toilet transfer to bedside commode with Supervision.      MIA Pink  2/27/2017

## 2017-02-27 NOTE — PLAN OF CARE
Pt likely to need 6 weeks IV Abx, possible discharge tomorrow. SW left VM with request for call back from Laura at [facility] ([phone]). TRISTIN added Kade to pt's treatment team in [facility].    Addendum 9:59 AM  TRISTIN spoke with Laura who accepted pt's referral. Per Laura, [facility] will coordinate home health with pt.    Valeria Miranda, AFIA  [id number]

## 2017-02-27 NOTE — NURSING
L knee surgical drain tube was changed tonight. Old tube contains 5-10 mL clear pink liquid (this tube remains at pt's bedside).When new tube was spiked, pink/orange liquid began filling the tube immediately. Drain filled MCC in 1-2 minutes. It seems to be flowing more easily now.  Will resume care.

## 2017-02-27 NOTE — PLAN OF CARE
POD 2 s/p I&D of left TKA. PT/OT ordered to eval and treat. PT/OT recs pending. Patient currently lives alone but has the support of her daughter and son. Patient states her daughter lives near by. CM completed discharge assessment and planning with patient. Patient verbalized understanding. All questions and concerns addressed. SW and CM will continue to follow for any additional needs. Plan A to discharge home with home health as soon as medically stable. Plan B to discharge to SNF.     PCP: Adry Damian MD    Pharmacy:   [id number] - [place], [place] - [address] [facility] [address]  [address]  [place] [address]  Phone: [phone] Fax: [phone]    Payor: MEDICARE / Plan: MEDICARE PART A & B / Product Type: [facility] /      02/27/17 0800   Discharge Assessment   Assessment Type Discharge Planning Assessment   Confirmed/corrected address and phone number on facesheet? Yes   Assessment information obtained from? Patient;Medical Record   Expected Length of Stay (days) 5   Communicated expected length of stay with patient/caregiver yes   Prior to hospitilization cognitive status: Alert/Oriented   Prior to hospitalization functional status: Assistive Equipment   Current cognitive status: Alert/Oriented   Current Functional Status: Assistive Equipment   Arrived From home or self-care   Lives With alone   Able to Return to Prior Arrangements yes   Is patient able to care for self after discharge? Yes   How many people do you have in your home that can help with your care after discharge? 2   Who are your caregiver(s) and their phone number(s)? daughter- Anitha [facility] [phone]; son- Clarence Worrell [phone]   Patient's perception of discharge disposition home health   Readmission Within The Last 30 Days Yes- L TKA done 2/14/17; I&D to L TKA this admission   Patient currently being followed by outpatient case management? No   Patient currently receives home health  services? No   Does the patient currently use HME? Yes   Patient currently receives private duty nursing? No   Patient currently receives any other outside agency services? No   Equipment Currently Used at Home bedside commode;walker, rolling   Do you have any problems affording any of your prescribed medications? No   Is the patient taking medications as prescribed? yes   Do you have any financial concerns preventing you from receiving the healthcare you need? No   Does the patient have transportation to healthcare appointments? Yes   Transportation Available family or friend will provide   On Dialysis? No   Does the patient receive services at the [facility]madin Clinic? No   Are there any open cases? No   Discharge Plan A Home Health;Home with family   Discharge Plan B Skilled Nursing Facility   Patient/Family In Agreement With Plan yes

## 2017-02-27 NOTE — PT/OT/SLP EVAL
Occupational Therapy  Evaluation    Ella Canales   MRN: [id number]   Admitting Diagnosis: Cellulitis of left lower extremity    OT Date of Treatment: 17   OT Start Time: 928  OT Stop Time: 948  OT Total Time (min): 20 min    Billable Minutes:  Evaluation 12  Self Care/Home Management 8    Diagnosis: Cellulitis of left lower extremity   I&D L knee    Past Medical History:   Diagnosis Date    Arthritis     bilateral knees    GERD (gastroesophageal reflux disease)     Hyperlipidemia     Hypertension     Osteopenia       Past Surgical History:   Procedure Laterality Date    ECTOPIC PREGNANCY SURGERY      fess      FRACTURE SURGERY      ORIF right arm.    TOTAL KNEE ARTHROPLASTY Left     TUBAL LIGATION       General Precautions: Standard, fall  Orthopedic Precautions: LLE weight bearing as tolerated    Patient History:  Living Environment  Lives With: alone  Living Arrangements: house  Home Accessibility: stairs to enter home  Number of Stairs to Enter Home: 1  Stair Railings at Home: none  Transportation Available: family or friend will provide  Living Environment Comment: Spv/(A) available PRN  Equipment Currently Used at Home: bedside commode, walker, rolling, cane, straight    Prior level of function:   Bed Mobility/Transfers: independent  Grooming: independent  Bathing: independent  Upper Body Dressing: independent  Lower Body Dressing: independent  Toileting: independent  Home Management Skills: independent    Subjective:  Communicated with RN prior to session.    Pt agreeable to Evaluation    Pain Ratin/10  Location - Side: Left  Location: knee  Pain Addressed: Pre-medicate for activity, Reposition, Distraction    Objective:  Patient found with: peripheral IV (surgical drain)    Upper Extremity Range of Motion:  Right Upper Extremity: WFL  Left Upper Extremity: WFL    Upper Extremity Strength:  Right Upper Extremity: WFL  Left Upper Extremity: WFL    Functional Mobility:  Bed  "Mobility:  Scooting/Bridging: Stand by Assistance  Supine to Sit: Stand by Assistance    Transfers:  Sit <> Stand Assistance: Contact Guard Assistance  Sit <> Stand Assistive Device: Rolling Walker    Functional Ambulation: SBA with  feet    Activities of Daily Living:  UE Dressing Level of Assistance: Stand by assistance    LE Dressing Level of Assistance: Moderate assistance   Pt educated on LE dressing techniques    Grooming Position: Seated, EOB  Grooming Level of Assistance: Stand by assistance    AM-PAC 6 CLICK ADL  How much help from another person does this patient currently need?  1 = Unable, Total/Dependent Assistance  2 = A lot, Maximum/Moderate Assistance  3 = A little, Minimum/Contact Guard/Supervision  4 = None, Modified [place]/Independent    Putting on and taking off regular lower body clothing? : 3  Bathing (including washing, rinsing, drying)?: 3  Toileting, which includes using toilet, bedpan, or urinal? : 3  Putting on and taking off regular upper body clothing?: 3  Taking care of personal grooming such as brushing teeth?: 4  Eating meals?: 4  Total Score: 20    AM-PAC Raw Score CMS "G-Code Modifier Level of Impairment Assistance   6 % Total / Unable   7 - 9 CM 80 - 100% Maximal Assist   10 - 14 CL 60 - 80% Moderate Assist   15 - 19 CK 40 - 60% Moderate Assist   20 - 22 CJ 20 - 40% Minimal Assist   23 CI 1-20% SBA / CGA   24 CH 0% Independent/ Mod I       Patient left up in chair with PT present    Assessment:  Ella Canales is a 66 y.o. female with a medical diagnosis of s/p L knee I&D and presents with decreased function of L LE impeding her ability to perform ADLs and functional mobility and would benefit from OT services to maximize functional (I) and safety.    Rehab identified problem list/impairments: Rehab identified problem list/impairments: weakness, impaired endurance, impaired self care skills, impaired functional mobilty, gait instability, impaired balance, " decreased lower extremity function, decreased safety awareness, pain, edema, orthopedic precautions, impaired skin    Rehab potential is good.    Activity tolerance: Good    Discharge recommendations: Discharge Facility/Level Of Care Needs: home with home health     Barriers to discharge: Barriers to Discharge: Decreased caregiver support    Equipment recommendations: shower chair     GOALS:   Occupational Therapy Goals        Problem: Occupational Therapy Goal    Goal Priority Disciplines Outcome Interventions   Occupational Therapy Goal     OT, PT/OT     Description:  Goals to be met by: 03/05    Patient will increase functional independence with ADLs by performing:    UE Dressing with Supervision.  LE Dressing with Supervision with AD as needed.  Grooming while standing with Supervision.  Toileting from bedside commode with Supervision for hygiene and clothing management.   Stand pivot transfers with Supervision.  Toilet transfer to bedside commode with Supervision.                   PLAN:  Patient to be seen 3 x/week to address the above listed problems via self-care/home management, therapeutic activities, therapeutic exercises  Plan of Care reviewed with: patient    MIA Pink  02/27/2017

## 2017-02-27 NOTE — ASSESSMENT & PLAN NOTE
66 y.o. female with recent total left knee replacement 2/14 now s/p arthroscopic I&D by Dr. Lemon 2/25/17, concern for periprosthetic joint infection L TKA  -join aspiration WBC 8.3K, 90%segs  -remained afebrile, VSS, no leukocytosis   - .4  -intraop cultures pending  -continue vancomycin; will increase to 1500 mg IV q 12 hours given low trough; will need repeat vanc trough before 4th dose  - will stop ceftriaxone (given rash) and start cipro  -anticipate 6 weeks of IV abx  - may need two stage revision in future if infection doesn't clear

## 2017-02-27 NOTE — PROCEDURES
Ella Canales is a 66 y.o. female patient.    Temp: 98.2 °F (36.8 °C) (02/27/17 0740)  Pulse: 88 (02/27/17 0740)  Resp: 16 (02/27/17 0740)  BP: (!) 124/56 (02/27/17 0740)  SpO2: 95 % (02/27/17 0740)  Weight: 68.3 kg (150 lb 8 oz) (02/25/17 0206)  Height: 5' (152.4 cm) (02/25/17 0206)    PICC  Date/Time: 2/27/2017 11:52 AM  Performed by: DILCIA KING  Consent Done: Yes  Time out: Immediately prior to procedure a time out was called to verify the correct patient, procedure, equipment, support staff and site/side marked as required  Indications: med administration and vascular access  Anesthesia: local infiltration  Local anesthetic: lidocaine 1% without epinephrine  Anesthetic Total (mL): 2  Preparation: skin prepped with ChloraPrep  Skin prep agent dried: skin prep agent completely dried prior to procedure  Sterile barriers: all five maximum sterile barriers used - cap, mask, sterile gown, sterile gloves, and large sterile sheet  Hand hygiene: hand hygiene performed prior to central venous catheter insertion  Location details: right basilic  Catheter type: double lumen  Catheter size: 5 Fr  Catheter Length: 35cm    Ultrasound guidance: yes  Vessel Caliber: medium and patent, compressibility normal  Needle advanced into vessel with real time Ultrasound guidance.  Guidewire confirmed in vessel.  Image recorded and saved.  Sterile sheath used.  Number of attempts: 1  Post-procedure: blood return through all ports, chlorhexidine patch and sterile dressing applied  Technical procedures used: 3CG  Specimens: No  Implants: No  Assessment: placement verified by x-ray  Complications: none        Nimisha Sarah  2/27/2017

## 2017-02-27 NOTE — PLAN OF CARE
Problem: Physical Therapy Goal  Goal: Physical Therapy Goal  Goals to be met by: 3/9     Patient will increase functional independence with mobility by performin. Supine to sit with Set-up [place]  2. Sit to supine with Set-up [place]  3. Sit to stand transfer with Supervision  4. Gait x 150 feet with Supervision using Rolling Walker.   5. Ascend/Descend 6 inch curb step with Supervision using Rolling Walker.  6. (I) with HEP  Outcome: Ongoing (interventions implemented as appropriate)  Yordan Garcia, PT  2017

## 2017-02-27 NOTE — PLAN OF CARE
[facility]    HOME HEALTH ORDERS  FACE TO FACE ENCOUNTER    Patient Name: Ella Canales  YOB: 1950    PCP: Adry Damian MD   PCP Address: [address] [facility] / [place] [address]  PCP Phone Number: [phone]  PCP Fax: [phone]    Encounter Date: 02/28/2017    Admit to Home Health    Diagnoses:  Active Hospital Problems    Diagnosis  POA    *Cellulitis of left lower extremity [L03.116]  Yes    Rash [R21]  No    Postoperative wound infection [T81.4XXA]  Yes      Resolved Hospital Problems    Diagnosis Date Resolved POA   No resolved problems to display.       Future Appointments  Date Time Provider Department Center   3/27/2017 11:15 AM Angie Mulligan MD [facility] [place]   4/24/2017 8:40 AM Adry Damian MD [facility] [place]     Follow-up Information     Follow up with [facility].    Specialty:  Home Health Services    Why:  Home Health    Contact information:    [address] [place] [address] [address] [place] [id number]  [phone]              I have seen and examined this patient face to face today. My clinical findings that support the need for the home health skilled services and home bound status are the following:  Weakness/numbness causing balance and gait disturbance due to Joint Replacement making it taxing to leave home.    Allergies:  Review of patient's allergies indicates:   Allergen Reactions    Ace inhibitors      Other reaction(s): cough       Diet: regular diet    Activities: activity as tolerated    Home Health Admitting Clinician:   SN/PT to complete comprehensive assessment including routine vital signs. Instruct on disease process and s/s of complications to report to MD. Follow specific home health arthoplasty protocol. Review/verify medication list sent home with the patient at time of discharge  and instruct patient/caregiver as needed. If coumadin ordered, coumadin clinic to manage INR with INR draws 2x per week with a goal to  maintain INR between 1.8 and 2.2. Frequency may be adjusted depending on start of care date.    Notify MD if SBP > 160 or < 90; DBP > 90 or < 50; HR > 120 or < 50; Temp > 101    Home Medical Equipment:  Walker, 3-1 bedside commode, transfer tub bench    CONSULTS:    Physical Therapy may admit if patient not on coumadin, PT to perform comprehensive assessment if performing admit visit and generate therapy plan of care. Evaluate for home safety and equipment needs; Establish/upgrade home exercise program. Perform/instruct on therapeutic exercises, gait training, transfer training, and Range of Motion.      OTHER: (only select if patient needs other therapy disciplines)  Aide to provide assistance with personal care, ADLs, and vital signs.    MISCELLANEOUS CARE:  HOME INFUSION THERAPY:   SN to perform Infusion Therapy/Central Line Care.  Review Central Line Care & Central Line Flush with patient.    Administer (drug and dose):     Vancomycin 1500mg q12hrs IV        Last dose given: 2/27                         Home dose due: 2/28    Scrub the Hub: Prior to accessing the line, always perform a 30 second alcohol scrub  Each lumen of the central line is to be flushed at least daily with 10 mL Normal Saline and 3 mL Heparin flush (100 units/mL)  Skilled Nurse (SN) may draw blood from IV access  Blood Draw Procedure:   - Aspirate at least 5 mL of blood   - Discard   - Obtain specimen   - Change posiflow cap   - Flush with 20 mL Normal Saline followed by a                 3-5 mL Heparin flush (100 units/mL)  Central :   - Sterile dressing changes are done weekly and as needed.   - Use chlor-hexadine scrub to cleanse site, apply Biopatch to insertion site,       apply securement device dressing   - Posi-flow caps are changed weekly and after EVERY lab draw.   - If sterile gauze is under dressing to control oozing,                 dressing change must be performed every 24 hours until gauze is not  needed.    LABS:  Weekly CBC/BMP/ESR/CRP/Vanc Trough to be faxed to [facility]    WOUND CARE ORDERS:  Assess Surgical Incision/DSRG each TX  Aquacel AG drsg applied post-op leave on 14 days post op. Call MD if any drainage reaches border to border of drsg horizontally, s/s of infection, temp >101, induration, swelling or redness.  If dressing is removed per MD order, then apply island dressing, change/teach caregiver to perform daily dressing change if island dressing present.    Medications: Review discharge medications with patient and family and provide education.      Current Discharge Medication List      START taking these medications    Details   ciprofloxacin HCl (CIPRO) 750 MG tablet Take 1 tablet (750 mg total) by mouth every 12 (twelve) hours.  Qty: 60 tablet, Refills: 1      dextrose 5 % SolP 250 mL with vancomycin 1,000 mg SolR 1,500 mg Inject 1,500 mg into the vein every 12 (twelve) hours.         CONTINUE these medications which have NOT CHANGED    Details   fexofenadine (ALLEGRA) 60 MG tablet Take 60 mg by mouth once daily.      fluticasone (FLONASE) 50 mcg/actuation nasal spray 1 spray by Each Nare route once daily.  Qty: 16 g, Refills: prn    Associated Diagnoses: Chronic rhinitis      glucosamine-chondroit-vit C-Mn (GLUCOSAMINE-CHONDROITIN MAX ST) 500-400 mg Cap Twice a day      losartan (COZAAR) 100 MG tablet Take 1 tablet (100 mg total) by mouth once daily.  Qty: 90 tablet, Refills: 3    Associated Diagnoses: Essential hypertension      metoprolol succinate (TOPROL-XL) 50 MG 24 hr tablet Take 1 tablet (50 mg total) by mouth once daily.  Qty: 90 tablet, Refills: 3    Associated Diagnoses: Essential hypertension      omeprazole (PRILOSEC) 20 MG capsule Take 1 capsule (20 mg total) by mouth 2 (two) times daily.  Qty: 180 capsule, Refills: 1    Associated Diagnoses: Gastroesophageal reflux disease without esophagitis      oxycodone-acetaminophen (PERCOCET)  mg per tablet Take 1 tablet by  mouth every 6 (six) hours as needed for Pain.  Qty: 60 tablet, Refills: 0    Associated Diagnoses: Traumatic arthritis of left knee; Genu varum of left lower extremity      potassium chloride SA (K-DUR,KLOR-CON) 10 MEQ tablet Take 1 tablet (10 mEq total) by mouth 2 (two) times daily.  Qty: 180 tablet, Refills: 4    Associated Diagnoses: Hypokalemia      promethazine (PHENERGAN) 25 MG tablet Take 1 tablet (25 mg total) by mouth every 6 (six) hours as needed for Nausea.  Qty: 30 tablet, Refills: 0    Associated Diagnoses: Traumatic arthritis of left knee; Genu varum of left lower extremity      raloxifene (EVISTA) 60 mg tablet Take 1 tablet (60 mg total) by mouth once daily.  Qty: 90 tablet, Refills: 3      simvastatin (ZOCOR) 40 MG tablet Take 1 tablet (40 mg total) by mouth every evening.  Qty: 90 tablet, Refills: 3    Associated Diagnoses: Hyperlipidemia, unspecified hyperlipidemia type      rivaroxaban (XARELTO) 10 mg Tab Take 1 tablet (10 mg total) by mouth once daily.  Qty: 12 tablet, Refills: 0             I certify that this patient is confined to her home and needs intermittent skilled nursing care and physical therapy.

## 2017-02-27 NOTE — ASSESSMENT & PLAN NOTE
- possible drug rash??  - will stop ceftriaxone and start cipro 750 mg PO BID empirically as beta lactam most likely cause of rash  - benadryl PRN

## 2017-02-27 NOTE — PROGRESS NOTES
[facility]  Infectious Disease  Progress Note    Patient Name: Ella Canales  MRN: [id number]  Admission Date: 2/24/2017  Length of Stay: 1 days  Attending Physician: Patel Denise MD  Primary Care Provider: Adry Damian MD    Isolation Status: No active isolations  Assessment/Plan:      Postoperative wound infection  66 y.o. female with recent total left knee replacement 2/14 now s/p arthroscopic I&D by Dr. Lemon 2/25/17, concern for periprosthetic joint infection L TKA  -join aspiration WBC 8.3K, 90%segs  -remained afebrile, VSS, no leukocytosis   - .4  -intraop cultures pending  -continue vancomycin; will increase to 1500 mg IV q 12 hours given low trough; will need repeat vanc trough before 4th dose  - will stop ceftriaxone (given rash) and start cipro  -anticipate 6 weeks of IV abx  - may need two stage revision in future if infection doesn't clear      Rash  - possible drug rash??  - will stop ceftriaxone and start cipro 750 mg PO BID empirically as beta lactam most likely cause of rash  - benadryl PRN      Thank you for your consult. I will follow-up with patient. Please contact us if you have any additional questions.  JOEY Mercado, pager: [phone]  Infectious Disease  [facility]    Subjective:     Principal Problem:Cellulitis of left lower extremity    Interval History: doing well; complains of rash and itching to her back; afebrile; has knee pain with movement.     HPI:  Patient is a 66 y.o. female s/p L TKA by Dr. Mulligan 2/14/17 presents with worsening LLE redness and pain since surgery.  Pt reports having low grade fever or 100.1 2 days after surgery. Denies having any other acute symptoms. Pt was seen by orthopedic surgery and had a joint aspiration which showed WBC 8.3k, but 90% segs. CRP and ESR are elevated. It was there to proceed with arthrotomy with I&D which she had 2/25/17. Pt now s/p arthrotomy, had a dermal graft which was  removed. Hardware still in place.     Review of Systems   Constitutional: Negative for chills and fever.   Respiratory: Negative for cough and shortness of breath.    Gastrointestinal: Negative for abdominal pain, diarrhea and vomiting.   Genitourinary: Negative for dysuria.   Musculoskeletal:        Left knee pain   Skin: Positive for rash (back) and wound.     Objective:     Vital Signs (Most Recent):  Temp: 98.2 °F (36.8 °C) (02/27/17 0740)  Pulse: 88 (02/27/17 0740)  Resp: 16 (02/27/17 0740)  BP: (!) 124/56 (02/27/17 0740)  SpO2: 95 % (02/27/17 0740) Vital Signs (24h Range):  Temp:  [98.1 °F (36.7 °C)-98.9 °F (37.2 °C)] 98.2 °F (36.8 °C)  Pulse:  [] 88  Resp:  [14-19] 16  SpO2:  [94 %-96 %] 95 %  BP: ()/(55-78) 124/56     Weight: 68.3 kg (150 lb 8 oz)  Body mass index is 29.39 kg/(m^2).    Estimated Creatinine Clearance: 66.3 mL/min (based on Cr of 0.7).    Physical Exam   Constitutional: She appears well-developed and well-nourished. No distress.   HENT:   Head: Normocephalic.   Eyes: Pupils are equal, round, and reactive to light.   Cardiovascular: Normal rate, regular rhythm and normal heart sounds.  Exam reveals no gallop and no friction rub.    No murmur heard.  Pulmonary/Chest: Effort normal. No respiratory distress. She has no wheezes. She has no rales. She exhibits no tenderness.   Musculoskeletal:   Left knee dressed; drain in place   Neurological: She is alert.   Skin: Skin is warm and dry. Rash (erythematous rash noted to entire back) noted. She is not diaphoretic.   Psychiatric: She has a normal mood and affect.   Nursing note and vitals reviewed.      Significant Labs:   Blood Culture: No results for input(s): LABBLOO in the last 4320 hours.  CBC:   Recent Labs  Lab 02/27/17  0913   WBC 5.00   HGB 9.1*   HCT 28.6*   *     CMP: No results for input(s): NA, K, CL, CO2, GLU, BUN, CREATININE, CALCIUM, PROT, ALBUMIN, BILITOT, ALKPHOS, AST, ALT, ANIONGAP, EGFRNONAA in the last 48  hours.    Invalid input(s): TAMIA    Significant Imaging: I have reviewed all pertinent imaging results/findings within the past 24 hours.

## 2017-02-27 NOTE — PLAN OF CARE
Per [facility], pt was set up with [facility]. TRISTIN confirmed with Korin from [facility] ([id number]) that pt will be seen in home day after d/c by a skilled nurse.  Agency info added to pt's AVS.    Addendum 2:10 PM  [facility] will to a teaching with the pt this afternoon. If pt d/c 2/28, on call SW/LISA can call [facility] Pharmacist ([phone]).    Valeria Miranda, AFIA  [phone]

## 2017-02-27 NOTE — PLAN OF CARE
Problem: Patient Care Overview  Goal: Plan of Care Review  Pt AAOX4, VSS. Surgical site CDI. No family at bedside. Pt is resting quietly now. No s/s infection, skin breakdown/pressure ulcers - pt moves independently well. Pt has not worked with PT/OT yet. Pain managed with PRN medications. IS education complete. S/FCD's on and functioning properly. Fall precautions maintained. Will resume care.

## 2017-02-27 NOTE — PROGRESS NOTES
"Patient complaining of rash to her back. Informed her that I will bring her a benadryl, as she has it PRN for itching. Pt states benadryl makes her "crazy" and is requesting something topical.    Dr. Issa returned page. Topical benadryl ordered and pt will start Xarelto tomorrow as well as PT/OT.  "

## 2017-02-27 NOTE — CONSULTS
Double lumen PICC placed in right basilic vein, 35cm in length, 4cm exposed with arm circumference 29 cm. Lot# [id number]

## 2017-02-27 NOTE — PT/OT/SLP EVAL
Physical Therapy  Evaluation/Treatment    Ella Canales   MRN: [id number]   Admitting Diagnosis: Cellulitis of left lower extremity    PT Received On: 17  PT Start Time: 928     PT Stop Time: 1005    PT Total Time (min): 37 min       Billable Minutes:  Evaluation 12 and Therapeutic Exercise 25    Diagnosis: Cellulitis of left lower extremity  PT/OT Co-eval    Past Medical History:   Diagnosis Date    Arthritis     bilateral knees    GERD (gastroesophageal reflux disease)     Hyperlipidemia     Hypertension     Osteopenia       Past Surgical History:   Procedure Laterality Date    ECTOPIC PREGNANCY SURGERY      fess      FRACTURE SURGERY      ORIF right arm.    TOTAL KNEE ARTHROPLASTY Left     TUBAL LIGATION         General Precautions: Standard, fall  Orthopedic Precautions: LLE weight bearing as tolerated   Braces:              Patient History:  Lives With: alone  Living Arrangements: house  Home Accessibility: stairs to enter home  Number of Stairs to Enter Home: 1  Stair Railings at Home: none  Living Environment Comment: Friends and family can assist prn  Equipment Currently Used at Home: bedside commode, walker, rolling, cane, straight    Previous Level of Function:  Ambulation Skills: needs device  Transfer Skills: needs device  ADL Skills: needs device    Subjective:  Communicated with nsg prior to session.  Pt agreeable to PT session  Chief Complaint: Decreased mobility  Patient goals: Return to PLOF    Pain Ratin/10   Location - Side: Left     Location: knee          Objective:   Patient found with: peripheral IV (surgical drain)     Cognitive Exam:  Oriented to: Person, Place, Time and Situation    Follows Commands/attention: Follows multistep  commands  Communication: clear/fluent  Safety awareness/insight to disability: intact    Physical Exam:  Skin integrity: Covered by surgical dressing  Edema: DTA 2/2 surgical dressing    Sensation:   Intact    Lower Extremity Strength:  Right  Lower Extremity: WFL  Left Lower Extremity: 3/5 grossly     Fine motor coordination:  Intact    Gross motor coordination: WFL    Functional Mobility:  Bed Mobility:  Supine to Sit: Stand by Assistance  Sit to Supine: Stand by Assistance    Transfers:  Sit <> Stand Assistance: Contact Guard Assistance  Sit <> Stand Assistive Device: Rolling Walker    Gait:   Gait Distance: 200 ft  Assistance 1: Stand by Assistance  Gait Assistive Device: Rolling walker  Gait Pattern: 3-point gait  Gait Deviation(s): decreased joaquín, increased time in double stance, decreased weight-shifting ability, decreased velocity of limb motion, decreased step length, increased stride width, decreased swing-to-stance ratio, decreased toe-to-floor clearance      Balance:   Static Sit: GOOD: Takes MODERATE challenges from all directions  Dynamic Sit: GOOD: Maintains balance through MODERATE excursions of active trunk movement  Static Stand: FAIR+: Takes MINIMAL challenges from all directions  Dynamic stand: FAIR+: Needs CLOSE SUPERVISION during gait and is able to right self with minor LOB    Therapeutic Activities and Exercises:  White board updated    Supine therex per handout (Knee protocol), 20x each  · Ankle pumps  · Quad sets  · Glute squeezes  · Heel slides  · LAQ    Pt educated on:  · Common signs and symptoms associated with TKA  · Collagen healing time and importance of regaining functional ROM within 6-8 weeks to prevent scar tissue formation  · Importance of protecting surgical incision during functional tasks to reduce the risk of bleeding/infection  · HEP  · Edema management including icing regimen  · DME management  · Proper breathing during therex        AM-PAC 6 CLICK MOBILITY  How much help from another person does this patient currently need?   1 = Unable, Total/Dependent Assistance  2 = A lot, Maximum/Moderate Assistance  3 = A little, Minimum/Contact Guard/Supervision  4 = None, Modified [place]/Independent    Turning  over in bed (including adjusting bedclothes, sheets and blankets)?: 4  Sitting down on and standing up from a chair with arms (e.g., wheelchair, bedside commode, etc.): 4  Moving from lying on back to sitting on the side of the bed?: 4  Moving to and from a bed to a chair (including a wheelchair)?: 4  Need to walk in hospital room?: 4  Climbing 3-5 steps with a railing?: 3  Total Score: 23     AM-PAC Raw Score CMS G-Code Modifier Level of Impairment Assistance   6 % Total / Unable   7 - 9 CM 80 - 100% Maximal Assist   10 - 14 CL 60 - 80% Moderate Assist   15 - 19 CK 40 - 60% Moderate Assist   20 - 22 CJ 20 - 40% Minimal Assist   23 CI 1-20% SBA / CGA   24 CH 0% Independent/ Mod I     Patient left up in chair with all lines intact, call button in reach and nsg notified.    Assessment:   Ella Canales is a 66 y.o. female with a medical diagnosis of Cellulitis of left lower extremity and presents with good overall mobility and safety during her initial eval.  Pt is a good candidate to benefit from skilled PT services to maximize her functional independence and to ensure safe return to the home environment.    Rehab identified problem list/impairments: Rehab identified problem list/impairments: weakness, impaired endurance, impaired self care skills, impaired functional mobilty, gait instability, impaired balance, orthopedic precautions, impaired skin, pain, edema, decreased ROM    Rehab potential is good.    Activity tolerance: Good    Discharge recommendations: Discharge Facility/Level Of Care Needs: home with home health     Barriers to discharge: Barriers to Discharge: Decreased caregiver support    Equipment recommendations: Equipment Needed After Discharge: shower chair     GOALS:   Physical Therapy Goals        Problem: Physical Therapy Goal    Goal Priority Disciplines Outcome Goal Variances Interventions   Physical Therapy Goal     PT/OT, PT Ongoing (interventions implemented as appropriate)      Description:  Goals to be met by: 3/9     Patient will increase functional independence with mobility by performin. Supine to sit with Set-up [place]  2. Sit to supine with Set-up [place]  3. Sit to stand transfer with Supervision  4. Gait  x 150 feet with Supervision using Rolling Walker.   5. Ascend/Descend 6 inch curb step with Supervision using Rolling Walker.  6. (I) with HEP                PLAN:    Patient to be seen daily to address the above listed problems via gait training, therapeutic activities, therapeutic exercises  Plan of Care expires: 17  Plan of Care reviewed with: patient          Yordan Garcia, PT  2017

## 2017-02-27 NOTE — PLAN OF CARE
Problem: Patient Care Overview  Goal: Plan of Care Review  Outcome: Ongoing (interventions implemented as appropriate)  Pt AAOX4, VSS. I&D site CDI, ACE wrap in place.  No family at bedside. Pt is resting quietly now. L leg elevated on pillows for comfort.   No s/s skin breakdown/pressure ulcers - pt moves independently well and voids per bedpan. No BM since procedure. Pain managed with PRN medications. IS education complete. S/FCD's on and functioning properly. Fall precautions maintained. Will resume care.     Problem: Pain, Acute (Adult)  Goal: Identify Related Risk Factors and Signs and Symptoms  Related risk factors and signs and symptoms are identified upon initiation of Human Response Clinical Practice Guideline (CPG)   Outcome: Ongoing (interventions implemented as appropriate)  PRN pain med administered    Problem: Infection, Risk/Actual (Adult)  Goal: Identify Related Risk Factors and Signs and Symptoms  Related risk factors and signs and symptoms are identified upon initiation of Human Response Clinical Practice Guideline (CPG)   Outcome: Ongoing (interventions implemented as appropriate)  IV AB administered

## 2017-02-28 VITALS
DIASTOLIC BLOOD PRESSURE: 55 MMHG | HEIGHT: 60 IN | HEART RATE: 94 BPM | TEMPERATURE: 98 F | WEIGHT: 150.5 LBS | SYSTOLIC BLOOD PRESSURE: 108 MMHG | OXYGEN SATURATION: 96 % | BODY MASS INDEX: 29.55 KG/M2 | RESPIRATION RATE: 17 BRPM

## 2017-02-28 LAB
ANION GAP SERPL CALC-SCNC: 8 MMOL/L
BASOPHILS # BLD AUTO: 0.03 K/UL
BASOPHILS NFR BLD: 0.5 %
BUN SERPL-MCNC: 10 MG/DL
CALCIUM SERPL-MCNC: 9.2 MG/DL
CHLORIDE SERPL-SCNC: 102 MMOL/L
CO2 SERPL-SCNC: 28 MMOL/L
CREAT SERPL-MCNC: 0.7 MG/DL
DIFFERENTIAL METHOD: ABNORMAL
EOSINOPHIL # BLD AUTO: 0.1 K/UL
EOSINOPHIL NFR BLD: 1.9 %
ERYTHROCYTE [DISTWIDTH] IN BLOOD BY AUTOMATED COUNT: 14 %
EST. GFR  (AFRICAN AMERICAN): >60 ML/MIN/1.73 M^2
EST. GFR  (NON AFRICAN AMERICAN): >60 ML/MIN/1.73 M^2
GLUCOSE SERPL-MCNC: 93 MG/DL
HCT VFR BLD AUTO: 25.7 %
HGB BLD-MCNC: 8.3 G/DL
LYMPHOCYTES # BLD AUTO: 1.7 K/UL
LYMPHOCYTES NFR BLD: 28.9 %
MCH RBC QN AUTO: 27.9 PG
MCHC RBC AUTO-ENTMCNC: 32.3 %
MCV RBC AUTO: 87 FL
MONOCYTES # BLD AUTO: 0.6 K/UL
MONOCYTES NFR BLD: 10.5 %
NEUTROPHILS # BLD AUTO: 3.3 K/UL
NEUTROPHILS NFR BLD: 57.7 %
PLATELET # BLD AUTO: 502 K/UL
PMV BLD AUTO: 8.7 FL
POTASSIUM SERPL-SCNC: 4.1 MMOL/L
RBC # BLD AUTO: 2.97 M/UL
SODIUM SERPL-SCNC: 138 MMOL/L
WBC # BLD AUTO: 5.71 K/UL

## 2017-02-28 PROCEDURE — 85025 COMPLETE CBC W/AUTO DIFF WBC: CPT

## 2017-02-28 PROCEDURE — 25000003 PHARM REV CODE 250: Performed by: STUDENT IN AN ORGANIZED HEALTH CARE EDUCATION/TRAINING PROGRAM

## 2017-02-28 PROCEDURE — 25000003 PHARM REV CODE 250: Performed by: PHYSICIAN ASSISTANT

## 2017-02-28 PROCEDURE — 97116 GAIT TRAINING THERAPY: CPT

## 2017-02-28 PROCEDURE — 25000003 PHARM REV CODE 250: Performed by: ORTHOPAEDIC SURGERY

## 2017-02-28 PROCEDURE — 80048 BASIC METABOLIC PNL TOTAL CA: CPT

## 2017-02-28 PROCEDURE — 99232 SBSQ HOSP IP/OBS MODERATE 35: CPT | Mod: ,,, | Performed by: INTERNAL MEDICINE

## 2017-02-28 PROCEDURE — 97110 THERAPEUTIC EXERCISES: CPT

## 2017-02-28 PROCEDURE — 63600175 PHARM REV CODE 636 W HCPCS: Performed by: PHYSICIAN ASSISTANT

## 2017-02-28 RX ORDER — OXYCODONE AND ACETAMINOPHEN 10; 325 MG/1; MG/1
1 TABLET ORAL EVERY 6 HOURS PRN
Qty: 60 TABLET | Refills: 0 | Status: ON HOLD | OUTPATIENT
Start: 2017-02-28 | End: 2017-03-10 | Stop reason: HOSPADM

## 2017-02-28 RX ORDER — CIPROFLOXACIN 750 MG/1
750 TABLET, FILM COATED ORAL EVERY 12 HOURS
Qty: 60 TABLET | Refills: 1 | Status: SHIPPED | OUTPATIENT
Start: 2017-02-28 | End: 2017-03-20 | Stop reason: SDUPTHER

## 2017-02-28 RX ADMIN — LOSARTAN POTASSIUM 100 MG: 50 TABLET, FILM COATED ORAL at 09:02

## 2017-02-28 RX ADMIN — OXYCODONE HYDROCHLORIDE 10 MG: 5 TABLET ORAL at 11:02

## 2017-02-28 RX ADMIN — SODIUM CHLORIDE, PRESERVATIVE FREE 10 ML: 5 INJECTION INTRAVENOUS at 02:02

## 2017-02-28 RX ADMIN — RALOXIFENE HYDROCHLORIDE 60 MG: 60 TABLET, FILM COATED ORAL at 09:02

## 2017-02-28 RX ADMIN — VANCOMYCIN HYDROCHLORIDE 1500 MG: 1 INJECTION, POWDER, LYOPHILIZED, FOR SOLUTION INTRAVENOUS at 02:02

## 2017-02-28 RX ADMIN — SODIUM CHLORIDE, PRESERVATIVE FREE 10 ML: 5 INJECTION INTRAVENOUS at 06:02

## 2017-02-28 RX ADMIN — CIPROFLOXACIN HYDROCHLORIDE 750 MG: 750 TABLET, FILM COATED ORAL at 09:02

## 2017-02-28 RX ADMIN — RIVAROXABAN 10 MG: 10 TABLET, FILM COATED ORAL at 09:02

## 2017-02-28 RX ADMIN — SODIUM CHLORIDE, PRESERVATIVE FREE 10 ML: 5 INJECTION INTRAVENOUS at 01:02

## 2017-02-28 RX ADMIN — PANTOPRAZOLE SODIUM 40 MG: 40 TABLET, DELAYED RELEASE ORAL at 09:02

## 2017-02-28 RX ADMIN — OXYCODONE HYDROCHLORIDE 10 MG: 5 TABLET ORAL at 01:02

## 2017-02-28 RX ADMIN — VANCOMYCIN HYDROCHLORIDE 1500 MG: 1 INJECTION, POWDER, LYOPHILIZED, FOR SOLUTION INTRAVENOUS at 01:02

## 2017-02-28 RX ADMIN — POTASSIUM CHLORIDE 10 MEQ: 750 CAPSULE, EXTENDED RELEASE ORAL at 09:02

## 2017-02-28 RX ADMIN — OXYCODONE HYDROCHLORIDE 10 MG: 5 TABLET ORAL at 02:02

## 2017-02-28 RX ADMIN — Medication 3 ML: at 06:02

## 2017-02-28 RX ADMIN — OXYCODONE HYDROCHLORIDE 10 MG: 5 TABLET ORAL at 07:02

## 2017-02-28 RX ADMIN — OXYCODONE HYDROCHLORIDE 10 MG: 5 TABLET ORAL at 04:02

## 2017-02-28 NOTE — PLAN OF CARE
9:20 AM. TRISTIN sent HH orders to both [facility] and [facility], which pt has been accepted for both. Placed call to [facility] ([phone]) informing them of pts DC as well as to on call [facility] [place] [phone] informing her of sent orders. Both states they have received orders.     10:11 AM. Kade waiting for updated IV ABX orders in HH orders to be placed. TRISTIN paged ortho.  10:38 AM Updated HH orders sent to both [facility] and [facility]. Both received orders.    NEETA Lawson LMSW  [phone]

## 2017-02-28 NOTE — ASSESSMENT & PLAN NOTE
66 y.o. female with recent total left knee replacement 2/14 now s/p arthroscopic I&D by Dr. 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    Recommendations:  1.  continue vancomycin; increased to 1500 mg IV q 12 hours.  2. If patient discharged today, needs vancomycin trough thru home health tomorrow in order to adjust vancomycin.  3. Continue cipro for gram negative coverage  4. Would not label chart with cephalosporin allergy; this is not likely a drug allergy.  5. Length of therapy 6 weeks.  6. If IV therapy fails, will need a 2-stage revision  Follow up with ID 2 weeks after discharge.

## 2017-02-28 NOTE — PLAN OF CARE
Problem: Patient Care Overview  Pt AAOX4, VSS. I&D site CDI, ACE wrap in place. TSL drain putting out bloody drainage. No family at bedside. Pt is resting quietly now. L leg elevated on pillows for comfort. Pt ambulates to restroom with walker and stand by assistance. No s/s skin breakdown/pressure ulcers. No BM since procedure. Pain managed with PRN medications. IS education complete. S/FCD's on and functioning properly. Fall precautions maintained. Will resume care.      Problem: Pain, Acute (Adult)  PRN pain med administered     Problem: Infection, Actual (Adult)  IV AB administered

## 2017-02-28 NOTE — PT/OT/SLP PROGRESS
"Physical Therapy  Treatment    Ella Canales   MRN: [id number]   Admitting Diagnosis: Cellulitis of left lower extremity    PT Received On: 17  PT Start Time: 925     PT Stop Time: 50    PT Total Time (min): 25 min       Billable Minutes:  Gait Training 17 and Therapeutic Exercise 8    Treatment Type: Treatment  PT/PTA: PTA     PTA Visit Number: 1       General Precautions: Standard, fall  Orthopedic Precautions: LLE weight bearing as tolerated   Braces:           Subjective:  Communicated with NSG prior to session.  Patient states " I got out of bed by myself this morning. I don't want to use the walker anymore, I do much better with the cane, that's the way I was walking at home before I came here"    Pain Ratin/10  Location - Side: Left  Location - Orientation: generalized  Location: knee  Pain Addressed: Pre-medicate for activity, Reposition, Distraction  Pain Rating Post-Intervention: 5/10    Objective:        Functional Mobility:  Bed Mobility:   Scooting/Bridging: Modified Independent  Supine to Sit: Supervision  Sit to Supine: Supervision    Transfers:  Sit <> Stand Assistance: Stand By Assistance  Sit <> Stand Assistive Device: Straight Cane  Bed <> Chair Technique: Stand Pivot  Bed <> Chair Assistance: Supervision  Bed <> Chair Assistive Device: Straight Cane    Gait:   Gait Distance: 200 ft with chair follow due to c/o dizziness earlier.  Assistance 1: Stand by Assistance  Gait Assistive Device: Single point cane  Gait Pattern: 2-point gait  Gait Deviation(s): decreased joaquín, decreased step length, decreased stride length    Stairs:  Pt ascended/descend 6" curb step with Straight Cane with SC for support only. with Contact Guard Assistance.     Balance:   Static Sit: GOOD: Takes MODERATE challenges from all directions  Dynamic Sit: GOOD: Maintains balance through MODERATE excursions of active trunk movement  Static Stand: FAIR+: Takes MINIMAL challenges from all directions  Dynamic " stand: FAIR+: Needs CLOSE SUPERVISION during gait and is able to right self with minor LOB     Therapeutic Activities and Exercises:  Donned a second gown. 6 curb training. There ex: AP, SQ AND QUAD SETS 2X12 REPS .     AM-PAC 6 CLICK MOBILITY  How much help from another person does this patient currently need?   1 = Unable, Total/Dependent Assistance  2 = A lot, Maximum/Moderate Assistance  3 = A little, Minimum/Contact Guard/Supervision  4 = None, Modified [place]/Independent    Turning over in bed (including adjusting bedclothes, sheets and blankets)?: 4  Sitting down on and standing up from a chair with arms (e.g., wheelchair, bedside commode, etc.): 4  Moving from lying on back to sitting on the side of the bed?: 4  Moving to and from a bed to a chair (including a wheelchair)?: 4  Need to walk in hospital room?: 4  Climbing 3-5 steps with a railing?: 3  Total Score: 23    AM-PAC Raw Score CMS G-Code Modifier Level of Impairment Assistance   6 % Total / Unable   7 - 9 CM 80 - 100% Maximal Assist   10 - 14 CL 60 - 80% Moderate Assist   15 - 19 CK 40 - 60% Moderate Assist   20 - 22 CJ 20 - 40% Minimal Assist   23 CI 1-20% SBA / CGA   24 CH 0% Independent/ Mod I     Patient left up in chair with all lines intact and call button in reach.    Assessment:  Ella Canales is a 66 y.o. female with a medical diagnosis of Cellulitis of left lower extremity and presents with decreased L knee ROM. Patient declined to use the RW and instead opted to use the SC, but Patient showed no safety concerns when ambulating with a SC, but showed limited tolerance to exercises on the L LE especially with L knee ROM. Patient would benefit from continued P.T. To address deficits.    Rehab identified problem list/impairments: Rehab identified problem list/impairments: weakness, pain, decreased ROM    Rehab potential is good.    Activity tolerance: Good    Discharge recommendations: Discharge Facility/Level Of Care Needs:  home with home health     Barriers to discharge: Barriers to Discharge: Decreased caregiver support    Equipment recommendations: Equipment Needed After Discharge: shower chair     GOALS:   Physical Therapy Goals        Problem: Physical Therapy Goal    Goal Priority Disciplines Outcome Goal Variances Interventions   Physical Therapy Goal     PT/OT, PT Ongoing (interventions implemented as appropriate)     Description:  Goals to be met by: 3/9     Patient will increase functional independence with mobility by performin. Supine to sit with Set-up [place]- MET  2. Sit to supine with Set-up [place]- MET  3. Sit to stand transfer with Supervision- MET  4. Gait  x 150 feet with Supervision using Rolling Walker- NOT MET.   5. Ascend/Descend 6 inch curb step with Supervision using Rolling Walker- NOT MET.  6. (I) with HEP- MET                 PLAN:    Patient to be seen daily  to address the above listed problems via gait training, therapeutic activities, therapeutic exercises  Plan of Care expires: 17  Plan of Care reviewed with: patient         Radames Devlin, PTA  2017

## 2017-02-28 NOTE — PLAN OF CARE
Problem: Physical Therapy Goal  Goal: Physical Therapy Goal  Goals to be met by: 3/9     Patient will increase functional independence with mobility by performin. Supine to sit with Set-up [place]- MET  2. Sit to supine with Set-up [place]- MET  3. Sit to stand transfer with Supervision- MET  4. Gait x 150 feet with Supervision using Rolling Walker- NOT MET.   5. Ascend/Descend 6 inch curb step with Supervision using Rolling Walker- NOT MET.  6. (I) with HEP- MET   Outcome: Ongoing (interventions implemented as appropriate)  Patient showed improved independence with transfers and started ambulation with a SC per patient's choice.

## 2017-02-28 NOTE — NURSING
Pt discharge. Pt states feeling comfortable going home. Pt verbalized understanding discharge teaching. Medications reviewed. Pt stable. No acute complaints at this time.

## 2017-02-28 NOTE — SUBJECTIVE & OBJECTIVE
Interval History:     Rash very pruritic.    HPI:  Patient is a 66 y.o. female s/p L TKA by Dr. Mulligan 2/14/17 presents with worsening LLE redness and pain since surgery.  Pt reports having low grade fever or 100.1 2 days after surgery. Denies having any other acute symptoms. Pt was seen by orthopedic surgery and had a joint aspiration which showed WBC 8.3k, but 90% segs. CRP and ESR are elevated. It was there to proceed with arthrotomy with I&D which she had 2/25/17. Pt now s/p arthrotomy, had a dermal graft which was removed. Hardware still in place.     Review of Systems   Constitutional: Negative for chills and fever.   Respiratory: Negative for cough and shortness of breath.    Gastrointestinal: Negative for abdominal pain, diarrhea and vomiting.   Genitourinary: Negative for dysuria.   Musculoskeletal:        Left knee pain   Skin: Positive for rash (back) and wound.     Objective:     Vital Signs (Most Recent):  Temp: 97.8 °F (36.6 °C) (02/28/17 0800)  Pulse: 92 (02/28/17 0800)  Resp: 16 (02/28/17 0800)  BP: 110/60 (02/28/17 0800)  SpO2: 95 % (02/28/17 0800) Vital Signs (24h Range):  Temp:  [97 °F (36.1 °C)-98.6 °F (37 °C)] 97.8 °F (36.6 °C)  Pulse:  [71-92] 92  Resp:  [14-17] 16  SpO2:  [94 %-98 %] 95 %  BP: (110-130)/(57-74) 110/60     Weight: 68.3 kg (150 lb 8 oz)  Body mass index is 29.39 kg/(m^2).    Estimated Creatinine Clearance: 66.3 mL/min (based on Cr of 0.7).    Physical Exam   Constitutional: She appears well-developed and well-nourished. No distress.   HENT:   Head: Normocephalic.   Eyes: Pupils are equal, round, and reactive to light.   Cardiovascular: Normal rate, regular rhythm and normal heart sounds.  Exam reveals no gallop and no friction rub.    No murmur heard.  Pulmonary/Chest: Effort normal. No respiratory distress. She has no wheezes. She has no rales. She exhibits no tenderness.   Musculoskeletal:   Left knee dressed   Neurological: She is alert.   Skin: Skin is warm and dry. Rash  (erythematous rash noted to entire back) noted. She is not diaphoretic.   Psychiatric: She has a normal mood and affect.   Nursing note and vitals reviewed.      Significant Labs:   CBC:   Recent Labs  Lab 02/27/17 0913 02/28/17  0453   WBC 5.00 5.71   HGB 9.1* 8.3*   HCT 28.6* 25.7*   * 502*     CMP:   Recent Labs  Lab 02/27/17 0913 02/28/17 0453    138   K 4.0 4.1    102   CO2 26 28   * 93   BUN 8 10   CREATININE 0.8 0.7   CALCIUM 9.3 9.2   ANIONGAP 10 8   EGFRNONAA >60.0 >60.0     All pertinent labs within the past 24 hours have been reviewed.    Significant Imaging: I have reviewed all pertinent imaging results/findings within the past 24 hours.

## 2017-02-28 NOTE — DISCHARGE SUMMARY
[facility]  Discharge Summary  Surgery      Admit Date: 2/24/2017    Discharge Date and Time: 2/28/2017  4:39 PM    Attending Physician: No att. providers found     Discharge Provider: Andrey Ya    Reason for Admission: knee infection/cellulitis    Procedures Performed: Procedure(s) (LRB):  INCISION AND DRAINAGE-KNEE (Left)    Hospital Course (synopsis of major diagnoses, care, treatment, and services provided during the course of the hospital stay): Pt was admitted s/p knee i&D for monitoring and pain control. On POD4  pt was tolerating regular diet, pain was controlled on PO pain medication, pt had worked with and was cleared by PT,  and pt was dicharged with f/u in 2 weeks. Pt discharged with IV abx x 6 weeks       Consults: PT and OT    Significant Diagnostic Studies: Labs:   BMP:   Recent Labs  Lab 02/27/17  0913 02/28/17  0453   * 93    138   K 4.0 4.1    102   CO2 26 28   BUN 8 10   CREATININE 0.8 0.7   CALCIUM 9.3 9.2    and CBC   Recent Labs  Lab 02/27/17  0913 02/28/17  0453   WBC 5.00 5.71   HGB 9.1* 8.3*   HCT 28.6* 25.7*   * 502*       Final Diagnoses:   Principal Problem: Cellulitis of left lower extremity   Secondary Diagnoses:   Active Hospital Problems    Diagnosis  POA    *Cellulitis of left lower extremity [L03.116]  Yes    Rash [R21]  No    Postoperative wound infection [T81.4XXA]  Yes      Resolved Hospital Problems    Diagnosis Date Resolved POA   No resolved problems to display.       Discharged Condition: good    Disposition: [facility]Health Care [facility]    Follow Up/Patient Instructions:     Medications:  Reconciled Home Medications:   Discharge Medication List as of 2/28/2017  2:58 PM      START taking these medications    Details   ciprofloxacin HCl (CIPRO) 750 MG tablet Take 1 tablet (750 mg total) by mouth every 12 (twelve) hours., Starting 2/28/2017, Until Sat 4/29/17, Normal      dextrose 5 % SolP 250 mL with vancomycin 1,000 mg SolR 1,500  mg Inject 1,500 mg into the vein every 12 (twelve) hours., Starting 2/28/2017, Until Discontinued, No Print         CONTINUE these medications which have CHANGED    Details   oxycodone-acetaminophen (PERCOCET)  mg per tablet Take 1 tablet by mouth every 6 (six) hours as needed for Pain., Starting 2/28/2017, Until Discontinued, Print      rivaroxaban (XARELTO) 10 mg Tab Take 1 tablet (10 mg total) by mouth once daily., Starting 2/28/2017, Until Thu 3/30/17, Normal         CONTINUE these medications which have NOT CHANGED    Details   fexofenadine (ALLEGRA) 60 MG tablet Take 60 mg by mouth once daily., Until Discontinued, Historical Med      fluticasone (FLONASE) 50 mcg/actuation nasal spray 1 spray by Each Nare route once daily., Starting 1/6/2017, Until Discontinued, Normal      glucosamine-chondroit-vit C-Mn (GLUCOSAMINE-CHONDROITIN MAX ST) 500-400 mg Cap Twice a day, Starting 11/25/2011, Until Discontinued, Historical Med      losartan (COZAAR) 100 MG tablet Take 1 tablet (100 mg total) by mouth once daily., Starting 1/6/2017, Until Discontinued, Normal      metoprolol succinate (TOPROL-XL) 50 MG 24 hr tablet Take 1 tablet (50 mg total) by mouth once daily., Starting 1/6/2017, Until Discontinued, Normal      omeprazole (PRILOSEC) 20 MG capsule Take 1 capsule (20 mg total) by mouth 2 (two) times daily., Starting 1/6/2017, Until Discontinued, Normal      potassium chloride SA (K-DUR,KLOR-CON) 10 MEQ tablet Take 1 tablet (10 mEq total) by mouth 2 (two) times daily., Starting 1/6/2017, Until Discontinued, Normal      promethazine (PHENERGAN) 25 MG tablet Take 1 tablet (25 mg total) by mouth every 6 (six) hours as needed for Nausea., Starting 2/6/2017, Until Wed 3/8/17, Normal      raloxifene (EVISTA) 60 mg tablet Take 1 tablet (60 mg total) by mouth once daily., Starting 1/6/2017, Until Discontinued, Normal      simvastatin (ZOCOR) 40 MG tablet Take 1 tablet (40 mg total) by mouth every evening., Starting  1/6/2017, Until Discontinued, Normal             Discharge Procedure Orders  Diet general     Activity as tolerated     Sponge bath only until clinic visit     Keep surgical extremity elevated     Ice to affected area     Call MD for:  temperature >100.4     Call MD for:  persistent nausea and vomiting or diarrhea     Call MD for:  severe uncontrolled pain     Call MD for:  redness, tenderness, or signs of infection (pain, swelling, redness, odor or green/yellow discharge around incision site)     Call MD for:  difficulty breathing or increased cough     Call MD for:  severe persistent headache     Call MD for:  worsening rash     Call MD for:  persistent dizziness, light-headedness, or visual disturbances     Leave dressing on - Keep it clean, dry, and intact until clinic visit       Follow-up Information     Follow up with [facility]    Specialty:  Home Health Services    Why:  Home Health    Contact information:    Prabha COLBERT [id number] [phone]

## 2017-02-28 NOTE — PROGRESS NOTES
[facility]  Infectious Disease  Progress Note    Patient Name: Ella Canales  MRN: [id number]  Admission Date: 2/24/2017  Length of Stay: 2 days  Attending Physician: Patel Denise MD  Primary Care Provider: Adry Damian MD    Isolation Status: No active isolations  Assessment/Plan:      Postoperative wound infection  66 y.o. female with recent total left knee replacement 2/14 now s/p arthroscopic I&D by Dr. 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    Recommendations:  1.  continue vancomycin; increased to 1500 mg IV q 12 hours.  2. If patient discharged today, needs vancomycin trough thru home health tomorrow in order to adjust vancomycin.  3. Continue cipro for gram negative coverage  4. Would not label chart with cephalosporin allergy; this is not likely a drug allergy.  5. Length of therapy 6 weeks.  6. If IV therapy fails, will need a 2-stage revision  Follow up with ID 2 weeks after discharge.          Rash  Doubt drug rash; however, antibiotics already adjusted and coverage is fine.  Hydrocortisone cream may help with pruritis , if benadryl cream not helping.       Anticipated Disposition: discharge home with IV antibiotics and follow up in ID clinic.     Thank you for your consult. I will sign off. Please contact us if you have any additional questions.    Jewels Matias MD  Infectious Disease  [facility]    Subjective:     Principal Problem:Cellulitis of left lower extremity    Interval History:     Rash very pruritic.    HPI:  Patient is a 66 y.o. female s/p L TKA by Dr. Mulligan 2/14/17 presents with worsening LLE redness and pain since surgery.  Pt reports having low grade fever or 100.1 2 days after surgery. Denies having any other acute symptoms. Pt was seen by orthopedic surgery  and had a joint aspiration which showed WBC 8.3k, but 90% segs. CRP and ESR are elevated. It was there to proceed with arthrotomy with I&D which she had 2/25/17. Pt now s/p arthrotomy, had a dermal graft which was removed. Hardware still in place.     Review of Systems   Constitutional: Negative for chills and fever.   Respiratory: Negative for cough and shortness of breath.    Gastrointestinal: Negative for abdominal pain, diarrhea and vomiting.   Genitourinary: Negative for dysuria.   Musculoskeletal:        Left knee pain   Skin: Positive for rash (back) and wound.     Objective:     Vital Signs (Most Recent):  Temp: 97.8 °F (36.6 °C) (02/28/17 0800)  Pulse: 92 (02/28/17 0800)  Resp: 16 (02/28/17 0800)  BP: 110/60 (02/28/17 0800)  SpO2: 95 % (02/28/17 0800) Vital Signs (24h Range):  Temp:  [97 °F (36.1 °C)-98.6 °F (37 °C)] 97.8 °F (36.6 °C)  Pulse:  [71-92] 92  Resp:  [14-17] 16  SpO2:  [94 %-98 %] 95 %  BP: (110-130)/(57-74) 110/60     Weight: 68.3 kg (150 lb 8 oz)  Body mass index is 29.39 kg/(m^2).    Estimated Creatinine Clearance: 66.3 mL/min (based on Cr of 0.7).    Physical Exam   Constitutional: She appears well-developed and well-nourished. No distress.   HENT:   Head: Normocephalic.   Eyes: Pupils are equal, round, and reactive to light.   Cardiovascular: Normal rate, regular rhythm and normal heart sounds.  Exam reveals no gallop and no friction rub.    No murmur heard.  Pulmonary/Chest: Effort normal. No respiratory distress. She has no wheezes. She has no rales. She exhibits no tenderness.   Musculoskeletal:   Left knee dressed   Neurological: She is alert.   Skin: Skin is warm and dry. Rash (erythematous rash noted to entire back) noted. She is not diaphoretic.   Psychiatric: She has a normal mood and affect.   Nursing note and vitals reviewed.      Significant Labs:   CBC:   Recent Labs  Lab 02/27/17  0913 02/28/17  0453   WBC 5.00 5.71   HGB 9.1* 8.3*   HCT 28.6* 25.7*   * 502*     CMP:    Recent Labs  Lab 02/27/17  0913 02/28/17  0453    138   K 4.0 4.1    102   CO2 26 28   * 93   BUN 8 10   CREATININE 0.8 0.7   CALCIUM 9.3 9.2   ANIONGAP 10 8   EGFRNONAA >60.0 >60.0     All pertinent labs within the past 24 hours have been reviewed.    Significant Imaging: I have reviewed all pertinent imaging results/findings within the past 24 hours.

## 2017-02-28 NOTE — ASSESSMENT & PLAN NOTE
Doubt drug rash; however, antibiotics already adjusted and coverage is fine.  Hydrocortisone cream may help with pruritis , if benadryl cream not helping.

## 2017-02-28 NOTE — PROGRESS NOTES
ORTHO PROGRESS NOTE:    Ella Canales is a 66 y.o. female admitted on 2/24/2017  Hospital Day: 2  Post Op Day: 2    The patient was seen and examined this morning at the bedside. No acute issues overnight and adequate control of pain on current regimen.      Ready to go home today    PHYSICAL EXAM:  Awake/alert/oriented x 3  No acute distress  AFVSS  Good inspiratory effort with unlabored breathing  Dressings c/d/i  NVI distally    Vitals:    02/27/17 1637 02/27/17 2004 02/28/17 0015 02/28/17 0452   BP: (!) 120/57 120/60 130/65 127/74   BP Location: Right arm Left arm Left arm Left arm   Patient Position: Lying Sitting Lying Sitting   BP Method: Automatic Automatic Automatic Automatic   Pulse: 92 92 79 71   Resp: 16 16 17 15   Temp: 98.4 °F (36.9 °C) 98.6 °F (37 °C) 97 °F (36.1 °C) 98.4 °F (36.9 °C)   TempSrc: Oral Oral Oral Oral   SpO2:  95% 98% (!) 94%   Weight:       Height:         I/O last 3 completed shifts:  In: 3751 [P.O.:3168; I.V.:83; IV Piggyback:500]  Out: 35 [Drains:35]    Recent Labs  Lab 02/27/17  0913 02/28/17 0453   CALCIUM 9.3 9.2    138   K 4.0 4.1   CO2 26 28    102   BUN 8 10   CREATININE 0.8 0.7       Recent Labs  Lab 02/27/17  0913 02/28/17 0453   WBC 5.00 5.71   RBC 3.30* 2.97*   HGB 9.1* 8.3*   HCT 28.6* 25.7*   * 502*     No results for input(s): INR, APTT in the last 72 hours.    Invalid input(s): PT      A/P: 66 y.o. female POD 3 s/p Irrigation and debridement of the Left total knee arthroplasty    Cultures: NGTD  Continue BS Abx  PT/OT - WBAT        -- Dispo: home today with IV abx

## 2017-03-01 ENCOUNTER — TELEPHONE (OUTPATIENT)
Dept: ADMINISTRATIVE | Facility: CLINIC | Age: 67
End: 2017-03-01

## 2017-03-01 LAB
BACTERIA SPEC AEROBE CULT: NO GROWTH

## 2017-03-02 ENCOUNTER — TELEPHONE (OUTPATIENT)
Dept: SPORTS MEDICINE | Facility: CLINIC | Age: 67
End: 2017-03-02

## 2017-03-02 ENCOUNTER — TELEPHONE (OUTPATIENT)
Dept: ADMINISTRATIVE | Facility: CLINIC | Age: 67
End: 2017-03-02

## 2017-03-02 ENCOUNTER — OFFICE VISIT (OUTPATIENT)
Dept: SPORTS MEDICINE | Facility: CLINIC | Age: 67
End: 2017-03-02
Payer: MEDICARE

## 2017-03-02 ENCOUNTER — PATIENT OUTREACH (OUTPATIENT)
Dept: ADMINISTRATIVE | Facility: CLINIC | Age: 67
End: 2017-03-02
Payer: MEDICARE

## 2017-03-02 VITALS
HEART RATE: 83 BPM | DIASTOLIC BLOOD PRESSURE: 73 MMHG | BODY MASS INDEX: 29.45 KG/M2 | HEIGHT: 60 IN | WEIGHT: 150 LBS | SYSTOLIC BLOOD PRESSURE: 123 MMHG

## 2017-03-02 DIAGNOSIS — T84.50XD PROSTHETIC JOINT INFECTION, SUBSEQUENT ENCOUNTER: ICD-10-CM

## 2017-03-02 DIAGNOSIS — M25.562 ACUTE PAIN OF LEFT KNEE: Primary | ICD-10-CM

## 2017-03-02 PROCEDURE — 99214 OFFICE O/P EST MOD 30 MIN: CPT | Mod: PBBFAC,PO | Performed by: PHYSICIAN ASSISTANT

## 2017-03-02 PROCEDURE — 99999 PR PBB SHADOW E&M-EST. PATIENT-LVL IV: CPT | Mod: PBBFAC,,, | Performed by: PHYSICIAN ASSISTANT

## 2017-03-02 PROCEDURE — 99024 POSTOP FOLLOW-UP VISIT: CPT | Mod: ,,, | Performed by: PHYSICIAN ASSISTANT

## 2017-03-02 NOTE — MR AVS SNAPSHOT
[facility]  [address]  [facility] [address]  Phone: [phone]                  Ella Canales   3/2/2017 1:00 PM   Appointment    Description:  Female : 1950   Provider:  Ronel Urban PA-C   Department:  [facility]                To Do List           Future Appointments        Provider Department Dept Phone    3/27/2017 11:15 AM Angie Mulligan MD [facility] [phone]    2017 8:40 AM Adry Damian MD [facility] [phone]      Goals (5 Years of Data)     None      [facility]     [facility] Line -  Assistance  Registered nurses in the [facility] provide clinical advisement, health education, appointment booking, and other advisory services.  Call for this free service at [phone].             Medications           Message regarding Medications     Verify the changes and/or additions to your medication regime listed below are the same as discussed with your clinician today.  If any of these changes or additions are incorrect, please notify your healthcare provider.             Verify that the below list of medications is an accurate representation of the medications you are currently taking.  If none reported, the list may be blank. If incorrect, please contact your healthcare provider. Carry this list with you in case of emergency.           Current Medications     ciprofloxacin HCl (CIPRO) 750 MG tablet Take 1 tablet (750 mg total) by mouth every 12 (twelve) hours.    dextrose 5 % SolP 250 mL with vancomycin 1,000 mg SolR 1,500 mg Inject 1,500 mg into the vein every 12 (twelve) hours.    fexofenadine (ALLEGRA) 60 MG tablet Take 60 mg by mouth once daily.    fluticasone (FLONASE) 50 mcg/actuation nasal spray 1 spray by Each Nare route once daily.    glucosamine-chondroit-vit C-Mn (GLUCOSAMINE-CHONDROITIN MAX ST) 500-400 mg Cap Twice a day    losartan (COZAAR)  100 MG tablet Take 1 tablet (100 mg total) by mouth once daily.    metoprolol succinate (TOPROL-XL) 50 MG 24 hr tablet Take 1 tablet (50 mg total) by mouth once daily.    omeprazole (PRILOSEC) 20 MG capsule Take 1 capsule (20 mg total) by mouth 2 (two) times daily.    oxycodone-acetaminophen (PERCOCET)  mg per tablet Take 1 tablet by mouth every 6 (six) hours as needed for Pain.    potassium chloride SA (K-DUR,KLOR-CON) 10 MEQ tablet Take 1 tablet (10 mEq total) by mouth 2 (two) times daily.    promethazine (PHENERGAN) 25 MG tablet Take 1 tablet (25 mg total) by mouth every 6 (six) hours as needed for Nausea.    raloxifene (EVISTA) 60 mg tablet Take 1 tablet (60 mg total) by mouth once daily.    rivaroxaban (XARELTO) 10 mg Tab Take 1 tablet (10 mg total) by mouth once daily.    simvastatin (ZOCOR) 40 MG tablet Take 1 tablet (40 mg total) by mouth every evening.           Clinical Reference Information           Your Vitals Were     Last Period                   04/03/2012           Allergies as of 3/2/2017     Ace Inhibitors      Immunizations Administered on Date of Encounter - 3/2/2017     None      Language Assistance Services     ATTENTION: Language assistance services are available, free of charge. Please call [phone].      ATENCIÓN: Si minna gómez, tiene a priest disposición servicios gratuitos de asistencia lingüística. Llame al [phone].     CHÚ Ý: N?u b?n nói Ti?ng Vi?t, có các d?ch v? h? tr? ngôn ng? mi?n phí dành cho b?n. G?i s? [phone].         [facility] complies with applicable Federal civil rights laws and does not discriminate on the basis of race, color, national origin, age, disability, or sex.

## 2017-03-02 NOTE — PROGRESS NOTES
Subjective:          Chief Complaint: Ella Canales is a 66 y.o. female who had concerns including Post-op Evaluation of the Left Knee.    HPI    Patient presents to clinic for post op evaluation of left knee. Pain today is 4/10. Denies nausea, vomiting, fever, chills.  She is here today to follow up after going to the ED for increased swelling in her lower leg concerning for cellulitis vs septic arthritis. She was then taken to the OR by Dr. Patel Denise for an irrigation and debridement for possible septic arthritis.She has been taking Percocet 10mg every 7 hours.    DATE OF PROCEDURE: 02/24/2017     PREOPERATIVE DIAGNOSIS: Possible septic arthritis, left total knee replacement.     POSTOPERATIVE DIAGNOSIS: Possible septic arthritis, left total knee   replacement.      PROCEDURE: Arthrotomy, left knee, with irrigation and debridement (CPT #51753).     SURGEON: Patel Denise M.D.     ASSISTANTS: Andrey Ya M.D. (RES); Anant Issa M.D. 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 Negative for chills, fever, weight gain and weight loss.   HENT: Negative for congestion, headaches and sore throat.    Eyes: Negative for blurred vision and double vision.   Cardiovascular: Negative for chest pain, leg swelling and palpitations.   Respiratory: Negative for cough and shortness of breath.    Hematologic/Lymphatic: Does not  bruise/bleed easily.   Skin: Negative for itching, poor wound healing and rash.   Musculoskeletal: Positive for joint pain, joint swelling and stiffness. Negative for back pain, muscle weakness and myalgias.   Gastrointestinal: Negative for abdominal pain, constipation, diarrhea, nausea and vomiting.   Genitourinary: Negative.  Negative for frequency and hematuria.   Neurological: Negative for dizziness, numbness, paresthesias and sensory change.   Psychiatric/Behavioral: Negative for altered mental status and depression. The patient is not nervous/anxious.    Allergic/Immunologic: Negative for hives.       Pain Related Questions  Over the past 3 days, what was your average pain during activity? (I.e. running, jogging, walking, climbing stairs, getting dressed, ect.): 6  Over the past 3 days, what was your highest pain level?: 6  Over the past 3 days, what was your lowest pain level? : 2    Other  How many nights a week are you awakened by your affected body part?: 0  Was the patient's HEIGHT measured or patient reported?: Patient Reported  Was the patient's WEIGHT measured or patient reported?: Measured      Objective:        General: Ella is well-developed, well-nourished, appears stated age, in no acute distress, alert and oriented to time, place and person.     General    Vitals reviewed.  Constitutional: She is oriented to person, place, and time. She appears well-developed and well-nourished. No distress.   Neck: Normal range of motion.   Cardiovascular: Normal rate and regular rhythm.    Pulmonary/Chest: Effort normal. No respiratory distress.   Neurological: She is alert and oriented to person, place, and time.   Psychiatric: She has a normal mood and affect. Her behavior is normal. Thought content normal.             Left Knee Exam     Inspection   Erythema: present  Scars: present  Swelling: present  Effusion: present  Deformity: deformity  Bruising: present    Tenderness   Left knee tenderness location:  global tenderness.    Other   Sensation: normal    Comments:    Aquacel bandage over incision removed. No gross drainage. Incision evaluated.   No purulent drainage. Peeling of skin around incision noted due to diffuse swelling. Erythema and bruising noted diffusely throughout proximal tibia.    Mepilex Ag border placed over incision.             Assessment:       Encounter Diagnoses   Name Primary?    Acute pain of left knee Yes    Prosthetic joint infection, subsequent encounter           Plan:       1. Mepilex Ag border placed over incision. Change on Sunday. Another bandage provided.  2. Rx for Jobst stockings provided  3. Continue Xarelto.  4. Referral to ID Dr. Alex for Monday March 6th at 9:30am  5. Contacted [employer] for polyexchange   6. We reviewed with Ella today, the pathology and natural history of her diagnosis. We have discussed a variety of treatment options including medications, physical therapy and other alternative treatments. I also explained the indications, risks and benefits of surgery. After discussion, Ella decided to proceed with surgery. The decision was made to go forward with: Once implants have arrived in 7-10 days  1. Left knee polyethylene liner exchange  2. Left knee complex open incision and drainage      The details of the surgical procedure were explained, including the location of probable incisions and a description of likely hardware and/or grafts to be used.  The patient understands the likely convalescence after surgery.  Also, we have thoroughly discussed the risks, benefits and alternatives to surgery, including, but not limited to, the risk of infection, joint stiffness, blood clot (including DVT and/or pulmonary embolus), neurologic and vascular injury.  It was explained that, if tissue has been repaired or reconstructed, there is a chance of failure, which may require further management.      All of the patient's questions were answered and informed consent  was obtained. The patient will contact us if they have any questions or concerns in the interim.    7. Tentative date for surgery is March 9th, if not then March 14th  8. RTC in 4 days with Dr. Angie Mulligan for post op wound check                      Patient questionnaires may have been collected.

## 2017-03-02 NOTE — TELEPHONE ENCOUNTER
----- Message from Etienne Lanier sent at 3/2/2017 11:03 AM CST -----  Contact: [email or web address]  Pt called in stating that she is still on an IV drip in [place] and will not be done for at least another hour. Pt would like a call back to discuss coming in a little later today for her Post-op    Call back is [phone]    Thank you

## 2017-03-02 NOTE — PATIENT INSTRUCTIONS
Discharge Instructions for Cellulitis  You have been diagnosed with cellulitis. This is an infection in the deepest layer of the skin. In some cases, the infection also affects the muscle. Cellulitis is caused by bacteria. The bacteria can enter the body through broken skin. This can happen with a cut, scratch, animal bite, or an insect bite that has been scratched. You may have been treated in the hospital with antibiotics and fluids. You will likely be given a prescription for antibiotics to take at home. This sheet will help you take care of yourself at home.  Home care  When you are home:  · Take the prescribed antibiotic medicine you are given as directed until it is gone. Take it even if you feel better. It treats the infection and stops it from returning. Not taking all the medicine can make future infections hard to treat.  · Keep the infected area clean.  · When possible, raise the infected area above the level of your heart. This helps keep swelling down.  · Talk with your healthcare provider if you are in pain. Ask what kind of over-the-counter medicine you can take for pain.  · Apply clean bandages as advised.  · Take your temperature once a day for a week.  · Wash your hands often to prevent spreading the infection.  In the future, wash your hands before and after you touch cuts, scratches, or bandages. This will help prevent infection.   When to call your healthcare provider  Call your healthcare provider immediately if you have any of the following:  · Difficulty or pain when moving the joints above or below the infected area  · Discharge or pus draining from the area  · Fever of 100.4°F (38°C) or higher, or as directed by your healthcare provider  · Pain that gets worse in or around the infected   · Redness that gets worse in or around the infected area, particularly if the area of redness expands to a wider area  · Shaking chills  · Swelling of the infected area  · Vomiting   Date Last Reviewed:  8/1/2016  © 5034-6059 [employer]. [address] [place] [address], [place] [address]. All rights reserved. This information is not intended as a substitute for professional medical care. Always follow your healthcare professional's instructions.

## 2017-03-03 ENCOUNTER — LAB VISIT (OUTPATIENT)
Dept: LAB | Facility: HOSPITAL | Age: 67
End: 2017-03-03
Attending: ORTHOPAEDIC SURGERY
Payer: MEDICARE

## 2017-03-03 ENCOUNTER — TELEPHONE (OUTPATIENT)
Dept: SPORTS MEDICINE | Facility: CLINIC | Age: 67
End: 2017-03-03

## 2017-03-03 DIAGNOSIS — B99.9 INFECTION: Primary | ICD-10-CM

## 2017-03-03 LAB — VANCOMYCIN TROUGH SERPL-MCNC: 20.1 UG/ML

## 2017-03-03 PROCEDURE — 36415 COLL VENOUS BLD VENIPUNCTURE: CPT

## 2017-03-03 PROCEDURE — 80202 ASSAY OF VANCOMYCIN: CPT

## 2017-03-03 NOTE — TELEPHONE ENCOUNTER
Calling patient to confirm that Dr. Mulligan can do her surgery on Thursday.  During the call she asked about her Xarelto prescription not being authorized yet by her insurance company and it would cost her over $500 out of pocket cost.      Spoke to Ronel Urban PA-C on the phone. She is okay with patient not picking up the medication because Dr. Mulligan has decided to proceed with a second surgery next week and she will have to be off the blood thinners anyway.    Ronel asked the patient to come see Dr. Mulligan after her ID appointment on Monday.  Scheduled this appointment for patient.

## 2017-03-06 ENCOUNTER — OFFICE VISIT (OUTPATIENT)
Dept: INFECTIOUS DISEASES | Facility: CLINIC | Age: 67
DRG: 464 | End: 2017-03-06
Payer: MEDICARE

## 2017-03-06 ENCOUNTER — OFFICE VISIT (OUTPATIENT)
Dept: SPORTS MEDICINE | Facility: CLINIC | Age: 67
DRG: 464 | End: 2017-03-06
Payer: MEDICARE

## 2017-03-06 VITALS
TEMPERATURE: 98 F | WEIGHT: 148.81 LBS | SYSTOLIC BLOOD PRESSURE: 157 MMHG | DIASTOLIC BLOOD PRESSURE: 80 MMHG | BODY MASS INDEX: 29.22 KG/M2 | HEIGHT: 60 IN | HEART RATE: 86 BPM

## 2017-03-06 VITALS
HEIGHT: 60 IN | SYSTOLIC BLOOD PRESSURE: 145 MMHG | DIASTOLIC BLOOD PRESSURE: 85 MMHG | HEART RATE: 80 BPM | WEIGHT: 148 LBS | BODY MASS INDEX: 29.06 KG/M2

## 2017-03-06 DIAGNOSIS — M17.31 POST-TRAUMATIC OSTEOARTHRITIS OF RIGHT KNEE: ICD-10-CM

## 2017-03-06 DIAGNOSIS — L08.9 INFECTED ABRASION OF LEFT KNEE, SUBSEQUENT ENCOUNTER: ICD-10-CM

## 2017-03-06 DIAGNOSIS — M23.92 DERANGEMENT, KNEE INTERNAL, LEFT: ICD-10-CM

## 2017-03-06 DIAGNOSIS — M21.161 GENU VARUM OF RIGHT LOWER EXTREMITY: ICD-10-CM

## 2017-03-06 DIAGNOSIS — S80.212D INFECTED ABRASION OF LEFT KNEE, SUBSEQUENT ENCOUNTER: ICD-10-CM

## 2017-03-06 DIAGNOSIS — T84.84XA PAINFUL ORTHOPAEDIC HARDWARE: Primary | ICD-10-CM

## 2017-03-06 DIAGNOSIS — M23.92 KNEE INTERNAL DERANGEMENT, LEFT: Primary | ICD-10-CM

## 2017-03-06 DIAGNOSIS — T84.54XA INFECTION OF PROSTHETIC LEFT KNEE JOINT: Primary | ICD-10-CM

## 2017-03-06 PROBLEM — Z96.22: Status: RESOLVED | Noted: 2017-01-06 | Resolved: 2017-03-06

## 2017-03-06 PROBLEM — R21 RASH: Status: RESOLVED | Noted: 2017-02-27 | Resolved: 2017-03-06

## 2017-03-06 PROBLEM — L03.116 CELLULITIS OF LEFT LOWER EXTREMITY: Status: RESOLVED | Noted: 2017-02-24 | Resolved: 2017-03-06

## 2017-03-06 PROBLEM — T81.49XA POSTOPERATIVE WOUND INFECTION: Status: RESOLVED | Noted: 2017-02-24 | Resolved: 2017-03-06

## 2017-03-06 PROBLEM — M12.562 TRAUMATIC ARTHRITIS OF LEFT KNEE: Status: RESOLVED | Noted: 2017-02-14 | Resolved: 2017-03-06

## 2017-03-06 LAB
BACTERIA SPEC ANAEROBE CULT: NORMAL

## 2017-03-06 PROCEDURE — 99499 UNLISTED E&M SERVICE: CPT | Mod: S$PBB,,, | Performed by: ORTHOPAEDIC SURGERY

## 2017-03-06 PROCEDURE — 99215 OFFICE O/P EST HI 40 MIN: CPT | Mod: S$PBB,,, | Performed by: INTERNAL MEDICINE

## 2017-03-06 PROCEDURE — 99999 PR PBB SHADOW E&M-EST. PATIENT-LVL III: CPT | Mod: PBBFAC,,, | Performed by: INTERNAL MEDICINE

## 2017-03-06 PROCEDURE — 99999 PR PBB SHADOW E&M-EST. PATIENT-LVL III: CPT | Mod: PBBFAC,,, | Performed by: ORTHOPAEDIC SURGERY

## 2017-03-06 RX ORDER — VANCOMYCIN HYDROCHLORIDE 10 G/1
INJECTION, POWDER, LYOPHILIZED, FOR SOLUTION INTRAVENOUS
COMMUNITY
Start: 2017-03-02 | End: 2017-03-14 | Stop reason: SDUPTHER

## 2017-03-06 RX ORDER — OXYCODONE AND ACETAMINOPHEN 10; 325 MG/1; MG/1
1 TABLET ORAL EVERY 6 HOURS PRN
Qty: 60 TABLET | Refills: 0 | Status: SHIPPED | OUTPATIENT
Start: 2017-03-06 | End: 2017-03-14 | Stop reason: SDUPTHER

## 2017-03-06 RX ORDER — CELECOXIB 200 MG/1
200 CAPSULE ORAL 2 TIMES DAILY
Qty: 60 CAPSULE | Refills: 2 | Status: ON HOLD | OUTPATIENT
Start: 2017-03-06 | End: 2017-03-10 | Stop reason: HOSPADM

## 2017-03-06 RX ORDER — PROMETHAZINE HYDROCHLORIDE 25 MG/1
25 TABLET ORAL EVERY 6 HOURS PRN
Qty: 60 TABLET | Refills: 0 | Status: ON HOLD | OUTPATIENT
Start: 2017-03-06 | End: 2017-03-11 | Stop reason: HOSPADM

## 2017-03-06 NOTE — PROGRESS NOTES
Subjective:          Chief Complaint: Ella Canales is a 66 y.o. female who had concerns including Follow-up of the Left Knee.    HPI Comments: Patient is here for a follow up for her left knee. She was then taken to the OR by Dr. Patel Denise for an irrigation and debridement on 2/24/2017. She is scheduled for a polyethylene liner exchange on Thursday. She was seen in ID this morning with Dr. Mckinley. He ordered blood work.    She is on a PICC line with vanocmycin and Cipro orally.    DATE OF PROCEDURE: 02/24/2017     PREOPERATIVE DIAGNOSIS: Possible septic arthritis, left total knee replacement.     POSTOPERATIVE DIAGNOSIS: Possible septic arthritis, left total knee   replacement.      PROCEDURE: Arthrotomy, left knee, with irrigation and debridement (CPT #04568).     SURGEON: Patel Denise M.D.     ASSISTANTS: Andrey Ya M.D. (RES); Anant Issa M.D. (RES)    DATE OF PROCEDURE: 2/14/2017     ATTENDING SURGEON: Surgeon(s) and Role:  * Angie Mulligan MD - Primary      FIRST ASSISTANT:Paolo Abdi MD - Fellow  SECOND ASSISTANT: Ronel Urban PA-C - Assistant  THIRD ASSISTANT: SMA Prince - Assistant     PREOPERATIVE DIAGNOSIS: Left  Arthritis, Traumatic M12.50, DJD knee M17.9 and Genu Varum (acquired) M21.169     POSTOPERATIVE DIAGNOSIS:  Left  Arthritis, Traumatic M12.50, DJD knee M17.9 and Genu Varum (acquired) M21.169     PROCEDURES PERFORMED:  Left  Replacement, Knee, Medial and Lateral compartment (Total Knee) 46793        Review of Systems   Constitution: Negative. Negative for chills, fever, weight gain and weight loss.   HENT: Negative for congestion, headaches and sore throat.    Eyes: Negative for blurred vision and double vision.   Cardiovascular: Negative for chest pain, leg swelling and palpitations.   Respiratory: Negative for cough and shortness of breath.    Hematologic/Lymphatic: Does not bruise/bleed easily.   Skin: Negative for itching, poor wound healing and rash.    Musculoskeletal: Positive for joint pain, joint swelling and stiffness. Negative for back pain, muscle weakness and myalgias.   Gastrointestinal: Negative for abdominal pain, constipation, diarrhea, nausea and vomiting.   Genitourinary: Negative.  Negative for frequency and hematuria.   Neurological: Negative for dizziness, numbness, paresthesias and sensory change.   Psychiatric/Behavioral: Negative for altered mental status and depression. The patient is not nervous/anxious.    Allergic/Immunologic: Negative for hives.       Pain Related Questions  Over the past 3 days, what was your average pain during activity? (I.e. running, jogging, walking, climbing stairs, getting dressed, ect.): 4  Over the past 3 days, what was your highest pain level?: 6  Over the past 3 days, what was your lowest pain level? : 2    Other  How many nights a week are you awakened by your affected body part?: 0  Was the patient's HEIGHT measured or patient reported?: Patient Reported  Was the patient's WEIGHT measured or patient reported?: Measured      Objective:        General: Ella is well-developed, well-nourished, appears stated age, in no acute distress, alert and oriented to time, place and person.     General    Vitals reviewed.  Constitutional: She is oriented to person, place, and time. She appears well-developed and well-nourished. No distress.   HENT:   Mouth/Throat: No oropharyngeal exudate.   Eyes: Right eye exhibits no discharge. Left eye exhibits no discharge.   Neck: Normal range of motion.   Cardiovascular: Normal rate and regular rhythm.    Pulmonary/Chest: Effort normal and breath sounds normal. No respiratory distress.   Neurological: She is alert and oriented to person, place, and time. She has normal reflexes. No cranial nerve deficit. Coordination normal.   Psychiatric: She has a normal mood and affect. Her behavior is normal. Judgment and thought content normal.     General Musculoskeletal Exam   Gait: antalgic        Right Knee Exam     Inspection   Erythema: absent  Scars: absent  Swelling: absent  Effusion: effusion  Deformity: deformity  Bruising: absent    Tenderness   The patient is experiencing no tenderness.         Range of Motion   Extension: 0   Flexion: 140     Tests   Meniscus   David:  Medial - negative Lateral - negative  Ligament Examination Lachman: normal (-1 to 2mm) PCL-Posterior Drawer: normal (0 to 2mm)     MCL - Valgus: normal (0 to 2mm)  LCL - Varus: normalPivot Shift: normal (Equal)Reverse Pivot Shift: normal (Equal)Dial Test at 30 degrees: normal (< 5 degrees)Dial Test at 90 degrees: normal (< 5 degrees)  Posterior Sag Test: negative  Posterolateral Corner: unstable (>15 degrees difference)  Patella   Patellar Apprehension: negative  Passive Patellar Tilt: neutral  Patellar Tracking: normal  Patellar Glide (quadrants): Lateral - 1   Medial - 2  Q-Angle at 90 degrees: normal  Patellar Grind: negative  J-Sign: none    Other   Meniscal Cyst: absent  Popliteal (Baker's) Cyst: absent  Sensation: normal    Left Knee Exam     Inspection   Erythema: present  Scars: present  Swelling: present  Effusion: present  Deformity: deformity  Bruising: present    Tenderness   Left knee tenderness location: global tenderness.    Range of Motion   Extension:  10 abnormal   Flexion:  80 abnormal     Tests   Meniscus   David:  Medial - negative Lateral - negative  Stability Lachman: normal (-1 to 2mm) PCL-Posterior Drawer: normal (0 to 2mm)  MCL - Valgus: normal (0 to 2mm)  LCL - Varus: normal (0 to 2mm)Pivot Shift: normal (Equal)Reverse Pivot Shift: normal (Equal)Dial Test at 30 degrees: normal (< 5 degrees)Dial Test at 90 degrees: normal (< 5 degrees)  Posterior Sag Test: negative  Posterolateral Corner: unstable (>15 degrees difference)  Patella   Patellar Apprehension: negative  Passive Patellar Tilt: neutral  Patellar Tracking: normal  Patellar Glide (Quadrants): Lateral - 1 Medial - 2  Q-Angle at 90 degrees:  normal  Patellar Grind: negative  J-Sign: J sign absent    Other   Meniscal Cyst: absent  Popliteal (Baker's) Cyst: absent  Sensation: normal    Comments:  Peeling of skin around incision noted due to diffuse swelling. Erythema and bruising noted diffusely throughout proximal tibia.    Mepilex Ag border placed over incision.     Resolving postoperative hemarthrosis    Right Hip Exam     Tests   Ivon: negative  Left Hip Exam     Tests   Ivon: negative          Muscle Strength   Right Lower Extremity   Hip Abduction: 5/5   Quadriceps:  5/5   Hamstrin/5   Left Lower Extremity   Hip Abduction: 5/5   Quadriceps:  4/5   Hamstrin/5     Reflexes     Left Side  Quadriceps:  2+  Achilles:  2+    Right Side   Quadriceps:  2+  Achilles:  2+    Vascular Exam     Right Pulses  Dorsalis Pedis:      2+  Posterior Tibial:      2+        Left Pulses  Dorsalis Pedis:      2+  Posterior Tibial:      2+        Edema  Right Lower Leg: absent  Left Lower Leg: present            Assessment:       Encounter Diagnoses   Name Primary?    Knee internal derangement, left Yes    Post-traumatic osteoarthritis of right knee     Genu varum of right lower extremity           Plan:       1. IKDC, SF-12 and KOOS was not filled out today in clinic.     RTC in 2 weeks with Dr. Angie Mulligan MD for postoperative follow-up. Patient will not fill out IKDC, SF-12 and KOOS on return.    2. We reviewed with Ella today, the pathology and natural history of her diagnosis. We have discussed a variety of treatment options including medications, physical therapy and other alternative treatments. I also explained the indications, risks and benefits of surgery. After discussion, Ella decided to proceed with surgery. The decision was made to go forward with:     1. Left knee polyethylene liner exchange  2. Left knee complex open incision and drainage      The details of the surgical procedure were explained, including the location of probable incisions  and a description of likely hardware and/or grafts to be used.  The patient understands the likely convalescence after surgery.  Also, we have thoroughly discussed the risks, benefits and alternatives to surgery, including, but not limited to, the risk of infection, joint stiffness, blood clot (including DVT and/or pulmonary embolus), neurologic and vascular injury.  It was explained that, if tissue has been repaired or reconstructed, there is a chance of failure, which may require further management.      All of the patient's questions were answered and informed consent was obtained. The patient will contact us if they have any questions or concerns in the interim.    3. Postoperative discontinue anti-coagulant (remove Xarelto excessive left knee post-operative bleeding)    4. Celebrex 200 mg po BID and continue prilosec at this time    5. Phenergan for nausea and vomiting                        Patient questionnaires may have been collected.

## 2017-03-06 NOTE — MR AVS SNAPSHOT
[facility]  [address]  [place] [address]  Phone: [phone]  Fax: [phone]                  Ella Canales   3/6/2017 9:30 AM   Office Visit    Description:  Female : 1950   Provider:  Olman Mckinley MD   Department:  [facility]           Diagnoses this Visit        Comments    Infected abrasion of left knee, subsequent encounter    -  Primary            To Do List           Future Appointments        Provider Department Dept Phone    3/6/2017 10:20 AM LAB, APPOINTMENT [place] [facility] [phone]    3/6/2017 11:00 AM Angie Mulligan MD [facility] [phone]    3/22/2017 1:00 PM Angie Mulligan MD [facility] [phone]    2017 8:40 AM Adry Damian MD [facility] [phone]      Goals (5 Years of Data)     None      [facility]     [facility] Nurse Care [facility] -  Assistance  Registered nurses in the [facility] provide clinical advisement, health education, appointment booking, and other advisory services.  Call for this free service at [phone].             Medications           Message regarding Medications     Verify the changes and/or additions to your medication regime listed below are the same as discussed with your clinician today.  If any of these changes or additions are incorrect, please notify your healthcare provider.        STOP taking these medications     fluticasone (FLONASE) 50 mcg/actuation nasal spray 1 spray by Each Nare route once daily.    promethazine (PHENERGAN) 25 MG tablet Take 1 tablet (25 mg total) by mouth every 6 (six) hours as needed for Nausea.           Verify that the below list of medications is an accurate representation of the medications you are currently taking.  If none reported, the list may be blank. If incorrect, please contact your healthcare provider. Carry this list with you  in case of emergency.           Current Medications     ciprofloxacin HCl (CIPRO) 750 MG tablet Take 1 tablet (750 mg total) by mouth every 12 (twelve) hours.    dextrose 5 % SolP 250 mL with vancomycin 1,000 mg SolR 1,500 mg Inject 1,500 mg into the vein every 12 (twelve) hours.    fexofenadine (ALLEGRA) 60 MG tablet Take 60 mg by mouth once daily.    glucosamine-chondroit-vit C-Mn (GLUCOSAMINE-CHONDROITIN MAX ST) 500-400 mg Cap Twice a day    losartan (COZAAR) 100 MG tablet Take 1 tablet (100 mg total) by mouth once daily.    metoprolol succinate (TOPROL-XL) 50 MG 24 hr tablet Take 1 tablet (50 mg total) by mouth once daily.    omeprazole (PRILOSEC) 20 MG capsule Take 1 capsule (20 mg total) by mouth 2 (two) times daily.    oxycodone-acetaminophen (PERCOCET)  mg per tablet Take 1 tablet by mouth every 6 (six) hours as needed for Pain.    potassium chloride SA (K-DUR,KLOR-CON) 10 MEQ tablet Take 1 tablet (10 mEq total) by mouth 2 (two) times daily.    raloxifene (EVISTA) 60 mg tablet Take 1 tablet (60 mg total) by mouth once daily.    rivaroxaban (XARELTO) 10 mg Tab Take 1 tablet (10 mg total) by mouth once daily.    simvastatin (ZOCOR) 40 MG tablet Take 1 tablet (40 mg total) by mouth every evening.    vancomycin (VANCOCIN) 10 gram SolR            Clinical Reference Information           Your Vitals Were     BP Pulse Temp Height Weight Last Period    157/80 (BP Location: Left arm, Patient Position: Sitting) 86 98.2 °F (36.8 °C) (Oral) 5' (1.524 m) 67.5 kg (148 lb 13 oz) 04/03/2012    BMI                29.06 kg/m2          Blood Pressure          Most Recent Value    BP  (!)  157/80      Allergies as of 3/6/2017     Ace Inhibitors      Immunizations Administered on Date of Encounter - 3/6/2017     None      Orders Placed During Today's Visit     Future Labs/Procedures Expected by Expires    C-reactive protein  3/6/2017 5/5/2018    Sedimentation rate, manual  3/6/2017 5/5/2018    CBC auto differential  As  directed 3/6/2018    Comprehensive metabolic panel  As directed 3/6/2018      Language Assistance Services     ATTENTION: Language assistance services are available, free of charge. Please call [phone].      ATENCIÓN: Si habla dalia, tiene a priest disposición servicios gratuitos de asistencia lingüística. Llame al [phone].     CHÚ Ý: N?u b?n nói Ti?ng Vi?t, có các d?ch v? h? tr? ngôn ng? mi?n phí dành cho b?n. G?i s? [phone].         [facility] complies with applicable Federal civil rights laws and does not discriminate on the basis of race, color, national origin, age, disability, or sex.

## 2017-03-06 NOTE — LETTER
March 6, 2017      Angie Mulligan MD  [address]  [place] [address]           [facility]  [address]  [facility] [address]  Phone: [phone]  Fax: [phone]          Patient: Ella Canales   MR Number: [id number]   YOB: 1950   Date of Visit: 3/6/2017       Dear Dr. Angie Mulligan:    Thank you for referring Ella Canales to me for evaluation. Attached you will find relevant portions of my assessment and plan of care.    If you have questions, please do not hesitate to call me. I look forward to following Ella Canales along with you.    Sincerely,    Olman Mckinley MD    Enclosure  CC:  No Recipients    If you would like to receive this communication electronically, please contact [email or web address] or [phone] to request more information on [facility] Link access.    For providers and/or their staff who would like to refer a patient to [facility], please contact us through our one-stop-shop provider referral line, [facility], at [phone].    If you feel you have received this communication in error or would no longer like to receive these types of communications, please e-mail [email or web address]

## 2017-03-06 NOTE — PROGRESS NOTES
Subjective:      Patient ID: Ella Canales is a 66 y.o. female.    Chief Complaint:  Referred by Dr. Mulilgan for f/u on rx of prosthetic knee infection      History of Present Illness    2/14 pt had left TKR  2/24 seen in ER for redness and pain in left knee; was aspirated and had 8345 WBC; no crystals and all cultures then and subsequently have been sterile admitted to [facility] 2/24-28 and was treated empirically with IV vancomycin (1500 mg q12h and PO cipro 750 mg bid.  She is scheduled for readmission and explantation of the prosthesis this week.   Lab Results   Component Value Date    WBC 4.51 03/06/2017    HGB 9.3 (L) 03/06/2017    HCT 29.5 (L) 03/06/2017    MCV 86 03/06/2017     03/06/2017     CMP  Sodium   Date Value Ref Range Status   03/06/2017 142 136 - 145 mmol/L Final     Potassium   Date Value Ref Range Status   03/06/2017 3.9 3.5 - 5.1 mmol/L Final     Chloride   Date Value Ref Range Status   03/06/2017 105 95 - 110 mmol/L Final     CO2   Date Value Ref Range Status   03/06/2017 28 23 - 29 mmol/L Final     Glucose   Date Value Ref Range Status   03/06/2017 99 70 - 110 mg/dL Final     BUN, Bld   Date Value Ref Range Status   03/06/2017 8 8 - 23 mg/dL Final     Creatinine   Date Value Ref Range Status   03/06/2017 0.8 0.5 - 1.4 mg/dL Final     Calcium   Date Value Ref Range Status   03/06/2017 9.3 8.7 - 10.5 mg/dL Final     Total Protein   Date Value Ref Range Status   03/06/2017 7.1 6.0 - 8.4 g/dL Final     Albumin   Date Value Ref Range Status   03/06/2017 3.2 (L) 3.5 - 5.2 g/dL Final     Total Bilirubin   Date Value Ref Range Status   03/06/2017 0.3 0.1 - 1.0 mg/dL Final     Comment:     Alkaline Phosphatase   Date Value Ref Range Status   03/06/2017 123 55 - 135 U/L Final     AST   Date Value Ref Range Status   03/06/2017 26 10 - 40 U/L Final     ALT   Date Value Ref Range Status   03/06/2017 18 10 - 44 U/L Final     Anion Gap   Date Value Ref Range Status   03/06/2017 9 8 - 16 mmol/L Final      eGFR if    Date Value Ref Range Status   03/06/2017 >60.0 >60 mL/min/1.73 m^2 Final     eGFR if non    Date Value Ref Range Status   03/06/2017 >60.0 >60 mL/min/1.73 m^2 Final     Comment:     Her CRP has improved from 180 to 18.9 and ESR from 131 to 77  vanco trough is 20 today    Review of Systems   Constitution: Negative for chills, decreased appetite, fever, weakness, malaise/fatigue, night sweats, weight gain and weight loss.   HENT: Negative for congestion, ear pain, headaches, hearing loss, hoarse voice, sore throat and tinnitus.    Eyes: Negative for blurred vision, redness and visual disturbance.   Cardiovascular: Negative for chest pain, leg swelling and palpitations.   Respiratory: Negative for cough, hemoptysis, shortness of breath and sputum production.    Hematologic/Lymphatic: Negative for adenopathy. Does not bruise/bleed easily.   Skin: Negative for dry skin, itching, rash and suspicious lesions.   Musculoskeletal: Negative for back pain, joint pain, myalgias and neck pain.   Gastrointestinal: Negative for abdominal pain, constipation, diarrhea, heartburn, nausea and vomiting.   Genitourinary: Negative for dysuria, flank pain, frequency, hematuria, hesitancy and urgency.   Neurological: Negative for dizziness, numbness and paresthesias.   Psychiatric/Behavioral: Negative for depression and memory loss. The patient does not have insomnia and is not nervous/anxious.      Objective:   Physical Exam   Constitutional: She is oriented to person, place, and time. She appears well-nourished. No distress.   Musculoskeletal:   Left knee appears to be painful when moved.   Neurological: She is alert and oriented to person, place, and time.   Skin: Skin is warm and dry.   Psychiatric: She has a normal mood and affect. Her behavior is normal. Thought content normal.   Vitals reviewed.    Assessment:       1. Infection of prosthetic left knee joint , culture negative          Plan:        1. Continue present abx   2. Continue present weekly lab monitoring   3. See me back 1 week post knee explant with same labs as today

## 2017-03-06 NOTE — MR AVS SNAPSHOT
[facility]  [address]  [facility] [address]  Phone: [phone]                  Ella Canales   3/6/2017 11:00 AM   Office Visit    Description:  Female : 1950   Provider:  Angie Mulligan MD   Department:  [facility]           Reason for Visit     Left Knee - Follow-up           Diagnoses this Visit        Comments    Knee internal derangement, left    -  Primary     Post-traumatic osteoarthritis of right knee         Genu varum of right lower extremity                To Do List           Future Appointments        Provider Department Dept Phone    3/22/2017 1:00 PM Angie Mulligan MD [facility] [phone]    2017 8:40 AM Adry Damian MD [facility] [phone]      Goals (5 Years of Data)     None      Follow-Up and Disposition     Return RTC in 2 weeks with Dr. Angie Mulligan MD for postoperative follow-up. Patient will not fill out IKDC,, for RTC in 2 weeks with Dr. Angie Mulligan MD for postoperative follow-up. Patient will not fill out IKDC,.       These Medications        Disp Refills Start End    oxycodone-acetaminophen (PERCOCET)  mg per tablet 60 tablet 0 3/6/2017     Take 1 tablet by mouth every 6 (six) hours as needed for Pain. - Oral    Pharmacy: "[email or web address] [address] [place] [address] [address] AT [facility] Ph #: [phone]       celecoxib (CELEBREX) 200 MG capsule 60 capsule 2 3/6/2017 2017    Take 1 capsule (200 mg total) by mouth 2 (two) times daily. - Oral    Pharmacy: "[email or web address] [address] [facility], [place] - [address] AT [facility] Ph #: [phone]       promethazine (PHENERGAN) 25 MG tablet 60 tablet 0 3/6/2017 3/13/2017    Take 1 tablet (25 mg total) by mouth every 6 (six) hours as needed for Nausea. - Oral    Pharmacy: [facility] [id number] [facility] [address] AT [facility] Ph #:  [phone]         [facility]     [facility] On Call Nurse Care Line - 24/7 Assistance  Registered nurses in the [facility] provide clinical advisement, health education, appointment booking, and other advisory services.  Call for this free service at [phone].             Medications           Message regarding Medications     Verify the changes and/or additions to your medication regime listed below are the same as discussed with your clinician today.  If any of these changes or additions are incorrect, please notify your healthcare provider.        START taking these NEW medications        Refills    oxycodone-acetaminophen (PERCOCET)  mg per tablet 0    Sig: Take 1 tablet by mouth every 6 (six) hours as needed for Pain.    Class: Normal    Route: Oral    celecoxib (CELEBREX) 200 MG capsule 2    Sig: Take 1 capsule (200 mg total) by mouth 2 (two) times daily.    Class: Normal    Route: Oral    promethazine (PHENERGAN) 25 MG tablet 0    Sig: Take 1 tablet (25 mg total) by mouth every 6 (six) hours as needed for Nausea.    Class: Normal    Route: Oral      STOP taking these medications     rivaroxaban (XARELTO) 10 mg Tab Take 1 tablet (10 mg total) by mouth once daily.           Verify that the below list of medications is an accurate representation of the medications you are currently taking.  If none reported, the list may be blank. If incorrect, please contact your healthcare provider. Carry this list with you in case of emergency.           Current Medications     ciprofloxacin HCl (CIPRO) 750 MG tablet Take 1 tablet (750 mg total) by mouth every 12 (twelve) hours.    dextrose 5 % SolP 250 mL with vancomycin 1,000 mg SolR 1,500 mg Inject 1,500 mg into the vein every 12 (twelve) hours.    fexofenadine (ALLEGRA) 60 MG tablet Take 60 mg by mouth once daily.    glucosamine-chondroit-vit C-Mn (GLUCOSAMINE-CHONDROITIN MAX ST) 500-400 mg Cap Twice a day    losartan (COZAAR) 100 MG tablet  Take 1 tablet (100 mg total) by mouth once daily.    metoprolol succinate (TOPROL-XL) 50 MG 24 hr tablet Take 1 tablet (50 mg total) by mouth once daily.    omeprazole (PRILOSEC) 20 MG capsule Take 1 capsule (20 mg total) by mouth 2 (two) times daily.    oxycodone-acetaminophen (PERCOCET)  mg per tablet Take 1 tablet by mouth every 6 (six) hours as needed for Pain.    potassium chloride SA (K-DUR,KLOR-CON) 10 MEQ tablet Take 1 tablet (10 mEq total) by mouth 2 (two) times daily.    raloxifene (EVISTA) 60 mg tablet Take 1 tablet (60 mg total) by mouth once daily.    simvastatin (ZOCOR) 40 MG tablet Take 1 tablet (40 mg total) by mouth every evening.    vancomycin (VANCOCIN) 10 gram SolR     celecoxib (CELEBREX) 200 MG capsule Take 1 capsule (200 mg total) by mouth 2 (two) times daily.    oxycodone-acetaminophen (PERCOCET)  mg per tablet Take 1 tablet by mouth every 6 (six) hours as needed for Pain.    promethazine (PHENERGAN) 25 MG tablet Take 1 tablet (25 mg total) by mouth every 6 (six) hours as needed for Nausea.           Clinical Reference Information           Your Vitals Were     BP Pulse Height Weight Last Period BMI    145/85 80 5' (1.524 m) 67.1 kg (148 lb) 04/03/2012 28.9 kg/m2      Blood Pressure          Most Recent Value    BP  (!)  145/85      Allergies as of 3/6/2017     Ace Inhibitors      Immunizations Administered on Date of Encounter - 3/6/2017     None      Language Assistance Services     ATTENTION: Language assistance services are available, free of charge. Please call [phone].      ATENCIÓN: Si spikela dejuankristian, tiene a priest disposición servicios gratuitos de asistencia lingüística. Llame al [phone].     MARCE Ý: N?u b?n nói Ti?ng Vi?t, có các d?ch v? h? tr? ngôn ng? mi?n phí dành cho b?n. G?i s? [phone].         [facility] complies with applicable Federal civil rights laws and does not discriminate on the basis of race, color, national origin, age,  disability, or sex.

## 2017-03-07 ENCOUNTER — ANESTHESIA EVENT (OUTPATIENT)
Dept: SURGERY | Facility: OTHER | Age: 67
DRG: 464 | End: 2017-03-07
Payer: MEDICARE

## 2017-03-07 NOTE — ANESTHESIA PREPROCEDURE EVALUATION
03/07/2017  Ella Canales is a 66 y.o., female.    [facility] Anesthesia Evaluation    I have reviewed the Patient Summary Reports.    I have reviewed the Nursing Notes.   I have reviewed the Medications.     Review of Systems  Anesthesia Hx:  No problems with previous Anesthesia  Denies Family Hx of Anesthesia complications.   Denies Personal Hx of Anesthesia complications.   Social:  Non-Smoker    Hematology/Oncology:  Hematology Normal   Oncology Normal     EENT/Dental:   chronic allergic rhinitis   Cardiovascular:   Hypertension, well controlled Normal angiogram 2016   Pulmonary:  Pulmonary Normal    Renal/:  Renal/ Normal     Hepatic/GI:   GERD, well controlled    Musculoskeletal:  Musculoskeletal Normal    Neurological:  Neurology Normal    Endocrine:  Endocrine Normal        Physical Exam  General:  Well nourished, Obesity    Airway/Jaw/Neck:  Airway Findings: Mouth Opening: Normal Tongue: Normal  General Airway Assessment: Average  Mallampati: II  TM Distance: 4 - 6 cm  Jaw/Neck Findings:  Neck ROM: Normal ROM      Dental:  Dental Findings: upper front caps, molar caps        Mental Status:  Mental Status Findings:  Cooperative, Alert and Oriented         Anesthesia Plan  Type of Anesthesia, risks & benefits discussed:  Anesthesia Type:  general  Patient's Preference:   Intra-op Monitoring Plan:   Intra-op Monitoring Plan Comments:   Post Op Pain Control Plan:   Post Op Pain Control Plan Comments:   Induction:    Beta Blocker:         Informed Consent: Patient understands risks and agrees with Anesthesia plan.  Questions answered. Anesthesia consent signed with patient.  ASA Score: 3     Day of Surgery Review of History & Physical:    H&P update referred to the surgeon.         Ready For Surgery From Anesthesia Perspective.

## 2017-03-08 ENCOUNTER — TELEPHONE (OUTPATIENT)
Dept: INFECTIOUS DISEASES | Facility: CLINIC | Age: 67
End: 2017-03-08

## 2017-03-08 DIAGNOSIS — T84.84XA PAINFUL ORTHOPAEDIC HARDWARE: Primary | ICD-10-CM

## 2017-03-08 NOTE — PRE ADMISSION SCREENING
Spoke with Dorys (Dr. Mulligan office)-notified was informed by patient she is having difficulty with PICC line-purple port.

## 2017-03-08 NOTE — H&P
Ella Canales  is here for a completion of her perioperative paperwork. she  Is scheduled to undergo 1. Left knee polyethylene liner exchange  2. Left knee complex open incision and drainage on 3/9/2017.  She is a healthy individual and does not need clearance for this procedure.     Risks, indications and benefits of the surgical procedure were discussed with the patient. All questions with regard to surgery, rehab, expected return to functional activities, activities of daily living and recreational endeavors were answered to her satisfaction.    Once no other questions were asked, a brief history and physical exam was then performed.      PHYSICAL EXAM:  GEN: A&Ox3, WD WN NAD  HEENT: WNL  CHEST: CTAB, no W/R/R  HEART: RRR, no M/R/G  ABD: Soft, NT ND, BS x4 QUADS  MS; See Epic  NEURO: CN II-XII intact       The surgical consent was then reviewed with the patient, who agreed with all the contents of the consent form and it was signed. she was then given the [facility] surgery packet to bring with her to [facility] for the anesthesia portion of her perioperative paperwork.     PHYSICAL THERAPY:  She was also instructed regarding physical therapy and will begin on  POD #1 .     POST OP CARE:instructions were reviewed including care of the wound and dressing after surgery and when she can shower.     PAIN MANAGEMENT: Ella Canales was also given her pain management regime, which includes the TENS unit which she has from her previous surgery.  She was also instructed regarding the Polar ice unit that will be in place after surgery and her postoperative pain medications.     PAIN MEDICATION:  Percocet 10/325mg 1 po q 4-6 hours prn pain  Phenergan 25 mg one p.o. q.4-6 hours p.r.n. nausea and vomiting.  No post op blood thinner due to hx of ulcers from aspirin and post operative hemarthrosis from Xarelto    As there were no other questions to be asked, she was given my business card along with Angie Mulligan MD  business card if she has any questions or concerns prior to surgery or in the postop period.

## 2017-03-09 ENCOUNTER — ANESTHESIA (OUTPATIENT)
Dept: SURGERY | Facility: OTHER | Age: 67
DRG: 464 | End: 2017-03-09
Payer: MEDICARE

## 2017-03-09 ENCOUNTER — HOSPITAL ENCOUNTER (INPATIENT)
Facility: OTHER | Age: 67
LOS: 2 days | Discharge: HOME HEALTH | DRG: 464 | End: 2017-03-11
Attending: ORTHOPAEDIC SURGERY | Admitting: ORTHOPAEDIC SURGERY
Payer: MEDICARE

## 2017-03-09 DIAGNOSIS — Z91.89 AT RISK FOR HEART DISEASE: ICD-10-CM

## 2017-03-09 DIAGNOSIS — E66.9 OBESITY (BMI 30-39.9): ICD-10-CM

## 2017-03-09 DIAGNOSIS — T84.84XA PAINFUL ORTHOPAEDIC HARDWARE: ICD-10-CM

## 2017-03-09 DIAGNOSIS — T84.54XA INFECTION OF PROSTHETIC LEFT KNEE JOINT: Primary | ICD-10-CM

## 2017-03-09 DIAGNOSIS — M25.562 LEFT KNEE PAIN, UNSPECIFIED CHRONICITY: ICD-10-CM

## 2017-03-09 DIAGNOSIS — J31.0 CHRONIC RHINITIS: ICD-10-CM

## 2017-03-09 DIAGNOSIS — E78.00 PURE HYPERCHOLESTEROLEMIA: ICD-10-CM

## 2017-03-09 DIAGNOSIS — K21.00 GASTROESOPHAGEAL REFLUX DISEASE WITH ESOPHAGITIS: ICD-10-CM

## 2017-03-09 DIAGNOSIS — M85.80 AGE-RELATED BONE LOSS: ICD-10-CM

## 2017-03-09 DIAGNOSIS — E87.6 HYPOKALEMIA: ICD-10-CM

## 2017-03-09 DIAGNOSIS — I10 ESSENTIAL HYPERTENSION: ICD-10-CM

## 2017-03-09 DIAGNOSIS — M19.90 ARTHRITIS: ICD-10-CM

## 2017-03-09 DIAGNOSIS — M21.161 GENU VARUM OF RIGHT LOWER EXTREMITY: ICD-10-CM

## 2017-03-09 LAB
ABO + RH BLD: NORMAL
ANION GAP SERPL CALC-SCNC: 8 MMOL/L
BLD GP AB SCN CELLS X3 SERPL QL: NORMAL
BLD PROD TYP BPU: NORMAL
BLD PROD TYP BPU: NORMAL
BLOOD UNIT EXPIRATION DATE: NORMAL
BLOOD UNIT EXPIRATION DATE: NORMAL
BLOOD UNIT TYPE CODE: 6200
BLOOD UNIT TYPE CODE: 9500
BLOOD UNIT TYPE: NORMAL
BLOOD UNIT TYPE: NORMAL
BUN SERPL-MCNC: 5 MG/DL
CALCIUM SERPL-MCNC: 7 MG/DL
CHLORIDE SERPL-SCNC: 109 MMOL/L
CO2 SERPL-SCNC: 19 MMOL/L
CODING SYSTEM: NORMAL
CODING SYSTEM: NORMAL
CREAT SERPL-MCNC: 0.5 MG/DL
DISPENSE STATUS: NORMAL
DISPENSE STATUS: NORMAL
EST. GFR  (AFRICAN AMERICAN): >60 ML/MIN/1.73 M^2
EST. GFR  (NON AFRICAN AMERICAN): >60 ML/MIN/1.73 M^2
GLUCOSE SERPL-MCNC: 93 MG/DL
HCT VFR BLD AUTO: 18.9 %
HGB BLD-MCNC: 5.7 G/DL
NUM UNITS TRANS PACKED RBC: NORMAL
POTASSIUM SERPL-SCNC: 4 MMOL/L
SODIUM SERPL-SCNC: 136 MMOL/L
TRANS ERYTHROCYTES VOL PATIENT: NORMAL ML

## 2017-03-09 PROCEDURE — 27405 REPAIR OF KNEE LIGAMENT: CPT | Mod: 58,59,22,LT | Performed by: ORTHOPAEDIC SURGERY

## 2017-03-09 PROCEDURE — P9040 RBC LEUKOREDUCED IRRADIATED: HCPCS

## 2017-03-09 PROCEDURE — 87075 CULTR BACTERIA EXCEPT BLOOD: CPT | Mod: 59

## 2017-03-09 PROCEDURE — 80048 BASIC METABOLIC PNL TOTAL CA: CPT

## 2017-03-09 PROCEDURE — C1713 ANCHOR/SCREW BN/BN,TIS/BN: HCPCS | Performed by: ORTHOPAEDIC SURGERY

## 2017-03-09 PROCEDURE — 25000003 PHARM REV CODE 250: Performed by: ORTHOPAEDIC SURGERY

## 2017-03-09 PROCEDURE — 71000033 HC RECOVERY, INTIAL HOUR: Performed by: ORTHOPAEDIC SURGERY

## 2017-03-09 PROCEDURE — 36000710: Performed by: ORTHOPAEDIC SURGERY

## 2017-03-09 PROCEDURE — C1729 CATH, DRAINAGE: HCPCS | Performed by: ORTHOPAEDIC SURGERY

## 2017-03-09 PROCEDURE — 25000003 PHARM REV CODE 250: Performed by: NURSE ANESTHETIST, CERTIFIED REGISTERED

## 2017-03-09 PROCEDURE — 37000009 HC ANESTHESIA EA ADD 15 MINS: Performed by: ORTHOPAEDIC SURGERY

## 2017-03-09 PROCEDURE — C1776 JOINT DEVICE (IMPLANTABLE): HCPCS | Performed by: ORTHOPAEDIC SURGERY

## 2017-03-09 PROCEDURE — 86901 BLOOD TYPING SEROLOGIC RH(D): CPT

## 2017-03-09 PROCEDURE — 0SPD09Z REMOVAL OF LINER FROM LEFT KNEE JOINT, OPEN APPROACH: ICD-10-PCS | Performed by: ORTHOPAEDIC SURGERY

## 2017-03-09 PROCEDURE — 0SUD09Z SUPPLEMENT LEFT KNEE JOINT WITH LINER, OPEN APPROACH: ICD-10-PCS | Performed by: ORTHOPAEDIC SURGERY

## 2017-03-09 PROCEDURE — 27201423 OPTIME MED/SURG SUP & DEVICES STERILE SUPPLY: Performed by: ORTHOPAEDIC SURGERY

## 2017-03-09 PROCEDURE — 86920 COMPATIBILITY TEST SPIN: CPT

## 2017-03-09 PROCEDURE — 63600175 PHARM REV CODE 636 W HCPCS: Performed by: SPECIALIST

## 2017-03-09 PROCEDURE — P9021 RED BLOOD CELLS UNIT: HCPCS

## 2017-03-09 PROCEDURE — 63600175 PHARM REV CODE 636 W HCPCS: Performed by: NURSE ANESTHETIST, CERTIFIED REGISTERED

## 2017-03-09 PROCEDURE — 63600175 PHARM REV CODE 636 W HCPCS: Performed by: ORTHOPAEDIC SURGERY

## 2017-03-09 PROCEDURE — 25000003 PHARM REV CODE 250: Performed by: SPECIALIST

## 2017-03-09 PROCEDURE — 85018 HEMOGLOBIN: CPT

## 2017-03-09 PROCEDURE — 10180 I&D COMPLEX PO WOUND INFCTJ: CPT | Mod: 58,51,GC, | Performed by: ORTHOPAEDIC SURGERY

## 2017-03-09 PROCEDURE — 37000008 HC ANESTHESIA 1ST 15 MINUTES: Performed by: ORTHOPAEDIC SURGERY

## 2017-03-09 PROCEDURE — 27486 REVISE/REPLACE KNEE JOINT: CPT | Mod: 58,22,LT,GC | Performed by: ORTHOPAEDIC SURGERY

## 2017-03-09 PROCEDURE — 36430 TRANSFUSION BLD/BLD COMPNT: CPT

## 2017-03-09 PROCEDURE — 86900 BLOOD TYPING SEROLOGIC ABO: CPT

## 2017-03-09 PROCEDURE — 87176 TISSUE HOMOGENIZATION CULTR: CPT

## 2017-03-09 PROCEDURE — 25000003 PHARM REV CODE 250: Performed by: PHYSICIAN ASSISTANT

## 2017-03-09 PROCEDURE — 87070 CULTURE OTHR SPECIMN AEROBIC: CPT

## 2017-03-09 PROCEDURE — 85014 HEMATOCRIT: CPT

## 2017-03-09 PROCEDURE — 63600175 PHARM REV CODE 636 W HCPCS: Performed by: ANESTHESIOLOGY

## 2017-03-09 PROCEDURE — 0MQP0ZZ REPAIR LEFT KNEE BURSA AND LIGAMENT, OPEN APPROACH: ICD-10-PCS | Performed by: ORTHOPAEDIC SURGERY

## 2017-03-09 PROCEDURE — 11000001 HC ACUTE MED/SURG PRIVATE ROOM

## 2017-03-09 PROCEDURE — 0S9D00Z DRAINAGE OF LEFT KNEE JOINT WITH DRAINAGE DEVICE, OPEN APPROACH: ICD-10-PCS | Performed by: ORTHOPAEDIC SURGERY

## 2017-03-09 PROCEDURE — 0SBD0ZZ EXCISION OF LEFT KNEE JOINT, OPEN APPROACH: ICD-10-PCS | Performed by: ORTHOPAEDIC SURGERY

## 2017-03-09 PROCEDURE — 36000711: Performed by: ORTHOPAEDIC SURGERY

## 2017-03-09 PROCEDURE — 71000039 HC RECOVERY, EACH ADD'L HOUR: Performed by: ORTHOPAEDIC SURGERY

## 2017-03-09 DEVICE — ANCHOR 4.5 HELIX ADV BR 3 SUT: Type: IMPLANTABLE DEVICE | Site: KNEE | Status: FUNCTIONAL

## 2017-03-09 DEVICE — IMPLANTABLE DEVICE: Type: IMPLANTABLE DEVICE | Site: KNEE | Status: FUNCTIONAL

## 2017-03-09 RX ORDER — ACETAMINOPHEN 10 MG/ML
1000 INJECTION, SOLUTION INTRAVENOUS EVERY 6 HOURS
Status: DISPENSED | OUTPATIENT
Start: 2017-03-09 | End: 2017-03-10

## 2017-03-09 RX ORDER — RALOXIFENE HYDROCHLORIDE 60 MG/1
60 TABLET, FILM COATED ORAL DAILY
Status: DISCONTINUED | OUTPATIENT
Start: 2017-03-10 | End: 2017-03-11 | Stop reason: HOSPADM

## 2017-03-09 RX ORDER — HYDROMORPHONE HYDROCHLORIDE 2 MG/1
4 TABLET ORAL EVERY 4 HOURS PRN
Status: DISCONTINUED | OUTPATIENT
Start: 2017-03-09 | End: 2017-03-11 | Stop reason: HOSPADM

## 2017-03-09 RX ORDER — HYDROCODONE BITARTRATE AND ACETAMINOPHEN 500; 5 MG/1; MG/1
TABLET ORAL
Status: DISCONTINUED | OUTPATIENT
Start: 2017-03-09 | End: 2017-03-11 | Stop reason: HOSPADM

## 2017-03-09 RX ORDER — MEPERIDINE HYDROCHLORIDE 50 MG/ML
12.5 INJECTION INTRAMUSCULAR; INTRAVENOUS; SUBCUTANEOUS ONCE AS NEEDED
Status: DISCONTINUED | OUTPATIENT
Start: 2017-03-09 | End: 2017-03-09 | Stop reason: HOSPADM

## 2017-03-09 RX ORDER — MORPHINE SULFATE 2 MG/ML
2 INJECTION, SOLUTION INTRAMUSCULAR; INTRAVENOUS EVERY 4 HOURS PRN
Status: DISCONTINUED | OUTPATIENT
Start: 2017-03-09 | End: 2017-03-11 | Stop reason: HOSPADM

## 2017-03-09 RX ORDER — ONDANSETRON 8 MG/1
8 TABLET, ORALLY DISINTEGRATING ORAL EVERY 8 HOURS PRN
Status: DISCONTINUED | OUTPATIENT
Start: 2017-03-09 | End: 2017-03-11 | Stop reason: HOSPADM

## 2017-03-09 RX ORDER — SODIUM CHLORIDE 0.9 % (FLUSH) 0.9 %
3 SYRINGE (ML) INJECTION
Status: DISCONTINUED | OUTPATIENT
Start: 2017-03-09 | End: 2017-03-09

## 2017-03-09 RX ORDER — SIMVASTATIN 40 MG/1
40 TABLET, FILM COATED ORAL NIGHTLY
Status: DISCONTINUED | OUTPATIENT
Start: 2017-03-09 | End: 2017-03-11 | Stop reason: HOSPADM

## 2017-03-09 RX ORDER — BACITRACIN 50000 [IU]/1
INJECTION, POWDER, FOR SOLUTION INTRAMUSCULAR
Status: DISCONTINUED | OUTPATIENT
Start: 2017-03-09 | End: 2017-03-09 | Stop reason: HOSPADM

## 2017-03-09 RX ORDER — ONDANSETRON 2 MG/ML
INJECTION INTRAMUSCULAR; INTRAVENOUS
Status: DISCONTINUED | OUTPATIENT
Start: 2017-03-09 | End: 2017-03-09

## 2017-03-09 RX ORDER — ONDANSETRON 2 MG/ML
4 INJECTION INTRAMUSCULAR; INTRAVENOUS ONCE AS NEEDED
Status: DISCONTINUED | OUTPATIENT
Start: 2017-03-09 | End: 2017-03-09 | Stop reason: HOSPADM

## 2017-03-09 RX ORDER — CHLORZOXAZONE 500 MG/1
500 TABLET ORAL 4 TIMES DAILY PRN
Status: DISCONTINUED | OUTPATIENT
Start: 2017-03-09 | End: 2017-03-11 | Stop reason: HOSPADM

## 2017-03-09 RX ORDER — TRANEXAMIC ACID 100 MG/ML
1000 INJECTION, SOLUTION INTRAVENOUS ONCE
Status: COMPLETED | OUTPATIENT
Start: 2017-03-09 | End: 2017-03-09

## 2017-03-09 RX ORDER — OXYCODONE HYDROCHLORIDE 5 MG/1
15 TABLET ORAL
Status: DISCONTINUED | OUTPATIENT
Start: 2017-03-09 | End: 2017-03-11 | Stop reason: HOSPADM

## 2017-03-09 RX ORDER — METOPROLOL SUCCINATE 50 MG/1
50 TABLET, EXTENDED RELEASE ORAL DAILY
Status: DISCONTINUED | OUTPATIENT
Start: 2017-03-09 | End: 2017-03-09

## 2017-03-09 RX ORDER — METOPROLOL SUCCINATE 50 MG/1
50 TABLET, EXTENDED RELEASE ORAL NIGHTLY
Status: DISCONTINUED | OUTPATIENT
Start: 2017-03-09 | End: 2017-03-11 | Stop reason: HOSPADM

## 2017-03-09 RX ORDER — OXYCODONE HYDROCHLORIDE 5 MG/1
5 TABLET ORAL
Status: DISCONTINUED | OUTPATIENT
Start: 2017-03-09 | End: 2017-03-11 | Stop reason: HOSPADM

## 2017-03-09 RX ORDER — MAG HYDROX/ALUMINUM HYD/SIMETH 200-200-20
30 SUSPENSION, ORAL (FINAL DOSE FORM) ORAL
Status: DISCONTINUED | OUTPATIENT
Start: 2017-03-09 | End: 2017-03-11 | Stop reason: HOSPADM

## 2017-03-09 RX ORDER — CETIRIZINE HYDROCHLORIDE 10 MG/1
10 TABLET ORAL DAILY
Status: DISCONTINUED | OUTPATIENT
Start: 2017-03-09 | End: 2017-03-11 | Stop reason: HOSPADM

## 2017-03-09 RX ORDER — FENTANYL CITRATE 50 UG/ML
INJECTION, SOLUTION INTRAMUSCULAR; INTRAVENOUS
Status: DISCONTINUED | OUTPATIENT
Start: 2017-03-09 | End: 2017-03-09

## 2017-03-09 RX ORDER — SODIUM CHLORIDE 9 MG/ML
INJECTION, SOLUTION INTRAVENOUS CONTINUOUS
Status: DISCONTINUED | OUTPATIENT
Start: 2017-03-09 | End: 2017-03-11 | Stop reason: HOSPADM

## 2017-03-09 RX ORDER — PHENYLEPHRINE HYDROCHLORIDE 10 MG/ML
INJECTION INTRAVENOUS
Status: DISCONTINUED | OUTPATIENT
Start: 2017-03-09 | End: 2017-03-09

## 2017-03-09 RX ORDER — SODIUM CHLORIDE 0.9 % (FLUSH) 0.9 %
3 SYRINGE (ML) INJECTION EVERY 8 HOURS
Status: DISCONTINUED | OUTPATIENT
Start: 2017-03-09 | End: 2017-03-09 | Stop reason: HOSPADM

## 2017-03-09 RX ORDER — DIPHENHYDRAMINE HYDROCHLORIDE 50 MG/ML
25 INJECTION INTRAMUSCULAR; INTRAVENOUS EVERY 6 HOURS PRN
Status: DISCONTINUED | OUTPATIENT
Start: 2017-03-09 | End: 2017-03-09 | Stop reason: HOSPADM

## 2017-03-09 RX ORDER — RALOXIFENE HYDROCHLORIDE 60 MG/1
60 TABLET, FILM COATED ORAL NIGHTLY
Status: DISCONTINUED | OUTPATIENT
Start: 2017-03-09 | End: 2017-03-09

## 2017-03-09 RX ORDER — PANTOPRAZOLE SODIUM 40 MG/1
40 TABLET, DELAYED RELEASE ORAL DAILY
Status: DISCONTINUED | OUTPATIENT
Start: 2017-03-09 | End: 2017-03-11 | Stop reason: HOSPADM

## 2017-03-09 RX ORDER — OXYCODONE HCL 10 MG/1
10 TABLET, FILM COATED, EXTENDED RELEASE ORAL EVERY 12 HOURS
Status: COMPLETED | OUTPATIENT
Start: 2017-03-09 | End: 2017-03-11

## 2017-03-09 RX ORDER — FENTANYL CITRATE 50 UG/ML
25 INJECTION, SOLUTION INTRAMUSCULAR; INTRAVENOUS EVERY 5 MIN PRN
Status: DISCONTINUED | OUTPATIENT
Start: 2017-03-09 | End: 2017-03-09 | Stop reason: HOSPADM

## 2017-03-09 RX ORDER — RALOXIFENE HYDROCHLORIDE 60 MG/1
60 TABLET, FILM COATED ORAL DAILY
Status: DISCONTINUED | OUTPATIENT
Start: 2017-03-09 | End: 2017-03-09

## 2017-03-09 RX ORDER — MIDAZOLAM HYDROCHLORIDE 1 MG/ML
INJECTION INTRAMUSCULAR; INTRAVENOUS
Status: DISCONTINUED | OUTPATIENT
Start: 2017-03-09 | End: 2017-03-09

## 2017-03-09 RX ORDER — DEXAMETHASONE SODIUM PHOSPHATE 4 MG/ML
INJECTION, SOLUTION INTRA-ARTICULAR; INTRALESIONAL; INTRAMUSCULAR; INTRAVENOUS; SOFT TISSUE
Status: DISCONTINUED | OUTPATIENT
Start: 2017-03-09 | End: 2017-03-09

## 2017-03-09 RX ORDER — POTASSIUM CHLORIDE 750 MG/1
10 TABLET, EXTENDED RELEASE ORAL 2 TIMES DAILY
Status: DISCONTINUED | OUTPATIENT
Start: 2017-03-09 | End: 2017-03-11 | Stop reason: HOSPADM

## 2017-03-09 RX ORDER — MORPHINE SULFATE 4 MG/ML
4 INJECTION, SOLUTION INTRAMUSCULAR; INTRAVENOUS EVERY 4 HOURS PRN
Status: DISCONTINUED | OUTPATIENT
Start: 2017-03-09 | End: 2017-03-11 | Stop reason: HOSPADM

## 2017-03-09 RX ORDER — MORPHINE SULFATE 2 MG/ML
6 INJECTION, SOLUTION INTRAMUSCULAR; INTRAVENOUS EVERY 4 HOURS PRN
Status: DISCONTINUED | OUTPATIENT
Start: 2017-03-09 | End: 2017-03-11 | Stop reason: HOSPADM

## 2017-03-09 RX ORDER — LOSARTAN POTASSIUM 50 MG/1
100 TABLET ORAL DAILY
Status: DISCONTINUED | OUTPATIENT
Start: 2017-03-09 | End: 2017-03-11 | Stop reason: HOSPADM

## 2017-03-09 RX ORDER — MIDAZOLAM HYDROCHLORIDE 2 MG/2ML
1 INJECTION, SOLUTION INTRAMUSCULAR; INTRAVENOUS
Status: DISCONTINUED | OUTPATIENT
Start: 2017-03-09 | End: 2017-03-09 | Stop reason: HOSPADM

## 2017-03-09 RX ORDER — SODIUM CHLORIDE, SODIUM LACTATE, POTASSIUM CHLORIDE, CALCIUM CHLORIDE 600; 310; 30; 20 MG/100ML; MG/100ML; MG/100ML; MG/100ML
INJECTION, SOLUTION INTRAVENOUS CONTINUOUS PRN
Status: DISCONTINUED | OUTPATIENT
Start: 2017-03-09 | End: 2017-03-09

## 2017-03-09 RX ORDER — OXYCODONE HYDROCHLORIDE 5 MG/1
5 TABLET ORAL
Status: DISCONTINUED | OUTPATIENT
Start: 2017-03-09 | End: 2017-03-09 | Stop reason: HOSPADM

## 2017-03-09 RX ORDER — CELECOXIB 200 MG/1
200 CAPSULE ORAL 2 TIMES DAILY
Status: DISCONTINUED | OUTPATIENT
Start: 2017-03-09 | End: 2017-03-11 | Stop reason: HOSPADM

## 2017-03-09 RX ORDER — ACETAMINOPHEN 10 MG/ML
INJECTION, SOLUTION INTRAVENOUS
Status: DISCONTINUED | OUTPATIENT
Start: 2017-03-09 | End: 2017-03-09

## 2017-03-09 RX ORDER — LIDOCAINE HCL/PF 100 MG/5ML
SYRINGE (ML) INTRAVENOUS
Status: DISCONTINUED | OUTPATIENT
Start: 2017-03-09 | End: 2017-03-09

## 2017-03-09 RX ORDER — BUPIVACAINE HYDROCHLORIDE AND EPINEPHRINE 5; 5 MG/ML; UG/ML
INJECTION, SOLUTION EPIDURAL; INTRACAUDAL; PERINEURAL
Status: DISCONTINUED | OUTPATIENT
Start: 2017-03-09 | End: 2017-03-09 | Stop reason: HOSPADM

## 2017-03-09 RX ORDER — OXYCODONE HYDROCHLORIDE 5 MG/1
10 TABLET ORAL
Status: DISCONTINUED | OUTPATIENT
Start: 2017-03-09 | End: 2017-03-11 | Stop reason: HOSPADM

## 2017-03-09 RX ORDER — HETASTARCH 6 G/100ML
INJECTION, SOLUTION INTRAVENOUS CONTINUOUS PRN
Status: DISCONTINUED | OUTPATIENT
Start: 2017-03-09 | End: 2017-03-09

## 2017-03-09 RX ORDER — DIPHENHYDRAMINE HCL 25 MG
25 CAPSULE ORAL EVERY 6 HOURS PRN
Status: DISCONTINUED | OUTPATIENT
Start: 2017-03-09 | End: 2017-03-11 | Stop reason: HOSPADM

## 2017-03-09 RX ORDER — HYDROMORPHONE HYDROCHLORIDE 2 MG/ML
0.4 INJECTION, SOLUTION INTRAMUSCULAR; INTRAVENOUS; SUBCUTANEOUS EVERY 5 MIN PRN
Status: DISCONTINUED | OUTPATIENT
Start: 2017-03-09 | End: 2017-03-09 | Stop reason: HOSPADM

## 2017-03-09 RX ORDER — DOCUSATE SODIUM 100 MG/1
100 CAPSULE, LIQUID FILLED ORAL 2 TIMES DAILY
Status: DISCONTINUED | OUTPATIENT
Start: 2017-03-09 | End: 2017-03-11 | Stop reason: HOSPADM

## 2017-03-09 RX ORDER — BISACODYL 10 MG
10 SUPPOSITORY, RECTAL RECTAL DAILY PRN
Status: DISCONTINUED | OUTPATIENT
Start: 2017-03-09 | End: 2017-03-11 | Stop reason: HOSPADM

## 2017-03-09 RX ADMIN — ACETAMINOPHEN 1000 MG: 10 INJECTION, SOLUTION INTRAVENOUS at 05:03

## 2017-03-09 RX ADMIN — OXYCODONE HYDROCHLORIDE 5 MG: 5 TABLET ORAL at 12:03

## 2017-03-09 RX ADMIN — EPHEDRINE SULFATE 10 MG: 50 INJECTION INTRAMUSCULAR; INTRAVENOUS; SUBCUTANEOUS at 09:03

## 2017-03-09 RX ADMIN — HYDROMORPHONE HYDROCHLORIDE 0.4 MG: 2 INJECTION, SOLUTION INTRAMUSCULAR; INTRAVENOUS; SUBCUTANEOUS at 12:03

## 2017-03-09 RX ADMIN — FENTANYL CITRATE 25 MCG: 50 INJECTION, SOLUTION INTRAMUSCULAR; INTRAVENOUS at 09:03

## 2017-03-09 RX ADMIN — EPHEDRINE SULFATE 5 MG: 50 INJECTION INTRAMUSCULAR; INTRAVENOUS; SUBCUTANEOUS at 11:03

## 2017-03-09 RX ADMIN — FENTANYL CITRATE 50 MCG: 50 INJECTION, SOLUTION INTRAMUSCULAR; INTRAVENOUS at 09:03

## 2017-03-09 RX ADMIN — VANCOMYCIN HYDROCHLORIDE 1000 MG: 1 INJECTION, POWDER, LYOPHILIZED, FOR SOLUTION INTRAVENOUS at 09:03

## 2017-03-09 RX ADMIN — FENTANYL CITRATE 50 MCG: 50 INJECTION, SOLUTION INTRAMUSCULAR; INTRAVENOUS at 10:03

## 2017-03-09 RX ADMIN — OXYCODONE HYDROCHLORIDE 15 MG: 5 TABLET ORAL at 10:03

## 2017-03-09 RX ADMIN — TRANEXAMIC ACID 1000 MG: 100 INJECTION, SOLUTION INTRAVENOUS at 09:03

## 2017-03-09 RX ADMIN — CELECOXIB 200 MG: 200 CAPSULE ORAL at 09:03

## 2017-03-09 RX ADMIN — POTASSIUM CHLORIDE 10 MEQ: 750 TABLET, EXTENDED RELEASE ORAL at 09:03

## 2017-03-09 RX ADMIN — FENTANYL CITRATE 25 MCG: 50 INJECTION, SOLUTION INTRAMUSCULAR; INTRAVENOUS at 11:03

## 2017-03-09 RX ADMIN — FENTANYL CITRATE 25 MCG: 50 INJECTION, SOLUTION INTRAMUSCULAR; INTRAVENOUS at 10:03

## 2017-03-09 RX ADMIN — LIDOCAINE HYDROCHLORIDE 100 MG: 20 INJECTION, SOLUTION INTRAVENOUS at 09:03

## 2017-03-09 RX ADMIN — CIPROFLOXACIN HYDROCHLORIDE 750 MG: 250 TABLET, FILM COATED ORAL at 09:03

## 2017-03-09 RX ADMIN — PHENYLEPHRINE HYDROCHLORIDE 100 MCG: 10 INJECTION INTRAVENOUS at 11:03

## 2017-03-09 RX ADMIN — PROMETHAZINE HYDROCHLORIDE 6.25 MG: 25 INJECTION INTRAMUSCULAR; INTRAVENOUS at 12:03

## 2017-03-09 RX ADMIN — FENTANYL CITRATE 50 MCG: 50 INJECTION, SOLUTION INTRAMUSCULAR; INTRAVENOUS at 11:03

## 2017-03-09 RX ADMIN — LIDOCAINE HYDROCHLORIDE 50 MG: 20 INJECTION, SOLUTION INTRAVENOUS at 09:03

## 2017-03-09 RX ADMIN — MIDAZOLAM HYDROCHLORIDE 2 MG: 1 INJECTION, SOLUTION INTRAMUSCULAR; INTRAVENOUS at 09:03

## 2017-03-09 RX ADMIN — DOCUSATE SODIUM 100 MG: 100 CAPSULE, LIQUID FILLED ORAL at 09:03

## 2017-03-09 RX ADMIN — PHENYLEPHRINE HYDROCHLORIDE 100 MCG: 10 INJECTION INTRAVENOUS at 10:03

## 2017-03-09 RX ADMIN — PANTOPRAZOLE SODIUM 40 MG: 40 TABLET, DELAYED RELEASE ORAL at 05:03

## 2017-03-09 RX ADMIN — ACETAMINOPHEN 1000 MG: 10 INJECTION, SOLUTION INTRAVENOUS at 09:03

## 2017-03-09 RX ADMIN — DEXAMETHASONE SODIUM PHOSPHATE 4 MG: 4 INJECTION, SOLUTION INTRAMUSCULAR; INTRAVENOUS at 11:03

## 2017-03-09 RX ADMIN — CARBOXYMETHYLCELLULOSE SODIUM 2 DROP: 2.5 SOLUTION/ DROPS OPHTHALMIC at 09:03

## 2017-03-09 RX ADMIN — TRANEXAMIC ACID 1000 MG: 100 INJECTION, SOLUTION INTRAVENOUS at 05:03

## 2017-03-09 RX ADMIN — SODIUM CHLORIDE, SODIUM LACTATE, POTASSIUM CHLORIDE, AND CALCIUM CHLORIDE: 600; 310; 30; 20 INJECTION, SOLUTION INTRAVENOUS at 08:03

## 2017-03-09 RX ADMIN — EPHEDRINE SULFATE 5 MG: 50 INJECTION INTRAMUSCULAR; INTRAVENOUS; SUBCUTANEOUS at 10:03

## 2017-03-09 RX ADMIN — ONDANSETRON 4 MG: 2 INJECTION INTRAMUSCULAR; INTRAVENOUS at 11:03

## 2017-03-09 RX ADMIN — OXYCODONE HYDROCHLORIDE 10 MG: 5 TABLET ORAL at 06:03

## 2017-03-09 RX ADMIN — SODIUM CHLORIDE, SODIUM LACTATE, POTASSIUM CHLORIDE, AND CALCIUM CHLORIDE: 600; 310; 30; 20 INJECTION, SOLUTION INTRAVENOUS at 11:03

## 2017-03-09 RX ADMIN — METOPROLOL SUCCINATE 50 MG: 50 TABLET, EXTENDED RELEASE ORAL at 09:03

## 2017-03-09 RX ADMIN — EPHEDRINE SULFATE 5 MG: 50 INJECTION INTRAMUSCULAR; INTRAVENOUS; SUBCUTANEOUS at 09:03

## 2017-03-09 RX ADMIN — PHENYLEPHRINE HYDROCHLORIDE 100 MCG: 10 INJECTION INTRAVENOUS at 09:03

## 2017-03-09 RX ADMIN — OXYCODONE HYDROCHLORIDE 10 MG: 10 TABLET, FILM COATED, EXTENDED RELEASE ORAL at 09:03

## 2017-03-09 RX ADMIN — HETASTARCH IN SODIUM CHLORIDE: 6; .9 INJECTION, SOLUTION INTRAVENOUS at 11:03

## 2017-03-09 RX ADMIN — MIDAZOLAM HYDROCHLORIDE 1 MG: 1 INJECTION, SOLUTION INTRAMUSCULAR; INTRAVENOUS at 12:03

## 2017-03-09 RX ADMIN — SODIUM CHLORIDE: 0.9 INJECTION, SOLUTION INTRAVENOUS at 05:03

## 2017-03-09 RX ADMIN — EPHEDRINE SULFATE 10 MG: 50 INJECTION INTRAMUSCULAR; INTRAVENOUS; SUBCUTANEOUS at 10:03

## 2017-03-09 RX ADMIN — SIMVASTATIN 40 MG: 40 TABLET, FILM COATED ORAL at 09:03

## 2017-03-09 NOTE — ANESTHESIA POSTPROCEDURE EVALUATION
Anesthesia Post Evaluation    Patient: Ella Canales    Procedure(s) Performed: Procedure(s) (LRB):  REVISION-ARTHROPLASTY-KNEE-TOTAL; TIBIAL POLYETHYLENE EXCHANGE (Left)  INCISION AND DRAINAGE (I & D) (Left)  LYSIS-ADHESION (Left)  GRAFT (Left)  APPLICATION-WOUND DRESSING (Left)    Final Anesthesia Type: general  Patient location during evaluation: PACU  Patient participation: Yes- Able to Participate  Level of consciousness: awake and alert  Post-procedure vital signs: reviewed and stable  Pain management: adequate  Airway patency: patent  PONV status at discharge: No PONV  Anesthetic complications: no      Cardiovascular status: blood pressure returned to baseline  Respiratory status: unassisted, spontaneous ventilation and room air  Hydration status: euvolemic  Follow-up not needed.        Visit Vitals    /67 (BP Location: Left arm, Patient Position: Lying, BP Method: Automatic)    Pulse 78    Temp 36.4 °C (97.6 °F) (Oral)    Resp 18    Ht 5' (1.524 m)    Wt 68.3 kg (150 lb 9.2 oz)    LMP 04/03/2012    SpO2 100%    Breastfeeding No    BMI 29.41 kg/m2       Pain/Oj Score: Pain Assessment Performed: Yes (3/9/2017  7:25 AM)  Presence of Pain: non-verbal indicators present (pt is sleeping and snoring) (3/9/2017  1:23 PM)  Pain Rating Prior to Med Admin: 9 (3/9/2017 12:30 PM)  Pain Rating Post Med Admin: 5 (3/9/2017 12:53 PM)  Oj Score: 8 (3/9/2017  1:23 PM)

## 2017-03-09 NOTE — INTERVAL H&P NOTE
The patient has been examined and the H&P has been reviewed:    I concur with the findings and no changes have occurred since H&P was written.    Anesthesia/Surgery risks, benefits and alternative options discussed and understood by patient/family.          Active Hospital Problems    Diagnosis  POA    Painful orthopaedic hardware [T84.84XA]  Yes      Resolved Hospital Problems    Diagnosis Date Resolved POA   No resolved problems to display.

## 2017-03-09 NOTE — TRANSFER OF CARE
Anesthesia Transfer of Care Note    Patient: Ella Canales    Procedure(s) Performed: Procedure(s) (LRB):  REVISION-ARTHROPLASTY-KNEE-TOTAL; TIBIAL POLYETHYLENE EXCHANGE (Left)  INCISION AND DRAINAGE (I & D) (Left)  LYSIS-ADHESION (Left)  GRAFT (Left)  APPLICATION-WOUND DRESSING (Left)    Patient location: PACU    Anesthesia Type: general    Transport from OR: Transported from OR on 2-3 L/min O2 by NC with adequate spontaneous ventilation    Post pain: adequate analgesia    Post assessment: no apparent anesthetic complications    Post vital signs: stable    Level of consciousness: awake, alert and oriented    Nausea/Vomiting: no nausea/vomiting    Complications: none          Last vitals:   Visit Vitals    /67 (BP Location: Left arm, Patient Position: Lying, BP Method: Automatic)    Pulse 99    Temp 36.4 °C (97.6 °F) (Oral)    Resp 18    Ht 5' (1.524 m)    Wt 68.3 kg (150 lb 9.2 oz)    LMP 04/03/2012    SpO2 99%    Breastfeeding No    BMI 29.41 kg/m2

## 2017-03-09 NOTE — PROGRESS NOTES
Notified JOEY Butcher regarding Pt's H&H critical values. 2 units of RBCs ordered. New orders will carried out.

## 2017-03-09 NOTE — PLAN OF CARE
TRISTIN met with pt and sister, Nieves and friend, Huyen were present.  Friend, Huyen [phone] answered most of the questions; pt was in pain.  Pt's son, Clarence Worrell is POA and pt has a living will.  Pt has [facility] and will discharge to [facility] home: [address] , [place], [place].  Pt has a rolling walker, bedside commode, CPM and ice machine at home.  Pt was on IV antibiotics prior to admit and [facility] provided medication.     03/09/17 1656   Discharge Assessment   Assessment Type Discharge Planning Assessment   Confirmed/corrected address and phone number on facesheet? Yes   Assessment information obtained from? Caregiver;Patient   Communicated expected length of stay with patient/caregiver no   Prior to hospitilization cognitive status: Alert/Oriented   Prior to hospitalization functional status: Independent   Current cognitive status: Alert/Oriented   Current Functional Status: Assistive Equipment   Arrived From home or self-care   Lives With alone   Able to Return to Prior Arrangements yes   Is patient able to care for self after discharge? Yes   Who are your caregiver(s) and their phone number(s)? (Clarence, son and daughter-in-law [phone])   Readmission Within The Last 30 Days previous discharge plan unsuccessful   Patient currently being followed by outpatient case management? No   Patient currently receives home health services? Yes   Patient previously received home health services and would like to resume services if necessary? Yes   If yes, name of home health provider: ([facility])   Patient currently receives private duty nursing? No   Patient currently receives any other outside agency services? No   Equipment Currently Used at Home walker, rolling;bedside commode  (CPM, ice machine)   Do you have any problems affording any of your prescribed medications? No   Is the patient taking medications as prescribed? yes   Do you have any financial concerns preventing you from  receiving the healthcare you need? No   Does the patient have transportation to healthcare appointments? Yes   Transportation Available family or friend will provide   On Dialysis? No   Does the patient receive services at the Coumadin Clinic? No   Are there any open cases? No   Discharge Plan A Home Health   Patient/Family In Agreement With Plan yes

## 2017-03-09 NOTE — H&P (VIEW-ONLY)
Ella Canales  is here for a completion of her perioperative paperwork. she  Is scheduled to undergo 1. Left knee polyethylene liner exchange  2. Left knee complex open incision and drainage on 3/9/2017.  She is a healthy individual and does not need clearance for this procedure.     Risks, indications and benefits of the surgical procedure were discussed with the patient. All questions with regard to surgery, rehab, expected return to functional activities, activities of daily living and recreational endeavors were answered to her satisfaction.    Once no other questions were asked, a brief history and physical exam was then performed.      PHYSICAL EXAM:  GEN: A&Ox3, WD WN NAD  HEENT: WNL  CHEST: CTAB, no W/R/R  HEART: RRR, no M/R/G  ABD: Soft, NT ND, BS x4 QUADS  MS; See Epic  NEURO: CN II-XII intact       The surgical consent was then reviewed with the patient, who agreed with all the contents of the consent form and it was signed. she was then given the [facility] surgery packet to bring with her to [facility] for the anesthesia portion of her perioperative paperwork.     PHYSICAL THERAPY:  She was also instructed regarding physical therapy and will begin on  POD #1 .     POST OP CARE:instructions were reviewed including care of the wound and dressing after surgery and when she can shower.     PAIN MANAGEMENT: Ella Canales was also given her pain management regime, which includes the TENS unit which she has from her previous surgery.  She was also instructed regarding the Polar ice unit that will be in place after surgery and her postoperative pain medications.     PAIN MEDICATION:  Percocet 10/325mg 1 po q 4-6 hours prn pain  Phenergan 25 mg one p.o. q.4-6 hours p.r.n. nausea and vomiting.  No post op blood thinner due to hx of ulcers from aspirin and post operative hemarthrosis from Xarelto    As there were no other questions to be asked, she was given my business card along with Angie Mulligan MD  business card if she has any questions or concerns prior to surgery or in the postop period.

## 2017-03-09 NOTE — IP AVS SNAPSHOT
[facility] ([facility])  [address] [place], [place] [id number]  Phone: [phone]           Patient Discharge Instructions     Our goal is to set you up for success. This packet includes information on your condition, medications, and your home care. It will help you to care for yourself so you don't get sicker and need to go back to the hospital.     Please ask your nurse if you have any questions.        There are many details to remember when preparing to leave the hospital. Here is what you will need to do:    1. Take your medicine. If you are prescribed medications, review your Medication List in the following pages. You may have new medications to  at the pharmacy and others that you'll need to stop taking. Review the instructions for how and when to take your medications. Talk with your doctor or nurses if you are unsure of what to do.     2. Go to your follow-up appointments. Specific follow-up information is listed in the following pages. Your may be contacted by a transition nurse or clinical provider about future appointments. Be sure we have all of the phone numbers to reach you, if needed. Please contact your provider's office if you are unable to make an appointment.     3. Watch for warning signs. Your doctor or nurse will give you detailed warning signs to watch for and when to call for assistance. These instructions may also include educational information about your condition. If you experience any of warning signs to your health, call your doctor.               [facility]  Unless otherwise directed by your provider, please contact [facility], our nurse care line that is available for 24/7 assistance.     [phone] (toll-free)    Registered nurses in the [facility] provide clinical advisement, health education, appointment booking, and other advisory services.                    ** Verify the list of medication(s) below is accurate and up to  date. Carry this with you in case of emergency. If your medications have changed, please notify your healthcare provider.             Medication List      START taking these medications        Additional Info                      chlorzoxazone 500 mg Tab   Commonly known as:  PARAFON FORTE   Quantity:  30 tablet   Refills:  0   Dose:  500 mg    Last time this was given:  500 mg on 3/10/2017 10:55 PM   Instructions:  Take 1 tablet (500 mg total) by mouth 3 (three) times daily as needed (spasms).     Begin Date    AM    Noon    PM    Bedtime       docusate sodium 100 MG capsule   Commonly known as:  COLACE   Quantity:  21 capsule   Refills:  1   Dose:  100 mg    Last time this was given:  100 mg on 3/11/2017  8:53 AM   Instructions:  Take 1 capsule (100 mg total) by mouth 2 (two) times daily as needed for Constipation.     Begin Date    AM    Noon    PM    Bedtime       ferrous sulfate 325 (65 FE) MG EC tablet   Quantity:  30 tablet   Refills:  0   Dose:  325 mg    Instructions:  Take 1 tablet (325 mg total) by mouth once daily.     Begin Date    AM    Noon    PM    Bedtime       HYDROmorphone 4 MG tablet   Commonly known as:  DILAUDID   Quantity:  61 tablet   Refills:  0   Dose:  4 mg    Instructions:  Take 1 tablet (4 mg total) by mouth every 6 (six) hours as needed for Pain.     Begin Date    AM    Noon    PM    Bedtime         CONTINUE taking these medications        Additional Info                      ciprofloxacin HCl 750 MG tablet   Commonly known as:  CIPRO   Quantity:  60 tablet   Refills:  1   Dose:  750 mg   Indications:  Bone/Joint    Last time this was given:  750 mg on 3/11/2017  8:54 AM   Instructions:  Take 1 tablet (750 mg total) by mouth every 12 (twelve) hours.     Begin Date    AM    Noon    PM    Bedtime       dextrose 5 % SolP 250 mL with vancomycin 1,000 mg SolR 1,500 mg   Refills:  0   Dose:  1500 mg   Indications:  Bone/Joint    Last time this was given:  1,500 mg on 3/11/2017  1:34 AM    Instructions:  Inject 1,500 mg into the vein every 12 (twelve) hours.     Begin Date    AM    Noon    PM    Bedtime       fexofenadine 60 MG tablet   Commonly known as:  ALLEGRA   Refills:  0   Dose:  60 mg    Instructions:  Take 60 mg by mouth once daily.     Begin Date    AM    Noon    PM    Bedtime       GLUCOSAMINE-CHONDROITIN MAX -400 mg Cap   Refills:  0   Generic drug:  glucosamine-chondroit-vit C-Mn    Instructions:  Twice a day     Begin Date    AM    Noon    PM    Bedtime       losartan 100 MG tablet   Commonly known as:  COZAAR   Quantity:  90 tablet   Refills:  3   Dose:  100 mg    Last time this was given:  100 mg on 3/11/2017  8:55 AM   Instructions:  Take 1 tablet (100 mg total) by mouth once daily.     Begin Date    AM    Noon    PM    Bedtime       metoprolol succinate 50 MG 24 hr tablet   Commonly known as:  TOPROL-XL   Quantity:  90 tablet   Refills:  3   Dose:  50 mg    Last time this was given:  50 mg on 3/10/2017  8:44 PM   Instructions:  Take 1 tablet (50 mg total) by mouth once daily.     Begin Date    AM    Noon    PM    Bedtime       omeprazole 20 MG capsule   Commonly known as:  PRILOSEC   Quantity:  180 capsule   Refills:  1   Dose:  20 mg    Instructions:  Take 1 capsule (20 mg total) by mouth 2 (two) times daily.     Begin Date    AM    Noon    PM    Bedtime       * oxycodone-acetaminophen  mg per tablet   Commonly known as:  PERCOCET   Quantity:  60 tablet   Refills:  0   Dose:  1 tablet    Instructions:  Take 1 tablet by mouth every 6 (six) hours as needed for Pain.     Begin Date    AM    Noon    PM    Bedtime       * oxycodone-acetaminophen  mg per tablet   Commonly known as:  PERCOCET   Quantity:  61 tablet   Refills:  0   Dose:  1 tablet    Instructions:  Take 1 tablet by mouth every 6 (six) hours as needed for Pain.     Begin Date    AM    Noon    PM    Bedtime       potassium chloride SA 10 MEQ tablet   Commonly known as:  K-DUR,KLOR-CON   Quantity:  180  tablet   Refills:  4   Dose:  10 mEq    Last time this was given:  10 mEq on 3/11/2017  8:54 AM   Instructions:  Take 1 tablet (10 mEq total) by mouth 2 (two) times daily.     Begin Date    AM    Noon    PM    Bedtime       raloxifene 60 mg tablet   Commonly known as:  EVISTA   Quantity:  90 tablet   Refills:  3   Dose:  60 mg    Last time this was given:  60 mg on 3/11/2017  9:05 AM   Instructions:  Take 1 tablet (60 mg total) by mouth once daily.     Begin Date    AM    Noon    PM    Bedtime       simvastatin 40 MG tablet   Commonly known as:  ZOCOR   Quantity:  90 tablet   Refills:  3   Dose:  40 mg    Last time this was given:  40 mg on 3/10/2017  8:45 PM   Instructions:  Take 1 tablet (40 mg total) by mouth every evening.     Begin Date    AM    Noon    PM    Bedtime       vancomycin 10 gram Solr   Commonly known as:  VANCOCIN   Refills:  0    Last time this was given:  1,500 mg on 3/11/2017  1:34 AM     Begin Date    AM    Noon    PM    Bedtime       * Notice:  This list has 2 medication(s) that are the same as other medications prescribed for you. Read the directions carefully, and ask your doctor or other care provider to review them with you.      STOP taking these medications     celecoxib 200 MG capsule   Commonly known as:  CeleBREX       promethazine 25 MG tablet   Commonly known as:  PHENERGAN            Where to Get Your Medications      You can get these medications from any pharmacy     Bring a paper prescription for each of these medications     chlorzoxazone 500 mg Tab    docusate sodium 100 MG capsule    ferrous sulfate 325 (65 FE) MG EC tablet    HYDROmorphone 4 MG tablet    oxycodone-acetaminophen  mg per tablet                  Please bring to all follow up appointments:    1. A copy of your discharge instructions.  2. All medicines you are currently taking in their original bottles.  3. Identification and insurance card.    Please arrive 15 minutes ahead of scheduled appointment  time.    Please call 24 hours in advance if you must reschedule your appointment and/or time.        Your Scheduled Appointments     Mar 13, 2017 12:00 PM CDT   Post OP with Angie Mulligan MD   [facility])    [address]  [facility] [address]   [phone]            Mar 22, 2017  1:00 PM CDT   [facility]    [address]  [facility] [address]   [phone]            Apr 24, 2017  8:40 AM CDT   Established Patient Visit with Adry Damian MD   [facility]    [address] [place] [id number] [phone]              Follow-up Information     Follow up In 3 days.        Follow up In 3 days.        Follow up with [facility].    Specialty:  Home Health Services    Why:  Home Health    Contact information:    [address]  [address]  [facility] [address] [phone]          Follow up with [facility].    Specialty:  Dialysis Center    Why:  Infusion    Contact information:    [address] [place] [address] [phone]          Follow up with Angie Mulligan MD.    Specialties:  Sports Medicine, Orthopedic Surgery    Why:  APPOINTMENT:  March 13, 2017, Monday @ 12noon    Contact information:    [address] [place] [address]  [phone]          Follow up In 3 days.      Referrals     Future Orders    Ambulatory referral to Home Health         Discharge Instructions     Future Orders    Call MD for:  difficulty breathing or increased cough     Call MD for:  increased confusion or weakness     Call MD for:  persistent dizziness, light-headedness, or visual disturbances     Call MD for:  persistent nausea and vomiting or diarrhea     Call MD for:  redness, tenderness, or signs of infection (pain, swelling, redness, odor or green/yellow discharge around incision site)     Call MD for:  severe persistent  headache     Call MD for:  severe uncontrolled pain     Call MD for:  temperature >100.4     Call MD for:  worsening rash     Diet general     Questions:    Total calories:      Fat restriction, if any:      Protein restriction, if any:      Na restriction, if any:      Fluid restriction:      Additional restrictions:      Leave dressing on - Keep it clean, dry, and intact until clinic visit     Comments:    Will change Monday in clinic    Other restrictions (specify):     Comments:    WBAT with knee brace locked in extension, ok to bend to 60 at rest        Discharge Instructions            [address], [place]                                                                                          [phone]                   Postoperative Instructions for Knee Surgery                 Your Surgery Included:   Open               Arthroscopic   [] Ligament Repair       [] Diagnostic           [] ACL     [] PCL     [] MCL     [] PLLC      [] Synovectomy / Plica Removal [] Meniscal Cartilage Repair / Transplantation      [] Lysis of Adhesions / Manipulation [] Articular Cartilage Repair      [] Interval Release           [] Microfracture       [] OATS   [] ACI      [] Meniscectomy           [] Osteochondral Allograft      [] Meniscal Cartilage Repair  [] Patellar Realignment       [] Debridement / Chondroplasty         [] Lateral Release   [] Ligament Repair      [] Articular Cartilage Repair          [] Extensor Mechanism             []   Microfracture  []  OATS [] Tendon Repair          [] Ligament Reconstruction          [] Patella                  [] Quadriceps             []   ACL    []   PCL  [] High Tibial Osteotomy       [] PRP Arthrocentesis  [] Joint Replacement                    [] Unicompartmental   [] Patellofemoral                    [x] Total Knee I&D poly exchange                  Call our office ([phone]) immediately if you experience any of the following:        Excessive bleeding or pus like drainage at the incision site       Uncontrollable pain not relieved by pain medication       Excessive swelling or redness at the incision site       Fever above 101.5 degrees not controlled with Tylenol or Motrin       Shortness of Breath       Any foul odor or blistering from the surgery site    FOR EMERGENCIES: If any unusual problems or difficulties occur, call our office at [phone], or page the  at [phone] who will direct your call appropriately    1.   Pain Management: A cold therapy cuff, pain medications, local injections, and in some cases, regional anesthesia injections are used to manage your post-operative pain. The decision to use each of these options is based on their risks and benefits.     Medications: You were given one or more of the following medication prescriptions before leaving the hospital. Have the prescriptions filled at a pharmacy on your way home and follow the instructions on the bottles. If you need a refill, please call your pharmacy.      Narcotic Medication (usually Vicodin ES, Lortab, Percocet or Nucynta): Begin taking the medication before your knee starts to hurt. Some patients do not like to take any medication, but if you wait until your pain is severe before taking, you will be very uncomfortable for several hours waiting for the narcotic to work. Always take with food.     Nausea / Vomiting: For this issue, we prescribe Phenergan, use this medication as directed.     Cold Therapy: You may have been sent home with a [facility] cold therapy unit and wrap for your knee. Fill with ice and water to the indicated fill line and use throughout the day for the first two days and then as needed to help relieve pain and control swelling.      Regional Anesthesia Injections (Blocks): You may have been given a regional nerve block either before or after surgery. This may make your entire leg numb for 24-36 hours.                             * Proceed with caution when bearing weight on your leg.     2.   Diet: Eat a bland diet for the first day after surgery. Progress your diet as tolerated. Constipation may occur with Narcotic usage, contact our office if you are experiencing constipation.    3.   Activity: Limit your activity during the first 48 hours, keep your leg elevated with pillows under your heel. After the first 48 hours at home, increase your activity level based on your symptoms.    4.   Dressing Change: Remove the dressing on the 3rd day. It is normal for some blood to be seen on the dressings. It is also normal for you to see apparent bruising on the skin around your knee when you remove the dressing. If present, leave the steri-strip tape across the incisions. If you are concerned by the drainage or the appearance of your knee, please call one of the numbers listed below.    5.   Showering: You may shower on the 10th day after surgery if the wound is dry and clean, but do not let the wound soak in water until sutures are removed. Do not submerge in any water until after your postoperative appointment in clinic.    6.   Knee Brace: You may have been sent home in a hinged knee brace. Your brace is set at 0 to 60 degrees of motion. Wear the brace at all times, will be adjusted in clinic, keep LOCKED in full extension at all times with ambulation but can unlock at rest, you will need to wear this brace at all time unless instructed otherwise.     7.   No CPM use until instructed in clinic    8.   Weight Bearing: You may have been sent home with crutches. If instructed (see below), use these crutches at all times unless at complete rest.      [] Non-weight bearing for     0 weeks (you may touch your toes to the floor)      [] Partial weight bearing for  0 weeks    [] 25% Body Weight   [] 50% Body Weight      [x] Full weight bearing            [x]  NOW    []  after 0 weeks     9.  Knee Exercises: Begin these exercises the first day  "after surgery in order to help you regain your motion and strength. You may do the following marked exercises:     [x] Quad Sets - Begin activating your quadriceps muscle by driving your          knee downward into full knee extension while seated on a table or bed   with a towel rolled and propped under your heel     [x] Straight Leg Raise (SLR) - While eulalio your quadriceps muscle, lift     your fully extended leg to the level of your non-operative knee (as shown)     [x] Heel Slides - With the knee straight, slide your heel slowly toward your   buttocks, hold at the endpoint for 10-15 seconds, then slowly straighten     [x] Ankle pumps - With your knee straight, move your ankle in a "pumping"    fashion to activate your calf and leg muscles      10.  Physical Therapy: Physical therapy is an essential component to your recovery from surgery. Your physical therapy will start after follow up in clinic    FIRST POSTOPERATIVE VISIT: As scheduled.   Follow-up Monday with Dr. Mulligan for Wound Vac assessment and JF drainage output assessment.  Have patient record JF output.           Primary Diagnosis     Your primary diagnosis was:  Painful Orthopaedic Hardware      Admission Information     Date & Time Provider Department CSN    3/9/2017  6:24 AM Angie Mulligan MD [facility] [id number]      Care Providers     Provider Role Specialty Primary office phone    Angie Mulligan MD Attending Provider Sports Medicine [phone]    Angie Mulligan MD Surgeon  Sports Medicine [phone]      Your Vitals Were     BP Pulse Temp Resp Height Weight    128/61 (BP Location: Left arm, Patient Position: Lying, BP Method: Automatic) 80 98.4 °F (36.9 °C) (Oral) 18 5' (1.524 m) 68.3 kg (150 lb 9.2 oz)    Last Period SpO2 BMI          04/03/2012 96% 29.41 kg/m2        Recent Lab Values     No lab values to display.      Pending Labs     Order Current Status    Culture, Anaerobic In process    Culture, Anaerobic " In process    Prepare RBC 2 Units; ANEMIA CRITICAL LEVEL In process    Aerobic culture Preliminary result    Aerobic culture Preliminary result    Prepare RBC 2 Units; Critical lab values H&H Preliminary result      Allergies as of 3/11/2017        Reactions    Ace Inhibitors     Other reaction(s): cough      Advance Directives     An advance directive is a document which, in the event you are no longer able to make decisions for yourself, tells your healthcare team what kind of treatment you do or do not want to receive, or who you would like to make those decisions for you.  If you do not currently have an advance directive, [facility] encourages you to create one.  For more information call:  [phone]), [phone] ([phone]),  or log on to [email or web address].        Language Assistance Services     ATTENTION: Language assistance services are available, free of charge. Please call [phone].      ATENCIÓN: Si habla español, tiene a priest disposición servicios gratuitos de asistencia lingüística. Llame al [phone].     [facility] Ý: N?u b?n nói Ti?ng Vi?t, có các d?ch v? h? tr? ngôn ng? mi?n phí dành cho b?n. G?i s? [phone].        Blood Transfusion Reaction Signs and Symptoms     The blood you have received has been matched for you as carefully as possible. Most patients who receive a blood transfusion do not experience problems. However, there can be a delayed reaction that happens a few weeks after your blood transfusion. Contact your physician immediately if you experience any NEW SYMPTOMS listed below:     Fever greater than 100.4 degrees    Chills   Yellow color to your skin or eyes(Jaundice)   Back pain, chest pain, or pain at the infusion site   Weakness (more than usual)   Discomfort or uneasiness more than usual (Malaise)   Nausea or vomiting   Shortness of breath, wheezing, or coughing   Higher or lower blood pressure than normal   Skin rash, itching, skin  redness, or localized swelling (example: hands or feet)   Urinating less than normal   Urine appears reddish or orange and is darker than normal      Remember that some these signs may already exist for you--such as having chronic back pain or high blood pressure. You only need to look for and report to your doctor any new occurrences since your blood transfusion that are of concern.         [facility] complies with applicable Federal civil rights laws and does not discriminate on the basis of race, color, national origin, age, disability, or sex.

## 2017-03-09 NOTE — PLAN OF CARE
Patient prefers to have sister Nieves Park present for discharge teaching. Please contact them @[phone].
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
## 2017-03-10 LAB
ANION GAP SERPL CALC-SCNC: 5 MMOL/L
BUN SERPL-MCNC: 6 MG/DL
CALCIUM SERPL-MCNC: 7.8 MG/DL
CHLORIDE SERPL-SCNC: 106 MMOL/L
CO2 SERPL-SCNC: 26 MMOL/L
CREAT SERPL-MCNC: 0.7 MG/DL
EST. GFR  (AFRICAN AMERICAN): >60 ML/MIN/1.73 M^2
EST. GFR  (NON AFRICAN AMERICAN): >60 ML/MIN/1.73 M^2
GLUCOSE SERPL-MCNC: 98 MG/DL
HCT VFR BLD AUTO: 24.3 %
HGB BLD-MCNC: 7.8 G/DL
POTASSIUM SERPL-SCNC: 3.6 MMOL/L
SODIUM SERPL-SCNC: 137 MMOL/L

## 2017-03-10 PROCEDURE — 97162 PT EVAL MOD COMPLEX 30 MIN: CPT

## 2017-03-10 PROCEDURE — 97165 OT EVAL LOW COMPLEX 30 MIN: CPT

## 2017-03-10 PROCEDURE — 25000003 PHARM REV CODE 250: Performed by: ORTHOPAEDIC SURGERY

## 2017-03-10 PROCEDURE — 97116 GAIT TRAINING THERAPY: CPT

## 2017-03-10 PROCEDURE — 85018 HEMOGLOBIN: CPT

## 2017-03-10 PROCEDURE — 80048 BASIC METABOLIC PNL TOTAL CA: CPT

## 2017-03-10 PROCEDURE — 97110 THERAPEUTIC EXERCISES: CPT

## 2017-03-10 PROCEDURE — 97530 THERAPEUTIC ACTIVITIES: CPT

## 2017-03-10 PROCEDURE — 85014 HEMATOCRIT: CPT

## 2017-03-10 PROCEDURE — 63600175 PHARM REV CODE 636 W HCPCS: Performed by: ORTHOPAEDIC SURGERY

## 2017-03-10 PROCEDURE — 11000001 HC ACUTE MED/SURG PRIVATE ROOM

## 2017-03-10 RX ORDER — CHLORZOXAZONE 500 MG/1
500 TABLET ORAL 3 TIMES DAILY PRN
Qty: 30 TABLET | Refills: 0 | Status: SHIPPED | OUTPATIENT
Start: 2017-03-10 | End: 2017-03-20

## 2017-03-10 RX ORDER — DOCUSATE SODIUM 100 MG/1
100 CAPSULE, LIQUID FILLED ORAL 2 TIMES DAILY PRN
Qty: 21 CAPSULE | Refills: 1 | Status: SHIPPED | OUTPATIENT
Start: 2017-03-10 | End: 2017-03-20

## 2017-03-10 RX ORDER — OXYCODONE AND ACETAMINOPHEN 10; 325 MG/1; MG/1
1 TABLET ORAL EVERY 6 HOURS PRN
Qty: 61 TABLET | Refills: 0 | Status: SHIPPED | OUTPATIENT
Start: 2017-03-10 | End: 2017-04-24

## 2017-03-10 RX ORDER — FERROUS SULFATE 325(65) MG
325 TABLET, DELAYED RELEASE (ENTERIC COATED) ORAL DAILY
Qty: 30 TABLET | Refills: 0 | Status: SHIPPED | OUTPATIENT
Start: 2017-03-10 | End: 2017-04-24

## 2017-03-10 RX ORDER — HYDROMORPHONE HYDROCHLORIDE 4 MG/1
4 TABLET ORAL EVERY 6 HOURS PRN
Qty: 61 TABLET | Refills: 0 | Status: SHIPPED | OUTPATIENT
Start: 2017-03-10 | End: 2017-03-20

## 2017-03-10 RX ADMIN — OXYCODONE HYDROCHLORIDE 10 MG: 5 TABLET ORAL at 04:03

## 2017-03-10 RX ADMIN — CIPROFLOXACIN HYDROCHLORIDE 750 MG: 250 TABLET, FILM COATED ORAL at 08:03

## 2017-03-10 RX ADMIN — CELECOXIB 200 MG: 200 CAPSULE ORAL at 08:03

## 2017-03-10 RX ADMIN — CHLORZOXAZONE 500 MG: 500 TABLET ORAL at 02:03

## 2017-03-10 RX ADMIN — DOCUSATE SODIUM 100 MG: 100 CAPSULE, LIQUID FILLED ORAL at 08:03

## 2017-03-10 RX ADMIN — ACETAMINOPHEN 1000 MG: 10 INJECTION, SOLUTION INTRAVENOUS at 04:03

## 2017-03-10 RX ADMIN — VANCOMYCIN HYDROCHLORIDE 1500 MG: 1 INJECTION, POWDER, LYOPHILIZED, FOR SOLUTION INTRAVENOUS at 12:03

## 2017-03-10 RX ADMIN — OXYCODONE HYDROCHLORIDE 15 MG: 5 TABLET ORAL at 01:03

## 2017-03-10 RX ADMIN — POTASSIUM CHLORIDE 10 MEQ: 750 TABLET, EXTENDED RELEASE ORAL at 09:03

## 2017-03-10 RX ADMIN — PANTOPRAZOLE SODIUM 40 MG: 40 TABLET, DELAYED RELEASE ORAL at 09:03

## 2017-03-10 RX ADMIN — SODIUM CHLORIDE: 0.9 INJECTION, SOLUTION INTRAVENOUS at 01:03

## 2017-03-10 RX ADMIN — OXYCODONE HYDROCHLORIDE 10 MG: 10 TABLET, FILM COATED, EXTENDED RELEASE ORAL at 09:03

## 2017-03-10 RX ADMIN — CHLORZOXAZONE 500 MG: 500 TABLET ORAL at 01:03

## 2017-03-10 RX ADMIN — SIMVASTATIN 40 MG: 40 TABLET, FILM COATED ORAL at 08:03

## 2017-03-10 RX ADMIN — VANCOMYCIN HYDROCHLORIDE 1500 MG: 1 INJECTION, POWDER, LYOPHILIZED, FOR SOLUTION INTRAVENOUS at 01:03

## 2017-03-10 RX ADMIN — POTASSIUM CHLORIDE 10 MEQ: 750 TABLET, EXTENDED RELEASE ORAL at 08:03

## 2017-03-10 RX ADMIN — RALOXIFENE HYDROCHLORIDE 60 MG: 60 TABLET, FILM COATED ORAL at 09:03

## 2017-03-10 RX ADMIN — CHLORZOXAZONE 500 MG: 500 TABLET ORAL at 10:03

## 2017-03-10 RX ADMIN — CETIRIZINE HYDROCHLORIDE 10 MG: 10 TABLET, FILM COATED ORAL at 09:03

## 2017-03-10 RX ADMIN — CELECOXIB 200 MG: 200 CAPSULE ORAL at 09:03

## 2017-03-10 RX ADMIN — OXYCODONE HYDROCHLORIDE 10 MG: 5 TABLET ORAL at 12:03

## 2017-03-10 RX ADMIN — CIPROFLOXACIN HYDROCHLORIDE 750 MG: 250 TABLET, FILM COATED ORAL at 09:03

## 2017-03-10 RX ADMIN — OXYCODONE HYDROCHLORIDE 10 MG: 10 TABLET, FILM COATED, EXTENDED RELEASE ORAL at 08:03

## 2017-03-10 RX ADMIN — LOSARTAN POTASSIUM 100 MG: 50 TABLET, FILM COATED ORAL at 09:03

## 2017-03-10 RX ADMIN — ACETAMINOPHEN 1000 MG: 10 INJECTION, SOLUTION INTRAVENOUS at 11:03

## 2017-03-10 RX ADMIN — OXYCODONE HYDROCHLORIDE 15 MG: 5 TABLET ORAL at 05:03

## 2017-03-10 RX ADMIN — METOPROLOL SUCCINATE 50 MG: 50 TABLET, EXTENDED RELEASE ORAL at 08:03

## 2017-03-10 NOTE — PT/OT/SLP EVAL
Occupational Therapy  Evaluation    Ella Canales   MRN: [id number]   Admitting Diagnosis: Painful orthopaedic hardware    OT Date of Treatment: 03/10/17   OT Start Time: 1050  OT Stop Time: 1105  OT Total Time (min): 15 min    Billable Minutes:  Evaluation 15    Diagnosis: Painful orthopaedic hardware   S/p L TKA revision    Past Medical History:   Diagnosis Date    Arthritis     bilateral knees    GERD (gastroesophageal reflux disease)     Hyperlipidemia     Hypertension     Osteopenia     S/P PICC central line placement     Ulcer       Past Surgical History:   Procedure Laterality Date    ECTOPIC PREGNANCY SURGERY      fess      FRACTURE SURGERY      ORIF right arm.    TOTAL KNEE ARTHROPLASTY Left     TUBAL LIGATION         Referring physician: TONE Mulligna   Date referred to OT: 3/10/2017    General Precautions: Standard, fall  Orthopedic Precautions: LLE weight bearing as tolerated  Braces: Hinged knee brace    Do you have any cultural, spiritual, Yarsani conflicts, given your current situation?: None Specified     Patient History:  Living Environment  Lives With: alone  Living Arrangements: house  Transportation Available: family or friend will provide  Living Environment Comment: Pt reports she lives alone in a house but will be discharging to her son and daughter in laws 1 story house with 0 YEFRI and a walk in shower. She will have supervision and assist there. Pt reports she has been using RW and BSC at home.   Equipment Currently Used at Home: 3-in-1 commode, walker, rolling    Prior level of function:   Bed Mobility/Transfers: needs device (RW)  Grooming: independent  Bathing: independent  Upper Body Dressing: independent  Lower Body Dressing: independent  Toileting: needs device (BSC)  Home Management Skills: other (see comments) (does not perform)  Mode of Transportation: Car     Dominant hand: right    Subjective:  Communicated with nursing prior to session.  Pt found on bedside commode  with nursing and wanted to get back to bed. Pt expressed confusion with PT follow through.   Chief Complaint: Pain  Patient/Family stated goals: Returning home    Pain Ratin10  Location - Side: Left  Location - Orientation: generalized  Location: knee  Pain Addressed: Reposition, Distraction  Pain Rating Post-Intervention: 8/10    Objective:  Patient found with: peripheral IV    Cognitive Exam:  Oriented to: Person, Place, Time and Situation  Follows Commands/attention: Follows multistep  commands  Communication: clear/fluent  Memory:  No Deficits noted  Safety awareness/insight to disability: impaired  Coping skills/emotional control: Appropriate to situation    Visual/perceptual:  Intact    Physical Exam:  Postural examination/scapula alignment: No postural abnormalities identified  Skin integrity: Visible skin intact  Edema: None noted     Sensation:   Intact    Upper Extremity Range of Motion:  Right Upper Extremity: WNL  Left Upper Extremity: WNL    Upper Extremity Strength:  Right Upper Extremity: WNL  Left Upper Extremity: WNL   Strength: WNL    Fine motor coordination:   Intact    Gross motor coordination: WFL    Functional Mobility:  Bed Mobility:  Sit to Supine: Stand by Assistance (pt required multiple verbal cues to follow commands)    Transfers:  Sit <> Stand Assistance: Stand By Assistance  Sit <> Stand Assistive Device: Rolling Walker  Toilet Transfer Assistance: Stand By Assistance  Toilet Transfer Assistive Device: Rolling Walker, Bedside Commode, Grab Bars    Functional Ambulation: Pt ambulated around hospital room with RW with SBA, able to self-correct minor LOB    Activities of Daily Living:     Grooming Position: Standing at sink (brush teeth)  Grooming Level of Assistance: Stand by assistance  Toileting Where Assessed: Bedside commode  Toileting Level of Assistance: Stand by assistance       Balance:   Static Sit: GOOD: Takes MODERATE challenges from all directions  Dynamic Sit: GOOD:  "Maintains balance through MODERATE excursions of active trunk movement  Static Stand: FAIR: Maintains without assist but unable to take challenges  Dynamic stand: FAIR: Needs CONTACT GUARD during gait      AM-PAC 6 CLICK ADL  How much help from another person does this patient currently need?  1 = Unable, Total/Dependent Assistance  2 = A lot, Maximum/Moderate Assistance  3 = A little, Minimum/Contact Guard/Supervision  4 = None, Modified [place]/Independent    Putting on and taking off regular lower body clothing? : 3  Bathing (including washing, rinsing, drying)?: 3  Toileting, which includes using toilet, bedpan, or urinal? : 3  Putting on and taking off regular upper body clothing?: 3  Taking care of personal grooming such as brushing teeth?: 4  Eating meals?: 4  Total Score: 20    AM-PAC Raw Score CMS "G-Code Modifier Level of Impairment Assistance   6 % Total / Unable   7 - 9 CM 80 - 100% Maximal Assist   10 - 14 CL 60 - 80% Moderate Assist   15 - 19 CK 40 - 60% Moderate Assist   20 - 22 CJ 20 - 40% Minimal Assist   23 CI 1-20% SBA / CGA   24 CH 0% Independent/ Mod I       Patient left supine with all lines intact, call button in reach and bed alarm on    Assessment:  Ella Canales is a 66 y.o. female with a medical diagnosis of Painful orthopaedic hardware and presents with weakness, impaired functional mobility, gait instability, impaired balance, decreased LE functioning, decreased safety awareness, pain, decreased ROM.    Rehab identified problem list/impairments: Rehab identified problem list/impairments: weakness, impaired functional mobilty, gait instability, impaired balance, decreased lower extremity function, decreased safety awareness, pain, decreased ROM    Rehab potential is good.    Activity tolerance: Fair    Discharge recommendations: Discharge Facility/Level Of Care Needs: home health OT, home health PT     Barriers to discharge: Barriers to Discharge: None    Equipment " recommendations: shower chair     GOALS:   Occupational Therapy Goals        Problem: Occupational Therapy Goal    Goal Priority Disciplines Outcome Interventions   Occupational Therapy Goal     OT, PT/OT Ongoing (interventions implemented as appropriate)    Description:  Goals to be met by 4/14/17:    Assess UB Dressing  Assess LB Dressing  Grooming standing at sink with Modified Independent  Sit<>Supine with HOB flat              PLAN:  Patient to be seen daily to address the above listed problems via self-care/home management, therapeutic activities, therapeutic exercises  Plan of Care expires: 04/14/17  Plan of Care reviewed with: patient         LAUREN Blanton  03/10/2017

## 2017-03-10 NOTE — PLAN OF CARE
Problem: Patient Care Overview  Goal: Plan of Care Review  Outcome: Ongoing (interventions implemented as appropriate)  Pt arrived to floor via bed with JEFF Giraldo. IVF started, SCDs applied, oriented to room, call light placed within reach, bed low and locked, and family at bedside. VSS on 2 L NC. Pt complains of pain 9/10. Hale noted draining clear yellow urine to gravity. Dressing to L knee, CDI. Immobilizer on. Wound vac and JF drain noted to Left knee. No acute distress noted at this time. Will continue to monitor.      Blood transfusion going on, Pt tolerating well.

## 2017-03-10 NOTE — PROGRESS NOTES
TRISTIN f/u with Flora at [facility] [phone] has all necessary medical records and actually delivered pt medication on yesterday and Dorys will give pt a call.

## 2017-03-10 NOTE — PLAN OF CARE
03/10/17 1456   Readmission Questionnaire   At the time of your discharge, did someone talk to you about what your health problems were? Yes   At the time of discharge, did someone talk to you about what to watch out for regarding worsening of your health problem? Yes   At the time of discharge, did someone talk to you about what to do if you experienced worsening of your health problem? Yes   At the time of discharge, did someone talk to you about which medication to take when you left the hospital and which ones to stop taking? Yes   At the time of discharge, did someone talk to you about when and where to follow up with a doctor after you left the hospital? Yes   What do you believe caused you to be sick enough to be re-admitted? (knee pain)   How often do you need to have someone help you when you read instructions, pamphlets, or other written material from your doctor or pharmacy? Never   Do you have problems taking your medications as prescribed? No   Do you have any problems affording any of  your prescribed medications? No   Do you have problems obtaining/receiving your medications? No   Does the patient have transportation to healthcare appointments? Yes   Lives With alone   Living Arrangements house   Does the patient have family/friends to help with healtcare needs after discharge? yes   Who are your caregiver(s) and their phone number(s)? (Son, Clarence, [phone])   Does your caregiver provide all the help you need? Yes   Are you currently feeling confused? No   Are you currently having problems thinking? No   Are you currently having memory problems? No   Have you felt down, depressed, or hopeless? 1   In the last 7 days, my sleep quality was: fair

## 2017-03-10 NOTE — PLAN OF CARE
"Problem: Physical Therapy Goal  Goal: Physical Therapy Goal  Goals to be met by: 3/31/17     Patient will increase functional independence with mobility by performin. Supine to sit with modified independence.   2. Sit to supine with modified independence.   3. Sit<>stand transfer with modified independence using rolling walker.   4. Gait x 150 feet with modified independence using rolling walker.   5. Ascend/descend 4" curb step with RW SBA  Outcome: Ongoing (interventions implemented as appropriate)  PT evaluation completed. Please see progress note for details, POC, and recommendations.       "

## 2017-03-10 NOTE — PROGRESS NOTES
Orthopedic Postop Progress Note    Postop day: 1    ID: The patient is a 66 y.o. female status post: I&D knee    Overnight Events: nurse reports no acute event overnight.  Alexia bauer this AM    Vitals:    03/10/17 0743   BP: 127/65   Pulse: 78   Resp: (!) 97   Temp: 98.4 °F (36.9 °C)       Drain Output  03/09 0701 - 03/10 0700  In: 6863   Out: 3485 [Drains:85]    Physical Exam:  NAD, A/O x 3.  Wound c/d/i with clean dressing.  No focal motor or sensory deficits noted.    Assessment: The patient is a 66 y.o. female status post: above doing well    Plan:  1) Antibiotics: IV vancomycin and cipro  2)  Labs: Am labs reviewed, acceptable Hb, aymptomatic, Cr WNL  4) DVT Prophylaxis: mechanical  5) Lines/Drains: PICC  6) Dispo: home tomorrow with AM home PT, home Abx x 5 weeks, f/u in clinic on Monday for drain removal

## 2017-03-10 NOTE — DISCHARGE INSTRUCTIONS
[address], [place]                                                                                          [phone]                   Postoperative Instructions for Knee Surgery                 Your Surgery Included:   Open               Arthroscopic   [] Ligament Repair       [] Diagnostic           [] ACL     [] PCL     [] MCL     [] PLLC      [] Synovectomy / Plica Removal [] Meniscal Cartilage Repair / Transplantation      [] Lysis of Adhesions / Manipulation [] Articular Cartilage Repair      [] Interval Release           [] Microfracture       [] OATS   [] ACI      [] Meniscectomy           [] Osteochondral Allograft      [] Meniscal Cartilage Repair  [] Patellar Realignment       [] Debridement / Chondroplasty         [] Lateral Release   [] Ligament Repair      [] Articular Cartilage Repair          [] Extensor Mechanism             []   Microfracture  []  OATS [] Tendon Repair          [] Ligament Reconstruction          [] Patella                  [] Quadriceps             []   ACL    []   PCL  [] High Tibial Osteotomy       [] PRP Arthrocentesis  [] Joint Replacement                    [] Unicompartmental   [] Patellofemoral                    [x] Total Knee I&D poly exchange                  Call our office ([phone]) immediately if you experience any of the following:       Excessive bleeding or pus like drainage at the incision site       Uncontrollable pain not relieved by pain medication       Excessive swelling or redness at the incision site       Fever above 101.5 degrees not controlled with Tylenol or Motrin       Shortness of Breath       Any foul odor or blistering from the surgery site    FOR EMERGENCIES: If any unusual problems or difficulties occur, call our office at [phone], or page the  at [phone] who will direct your call appropriately    1.   Pain Management: A cold therapy cuff, pain medications, local injections,  and in some cases, regional anesthesia injections are used to manage your post-operative pain. The decision to use each of these options is based on their risks and benefits.     Medications: You were given one or more of the following medication prescriptions before leaving the hospital. Have the prescriptions filled at a pharmacy on your way home and follow the instructions on the bottles. If you need a refill, please call your pharmacy.      Narcotic Medication (usually Vicodin ES, Lortab, Percocet or Nucynta): Begin taking the medication before your knee starts to hurt. Some patients do not like to take any medication, but if you wait until your pain is severe before taking, you will be very uncomfortable for several hours waiting for the narcotic to work. Always take with food.     Nausea / Vomiting: For this issue, we prescribe Phenergan, use this medication as directed.     Cold Therapy: You may have been sent home with a [employer]® cold therapy unit and wrap for your knee. Fill with ice and water to the indicated fill line and use throughout the day for the first two days and then as needed to help relieve pain and control swelling.      Regional Anesthesia Injections (Blocks): You may have been given a regional nerve block either before or after surgery. This may make your entire leg numb for 24-36 hours.                            * Proceed with caution when bearing weight on your leg.     2.   Diet: Eat a bland diet for the first day after surgery. Progress your diet as tolerated. Constipation may occur with Narcotic usage, contact our office if you are experiencing constipation.    3.   Activity: Limit your activity during the first 48 hours, keep your leg elevated with pillows under your heel. After the first 48 hours at home, increase your activity level based on your symptoms.    4.   Dressing Change: Remove the dressing on the 3rd day. It is normal for some blood to be seen on the dressings. It is  also normal for you to see apparent bruising on the skin around your knee when you remove the dressing. If present, leave the steri-strip tape across the incisions. If you are concerned by the drainage or the appearance of your knee, please call one of the numbers listed below.    5.   Showering: You may shower on the 10th day after surgery if the wound is dry and clean, but do not let the wound soak in water until sutures are removed. Do not submerge in any water until after your postoperative appointment in clinic.    6.   Knee Brace: You may have been sent home in a hinged knee brace. Your brace is set at 0 to 60 degrees of motion. Wear the brace at all times, will be adjusted in clinic, keep LOCKED in full extension at all times with ambulation but can unlock at rest, you will need to wear this brace at all time unless instructed otherwise.     7.   No CPM use until instructed in clinic    8.   Weight Bearing: You may have been sent home with crutches. If instructed (see below), use these crutches at all times unless at complete rest.      [] Non-weight bearing for     0 weeks (you may touch your toes to the floor)      [] Partial weight bearing for  0 weeks    [] 25% Body Weight   [] 50% Body Weight      [x] Full weight bearing            [x]  NOW    []  after 0 weeks     9.  Knee Exercises: Begin these exercises the first day after surgery in order to help you regain your motion and strength. You may do the following marked exercises:     [x] Quad Sets - Begin activating your quadriceps muscle by driving your          knee downward into full knee extension while seated on a table or bed   with a towel rolled and propped under your heel     [x] Straight Leg Raise (SLR) - While eulalio your quadriceps muscle, lift     your fully extended leg to the level of your non-operative knee (as shown)     [x] Heel Slides - With the knee straight, slide your heel slowly toward your   buttocks, hold at the endpoint for  "10-15 seconds, then slowly straighten     [x] Ankle pumps - With your knee straight, move your ankle in a "pumping"    fashion to activate your calf and leg muscles      10.  Physical Therapy: Physical therapy is an essential component to your recovery from surgery. Your physical therapy will start after follow up in clinic    FIRST POSTOPERATIVE VISIT: As scheduled.   Follow-up Monday with Dr. Mulligan for Wound Vac assessment and JF drainage output assessment.  Have patient record JF output.       "

## 2017-03-10 NOTE — PT/OT/SLP PROGRESS
Physical Therapy  Treatment    Ella Canales   MRN: [id number]   Admitting Diagnosis: Painful orthopaedic hardware    PT Received On: 03/10/17  PT Start Time: 1335     PT Stop Time: 1406    PT Total Time (min): 31 min       Billable Minutes:  Gait [id number], Therapeutic Activity 10 and Therapeutic Exercise 11    Treatment Type: Treatment  PT/PTA: PT           General Precautions: Standard, fall  Orthopedic Precautions: LLE weight bearing as tolerated   Braces: Hinged knee brace (Locked in extension during gait; unlocked at rest)    Do you have any cultural, spiritual, Congregation conflicts, given your current situation?: None specified     Subjective:  Communicated with RN prior to session.    Pain Ratin/10  Location - Side: Left  Location - Orientation: generalized  Location: knee  Pain Addressed: Nurse notified, Pre-medicate for activity, Distraction, Reposition  Pain Rating Post-Intervention: 6/10    Objective:   Patient found with: peripheral IV    Functional Mobility:  Bed Mobility:   Supine to Sit: Stand by Assistance (with HOB flat; pt required increased time to perform and intermittent verbal cues for technique)  Sit to Supine: Stand by Assistance (with HOB flat; pt required intermittent verbal cues for technique)    Transfers:  Sit <> Stand Assistance: Supervision (x 1 from EOB; x 1 from BSC)  Sit <> Stand Assistive Device: Rolling Walker  Bed <> Chair Technique: Stand Pivot (from EOB <> BSC )  Bed <> Chair Assistance: Contact Guard Assistance  Bed <> Chair Assistive Device: Rolling Walker    Gait:   Gait Distance: 2 x 10'; pt required intermittent verbal cues for walker management  Assistance 1: Stand by Assistance  Gait Assistive Device: Rolling walker  Gait Pattern: reciprocal  Gait Deviation(s): decreased joaquín, increased time in double stance, decreased velocity of limb motion, decreased step length, decreased weight-shifting ability, decreased stride length    Balance:   Static Sit: GOOD: Takes  MODERATE challenges from all directions  Dynamic Sit: GOOD: Maintains balance through MODERATE excursions of active trunk movement  Static Stand: GOOD: Takes MODERATE challenges from all directions  Dynamic stand: GOOD: Needs SUPERVISION only during gait and able to self right with moderate      Therapeutic Activities and Exercises:  Pt performed 1 set of 10 reps of LLE  1. Glut sets  2. Quad sets  3. Ankle pumps  4. Hip abd/add  5. SLR  6. Knee flexion (heel slides)  Pt required verbal cues, tactile cues and visual cues for technique in order to complete.      AM-PAC 6 CLICK MOBILITY  How much help from another person does this patient currently need?   1 = Unable, Total/Dependent Assistance  2 = A lot, Maximum/Moderate Assistance  3 = A little, Minimum/Contact Guard/Supervision  4 = None, Modified [place]/Independent    Turning over in bed (including adjusting bedclothes, sheets and blankets)?: 4  Sitting down on and standing up from a chair with arms (e.g., wheelchair, bedside commode, etc.): 3  Moving from lying on back to sitting on the side of the bed?: 3  Moving to and from a bed to a chair (including a wheelchair)?: 3  Need to walk in hospital room?: 3  Climbing 3-5 steps with a railing?: 2  Total Score: 18    AM-PAC Raw Score CMS G-Code Modifier Level of Impairment Assistance   6 % Total / Unable   7 - 9 CM 80 - 100% Maximal Assist   10 - 14 CL 60 - 80% Moderate Assist   15 - 19 CK 40 - 60% Moderate Assist   20 - 22 CJ 20 - 40% Minimal Assist   23 CI 1-20% SBA / CGA   24 CH 0% Independent/ Mod I     Patient left supine with all lines intact, call button in reach, bed alarm on, RN notified and family present.    Assessment:  Pt presents with increased pain this session. Pt tolerated therapeutic exercise and shorter gait distance to assist with pain management at this time. Pt would benefit from continued skilled PT to maximize independence and safety with functional mobility.    Rehab identified  "problem list/impairments: Rehab identified problem list/impairments: weakness, impaired functional mobilty, impaired self care skills, pain, orthopedic precautions, impaired skin, decreased safety awareness, decreased lower extremity function, impaired balance, gait instability, decreased ROM    Rehab potential is good.    Activity tolerance: Good    Discharge recommendations: Discharge Facility/Level Of Care Needs: home with home health, home health PT, home health OT     Barriers to discharge: Barriers to Discharge: None    Equipment recommendations: Equipment Needed After Discharge: none     GOALS:   Physical Therapy Goals        Problem: Physical Therapy Goal    Goal Priority Disciplines Outcome Goal Variances Interventions   Physical Therapy Goal     PT/OT, PT Ongoing (interventions implemented as appropriate)     Description:  Goals to be met by: 3/31/17     Patient will increase functional independence with mobility by performin. Supine to sit with modified independence.   2. Sit to supine with modified independence.   3. Sit<>stand transfer with modified independence using rolling walker.   4. Gait x 150 feet with modified independence using rolling walker.   5. Ascend/descend 4" curb step with RW SBA            PLAN:    Patient to be seen daily  to address the above listed problems via gait training, therapeutic activities, therapeutic exercises, neuromuscular re-education  Plan of Care expires: 17  Plan of Care reviewed with: patient, family    Carli Burroughs, PT  03/10/2017  "

## 2017-03-10 NOTE — PLAN OF CARE
Problem: Occupational Therapy Goal  Goal: Occupational Therapy Goal  Goals to be met by 4/14/17:    Assess UB Dressing  Assess LB Dressing  Grooming standing at sink with Modified Independent  Sit<>Supine with HOB flat  Outcome: Ongoing (interventions implemented as appropriate)  OT evaluation completed. Pt would benefit from continuing OT services to improve independence safety  in self-care tasks and functional mobility. Discharge recommendation is home with home health OT and PT.

## 2017-03-10 NOTE — PROGRESS NOTES
Pt f/u appt with Dr. Angie Mulligan is 3/13/17, Monday @ 12noon.  SW placed appt date and time on AVS.

## 2017-03-10 NOTE — OP NOTE
DATE OF PROCEDURE:  03/09/2017    ATTENDING SURGEON:  Angie Mulligan M.D.    FIRST ASSISTANT:  Paolo Abdi M.D. - Fellow.    SECOND ASSISTANT:  SMA Prince - Assistant.    PREOPERATIVE DIAGNOSIS:  Left knee sepsis.    POSTOPERATIVE DIAGNOSES:  1.  Left knee hemarthrosis.  2.  Left knee sepsis.  3.  Left knee synovitis.  4.  Left knee medial retinacular tear.  5.  Left knee medial collateral ligament insufficiency.    PROCEDURES PERFORMED:  1.  Left knee complex polyethylene liner exchange.  2.  Left knee complex incision and drainage.  3.  Left knee medial collateral ligament repair.  4.  Left knee medial retinacular repair with application of a flexed HD graft.  5.  Left knee complex open synovectomy with hemostasis control.  6.  Wound VAC application.    Complexity of the case was increased based on the revision nature of procedure,   change in overall anatomy secondary to multiple sites of bleeding from previous   use of Xarelto.  In addition, there was significant loss of control medially and   significant intraarticular adhesions and scarring secondary to the previous   operative interventions as well as need for previous incision and drainage by   outside physicians.  In addition, there was a need to modify the liners to   further stabilize the knee protecting the medial collateral ligament, which had   been damaged.    ANESTHESIA:  General with LMA.    FLUIDS IN THE CASE:  1 L.    URINE OUTPUT:  None.    ESTIMATED BLOOD LOSS:  200 mL.    COMPLICATIONS:  None.    CONDITION ON RETURN TO RECOVERY ROOM:  Stable.    IMPLANTS UTILIZED:  ConforMIS iTotal CR iPoly 6 mm medial insert left, ConforMIS   iTotal CR iPoly with lateral E insert (left), Musculoskeletal Transplant   Foundation FlexHD structure with 6 x 8 cm graft, Mitek Healix advanced BR 3   suture anchor with Orthocord suture diameter 4.5 mm.    INDICATIONS FOR PROCEDURE:  Ella Canales is a 66-year-old female who is status   post previous left  knee arthroplasty procedure.  She had a total knee   replacement performed, could not use postoperative aspirin per standard protocol   to prevent deep venous thrombosis.  As a result, the patient was started on   Xarelto postoperatively.  She was doing well immediately after surgery, but then   began to develop significant ecchymosis throughout the leg, swelling of the leg   and developed an infection with associated hemarthrosis of the knee.  She was   taken to the Operating Room appropriately by Dr. Patel Lemon where an incision   and drainage was performed with tentative plan to perform repeat surgery to   further stabilize the knee as well as to use the ConforMIS custom inserts to do   polyethylene exchange to prevent any damage to the previously placed total knee   replacement in the setting of a possible infection.  The patient was started on   antibiotics through the intravenous access per the Infectious Disease Service   and is continuing on those antibiotics.  Preoperative evaluation prior to this   surgery demonstrated improvement in symptoms, decreased swelling and improved   bilateral markers for infection.    Complexity of case was increased based on the above-stated paragraph previously   noted.    DESCRIPTION OF PROCEDURE:  The patient was brought into the Operating Room,   placed in supine position.  Following the application of general anesthetic as   well as placement of LMA to stabilize the airway, the patient was given the   appropriate dose of antibiotics based on body weight.  Time-out was utilized to   verify left side as the operative site.  Both upper extremities were placed in   comfortable position.  Right leg was carefully padded along the heel and   peroneal nerve regions.  The left leg was raised, tourniquet was applied   proximally.  Examination under anesthesia revealed evidence of a genu valgum   alignment with what appeared to be collateral ligament insufficiency noted as   well on  examination.  Further, the patient demonstrated resolving hemarthrosis   with some continuing ecchymosis along the anterior aspect of the knee.  No   significant drainage was noted at the incisions.  Sutures were removed.  Left   leg was then prepped and draped in a sterile fashion with ChloraPrep material   with a bump under the left hip and popliteal post along the left side of the   table for intraoperative positioning.  Ioban was placed over the knee and the   incision was made utilizing the previous incision, carried down through the   skin, down the fat and fascia.  Bleeding sites were cauterized.  We then   visualized the area of concern demonstrating no significant recurrent   hemarthrosis or hematoma.  No signs of seropurulent.  Cultures were obtained.    Antibiotics were then provided to the patient.  We then performed a subvastus   posteromedial aspect of the knee using the previous operative exposure.  I   carefully released the vastus medialis obliquus off the intramuscular septum   proximally.  We visualized all areas and demonstrated significant areas of   diffuse bleeding, which was not focal but what appears secondary to   anticoagulants.  We used the Aquamantys probe to cauterize all areas,   stabilizing all bleeding very thorough with this evaluation to control the   bleeding.  We then carefully flexed the knee, subluxing the patella laterally.    We removed all scar around the patella itself prior to subluxing it laterally to   allow for better exposure.  In addition, there was evidence of insufficiency of   the medial collateral ligament, which was noted.    We removed the previously placed polyethylene liner.  Once again, there were no   signs of deep infection.  There was evidence of bleeding posteriorly, which was   controlled with the Aquamantys probe.  We were able to stabilize bleeding   appropriately, we felt with the use of this probe.  Further debridement was   performed and then  Pulsavac with 9 L of antibiotic solution and Pulsavac to the   knee with the polyethylene liner was removed.  We then trialed with a series of   liners which had been provided by the [employer] system per our request.  We   decided to proceed with a 6-mm medial liner and lateral E insert, which better   stabilized the knee and better controlled the alignment of the knee and took   pressure off the medial collateral ligament structures.  However, with these in   place, we still felt we needed to control and further repair the medial   collateral ligament structure.    As a result, we tapped the medial proximal tibia and placed a 4.5 triple-loaded   Healix anchor.  Sutures were then passed in modified Root fashion through the   superficial deep fibers of the medial collateral ligament.  We then trialed the   knee without tying the sutures demonstrated significant improvement in   stability.    Trial liners were removed with further Pulsavac.  We then controlled further   bleeding with Aquamantys probe.  We then applied the actual liners using a 6-mm   medial insert and a lateral E insert.  Excellent stability was achieved.  We   then took the knee in 30 degrees flexion with varus load applied and internal   rotation and tying sutures, which had previously been passed through the   superficial portion and deep portion of the medial collateral ligament   structures.  These stabilized the knee appropriately.  We then felt there was a   fair evidence of an insufficient tissue anteromedially along the proximal tibia.    As a result, we debrided this area further.  We then closed the proximal   subvastus approach in the intra-articular portion with a series of #1 PDS   sutures placed in figure-of-eight fashion.  Distally, we closed this area with   additional #1 PDS sutures placed in figure-of-eight fashion; however, we felt we   needed to augment repair.  As a result, we opened a 6 x 8 cm FlexHD graft and   sewed this  with excellent tension into the entire medial retinacular structures   extending up to the VMO region proximally and distally down the repaired   superficial medial collateral ligament and soft tissue structures anteromedially   along the knee.  We then gently took the knee through 0 to 90 degrees flexion   demonstrating good stability.  Normal valgus load.    Further debridement was performed.  Note, prior to closure of the entire joint,   we did place a Doug-Lima drain out of the superolateral aspect of the knee   to allow for postoperative control of bleeding.  This was tied into position   with nylon sutures proximally.  Distally, we then closed the soft tissue   structures further with a series of 3-0 PDS sutures placed in inverted fashion.    Skin was closed with a series of 2-0 nylon sutures placed in far-near and   near-far fashion along all areas.  We then applied an incisional wound VAC over   the incision distally to control any drainage from this area at the site of   graft placement.  This was sealed appropriately.  Appropriate sealed structure   was verified.  We then wrapped with a long-leg SAGE hose stocking as well as cast   padding.  A cooling unit was applied around the knee and the patient's knee was   placed into a hinged knee immobilizer, which was locked in extension.  The   patient was allowed to recover from the anesthetic, was extubated and was taken   to Recovery Room in stable condition.  At the completion of the case, all   instrument and sponge counts were correct.    NOTE:  Dr. Angie Mulligan was present for the key portions of the procedure and did   perform the key parts of the procedure.    PHYSICAL THERAPY:  The patient should begin physical therapy on postoperative   day #1.    WEIGHTBEARING STATUS:  Weightbearing as tolerated status with an immobilizer   locked in extension with gait for the first 6 weeks.  Immobilizer should be set   at rest from 0 to 60 degrees, lateral flexion  to 60 degrees as tolerated.    Immobilizer should be locked in extension with gait at 0 degrees, weightbearing   as tolerated using a walker or crutches.  The patient to use the immobilizer at   all times at rest and with gait.  Immobilizer can be removed for dressing change   and for hygiene.    The patient should be switched from knee immobilizer to a hinged sleeve at 6   weeks following surgery to protect the collateral ligament region.      HAYDER/  dd: 03/10/2017 06:42:05 (CST)  td: 03/10/2017 08:39:09 (CST)  Doc ID   #[id number]  Job ID #[id number]    CC: [facility]

## 2017-03-10 NOTE — PROGRESS NOTES
SW acknowledged HH and IV infusion orders.  SW met with pt and pt has [facility] and [facility] infusion for home iv antibiotic and would like both to remain as services providers.      SW called [facility] [phone], spoke to Flora regarding IV antibiotic referral, confirmed pt is an active pt.  [facility] added to treatment team and will retrieve all the necessary medical records and order for EPIC.     SW called [facility], [phone], spoke to Dalila, will see pt Sunday, 3/12/17.

## 2017-03-10 NOTE — PT/OT/SLP EVAL
"Physical Therapy  Evaluation and Treatment     Ella Canales   MRN: [id number]   Admitting Diagnosis: Painful orthopaedic hardware    PT Received On: 03/10/17  PT Start Time: 830     PT Stop Time: 905    PT Total Time (min): 35 min       Billable Minutes:  Evaluation 10, Gait [id number] and Therapeutic Exercise 10    Diagnosis: Painful orthopaedic hardware    Past Medical History:   Diagnosis Date    Arthritis     bilateral knees    GERD (gastroesophageal reflux disease)     Hyperlipidemia     Hypertension     Osteopenia     S/P PICC central line placement     Ulcer       Past Surgical History:   Procedure Laterality Date    ECTOPIC PREGNANCY SURGERY      fess      FRACTURE SURGERY      ORIF right arm.    TOTAL KNEE ARTHROPLASTY Left     TUBAL LIGATION       Referring physician: MD Trinidad  Date referred to PT: 3/9/17    General Precautions: Standard, fall  Orthopedic Precautions: LLE weight bearing as tolerated   Braces: Hinged knee brace (Locked in extension during gait; unlocked at rest)       Do you have any cultural, spiritual, Lutheran conflicts, given your current situation?: None specified     Patient History:  Living Environment Comment: Pt reports she lives alone but will be discharging home to her daughter in laws house with 0 YEFRI and 1 story and a walk in shower. She will have good supervision and family assist upon discharge. Pt reports upon recent she has been using a RW for mobility mod (I) and a BSC for toileting mod (I)   Equipment Currently Used at Home: walker, rolling, 3-in-1 commode  DME owned (not currently used): cane    Previous Level of Function:  Ambulation Skills: needs device  Transfer Skills: needs device  ADL Skills: needs device  Work/Leisure Activity: needs device    Subjective:  Communicated with RN prior to session.    Chief Complaint: "It really hurts"; "I'm going home tomorrow"  Patient goals: To return to PLOF     Pain Ratin/10   Location - Side: " Left  Location - Orientation: generalized  Location: knee  Pain Addressed: Nurse notified  Pain Rating Post-Intervention: 6/10    Objective:   Patient found with: peripheral IV     Cognitive Exam:  Oriented to: Person, Place, Time and Situation    Follows Commands/attention: Follows one-step commands  Communication: clear/fluent  Safety awareness/insight to disability: intact    Physical Exam:  Postural examination/scapula alignment: Rounded shoulder and Head forward    Skin integrity: wound vac intact  Edema: None noted     Sensation:   Intact    Lower Extremity Range of Motion:  Right Lower Extremity: WFL  Left Lower Extremity: WFL except knee NT     Lower Extremity Strength:  Right Lower Extremity: WFL  Left Lower Extremity: ankle DF: 4; Good quad contraction; hip flexion: 2+    Gross motor coordination: WFL    Functional Mobility:  Bed Mobility:  Supine to Sit: Minimum Assistance (with HOB elevated; pt required min A at LLE and increased time needed to perform )    Transfers:  Sit <> Stand Assistance: Contact Guard Assistance (x 2 from EOB; x 1 from BSC; pt required verbal and tactile cues for hand placement and safety)  Sit <> Stand Assistive Device: Rolling Walker  Bed <> Chair Technique: Stand Pivot (from EOB <> BSC )  Bed <> Chair Assistance: Contact Guard Assistance  Bed <> Chair Assistive Device: Rolling Walker    Gait:   Gait Distance: x 150'; pt required verbal cues for increased step length, sequencing and heel-toe gait  Assistance 1: Contact Guard Assistance  Gait Assistive Device: Rolling walker  Gait Pattern: reciprocal  Gait Deviation(s): decreased joaquín, increased time in double stance, decreased velocity of limb motion, decreased weight-shifting ability    Balance:   Static Sit: GOOD: Takes MODERATE challenges from all directions  Dynamic Sit: GOOD: Maintains balance through MODERATE excursions of active trunk movement  Static Stand: GOOD: Takes MODERATE challenges from all directions  Dynamic  stand: GOOD: Needs SUPERVISION only during gait and able to self right with moderate     Therapeutic Activities and Exercises:  Pt performed 1 set of 10 reps of LLE  1. Glut sets  2. Quad sets  3. Ankle pumps  4. SLR  Pt required verbal cues, tactile cues and visual cues for technique in order to complete.     AM-PAC 6 CLICK MOBILITY  How much help from another person does this patient currently need?   1 = Unable, Total/Dependent Assistance  2 = A lot, Maximum/Moderate Assistance  3 = A little, Minimum/Contact Guard/Supervision  4 = None, Modified [place]/Independent    Turning over in bed (including adjusting bedclothes, sheets and blankets)?: 4  Sitting down on and standing up from a chair with arms (e.g., wheelchair, bedside commode, etc.): 3  Moving from lying on back to sitting on the side of the bed?: 3  Moving to and from a bed to a chair (including a wheelchair)?: 3  Need to walk in hospital room?: 3  Climbing 3-5 steps with a railing?: 2  Total Score: 18     AM-PAC Raw Score CMS G-Code Modifier Level of Impairment Assistance   6 % Total / Unable   7 - 9 CM 80 - 100% Maximal Assist   10 - 14 CL 60 - 80% Moderate Assist   15 - 19 CK 40 - 60% Moderate Assist   20 - 22 CJ 20 - 40% Minimal Assist   23 CI 1-20% SBA / CGA   24 CH 0% Independent/ Mod I     Patient left up in chair with all lines intact, call button in reach, RN notified and son present.    Assessment:   Ella Canales is a 66 y.o. female with a medical diagnosis of Painful orthopaedic hardware and presents with s/p TKA revision and I&D. Pt tolerated treatment session well with increased pain throughout session. Pt would benefit from continued skilled PT to maximize independence and safety with functional mobility.    Rehab identified problem list/impairments: Rehab identified problem list/impairments: weakness, impaired functional mobilty, impaired self care skills, pain, orthopedic precautions, impaired skin, decreased safety  "awareness, decreased lower extremity function, impaired balance, gait instability, decreased ROM    Rehab potential is good.    Activity tolerance: Good    Discharge recommendations: Discharge Facility/Level Of Care Needs: home with home health, home health PT, home health OT     Barriers to discharge: Barriers to Discharge: None    Equipment recommendations: Equipment Needed After Discharge: none     GOALS:   Physical Therapy Goals        Problem: Physical Therapy Goal    Goal Priority Disciplines Outcome Goal Variances Interventions   Physical Therapy Goal     PT/OT, PT Ongoing (interventions implemented as appropriate)     Description:  Goals to be met by: 3/31/17     Patient will increase functional independence with mobility by performin. Supine to sit with modified independence.   2. Sit to supine with modified independence.   3. Sit<>stand transfer with modified independence using rolling walker.   4. Gait x 150 feet with modified independence using rolling walker.   5. Ascend/descend 4" curb step with RW SBA            PLAN:    Patient to be seen daily to address the above listed problems via gait training, therapeutic activities, therapeutic exercises, neuromuscular re-education  Plan of Care expires: 17  Plan of Care reviewed with: patient, son    Callie M Burroughs, PT  03/10/2017  "

## 2017-03-10 NOTE — PHYSICIAN QUERY
"PT Name: Ella Canales  MR #: [id number]  Physician Query Form - Hematology Clarification    Reviewer Calista Lawler RN, [email or web address]      This form is a permanent document in the medical record.      Query Date: March 10, 2017    By submitting this query, we are merely seeking further clarification of documentation. Please utilize your independent clinical judgment when addressing the question(s) below.    (The Medical record reflects the following:)     Indicators    Supporting Clinical Findings Location in Medical Record    "Anemia" documented     X      X  X H & H = Hgb=9.3, Hct=29.5      Hgb=5.7, Hct=18.9  Hgb=7.8, Hct=24.3 3/6-Pre-op labs per Anesthesia Eval    3/9 @ 1520-Labs  3/10-Labs    BP =      HR =      "GI bleeding" documented      Acute bleeding (Non GI site)     X Transfusion(s) 2u PRBC's given 3/9-Flowsheet    Treatment:     X Other:  Postop day: 1  ID: The patient is a 66 y.o. female status post: I&D knee  Labs: Am labs reviewed, acceptable Hb, aymptomatic, Cr WNL 3/10-Ortho PN     Provider, please specify diagnosis or diagnoses associated with above clinical findings.    [xx] Acute blood loss anemia expected post-operatively     [   ] Acute blood loss anemia    [  ] Aplastic anemia    [  ] Iron deficiency anemia     [  ] Pernicious anemia     [  ] Precipitous drop in H&H    [  ] Anemia of chronic disease ( Specify chronic disease)      [  ] CKD (specify stage) ___________________________     [  ] Neoplastic disease      [  ] Due to Chemotherapy      [  ] Other (Specify) _______________________________     [  ] Clinically Undetermined     [  ] Other Hematological Diagnosis _________________________________    [  ] Clinically Undetermined       Please document in your progress notes daily for the duration of treatment, until resolved, and include in your discharge summary.                                                                                                      "

## 2017-03-11 VITALS
WEIGHT: 150.56 LBS | TEMPERATURE: 98 F | OXYGEN SATURATION: 96 % | HEART RATE: 80 BPM | BODY MASS INDEX: 29.56 KG/M2 | RESPIRATION RATE: 18 BRPM | SYSTOLIC BLOOD PRESSURE: 128 MMHG | HEIGHT: 60 IN | DIASTOLIC BLOOD PRESSURE: 61 MMHG

## 2017-03-11 LAB
ANION GAP SERPL CALC-SCNC: 3 MMOL/L
BUN SERPL-MCNC: 6 MG/DL
CALCIUM SERPL-MCNC: 7.7 MG/DL
CHLORIDE SERPL-SCNC: 112 MMOL/L
CO2 SERPL-SCNC: 25 MMOL/L
CREAT SERPL-MCNC: 0.6 MG/DL
EST. GFR  (AFRICAN AMERICAN): >60 ML/MIN/1.73 M^2
EST. GFR  (NON AFRICAN AMERICAN): >60 ML/MIN/1.73 M^2
GLUCOSE SERPL-MCNC: 95 MG/DL
HCT VFR BLD AUTO: 24.9 %
HGB BLD-MCNC: 7.8 G/DL
POTASSIUM SERPL-SCNC: 3.7 MMOL/L
SODIUM SERPL-SCNC: 140 MMOL/L

## 2017-03-11 PROCEDURE — 80048 BASIC METABOLIC PNL TOTAL CA: CPT

## 2017-03-11 PROCEDURE — 25000003 PHARM REV CODE 250: Performed by: ORTHOPAEDIC SURGERY

## 2017-03-11 PROCEDURE — 63600175 PHARM REV CODE 636 W HCPCS: Performed by: ORTHOPAEDIC SURGERY

## 2017-03-11 PROCEDURE — 85018 HEMOGLOBIN: CPT

## 2017-03-11 PROCEDURE — 36415 COLL VENOUS BLD VENIPUNCTURE: CPT

## 2017-03-11 PROCEDURE — 97116 GAIT TRAINING THERAPY: CPT

## 2017-03-11 PROCEDURE — 85014 HEMATOCRIT: CPT

## 2017-03-11 PROCEDURE — 97535 SELF CARE MNGMENT TRAINING: CPT

## 2017-03-11 RX ADMIN — POTASSIUM CHLORIDE 10 MEQ: 750 TABLET, EXTENDED RELEASE ORAL at 08:03

## 2017-03-11 RX ADMIN — RALOXIFENE HYDROCHLORIDE 60 MG: 60 TABLET, FILM COATED ORAL at 09:03

## 2017-03-11 RX ADMIN — OXYCODONE HYDROCHLORIDE 10 MG: 10 TABLET, FILM COATED, EXTENDED RELEASE ORAL at 08:03

## 2017-03-11 RX ADMIN — CIPROFLOXACIN HYDROCHLORIDE 750 MG: 250 TABLET, FILM COATED ORAL at 08:03

## 2017-03-11 RX ADMIN — CELECOXIB 200 MG: 200 CAPSULE ORAL at 08:03

## 2017-03-11 RX ADMIN — OXYCODONE HYDROCHLORIDE 10 MG: 5 TABLET ORAL at 05:03

## 2017-03-11 RX ADMIN — LOSARTAN POTASSIUM 100 MG: 50 TABLET, FILM COATED ORAL at 08:03

## 2017-03-11 RX ADMIN — VANCOMYCIN HYDROCHLORIDE 1500 MG: 1 INJECTION, POWDER, LYOPHILIZED, FOR SOLUTION INTRAVENOUS at 01:03

## 2017-03-11 RX ADMIN — PANTOPRAZOLE SODIUM 40 MG: 40 TABLET, DELAYED RELEASE ORAL at 08:03

## 2017-03-11 RX ADMIN — SODIUM CHLORIDE: 0.9 INJECTION, SOLUTION INTRAVENOUS at 05:03

## 2017-03-11 RX ADMIN — DOCUSATE SODIUM 100 MG: 100 CAPSULE, LIQUID FILLED ORAL at 08:03

## 2017-03-11 RX ADMIN — CETIRIZINE HYDROCHLORIDE 10 MG: 10 TABLET, FILM COATED ORAL at 08:03

## 2017-03-11 NOTE — PLAN OF CARE
"Problem: Physical Therapy Goal  Goal: Physical Therapy Goal  Goals to be met by: 3/31/17     Patient will increase functional independence with mobility by performin. Supine to sit with modified independence.   2. Sit to supine with modified independence.   3. Sit<>stand transfer with modified independence using rolling walker. MET 3/11/2017  4. Gait x 150 feet with modified independence using rolling walker.   5. Ascend/descend 4" curb step with RW SBA   Outcome: Ongoing (interventions implemented as appropriate)  Pt progressing well towards goals. Please see progress note for details, POC, and recommendations.       "

## 2017-03-11 NOTE — PLAN OF CARE
Problem: Occupational Therapy Goal  Goal: Occupational Therapy Goal  Goals to be met by 4/14/17:    Assess UB Dressing (MET 3/11/17)  Assess LB Dressing (MET 3/11/17)  Grooming standing at sink with Modified Independent  Sit<>Supine with HOB flat   Outcome: Outcome(s) achieved Date Met:  03/11/17  The patient had reduced pain and improved functional mobility during the session.  She was Supervision sit><stand, ambulation and MI toilet and chair transfers with RW. She retrieved clothing for use Min A, UBD MI, LBD Min A, G/H standing at sink Supervision Assist, toileting MI with RW safety with self-care and discharge home needs addressed during the session.  D/C OT with hospital discharge. MIA Sarkar 3/11/2017

## 2017-03-11 NOTE — PT/OT/SLP PROGRESS
Occupational Therapy  Treatment/Discharge    Ella Canales   MRN: [id number]   Admitting Diagnosis: Painful orthopaedic hardware, s/p Left TKA revision    OT Date of Treatment: 17   OT Start Time: 1031  OT Stop Time: 1105  OT Total Time (min): 34 min    Billable Minutes:  Self Care/Home Management 34    General Precautions: Standard, fall  Orthopedic Precautions: LLE weight bearing as tolerated  Braces: Hinged knee brace (Left knee)    Do you have any cultural, spiritual, Yazidi conflicts, given your current situation?: None Specified    Subjective:  The patient was found supine in bed eager to discharge home and agreeable to OT treatment.    Pain Ratin/10  Location - Side: Left  Location: knee  Pain Addressed: Pre-medicate for activity  Pain Rating Post-Intervention: 4/10    Objective:  Patient found with: peripheral IV, wound vac     Functional Mobility:  Bed Mobility:  Supine to Sit: Stand by Assistance    Transfers:    Supervision sit><stand with RW  Mi toilet transfer with RW  MI bed>chair transfer with RW    Functional Ambulation: ambulated bed>bathroom>counter to retrieve clothing>bed with RW Supervision Assist    Activities of Daily Living:     UE Dressing Level of Assistance: Modified independent (doff night gown don bra and shirt)  Min A retrieve clothing for use with RW  LE Dressing Level of Assistance: Minimum assistance (don biefs and slacks, doff socks don shoes EOB with RW used for standign)  Grooming Position: Standing at sink (RW)  Grooming Level of Assistance: Supervision (wash hands, brush teeth)  Toileting Where Assessed: Toilet  Toileting Level of Assistance: Modified independent    Balance:   Static Sit: NORMAL: No deviations seen in posture held statically  Dynamic Sit: NORMAL: No deviations seen in posture held dynamically  Static Stand: FAIR: Maintains without assist but unable to take challenges  Dynamic stand: GOOD: Needs SUPERVISION only during gait and able to self right  with moderate     Therapeutic Activities and Exercises:  Home safety and set-up for self-care, LBD, wound vac management with mobility and self-care, LBD, RW safety reviewed    AM-PAC 6 CLICK ADL   How much help from another person does this patient currently need?   1 = Unable, Total/Dependent Assistance  2 = A lot, Maximum/Moderate Assistance  3 = A little, Minimum/Contact Guard/Supervision  4 = None, Modified [place]/Independent    Putting on and taking off regular lower body clothing? : 3  Bathing (including washing, rinsing, drying)?: 3  Toileting, which includes using toilet, bedpan, or urinal? : 4  Putting on and taking off regular upper body clothing?: 4  Taking care of personal grooming such as brushing teeth?: 3  Eating meals?: 4  Total Score: 21     AM-PAC Raw Score CMS G-Code Modifier Level of Impairment Assistance   6 % Total / Unable   7 - 9 CM 80 - 100% Maximal Assist   10 - 14 CL 60 - 80% Moderate Assist   15 - 19 CK 40 - 60% Moderate Assist   20 - 22 CJ 20 - 40% Minimal Assist   23 CI 1-20% SBA / CGA   24 CH 0% Independent/ Mod I     Patient left up in chair with all lines intact, call button in reach and nurse notified    ASSESSMENT:  lEla Canales is a 66 y.o. female with a medical diagnosis of Painful orthopaedic hardware, s/p Left TKA Revision and presents with  pain, impaired LE balance-gait continue to impact functional mobility and self-care skill.  OT treatment was provided to maximize safety, self-care independence and to address safety and activity recommendations for discharge home..    Rehab identified problem list/impairments:  (Functional Performance Deficits Effecting Function: pain, impaired LE balance-gait)    Rehab potential is good.    Activity tolerance: Good    Discharge recommendations: Discharge Facility/Level Of Care Needs: home health PT     Barriers to discharge: Barriers to Discharge: None    Equipment recommendations: shower chair     GOALS:    Occupational Therapy Goals     Not on file      Multidisciplinary Problems (Resolved)        Problem: Occupational Therapy Goal    Goal Priority Disciplines Outcome Interventions   Occupational Therapy Goal   (Resolved)     OT, PT/OT Outcome(s) achieved    Description:  Goals to be met by 4/14/17:    Assess UB Dressing (MET 3/11/17)  Assess LB Dressing (MET 3/11/17)  Grooming standing at sink with Modified Independent  Sit<>Supine with HOB flat               Plan:  Patient to be seen daily to address the above listed problems via self-care/home management, therapeutic activities, therapeutic exercises  Plan of Care expires: 04/14/17  Plan of Care reviewed with: patient         MIA Sarkar  03/11/2017

## 2017-03-11 NOTE — NURSING
Hourly rounding performed. Pain controlled throughout shift with prescribed medication.  Pt VSS and afebrile, free of falls, trauma. Injury, and skin break downs. Pt denies SOB, CP, & N/V. Pt in low locked bed, call light in reach.

## 2017-03-11 NOTE — PLAN OF CARE
Problem: Patient Care Overview  Goal: Plan of Care Review  Outcome: Ongoing (interventions implemented as appropriate)  Pt free of trauma, falls, and injury. Pt VSS and afebrile throughout shift. Pt free of skin breakdown. Pt neurovascular checks are intact. Pt dressing is clean, dry, and intact. Pt pain has been moderately controlled by PO pain meds/muscle relaxer and tolerated well. Pt has ambulated with nurse and RW in no distress. Purposeful rounding done. Pt has been eating and voiding adequately throughout shift. Pt has call light in reach, bed alarm on, bed brakes on, side rails up x2, bed in low position, TEDs/SCDs/FCDs on, IS at bedside, and nonskid socks on. Pt lying in bed in no distress. Will continue to monitor.

## 2017-03-11 NOTE — PT/OT/SLP PROGRESS
"Physical Therapy  Treatment    Ella Canales   MRN: [id number]   Admitting Diagnosis: Painful orthopaedic hardware    PT Received On: 17  PT Start Time: 849     PT Stop Time: 913    PT Total Time (min): 24 min       Billable Minutes:  Gait [id number]    Treatment Type: Treatment  PT/PTA: PT           General Precautions: Standard, fall  Orthopedic Precautions: LLE weight bearing as tolerated     Do you have any cultural, spiritual, Religion conflicts, given your current situation?: None specified     Subjective:  Communicated with RN prior to session.    Pain Ratin/10  Location - Side: Left  Location - Orientation: generalized  Location: knee  Pain Addressed: Pre-medicate for activity  Pain Rating Post-Intervention: 5/10    Objective:   Patient found with: peripheral IV, wound vac    Functional Mobility:  Bed Mobility:   Sit to Supine: Supervision    Transfers:  Sit <> Stand Assistance: Modified Independent  Sit <> Stand Assistive Device: Rolling Walker    Gait:   Gait Distance: x 150'; pt required verbal cues for increased step length and safety with turns   Assistance 1: Stand by Assistance  Gait Assistive Device: Rolling walker  Gait Pattern: reciprocal  Gait Deviation(s): decreased joaquín, decreased toe-to-floor clearance, decreased velocity of limb motion, decreased step length    Stairs:  Pt ascended/descend 4" curb step with Rolling Walker with bilateral with Contact Guard Assistance.   Pt required verbal cues for sequencing and safety     Balance:   Static Sit: GOOD: Takes MODERATE challenges from all directions  Dynamic Sit: GOOD: Maintains balance through MODERATE excursions of active trunk movement  Static Stand: GOOD: Takes MODERATE challenges from all directions  Dynamic stand: GOOD: Needs SUPERVISION only during gait and able to self right with moderate      AM-PAC 6 CLICK MOBILITY  How much help from another person does this patient currently need?   1 = Unable, Total/Dependent " Assistance  2 = A lot, Maximum/Moderate Assistance  3 = A little, Minimum/Contact Guard/Supervision  4 = None, Modified [place]/Independent    Turning over in bed (including adjusting bedclothes, sheets and blankets)?: 4  Sitting down on and standing up from a chair with arms (e.g., wheelchair, bedside commode, etc.): 4  Moving from lying on back to sitting on the side of the bed?: 3  Moving to and from a bed to a chair (including a wheelchair)?: 3  Need to walk in hospital room?: 3  Climbing 3-5 steps with a railing?: 3  Total Score: 20    AM-PAC Raw Score CMS G-Code Modifier Level of Impairment Assistance   6 % Total / Unable   7 - 9 CM 80 - 100% Maximal Assist   10 - 14 CL 60 - 80% Moderate Assist   15 - 19 CK 40 - 60% Moderate Assist   20 - 22 CJ 20 - 40% Minimal Assist   23 CI 1-20% SBA / CGA   24 CH 0% Independent/ Mod I     Patient left supine with all lines intact, call button in reach and RN notified.    Assessment:  Pt progressing well towards goals. Pt would benefit from continued skilled PT to maximize independence and safety with functional mobility.    Rehab identified problem list/impairments: Rehab identified problem list/impairments: weakness, gait instability, impaired balance, decreased lower extremity function, decreased ROM, decreased safety awareness, pain, orthopedic precautions    Rehab potential is good.    Activity tolerance: Good    Discharge recommendations: Discharge Facility/Level Of Care Needs: home health PT     Barriers to discharge: Barriers to Discharge: None    Equipment recommendations: Equipment Needed After Discharge: none     GOALS:   Physical Therapy Goals        Problem: Physical Therapy Goal    Goal Priority Disciplines Outcome Goal Variances Interventions   Physical Therapy Goal     PT/OT, PT Ongoing (interventions implemented as appropriate)     Description:  Goals to be met by: 3/31/17     Patient will increase functional independence with mobility by  "performin. Supine to sit with modified independence.   2. Sit to supine with modified independence.   3. Sit<>stand transfer with modified independence using rolling walker. MET 3/11/2017  4. Gait x 150 feet with modified independence using rolling walker.   5. Ascend/descend 4" curb step with RW SBA             PLAN:    Patient to be seen daily  to address the above listed problems via gait training, therapeutic activities, therapeutic exercises, neuromuscular re-education  Plan of Care expires: 17  Plan of Care reviewed with: patient    Carli M Manjeet, PT  2017  "

## 2017-03-11 NOTE — PROGRESS NOTES
Patient seen and examined this AM at bedside.  Nurse reports no acute events overnight. Patient's pain controlled this AM.\      PE:  aaox3  Warm well perfused leg  NVI      AM labs reviewed, acceptable    A/p POD 2 I&D knee doing well  D/c to home today  With home PT and abx  Follow-up Monday with Dr. Mulligan for Wound Vac assessment and JF drainage output assessment.  Have patient record JF output.

## 2017-03-11 NOTE — NURSING
Pt pain levels stayed the same. Pt did not want any pain meds. Instead she ask for the muscle relaxer.

## 2017-03-12 NOTE — PROGRESS NOTES
"Physical Therapy Discharge Summary    Ella Canales  MRN: [id number]   Painful orthopaedic hardware   Patient Discharged from acute Physical Therapy on 3-11-17.  Please refer to prior PT noted date on 3-11-17 for functional status.     Assessment:   Patient appropriate for care in another setting.  GOALS:   Physical Therapy Goals        Problem: Physical Therapy Goal    Goal Priority Disciplines Outcome Goal Variances Interventions   Physical Therapy Goal     PT/OT, PT Ongoing (interventions implemented as appropriate)     Description:  Goals to be met by: 3/31/17     Patient will increase functional independence with mobility by performin. Supine to sit with modified independence.   2. Sit to supine with modified independence.   3. Sit<>stand transfer with modified independence using rolling walker. MET 3/11/2017  4. Gait x 150 feet with modified independence using rolling walker.   5. Ascend/descend 4" curb step with RW SBA             Reasons for Discontinuation of Therapy Services  Transfer to alternate level of care.      Plan:  Patient Discharged to: Home with Home Health Service.  "

## 2017-03-13 ENCOUNTER — OFFICE VISIT (OUTPATIENT)
Dept: SPORTS MEDICINE | Facility: CLINIC | Age: 67
End: 2017-03-13
Payer: MEDICARE

## 2017-03-13 VITALS
HEIGHT: 60 IN | HEART RATE: 93 BPM | DIASTOLIC BLOOD PRESSURE: 83 MMHG | BODY MASS INDEX: 29.06 KG/M2 | WEIGHT: 148 LBS | SYSTOLIC BLOOD PRESSURE: 144 MMHG

## 2017-03-13 DIAGNOSIS — M21.161 GENU VARUM OF RIGHT LOWER EXTREMITY: ICD-10-CM

## 2017-03-13 DIAGNOSIS — T84.54XA INFECTION OF PROSTHETIC LEFT KNEE JOINT: ICD-10-CM

## 2017-03-13 DIAGNOSIS — T84.84XA PAINFUL ORTHOPAEDIC HARDWARE: ICD-10-CM

## 2017-03-13 DIAGNOSIS — M25.562 LEFT KNEE PAIN, UNSPECIFIED CHRONICITY: Primary | ICD-10-CM

## 2017-03-13 LAB — BACTERIA SPEC AEROBE CULT: NO GROWTH

## 2017-03-13 PROCEDURE — 87116 MYCOBACTERIA CULTURE: CPT

## 2017-03-13 PROCEDURE — 20610 DRAIN/INJ JOINT/BURSA W/O US: CPT | Mod: 58,S$PBB,LT, | Performed by: ORTHOPAEDIC SURGERY

## 2017-03-13 PROCEDURE — 99024 POSTOP FOLLOW-UP VISIT: CPT | Mod: S$PBB,,, | Performed by: ORTHOPAEDIC SURGERY

## 2017-03-13 PROCEDURE — 87075 CULTR BACTERIA EXCEPT BLOOD: CPT

## 2017-03-13 PROCEDURE — 20610 DRAIN/INJ JOINT/BURSA W/O US: CPT | Mod: PBBFAC,PO | Performed by: ORTHOPAEDIC SURGERY

## 2017-03-13 PROCEDURE — 87102 FUNGUS ISOLATION CULTURE: CPT

## 2017-03-13 PROCEDURE — 99999 PR PBB SHADOW E&M-EST. PATIENT-LVL III: CPT | Mod: PBBFAC,,, | Performed by: ORTHOPAEDIC SURGERY

## 2017-03-13 PROCEDURE — 99213 OFFICE O/P EST LOW 20 MIN: CPT | Mod: PBBFAC,PO | Performed by: ORTHOPAEDIC SURGERY

## 2017-03-13 PROCEDURE — 87205 SMEAR GRAM STAIN: CPT

## 2017-03-13 PROCEDURE — 87070 CULTURE OTHR SPECIMN AEROBIC: CPT

## 2017-03-13 PROCEDURE — 87015 SPECIMEN INFECT AGNT CONCNTJ: CPT

## 2017-03-13 NOTE — PROGRESS NOTES
Subjective:          Chief Complaint: Ella Canales is a 66 y.o. female who had concerns including Post-op Evaluation of the Left Knee.    HPI Comments: Patient is here for a follow up for her left knee. She was then taken to the OR by Dr. Patel Denise for an irrigation and debridement on 2/24/2017. She has see ID  Dr. Mckinley.  She is here today for vac change    She is on a PICC line with vanocmycin and Cipro orally.        DATE OF PROCEDURE: 03/09/2017     ATTENDING SURGEON: Angie Mulligan M.D.     FIRST ASSISTANT: Paolo Abdi M.D. - Fellow.     SECOND ASSISTANT: SMA Prince - Assistant.     PREOPERATIVE DIAGNOSIS: Left knee sepsis.     POSTOPERATIVE DIAGNOSES:  1. Left knee hemarthrosis.  2. Left knee sepsis.  3. Left knee synovitis.  4. Left knee medial retinacular tear.  5. Left knee medial collateral ligament insufficiency.     PROCEDURES PERFORMED:  1. Left knee complex polyethylene liner exchange.  2. Left knee complex incision and drainage.  3. Left knee medial collateral ligament repair.  4. Left knee medial retinacular repair with application of a flexed HD graft.  5. Left knee complex open synovectomy with hemostasis control.  6. Wound VAC application.              DATE OF PROCEDURE: 02/24/2017     PREOPERATIVE DIAGNOSIS: Possible septic arthritis, left total knee replacement.     POSTOPERATIVE DIAGNOSIS: Possible septic arthritis, left total knee   replacement.      PROCEDURE: Arthrotomy, left knee, with irrigation and debridement (CPT #86282).     SURGEON: Patel Denise M.D.     ASSISTANTS: Andrey Ya M.D. (RES); Anant Issa M.D. 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 Negative for chills, fever, night sweats, weight gain and weight loss.   HENT: Negative for congestion, headaches, hearing loss and sore throat.    Eyes: Negative for blurred vision, double vision and visual disturbance.   Cardiovascular: Negative for chest pain, leg swelling and palpitations.   Respiratory: Negative for cough and shortness of breath.    Endocrine: Negative for polyuria.   Hematologic/Lymphatic: Negative for bleeding problem. Does not bruise/bleed easily.   Skin: Negative for itching, poor wound healing and rash.   Musculoskeletal: Positive for joint pain, joint swelling and stiffness. Negative for back pain, muscle cramps, muscle weakness and myalgias.   Gastrointestinal: Negative for abdominal pain, constipation, diarrhea, melena, nausea and vomiting.   Genitourinary: Negative.  Negative for frequency and hematuria.   Neurological: Negative for dizziness, loss of balance, numbness, paresthesias and sensory change.   Psychiatric/Behavioral: Negative for altered mental status and depression. The patient is not nervous/anxious.    Allergic/Immunologic: Negative for hives.       Pain Related Questions  Over the past 3 days, what was your average pain during activity? (I.e. running, jogging, walking, climbing stairs, getting dressed, ect.): 3  Over the past 3 days, what was your highest pain level?: 4  Over the past 3 days, what was your lowest pain level? : 2    Other  How many nights a week are you awakened by your affected body part?: 7  Was the patient's HEIGHT measured or patient reported?: Patient Reported  Was the patient's WEIGHT measured or patient reported?: Measured      Objective:        General: Ella  is well-developed, well-nourished, appears stated age, in no acute distress, alert and oriented to time, place and person.     General    Vitals reviewed.  Constitutional: She is oriented to person, place, and time. She appears well-developed and well-nourished. No distress.   HENT:   Mouth/Throat: No oropharyngeal exudate.   Eyes: Right eye exhibits no discharge. Left eye exhibits no discharge.   Neck: Normal range of motion.   Cardiovascular: Normal rate and regular rhythm.    Pulmonary/Chest: Effort normal and breath sounds normal. No respiratory distress.   Neurological: She is alert and oriented to person, place, and time. She has normal reflexes. No cranial nerve deficit. Coordination normal.   Psychiatric: She has a normal mood and affect. Her behavior is normal. Judgment and thought content normal.     General Musculoskeletal Exam   Gait: antalgic       Right Knee Exam     Inspection   Erythema: absent  Scars: absent  Swelling: absent  Effusion: effusion  Deformity: deformity  Bruising: absent    Tenderness   The patient is experiencing no tenderness.         Range of Motion   Extension: 0   Flexion: 140     Tests   Meniscus   David:  Medial - negative Lateral - negative  Ligament Examination Lachman: normal (-1 to 2mm) PCL-Posterior Drawer: normal (0 to 2mm)     MCL - Valgus: normal (0 to 2mm)  LCL - Varus: normalPivot Shift: normal (Equal)Reverse Pivot Shift: normal (Equal)Dial Test at 30 degrees: normal (< 5 degrees)Dial Test at 90 degrees: normal (< 5 degrees)  Posterior Sag Test: negative  Posterolateral Corner: unstable (>15 degrees difference)  Patella   Patellar Apprehension: negative  Passive Patellar Tilt: neutral  Patellar Tracking: normal  Patellar Glide (quadrants): Lateral - 1   Medial - 2  Q-Angle at 90 degrees: normal  Patellar Grind: negative  J-Sign: none    Other   Meniscal Cyst: absent  Popliteal (Baker's) Cyst: absent  Sensation: normal    Left Knee Exam     Inspection   Erythema:  present  Scars: present  Swelling: present  Effusion: present  Deformity: deformity  Bruising: present    Tenderness   Left knee tenderness location: global tenderness.    Range of Motion   Extension:  10 abnormal   Flexion:  80 abnormal     Tests   Meniscus   David:  Medial - negative Lateral - negative  Stability Lachman: normal (-1 to 2mm) PCL-Posterior Drawer: normal (0 to 2mm)  MCL - Valgus: normal (0 to 2mm)  LCL - Varus: normal (0 to 2mm)Pivot Shift: normal (Equal)Reverse Pivot Shift: normal (Equal)Dial Test at 30 degrees: normal (< 5 degrees)Dial Test at 90 degrees: normal (< 5 degrees)  Posterior Sag Test: negative  Posterolateral Corner: unstable (>15 degrees difference)  Patella   Patellar Apprehension: negative  Passive Patellar Tilt: neutral  Patellar Tracking: normal  Patellar Glide (Quadrants): Lateral - 1 Medial - 2  Q-Angle at 90 degrees: normal  Patellar Grind: negative  J-Sign: J sign absent    Other   Meniscal Cyst: absent  Popliteal (Baker's) Cyst: absent  Sensation: normal    Comments:  Vac dressing intact; mikel drain  Removed  New vac dressing placed     Right Hip Exam     Tests   Ivon: negative  Left Hip Exam     Tests   Ivon: negative          Muscle Strength   Right Lower Extremity   Hip Abduction: 5/5   Quadriceps:  5/5   Hamstrin/5   Left Lower Extremity   Hip Abduction: 5/5   Quadriceps:  4/5   Hamstrin/5     Reflexes     Left Side  Quadriceps:  2+  Achilles:  2+    Right Side   Quadriceps:  2+  Achilles:  2+    Vascular Exam     Right Pulses  Dorsalis Pedis:      2+  Posterior Tibial:      2+        Left Pulses  Dorsalis Pedis:      2+  Posterior Tibial:      2+        Edema  Right Lower Leg: absent  Left Lower Leg: present            Assessment:       Encounter Diagnoses   Name Primary?    Left knee pain, unspecified chronicity Yes    Infection of prosthetic left knee joint     Painful orthopaedic hardware     Genu varum of right lower extremity           Plan:       1.  IKDC, SF-12 and KOOS was not filled out today in clinic.     RTC in 1 weeks with Dr. Angie Mulligan MD for postoperative follow-up. Patient will not fill out IKDC, SF-12 and KOOS on return.    2. Continue ABX per ID    3.  Wound vac dressing changed today     4. JF drain removed    5. Cultures sent    6. Aspiration Procedure    After time out was performed, including verification of patient ID, procedure, site and side, availability of information and equipment, review of safety issues, and agreement with consent, the procedure site was marked and the patient was prepped aseptically. 40cc's of sanguineous joint fluid was aspirated from the right Pre-patellar bursa using an 18g x 1.5 needle in sterile fashion without complication. Bandage was applied. Patient was reminded to rest with RICE for 48 hours post injection and to call the clinic immediately for any adverse side effects as explained in clinic today.                     Patient questionnaires may have been collected.

## 2017-03-13 NOTE — MR AVS SNAPSHOT
[facility]  [address]  [facility] [address]  Phone: [phone]                  Ella Canales   3/13/2017 12:00 PM   Appointment    Description:  Female : 1950   Provider:  Angie Mullgian MD   Department:  [facility]                To Do List           Future Appointments        Provider Department Dept Phone    3/13/2017 12:00 PM Angie Mulligan MD [facility] [phone]    3/22/2017 1:00 PM Angie Mulligan MD [facility] [phone]    2017 8:40 AM Adry Damian MD [facility] [phone]      Goals (5 Years of Data)     None      [facility]     [facility] On Call Nurse Care Line -  Assistance  Registered nurses in the [facility] On Call Center provide clinical advisement, health education, appointment booking, and other advisory services.  Call for this free service at [phone].             Medications           Message regarding Medications     Verify the changes and/or additions to your medication regime listed below are the same as discussed with your clinician today.  If any of these changes or additions are incorrect, please notify your healthcare provider.             Verify that the below list of medications is an accurate representation of the medications you are currently taking.  If none reported, the list may be blank. If incorrect, please contact your healthcare provider. Carry this list with you in case of emergency.           Current Medications     chlorzoxazone (PARAFON FORTE) 500 mg Tab Take 1 tablet (500 mg total) by mouth 3 (three) times daily as needed (spasms).    ciprofloxacin HCl (CIPRO) 750 MG tablet Take 1 tablet (750 mg total) by mouth every 12 (twelve) hours.    dextrose 5 % SolP 250 mL with vancomycin 1,000 mg SolR 1,500 mg Inject 1,500 mg into the vein every 12 (twelve) hours.    docusate sodium (COLACE) 100 MG capsule Take 1 capsule (100 mg total) by mouth  2 (two) times daily as needed for Constipation.    ferrous sulfate 325 (65 FE) MG EC tablet Take 1 tablet (325 mg total) by mouth once daily.    fexofenadine (ALLEGRA) 60 MG tablet Take 60 mg by mouth once daily.    glucosamine-chondroit-vit C-Mn (GLUCOSAMINE-CHONDROITIN MAX ST) 500-400 mg Cap Twice a day    HYDROmorphone (DILAUDID) 4 MG tablet Take 1 tablet (4 mg total) by mouth every 6 (six) hours as needed for Pain.    losartan (COZAAR) 100 MG tablet Take 1 tablet (100 mg total) by mouth once daily.    metoprolol succinate (TOPROL-XL) 50 MG 24 hr tablet Take 1 tablet (50 mg total) by mouth once daily.    omeprazole (PRILOSEC) 20 MG capsule Take 1 capsule (20 mg total) by mouth 2 (two) times daily.    oxycodone-acetaminophen (PERCOCET)  mg per tablet Take 1 tablet by mouth every 6 (six) hours as needed for Pain.    oxycodone-acetaminophen (PERCOCET)  mg per tablet Take 1 tablet by mouth every 6 (six) hours as needed for Pain.    potassium chloride SA (K-DUR,KLOR-CON) 10 MEQ tablet Take 1 tablet (10 mEq total) by mouth 2 (two) times daily.    raloxifene (EVISTA) 60 mg tablet Take 1 tablet (60 mg total) by mouth once daily.    simvastatin (ZOCOR) 40 MG tablet Take 1 tablet (40 mg total) by mouth every evening.    vancomycin (VANCOCIN) 10 gram SolR            Clinical Reference Information           Your Vitals Were     Last Period                   04/03/2012           Allergies as of 3/13/2017     Ace Inhibitors      Immunizations Administered on Date of Encounter - 3/13/2017     None      Language Assistance Services     ATTENTION: Language assistance services are available, free of charge. Please call [phone].      ATENCIÓN: Si minna gómez, tiene a priest disposición servicios gratuitos de asistencia lingüística. Llame al [phone].     CHÚ Ý: N?u b?n nói Ti?ng Vi?t, có các d?ch v? h? tr? ngôn ng? mi?n phí dành cho b?n. G?i s? [phone].         [facility] complies with  applicable Federal civil rights laws and does not discriminate on the basis of race, color, national origin, age, disability, or sex.

## 2017-03-14 ENCOUNTER — LAB VISIT (OUTPATIENT)
Dept: LAB | Facility: HOSPITAL | Age: 67
End: 2017-03-14
Attending: ORTHOPAEDIC SURGERY
Payer: MEDICARE

## 2017-03-14 ENCOUNTER — PATIENT OUTREACH (OUTPATIENT)
Dept: ADMINISTRATIVE | Facility: CLINIC | Age: 67
End: 2017-03-14
Payer: MEDICARE

## 2017-03-14 DIAGNOSIS — L03.116 CELLULITIS OF LEFT FOOT: Primary | ICD-10-CM

## 2017-03-14 LAB
ANION GAP SERPL CALC-SCNC: 10 MMOL/L
BACTERIA SPEC AEROBE CULT: NO GROWTH
BASOPHILS # BLD AUTO: 0 K/UL
BASOPHILS NFR BLD: 0.3 %
BUN SERPL-MCNC: 6 MG/DL
CALCIUM SERPL-MCNC: 8.8 MG/DL
CHLORIDE SERPL-SCNC: 107 MMOL/L
CO2 SERPL-SCNC: 24 MMOL/L
CREAT SERPL-MCNC: 0.7 MG/DL
DIFFERENTIAL METHOD: ABNORMAL
EOSINOPHIL # BLD AUTO: 0.2 K/UL
EOSINOPHIL NFR BLD: 4.5 %
ERYTHROCYTE [DISTWIDTH] IN BLOOD BY AUTOMATED COUNT: 14.8 %
EST. GFR  (AFRICAN AMERICAN): >60 ML/MIN/1.73 M^2
EST. GFR  (NON AFRICAN AMERICAN): >60 ML/MIN/1.73 M^2
GLUCOSE SERPL-MCNC: 99 MG/DL
GRAM STN SPEC: NORMAL
GRAM STN SPEC: NORMAL
HCT VFR BLD AUTO: 25 %
HGB BLD-MCNC: 8.1 G/DL
LYMPHOCYTES # BLD AUTO: 1 K/UL
LYMPHOCYTES NFR BLD: 28.4 %
MCH RBC QN AUTO: 27.2 PG
MCHC RBC AUTO-ENTMCNC: 32.4 %
MCV RBC AUTO: 84 FL
MONOCYTES # BLD AUTO: 0.4 K/UL
MONOCYTES NFR BLD: 10.7 %
NEUTROPHILS # BLD AUTO: 1.9 K/UL
NEUTROPHILS NFR BLD: 56.1 %
PLATELET # BLD AUTO: 213 K/UL
PMV BLD AUTO: 7.3 FL
POTASSIUM SERPL-SCNC: 3.3 MMOL/L
RBC # BLD AUTO: 2.97 M/UL
SODIUM SERPL-SCNC: 141 MMOL/L
VANCOMYCIN TROUGH SERPL-MCNC: 18.2 UG/ML
WBC # BLD AUTO: 3.4 K/UL

## 2017-03-14 PROCEDURE — 80048 BASIC METABOLIC PNL TOTAL CA: CPT

## 2017-03-14 PROCEDURE — 85025 COMPLETE CBC W/AUTO DIFF WBC: CPT

## 2017-03-14 PROCEDURE — 36415 COLL VENOUS BLD VENIPUNCTURE: CPT

## 2017-03-14 PROCEDURE — 80202 ASSAY OF VANCOMYCIN: CPT

## 2017-03-14 NOTE — PATIENT INSTRUCTIONS
Call MD for: increased confusion or weakness      Call MD for: persistent dizziness, light-headedness, or visual disturbances      Call MD for: worsening rash      Call MD for: severe persistent headache      Call MD for: difficulty breathing or increased cough      Call MD for: redness, tenderness, or signs of infection (pain, swelling, redness, odor or green/yellow discharge around incision site)      Call MD for: severe uncontrolled pain      Call MD for: persistent nausea and vomiting or diarrhea      Call MD for: temperature >100.4          Leave dressing on - Keep it clean, dry, and intact until clinic visit   Order Comments: Will change Monday in clinic

## 2017-03-17 ENCOUNTER — LAB VISIT (OUTPATIENT)
Dept: LAB | Facility: HOSPITAL | Age: 67
End: 2017-03-17
Attending: ORTHOPAEDIC SURGERY
Payer: MEDICARE

## 2017-03-17 DIAGNOSIS — L03.116 CELLULITIS OF LEFT FOOT: Primary | ICD-10-CM

## 2017-03-17 LAB
ANION GAP SERPL CALC-SCNC: 9 MMOL/L
BACTERIA SPEC AEROBE CULT: NO GROWTH
BACTERIA SPEC ANAEROBE CULT: NORMAL
BACTERIA SPEC ANAEROBE CULT: NORMAL
BASOPHILS # BLD AUTO: 0 K/UL
BASOPHILS NFR BLD: 0.9 %
BUN SERPL-MCNC: 6 MG/DL
CALCIUM SERPL-MCNC: 8.9 MG/DL
CHLORIDE SERPL-SCNC: 106 MMOL/L
CO2 SERPL-SCNC: 25 MMOL/L
CREAT SERPL-MCNC: 0.7 MG/DL
DIFFERENTIAL METHOD: ABNORMAL
EOSINOPHIL # BLD AUTO: 0.1 K/UL
EOSINOPHIL NFR BLD: 2.8 %
ERYTHROCYTE [DISTWIDTH] IN BLOOD BY AUTOMATED COUNT: 14.9 %
EST. GFR  (AFRICAN AMERICAN): >60 ML/MIN/1.73 M^2
EST. GFR  (NON AFRICAN AMERICAN): >60 ML/MIN/1.73 M^2
GLUCOSE SERPL-MCNC: 94 MG/DL
HCT VFR BLD AUTO: 25.4 %
HGB BLD-MCNC: 8.2 G/DL
LYMPHOCYTES # BLD AUTO: 1 K/UL
LYMPHOCYTES NFR BLD: 28.1 %
MCH RBC QN AUTO: 26.8 PG
MCHC RBC AUTO-ENTMCNC: 32.4 %
MCV RBC AUTO: 83 FL
MONOCYTES # BLD AUTO: 0.5 K/UL
MONOCYTES NFR BLD: 13.2 %
NEUTROPHILS # BLD AUTO: 1.9 K/UL
NEUTROPHILS NFR BLD: 55 %
PLATELET # BLD AUTO: 283 K/UL
PMV BLD AUTO: 7.2 FL
POTASSIUM SERPL-SCNC: 3.5 MMOL/L
RBC # BLD AUTO: 3.07 M/UL
SODIUM SERPL-SCNC: 140 MMOL/L
VANCOMYCIN TROUGH SERPL-MCNC: 20.1 UG/ML
WBC # BLD AUTO: 3.5 K/UL

## 2017-03-17 PROCEDURE — 80202 ASSAY OF VANCOMYCIN: CPT

## 2017-03-17 PROCEDURE — 85025 COMPLETE CBC W/AUTO DIFF WBC: CPT

## 2017-03-17 PROCEDURE — 80048 BASIC METABOLIC PNL TOTAL CA: CPT

## 2017-03-17 PROCEDURE — 36415 COLL VENOUS BLD VENIPUNCTURE: CPT

## 2017-03-20 ENCOUNTER — OFFICE VISIT (OUTPATIENT)
Dept: INFECTIOUS DISEASES | Facility: CLINIC | Age: 67
End: 2017-03-20
Payer: MEDICARE

## 2017-03-20 ENCOUNTER — OFFICE VISIT (OUTPATIENT)
Dept: SPORTS MEDICINE | Facility: CLINIC | Age: 67
End: 2017-03-20
Payer: MEDICARE

## 2017-03-20 ENCOUNTER — LAB VISIT (OUTPATIENT)
Dept: LAB | Facility: HOSPITAL | Age: 67
End: 2017-03-20
Attending: ORTHOPAEDIC SURGERY
Payer: MEDICARE

## 2017-03-20 VITALS
HEART RATE: 91 BPM | SYSTOLIC BLOOD PRESSURE: 147 MMHG | WEIGHT: 148 LBS | BODY MASS INDEX: 29.06 KG/M2 | HEIGHT: 60 IN | DIASTOLIC BLOOD PRESSURE: 91 MMHG

## 2017-03-20 VITALS
HEIGHT: 60 IN | SYSTOLIC BLOOD PRESSURE: 152 MMHG | TEMPERATURE: 98 F | WEIGHT: 153 LBS | HEART RATE: 93 BPM | BODY MASS INDEX: 30.04 KG/M2 | DIASTOLIC BLOOD PRESSURE: 73 MMHG

## 2017-03-20 DIAGNOSIS — T84.54XA INFECTION OF PROSTHETIC LEFT KNEE JOINT: Primary | ICD-10-CM

## 2017-03-20 DIAGNOSIS — E66.9 OBESITY (BMI 30-39.9): ICD-10-CM

## 2017-03-20 DIAGNOSIS — L03.116 CELLULITIS OF LEFT FOOT: Primary | ICD-10-CM

## 2017-03-20 DIAGNOSIS — M25.562 LEFT KNEE PAIN, UNSPECIFIED CHRONICITY: ICD-10-CM

## 2017-03-20 DIAGNOSIS — M23.92 INTERNAL DERANGEMENT OF LEFT KNEE: ICD-10-CM

## 2017-03-20 LAB
ANION GAP SERPL CALC-SCNC: 9 MMOL/L
APPEARANCE FLD: NORMAL
BASOPHILS # BLD AUTO: 0 K/UL
BASOPHILS NFR BLD: 1 %
BODY FLD TYPE: NORMAL
BUN SERPL-MCNC: 7 MG/DL
CALCIUM SERPL-MCNC: 8.9 MG/DL
CHLORIDE SERPL-SCNC: 108 MMOL/L
CO2 SERPL-SCNC: 24 MMOL/L
COLOR FLD: NORMAL
CREAT SERPL-MCNC: 0.7 MG/DL
DIFFERENTIAL METHOD: ABNORMAL
EOSINOPHIL # BLD AUTO: 0.1 K/UL
EOSINOPHIL NFR BLD: 3.7 %
EOSINOPHIL NFR FLD MANUAL: 1 %
ERYTHROCYTE [DISTWIDTH] IN BLOOD BY AUTOMATED COUNT: 14.5 %
EST. GFR  (AFRICAN AMERICAN): >60 ML/MIN/1.73 M^2
EST. GFR  (NON AFRICAN AMERICAN): >60 ML/MIN/1.73 M^2
GLUCOSE SERPL-MCNC: 118 MG/DL
GRAM STN SPEC: NORMAL
GRAM STN SPEC: NORMAL
HCT VFR BLD AUTO: 26.4 %
HGB BLD-MCNC: 8.4 G/DL
LYMPHOCYTES # BLD AUTO: 1 K/UL
LYMPHOCYTES NFR BLD: 27.8 %
LYMPHOCYTES NFR FLD MANUAL: 8 %
MCH RBC QN AUTO: 26.4 PG
MCHC RBC AUTO-ENTMCNC: 31.8 %
MCV RBC AUTO: 83 FL
MONOCYTES # BLD AUTO: 0.4 K/UL
MONOCYTES NFR BLD: 10.4 %
MONOS+MACROS NFR FLD MANUAL: 3 %
NEUTROPHILS # BLD AUTO: 2.2 K/UL
NEUTROPHILS NFR BLD: 57.1 %
NEUTROPHILS NFR FLD MANUAL: 88 %
PLATELET # BLD AUTO: 345 K/UL
PMV BLD AUTO: 7.4 FL
POTASSIUM SERPL-SCNC: 3.4 MMOL/L
RBC # BLD AUTO: 3.18 M/UL
SODIUM SERPL-SCNC: 141 MMOL/L
VANCOMYCIN TROUGH SERPL-MCNC: 22.8 UG/ML
WBC # BLD AUTO: 3.7 K/UL
WBC # FLD: 598 /CU MM

## 2017-03-20 PROCEDURE — 36415 COLL VENOUS BLD VENIPUNCTURE: CPT

## 2017-03-20 PROCEDURE — 99024 POSTOP FOLLOW-UP VISIT: CPT | Mod: ,,, | Performed by: ORTHOPAEDIC SURGERY

## 2017-03-20 PROCEDURE — 80202 ASSAY OF VANCOMYCIN: CPT

## 2017-03-20 PROCEDURE — 99999 PR PBB SHADOW E&M-EST. PATIENT-LVL III: CPT | Mod: PBBFAC,,, | Performed by: ORTHOPAEDIC SURGERY

## 2017-03-20 PROCEDURE — 85025 COMPLETE CBC W/AUTO DIFF WBC: CPT

## 2017-03-20 PROCEDURE — 99215 OFFICE O/P EST HI 40 MIN: CPT | Mod: S$PBB,,, | Performed by: INTERNAL MEDICINE

## 2017-03-20 PROCEDURE — 80048 BASIC METABOLIC PNL TOTAL CA: CPT

## 2017-03-20 PROCEDURE — 99213 OFFICE O/P EST LOW 20 MIN: CPT | Mod: PBBFAC | Performed by: INTERNAL MEDICINE

## 2017-03-20 PROCEDURE — 99999 PR PBB SHADOW E&M-EST. PATIENT-LVL III: CPT | Mod: PBBFAC,,, | Performed by: INTERNAL MEDICINE

## 2017-03-20 RX ORDER — CIPROFLOXACIN 750 MG/1
750 TABLET, FILM COATED ORAL EVERY 12 HOURS
Qty: 60 TABLET | Refills: 1 | Status: SHIPPED | OUTPATIENT
Start: 2017-03-20 | End: 2017-04-20 | Stop reason: SDUPTHER

## 2017-03-20 NOTE — MR AVS SNAPSHOT
[facility]  [address]  [facility] [address]  Phone: [phone]  Fax: [phone]                  Ella Canales   3/20/2017 3:30 PM   Office Visit    Description:  Female : 1950   Provider:  Olman Mckinley MD   Department:  [facility]           Reason for Visit     Follow-up                To Do List           Future Appointments        Provider Department Dept Phone    3/20/2017 6:30 PM [facility] [phone]    4/3/2017 1:00 PM Olman Mckinley MD [facility] [phone]    2017 8:40 AM Adry Damian MD [facility] [phone]      Goals (5 Years of Data)     None       These Medications        Disp Refills Start End    ciprofloxacin HCl (CIPRO) 750 MG tablet 60 tablet 1 3/20/2017 2017    Take 1 tablet (750 mg total) by mouth every 12 (twelve) hours. - Oral    Pharmacy: [facility] [address] [place] [address] [address] AT [facility] Ph #: [phone]         [facility]     [facility] -  Assistance  Registered nurses in the [facility] provide clinical advisement, health education, appointment booking, and other advisory services.  Call for this free service at [phone].             Medications           Message regarding Medications     Verify the changes and/or additions to your medication regime listed below are the same as discussed with your clinician today.  If any of these changes or additions are incorrect, please notify your healthcare provider.        STOP taking these medications     chlorzoxazone (PARAFON FORTE) 500 mg Tab Take 1 tablet (500 mg total) by mouth 3 (three) times daily as needed (spasms).    docusate sodium (COLACE) 100 MG capsule Take 1 capsule (100 mg total) by mouth 2 (two) times daily as needed for Constipation.    HYDROmorphone  (DILAUDID) 4 MG tablet Take 1 tablet (4 mg total) by mouth every 6 (six) hours as needed for Pain.           Verify that the below list of medications is an accurate representation of the medications you are currently taking.  If none reported, the list may be blank. If incorrect, please contact your healthcare provider. Carry this list with you in case of emergency.           Current Medications     dextrose 5 % SolP 250 mL with vancomycin 1,000 mg SolR 1,500 mg Inject 1,500 mg into the vein every 12 (twelve) hours.    ferrous sulfate 325 (65 FE) MG EC tablet Take 1 tablet (325 mg total) by mouth once daily.    fexofenadine (ALLEGRA) 60 MG tablet Take 60 mg by mouth once daily.    glucosamine-chondroit-vit C-Mn (GLUCOSAMINE-CHONDROITIN MAX ST) 500-400 mg Cap Twice a day    losartan (COZAAR) 100 MG tablet Take 1 tablet (100 mg total) by mouth once daily.    metoprolol succinate (TOPROL-XL) 50 MG 24 hr tablet Take 1 tablet (50 mg total) by mouth once daily.    omeprazole (PRILOSEC) 20 MG capsule Take 1 capsule (20 mg total) by mouth 2 (two) times daily.    oxycodone-acetaminophen (PERCOCET)  mg per tablet Take 1 tablet by mouth every 6 (six) hours as needed for Pain.    potassium chloride SA (K-DUR,KLOR-CON) 10 MEQ tablet Take 1 tablet (10 mEq total) by mouth 2 (two) times daily.    raloxifene (EVISTA) 60 mg tablet Take 1 tablet (60 mg total) by mouth once daily.    simvastatin (ZOCOR) 40 MG tablet Take 1 tablet (40 mg total) by mouth every evening.    ciprofloxacin HCl (CIPRO) 750 MG tablet Take 1 tablet (750 mg total) by mouth every 12 (twelve) hours.           Clinical Reference Information           Your Vitals Were     BP Pulse Temp Height Weight Last Period    152/73 (BP Location: Left arm, Patient Position: Sitting) 93 97.8 °F (36.6 °C) (Oral) 5' (1.524 m) 69.4 kg (153 lb) 04/03/2012    BMI                29.88 kg/m2          Blood Pressure          Most Recent Value    BP  (!)  152/73      Allergies as  of 3/20/2017     Ace Inhibitors      Immunizations Administered on Date of Encounter - 3/20/2017     None      Language Assistance Services     ATTENTION: Language assistance services are available, free of charge. Please call [phone].      ATENCIÓN: Si minna gómez, tiene a priest disposición servicios gratuitos de asistencia lingüística. Llame al [phone].     CHÚ Ý: N?u b?n nói Ti?ng Vi?t, có các d?ch v? h? tr? ngôn ng? mi?n phí dành cho b?n. G?i s? [phone].         [facility] complies with applicable Federal civil rights laws and does not discriminate on the basis of race, color, national origin, age, disability, or sex.

## 2017-03-20 NOTE — PROGRESS NOTES
Subjective:          Chief Complaint: Ella Canales is a 66 y.o. female who had concerns including Post-op Evaluation of the Left Knee.    HPI Comments: Patient is here for a follow up for her left knee. She was then taken to the OR by Dr. Patel Denise for an irrigation and debridement on 2/24/2017. She has see ID  Dr. Mckinley.  She is here today for vac change. The patient states that the JF drain has been putting out approx 35-40cc per day;       She is on a PICC line with vanocmycin and Cipro orally.        DATE OF PROCEDURE: 03/09/2017     ATTENDING SURGEON: Angie Mulligan M.D.     FIRST ASSISTANT: Paolo Abdi M.D. - Fellow.     SECOND ASSISTANT: SMA Prince - Assistant.     PREOPERATIVE DIAGNOSIS: Left knee sepsis.     POSTOPERATIVE DIAGNOSES:  1. Left knee hemarthrosis.  2. Left knee sepsis.  3. Left knee synovitis.  4. Left knee medial retinacular tear.  5. Left knee medial collateral ligament insufficiency.     PROCEDURES PERFORMED:  1. Left knee complex polyethylene liner exchange.  2. Left knee complex incision and drainage.  3. Left knee medial collateral ligament repair.  4. Left knee medial retinacular repair with application of a flexed HD graft.  5. Left knee complex open synovectomy with hemostasis control.  6. Wound VAC application.              DATE OF PROCEDURE: 02/24/2017     PREOPERATIVE DIAGNOSIS: Possible septic arthritis, left total knee replacement.     POSTOPERATIVE DIAGNOSIS: Possible septic arthritis, left total knee   replacement.      PROCEDURE: Arthrotomy, left knee, with irrigation and debridement (CPT #41877).     SURGEON: Patel Denise M.D.     ASSISTANTS: Andrey Ya M.D. (RES); Anant Issa M.D. 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 Negative for chills, fever, night sweats, weight gain and weight loss.   HENT: Negative for congestion, headaches, hearing loss and sore throat.    Eyes: Negative for blurred vision, double vision and visual disturbance.   Cardiovascular: Negative for chest pain, leg swelling and palpitations.   Respiratory: Negative for cough and shortness of breath.    Endocrine: Negative for polyuria.   Hematologic/Lymphatic: Negative for bleeding problem. Does not bruise/bleed easily.   Skin: Negative for itching, poor wound healing and rash.   Musculoskeletal: Positive for joint pain, joint swelling and stiffness. Negative for back pain, muscle cramps, muscle weakness and myalgias.   Gastrointestinal: Negative for abdominal pain, constipation, diarrhea, melena, nausea and vomiting.   Genitourinary: Negative.  Negative for frequency and hematuria.   Neurological: Negative for dizziness, loss of balance, numbness, paresthesias and sensory change.   Psychiatric/Behavioral: Negative for altered mental status and depression. The patient is not nervous/anxious.    Allergic/Immunologic: Negative for hives.       Pain Related Questions  Over the past 3 days, what was your average pain during activity? (I.e. running, jogging, walking, climbing stairs, getting dressed, ect.): 2  Over the past 3 days, what was your highest pain level?: 4  Over the past 3 days, what was your lowest pain level? : 0    Other  How many nights a week are you awakened by your affected body part?: 0  Was the patient's HEIGHT measured or patient reported?: Patient Reported  Was the  patient's WEIGHT measured or patient reported?: Measured      Objective:        General: Ella is well-developed, well-nourished, appears stated age, in no acute distress, alert and oriented to time, place and person.     General    Vitals reviewed.  Constitutional: She is oriented to person, place, and time. She appears well-developed and well-nourished. No distress.   HENT:   Mouth/Throat: No oropharyngeal exudate.   Eyes: Right eye exhibits no discharge. Left eye exhibits no discharge.   Neck: Normal range of motion.   Cardiovascular: Normal rate and regular rhythm.    Pulmonary/Chest: Effort normal and breath sounds normal. No respiratory distress.   Neurological: She is alert and oriented to person, place, and time. She has normal reflexes. No cranial nerve deficit. Coordination normal.   Psychiatric: She has a normal mood and affect. Her behavior is normal. Judgment and thought content normal.     General Musculoskeletal Exam   Gait: antalgic       Right Knee Exam     Inspection   Erythema: absent  Scars: absent  Swelling: absent  Effusion: effusion  Deformity: deformity  Bruising: absent    Tenderness   The patient is experiencing no tenderness.         Range of Motion   Extension: 0   Flexion: 140     Tests   Meniscus   David:  Medial - negative Lateral - negative  Ligament Examination Lachman: normal (-1 to 2mm) PCL-Posterior Drawer: normal (0 to 2mm)     MCL - Valgus: normal (0 to 2mm)  LCL - Varus: normalPivot Shift: normal (Equal)Reverse Pivot Shift: normal (Equal)Dial Test at 30 degrees: normal (< 5 degrees)Dial Test at 90 degrees: normal (< 5 degrees)  Posterior Sag Test: negative  Posterolateral Corner: unstable (>15 degrees difference)  Patella   Patellar Apprehension: negative  Passive Patellar Tilt: neutral  Patellar Tracking: normal  Patellar Glide (quadrants): Lateral - 1   Medial - 2  Q-Angle at 90 degrees: normal  Patellar Grind: negative  J-Sign: none    Other   Meniscal Cyst:  absent  Popliteal (Baker's) Cyst: absent  Sensation: normal    Left Knee Exam     Inspection   Erythema: present  Scars: present  Swelling: present  Effusion: present  Deformity: deformity  Bruising: present    Tenderness   Left knee tenderness location: global tenderness.    Range of Motion   Extension:  10 abnormal   Flexion:  80 abnormal     Tests   Meniscus   David:  Medial - negative Lateral - negative  Stability Lachman: normal (-1 to 2mm) PCL-Posterior Drawer: normal (0 to 2mm)  MCL - Valgus: normal (0 to 2mm)  LCL - Varus: normal (0 to 2mm)Pivot Shift: normal (Equal)Reverse Pivot Shift: normal (Equal)Dial Test at 30 degrees: normal (< 5 degrees)Dial Test at 90 degrees: normal (< 5 degrees)  Posterior Sag Test: negative  Posterolateral Corner: unstable (>15 degrees difference)  Patella   Patellar Apprehension: negative  Passive Patellar Tilt: neutral  Patellar Tracking: normal  Patellar Glide (Quadrants): Lateral - 1 Medial - 2  Q-Angle at 90 degrees: normal  Patellar Grind: negative  J-Sign: J sign absent    Other   Meniscal Cyst: absent  Popliteal (Baker's) Cyst: absent  Sensation: normal    Comments:  Vac dressing intact; mikel drain  Removed  New vac dressing placed     Right Hip Exam     Tests   Ivon: negative  Left Hip Exam     Tests   Ivon: negative          Muscle Strength   Right Lower Extremity   Hip Abduction: 5/5   Quadriceps:  5/5   Hamstrin/5   Left Lower Extremity   Hip Abduction: 5/5   Quadriceps:  4/5   Hamstrin/5     Reflexes     Left Side  Quadriceps:  2+  Achilles:  2+    Right Side   Quadriceps:  2+  Achilles:  2+    Vascular Exam     Right Pulses  Dorsalis Pedis:      2+  Posterior Tibial:      2+        Left Pulses  Dorsalis Pedis:      2+  Posterior Tibial:      2+        Edema  Right Lower Leg: absent  Left Lower Leg: present            Assessment:       Encounter Diagnoses   Name Primary?   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 IKDC, SF-12 and KOOS was not filled out today in clinic.     RTC in 1 weeks with Dr. Angie Mulligan MD for postoperative follow-up. Patient will not fill out IKDC, SF-12 and KOOS on return.    2. Continue ABX per ID    3.  Wound vac dressing changed today     4. JF drain patient will come in weds if drainage significantly less than 30.                  Patient questionnaires may have been collected.

## 2017-03-20 NOTE — PROGRESS NOTES
Subjective:      Patient ID: Ella Canales is a 66 y.o. female.    Chief Complaint:Follow-up      History of Present Illness    2/14 pt had left TKR  2/24 seen in ER for redness and pain in left knee; was aspirated and had 8345 WBC; no crystals and all cultures then and subsequently have been sterile admitted to [facility] 2/24-28 and was treated empirically with IV vancomycin (1500 mg q12h and PO cipro 750 mg bid.  She had  explantation of the prosthesis on 3/9 and was sent home on IV vanco and PO cipro. Vanco and cipro start date was approximately 2/24.     She is tolerating the abx well. Had mild vaginal itching but self rx with fluconazole with relief. Right arm PICC line doing fine.    Getting IV vanco from [facility] is doing her labs at home.  Lab Results   Component Value Date    WBC 3.70 (L) 03/20/2017    HGB 8.4 (L) 03/20/2017    HCT 26.4 (L) 03/20/2017    MCV 83 03/20/2017     03/20/2017     Trough vanco level today was 22.8.  BMP was normal today.  Cultures from [facility] are negative to date.    Review of Systems   Constitution: Negative for chills, decreased appetite, fever, weakness, malaise/fatigue, night sweats, weight gain and weight loss.   HENT: Negative for congestion, ear pain, headaches, hearing loss, hoarse voice, sore throat and tinnitus.    Eyes: Negative for blurred vision, redness and visual disturbance.   Cardiovascular: Negative for chest pain, leg swelling and palpitations.   Respiratory: Negative for cough, hemoptysis, shortness of breath and sputum production.    Hematologic/Lymphatic: Negative for adenopathy. Does not bruise/bleed easily.   Skin: Negative for dry skin, itching, rash and suspicious lesions.   Musculoskeletal: Negative for back pain, joint pain, myalgias and neck pain.   Gastrointestinal: Negative for abdominal pain, constipation, diarrhea, heartburn, nausea and vomiting.   Genitourinary: Negative for dysuria, flank pain, frequency, hematuria, hesitancy and  urgency.   Neurological: Negative for dizziness, numbness and paresthesias.   Psychiatric/Behavioral: Negative for depression and memory loss. The patient does not have insomnia and is not nervous/anxious.      Objective:   Physical Exam   Constitutional: She is oriented to person, place, and time. She appears well-developed and well-nourished.   Musculoskeletal:   PICC site without redness or swelling or tenderness   Neurological: She is alert and oriented to person, place, and time.   Vitals reviewed.    Assessment:       1. Infection of prosthetic left knee joint , c linically with negative cultures         Plan:        1. Continue current abx   2. Weekly CBC, CMP, ESR, CRP and trough vanco level  3. See back in 2 weeks

## 2017-03-20 NOTE — MR AVS SNAPSHOT
[facility]  [address]  [facility] [address]  Phone: [phone]                  Ella Canales   3/20/2017 1:00 PM   Appointment    Description:  Female : 1950   Provider:  Angie Mulligan MD   Department:  [facility]                To Do List           Future Appointments        Provider Department Dept Phone    3/20/2017 1:00 PM Angie Mulligan MD [facility] [phone]    3/20/2017 3:30 PM Olman Mckinley MD [facility] - Infectious Diseases [phone]    2017 8:40 AM Adry Damian MD [facility] Medicine [phone]      Goals (5 Years of Data)     None      [facility] On [facility]     [facility] On Call Nurse Care Line -  Assistance  Registered nurses in the [facility] On Call Center provide clinical advisement, health education, appointment booking, and other advisory services.  Call for this free service at [phone].             Medications           Message regarding Medications     Verify the changes and/or additions to your medication regime listed below are the same as discussed with your clinician today.  If any of these changes or additions are incorrect, please notify your healthcare provider.             Verify that the below list of medications is an accurate representation of the medications you are currently taking.  If none reported, the list may be blank. If incorrect, please contact your healthcare provider. Carry this list with you in case of emergency.           Current Medications     chlorzoxazone (PARAFON FORTE) 500 mg Tab Take 1 tablet (500 mg total) by mouth 3 (three) times daily as needed (spasms).    ciprofloxacin HCl (CIPRO) 750 MG tablet Take 1 tablet (750 mg total) by mouth every 12 (twelve) hours.    dextrose 5 % SolP 250 mL with vancomycin 1,000 mg SolR 1,500 mg Inject 1,500 mg into the vein every 12 (twelve) hours.    docusate sodium (COLACE) 100 MG capsule Take 1 capsule (100 mg total)  by mouth 2 (two) times daily as needed for Constipation.    ferrous sulfate 325 (65 FE) MG EC tablet Take 1 tablet (325 mg total) by mouth once daily.    fexofenadine (ALLEGRA) 60 MG tablet Take 60 mg by mouth once daily.    glucosamine-chondroit-vit C-Mn (GLUCOSAMINE-CHONDROITIN MAX ST) 500-400 mg Cap Twice a day    HYDROmorphone (DILAUDID) 4 MG tablet Take 1 tablet (4 mg total) by mouth every 6 (six) hours as needed for Pain.    losartan (COZAAR) 100 MG tablet Take 1 tablet (100 mg total) by mouth once daily.    metoprolol succinate (TOPROL-XL) 50 MG 24 hr tablet Take 1 tablet (50 mg total) by mouth once daily.    omeprazole (PRILOSEC) 20 MG capsule Take 1 capsule (20 mg total) by mouth 2 (two) times daily.    oxycodone-acetaminophen (PERCOCET)  mg per tablet Take 1 tablet by mouth every 6 (six) hours as needed for Pain.    potassium chloride SA (K-DUR,KLOR-CON) 10 MEQ tablet Take 1 tablet (10 mEq total) by mouth 2 (two) times daily.    raloxifene (EVISTA) 60 mg tablet Take 1 tablet (60 mg total) by mouth once daily.    simvastatin (ZOCOR) 40 MG tablet Take 1 tablet (40 mg total) by mouth every evening.           Clinical Reference Information           Your Vitals Were     Last Period                   04/03/2012           Allergies as of 3/20/2017     Ace Inhibitors      Immunizations Administered on Date of Encounter - 3/20/2017     None      Language Assistance Services     ATTENTION: Language assistance services are available, free of charge. Please call [phone].      ATENCIÓN: Si minna dalia, tiene a priest disposición servicios gratuitos de asistencia lingüística. Llame al [phone].     MARCE Ý: N?u b?n nói Ti?ng Vi?t, có các d?ch v? h? tr? ngôn ng? mi?n phí dành cho b?n. G?i s? [phone].         [facility] complies with applicable Federal civil rights laws and does not discriminate on the basis of race, color, national origin, age, disability, or sex.

## 2017-03-21 LAB — BACTERIA SPEC ANAEROBE CULT: NORMAL

## 2017-03-22 ENCOUNTER — TELEPHONE (OUTPATIENT)
Dept: SPORTS MEDICINE | Facility: CLINIC | Age: 67
End: 2017-03-22

## 2017-03-22 ENCOUNTER — LAB VISIT (OUTPATIENT)
Dept: LAB | Facility: HOSPITAL | Age: 67
End: 2017-03-22
Attending: INTERNAL MEDICINE
Payer: MEDICARE

## 2017-03-22 DIAGNOSIS — L03.116 CELLULITIS OF LEFT FOOT: Primary | ICD-10-CM

## 2017-03-22 LAB — VANCOMYCIN TROUGH SERPL-MCNC: 21.3 UG/ML

## 2017-03-22 PROCEDURE — 36415 COLL VENOUS BLD VENIPUNCTURE: CPT

## 2017-03-22 PROCEDURE — 80202 ASSAY OF VANCOMYCIN: CPT

## 2017-03-22 NOTE — TELEPHONE ENCOUNTER
----- Message from Robert Mulligan sent at 3/22/2017 10:40 AM CDT -----  Contact: self  Pt called regarding coming in to see Dr. Mulligan today. She states she was told to call to see when she can come in

## 2017-03-23 LAB — BACTERIA SPEC AEROBE CULT: NO GROWTH

## 2017-03-27 ENCOUNTER — OFFICE VISIT (OUTPATIENT)
Dept: SPORTS MEDICINE | Facility: CLINIC | Age: 67
End: 2017-03-27
Payer: MEDICARE

## 2017-03-27 VITALS
HEIGHT: 60 IN | DIASTOLIC BLOOD PRESSURE: 83 MMHG | HEART RATE: 85 BPM | SYSTOLIC BLOOD PRESSURE: 139 MMHG | BODY MASS INDEX: 30.04 KG/M2 | WEIGHT: 153 LBS

## 2017-03-27 DIAGNOSIS — T84.84XA PAINFUL ORTHOPAEDIC HARDWARE: ICD-10-CM

## 2017-03-27 DIAGNOSIS — M23.92 INTERNAL DERANGEMENT OF LEFT KNEE: ICD-10-CM

## 2017-03-27 DIAGNOSIS — M25.562 LEFT KNEE PAIN, UNSPECIFIED CHRONICITY: Primary | ICD-10-CM

## 2017-03-27 DIAGNOSIS — T84.54XA INFECTION OF PROSTHETIC LEFT KNEE JOINT: ICD-10-CM

## 2017-03-27 LAB — BACTERIA SPEC ANAEROBE CULT: NORMAL

## 2017-03-27 PROCEDURE — 97605 NEG PRS WND THER DME<=50SQCM: CPT | Mod: PBBFAC,PO | Performed by: ORTHOPAEDIC SURGERY

## 2017-03-27 PROCEDURE — 99024 POSTOP FOLLOW-UP VISIT: CPT | Mod: ,,, | Performed by: ORTHOPAEDIC SURGERY

## 2017-03-27 PROCEDURE — 97605 NEG PRS WND THER DME<=50SQCM: CPT | Mod: S$PBB,,, | Performed by: ORTHOPAEDIC SURGERY

## 2017-03-27 PROCEDURE — 99213 OFFICE O/P EST LOW 20 MIN: CPT | Mod: PBBFAC,PO | Performed by: ORTHOPAEDIC SURGERY

## 2017-03-27 PROCEDURE — 99999 PR PBB SHADOW E&M-EST. PATIENT-LVL III: CPT | Mod: PBBFAC,,, | Performed by: ORTHOPAEDIC SURGERY

## 2017-03-27 NOTE — MR AVS SNAPSHOT
[facility]  [address]  [facility] [address]  Phone: [phone]                  Ella Canales   3/27/2017 1:30 PM   Appointment    Description:  Female : 1950   Provider:  Angie Mulligan MD   Department:  [facility]                To Do List           Future Appointments        Provider Department Dept Phone    3/27/2017 1:30 PM Angie Mulligan MD [facility] [phone]    4/3/2017 1:00 PM Olman Mckinley MD [facility] - Infectious Diseases [phone]    2017 8:40 AM Adry Damian MD [facility] Medicine [phone]      Goals (5 Years of Data)     None      [facility]     [facility] On Call Nurse Care Line -  Assistance  Registered nurses in the [facility] On Call Center provide clinical advisement, health education, appointment booking, and other advisory services.  Call for this free service at [phone].             Medications           Message regarding Medications     Verify the changes and/or additions to your medication regime listed below are the same as discussed with your clinician today.  If any of these changes or additions are incorrect, please notify your healthcare provider.             Verify that the below list of medications is an accurate representation of the medications you are currently taking.  If none reported, the list may be blank. If incorrect, please contact your healthcare provider. Carry this list with you in case of emergency.           Current Medications     ciprofloxacin HCl (CIPRO) 750 MG tablet Take 1 tablet (750 mg total) by mouth every 12 (twelve) hours.    dextrose 5 % SolP 250 mL with vancomycin 1,000 mg SolR 1,500 mg Inject 1,500 mg into the vein every 12 (twelve) hours.    ferrous sulfate 325 (65 FE) MG EC tablet Take 1 tablet (325 mg total) by mouth once daily.    fexofenadine (ALLEGRA) 60 MG tablet Take 60 mg by mouth once daily.    glucosamine-chondroit-vit C-Mn  (GLUCOSAMINE-CHONDROITIN MAX ST) 500-400 mg Cap Twice a day    losartan (COZAAR) 100 MG tablet Take 1 tablet (100 mg total) by mouth once daily.    metoprolol succinate (TOPROL-XL) 50 MG 24 hr tablet Take 1 tablet (50 mg total) by mouth once daily.    omeprazole (PRILOSEC) 20 MG capsule Take 1 capsule (20 mg total) by mouth 2 (two) times daily.    oxycodone-acetaminophen (PERCOCET)  mg per tablet Take 1 tablet by mouth every 6 (six) hours as needed for Pain.    potassium chloride SA (K-DUR,KLOR-CON) 10 MEQ tablet Take 1 tablet (10 mEq total) by mouth 2 (two) times daily.    raloxifene (EVISTA) 60 mg tablet Take 1 tablet (60 mg total) by mouth once daily.    simvastatin (ZOCOR) 40 MG tablet Take 1 tablet (40 mg total) by mouth every evening.           Clinical Reference Information           Your Vitals Were     Last Period                   04/03/2012           Allergies as of 3/27/2017     Ace Inhibitors      Immunizations Administered on Date of Encounter - 3/27/2017     None      Language Assistance Services     ATTENTION: Language assistance services are available, free of charge. Please call [phone].      ATENCIÓN: Si spikela dalia, tiene a priest disposición servicios gratuitos de asistencia lingüística. Llame al [phone].     [facility] Ý: N?u b?n nói Ti?ng Vi?t, có các d?ch v? h? tr? ngôn ng? mi?n phí dành cho b?n. G?i s? [phone].         [facility] complies with applicable Federal civil rights laws and does not discriminate on the basis of race, color, national origin, age, disability, or sex.

## 2017-03-27 NOTE — PROGRESS NOTES
Subjective:          Chief Complaint: Ella Canales is a 66 y.o. female who had concerns including Post-op Evaluation of the Left Knee.    HPI Comments: Patient is here for a follow up for her left knee. She was then taken to the OR by Dr. Patel Denise for an irrigation and debridement on 2/24/2017. She has see ID  Dr. Mckinley.  She is here today for vac change. The patient states that the JF drain has been putting out approx 35-40cc per day;       She is on a PICC line with vanocmycin and Cipro orally.        DATE OF PROCEDURE: 03/09/2017     ATTENDING SURGEON: Angie Mulligan M.D.     FIRST ASSISTANT: Paolo Abdi M.D. - Fellow.     SECOND ASSISTANT: SMA Prince - Assistant.     PREOPERATIVE DIAGNOSIS: Left knee sepsis.     POSTOPERATIVE DIAGNOSES:  1. Left knee hemarthrosis.  2. Left knee sepsis.  3. Left knee synovitis.  4. Left knee medial retinacular tear.  5. Left knee medial collateral ligament insufficiency.     PROCEDURES PERFORMED:  1. Left knee complex polyethylene liner exchange.  2. Left knee complex incision and drainage.  3. Left knee medial collateral ligament repair.  4. Left knee medial retinacular repair with application of a flexed HD graft.  5. Left knee complex open synovectomy with hemostasis control.  6. Wound VAC application.              DATE OF PROCEDURE: 02/24/2017     PREOPERATIVE DIAGNOSIS: Possible septic arthritis, left total knee replacement.     POSTOPERATIVE DIAGNOSIS: Possible septic arthritis, left total knee   replacement.      PROCEDURE: Arthrotomy, left knee, with irrigation and debridement (CPT #99284).     SURGEON: Patel Denise M.D.     ASSISTANTS: Andrey Ya M.D. (RES); Anant Issa M.D. 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 Negative for chills, fever, night sweats, weight gain and weight loss.   HENT: Negative for congestion, headaches, hearing loss and sore throat.    Eyes: Negative for blurred vision, double vision and visual disturbance.   Cardiovascular: Negative for chest pain, leg swelling and palpitations.   Respiratory: Negative for cough and shortness of breath.    Endocrine: Negative for polyuria.   Hematologic/Lymphatic: Negative for bleeding problem. Does not bruise/bleed easily.   Skin: Negative for itching, poor wound healing and rash.   Musculoskeletal: Positive for joint pain, joint swelling and stiffness. Negative for back pain, muscle cramps, muscle weakness and myalgias.   Gastrointestinal: Negative for abdominal pain, constipation, diarrhea, melena, nausea and vomiting.   Genitourinary: Negative.  Negative for frequency and hematuria.   Neurological: Negative for dizziness, loss of balance, numbness, paresthesias and sensory change.   Psychiatric/Behavioral: Negative for altered mental status and depression. The patient is not nervous/anxious.    Allergic/Immunologic: Negative for hives.       Pain Related Questions  Over the past 3 days, what was your average pain during activity? (I.e. running, jogging, walking, climbing stairs, getting dressed, ect.): 2  Over the past 3 days, what was your highest pain level?: 2  Over the past 3 days, what was your lowest pain level? : 1    Other  How many nights a week are you awakened by your affected body part?: 0  Was the patient's HEIGHT measured or patient reported?: Patient Reported  Was the  patient's WEIGHT measured or patient reported?: Measured      Objective:        General: Ella is well-developed, well-nourished, appears stated age, in no acute distress, alert and oriented to time, place and person.     General    Vitals reviewed.  Constitutional: She is oriented to person, place, and time. She appears well-developed and well-nourished. No distress.   HENT:   Mouth/Throat: No oropharyngeal exudate.   Eyes: Right eye exhibits no discharge. Left eye exhibits no discharge.   Neck: Normal range of motion.   Cardiovascular: Normal rate and regular rhythm.    Pulmonary/Chest: Effort normal and breath sounds normal. No respiratory distress.   Neurological: She is alert and oriented to person, place, and time. She has normal reflexes. No cranial nerve deficit. Coordination normal.   Psychiatric: She has a normal mood and affect. Her behavior is normal. Judgment and thought content normal.     General Musculoskeletal Exam   Gait: antalgic       Right Knee Exam     Inspection   Erythema: absent  Scars: absent  Swelling: absent  Effusion: effusion  Deformity: deformity  Bruising: absent    Tenderness   The patient is experiencing no tenderness.         Range of Motion   Extension: 0   Flexion: 140     Tests   Meniscus   David:  Medial - negative Lateral - negative  Ligament Examination Lachman: normal (-1 to 2mm) PCL-Posterior Drawer: normal (0 to 2mm)     MCL - Valgus: normal (0 to 2mm)  LCL - Varus: normalPivot Shift: normal (Equal)Reverse Pivot Shift: normal (Equal)Dial Test at 30 degrees: normal (< 5 degrees)Dial Test at 90 degrees: normal (< 5 degrees)  Posterior Sag Test: negative  Posterolateral Corner: unstable (>15 degrees difference)  Patella   Patellar Apprehension: negative  Passive Patellar Tilt: neutral  Patellar Tracking: normal  Patellar Glide (quadrants): Lateral - 1   Medial - 2  Q-Angle at 90 degrees: normal  Patellar Grind: negative  J-Sign: none    Other   Meniscal Cyst:  absent  Popliteal (Baker's) Cyst: absent  Sensation: normal    Left Knee Exam     Inspection   Erythema: present  Scars: present  Swelling: present  Effusion: present  Deformity: deformity  Bruising: present    Tenderness   Left knee tenderness location: global tenderness.    Range of Motion   Extension:  10 abnormal   Flexion:  80 abnormal     Tests   Meniscus   David:  Medial - negative Lateral - negative  Stability Lachman: normal (-1 to 2mm) PCL-Posterior Drawer: normal (0 to 2mm)  MCL - Valgus: normal (0 to 2mm)  LCL - Varus: normal (0 to 2mm)Pivot Shift: normal (Equal)Reverse Pivot Shift: normal (Equal)Dial Test at 30 degrees: normal (< 5 degrees)Dial Test at 90 degrees: normal (< 5 degrees)  Posterior Sag Test: negative  Posterolateral Corner: unstable (>15 degrees difference)  Patella   Patellar Apprehension: negative  Passive Patellar Tilt: neutral  Patellar Tracking: normal  Patellar Glide (Quadrants): Lateral - 1 Medial - 2  Q-Angle at 90 degrees: normal  Patellar Grind: negative  J-Sign: J sign absent    Other   Meniscal Cyst: absent  Popliteal (Baker's) Cyst: absent  Sensation: normal    Comments:  Vac dressing intact; mikel drain removed  New vac dressing placed     Right Hip Exam     Tests   Ivon: negative  Left Hip Exam     Tests   Ivon: negative          Muscle Strength   Right Lower Extremity   Hip Abduction: 5/5   Quadriceps:  5/5   Hamstrin/5   Left Lower Extremity   Hip Abduction: 5/5   Quadriceps:  4/5   Hamstrin/5     Reflexes     Left Side  Quadriceps:  2+  Achilles:  2+    Right Side   Quadriceps:  2+  Achilles:  2+    Vascular Exam     Right Pulses  Dorsalis Pedis:      2+  Posterior Tibial:      2+        Left Pulses  Dorsalis Pedis:      2+  Posterior Tibial:      2+        Edema  Right Lower Leg: absent  Left Lower Leg: present            Assessment:       Encounter Diagnoses   Name Primary?    Left knee pain, unspecified chronicity Yes    Infection of prosthetic left knee  joint     Internal derangement of left knee     Painful orthopaedic hardware           Plan:       1. IKDC, SF-12 and KOOS was not filled out today in clinic.     RTC in 1 weeks with Dr. Angie Mulligan MD for postoperative follow-up. Patient will not fill out IKDC, SF-12 and KOOS on return.    2. Continue ABX per ID    3.  Wound vac dressing changed today     4. JF drain removed today                  Patient questionnaires may have been collected.

## 2017-03-28 ENCOUNTER — OFFICE VISIT (OUTPATIENT)
Dept: SPORTS MEDICINE | Facility: CLINIC | Age: 67
End: 2017-03-28
Payer: MEDICARE

## 2017-03-28 ENCOUNTER — LAB VISIT (OUTPATIENT)
Dept: LAB | Facility: HOSPITAL | Age: 67
End: 2017-03-28
Attending: INTERNAL MEDICINE
Payer: MEDICARE

## 2017-03-28 ENCOUNTER — TELEPHONE (OUTPATIENT)
Dept: SPORTS MEDICINE | Facility: CLINIC | Age: 67
End: 2017-03-28

## 2017-03-28 ENCOUNTER — TELEPHONE (OUTPATIENT)
Dept: INFECTIOUS DISEASES | Facility: CLINIC | Age: 67
End: 2017-03-28

## 2017-03-28 DIAGNOSIS — L03.116 CELLULITIS OF LEFT FOOT: Primary | ICD-10-CM

## 2017-03-28 DIAGNOSIS — S81.802S WOUND, OPEN, LEG, LEFT, SEQUELA: ICD-10-CM

## 2017-03-28 DIAGNOSIS — M21.161 GENU VARUM OF RIGHT LOWER EXTREMITY: ICD-10-CM

## 2017-03-28 DIAGNOSIS — T84.84XA PAINFUL ORTHOPAEDIC HARDWARE: Primary | ICD-10-CM

## 2017-03-28 DIAGNOSIS — M23.92 INTERNAL DERANGEMENT OF LEFT KNEE: ICD-10-CM

## 2017-03-28 LAB
ALBUMIN SERPL BCP-MCNC: 3.6 G/DL
ALP SERPL-CCNC: 99 U/L
ALT SERPL W/O P-5'-P-CCNC: 15 U/L
ANION GAP SERPL CALC-SCNC: 9 MMOL/L
AST SERPL-CCNC: 20 U/L
BASOPHILS # BLD AUTO: 0 K/UL
BASOPHILS NFR BLD: 1.3 %
BILIRUB SERPL-MCNC: 0.4 MG/DL
BUN SERPL-MCNC: 7 MG/DL
CALCIUM SERPL-MCNC: 9.1 MG/DL
CHLORIDE SERPL-SCNC: 108 MMOL/L
CO2 SERPL-SCNC: 24 MMOL/L
CREAT SERPL-MCNC: 0.7 MG/DL
CRP SERPL-MCNC: 11.4 MG/L
DIFFERENTIAL METHOD: ABNORMAL
EOSINOPHIL # BLD AUTO: 0.1 K/UL
EOSINOPHIL NFR BLD: 3.3 %
ERYTHROCYTE [DISTWIDTH] IN BLOOD BY AUTOMATED COUNT: 15.8 %
ERYTHROCYTE [SEDIMENTATION RATE] IN BLOOD BY WESTERGREN METHOD: 22 MM/HR
EST. GFR  (AFRICAN AMERICAN): >60 ML/MIN/1.73 M^2
EST. GFR  (NON AFRICAN AMERICAN): >60 ML/MIN/1.73 M^2
GLUCOSE SERPL-MCNC: 98 MG/DL
HCT VFR BLD AUTO: 29 %
HGB BLD-MCNC: 9.4 G/DL
LYMPHOCYTES # BLD AUTO: 0.8 K/UL
LYMPHOCYTES NFR BLD: 24.2 %
MCH RBC QN AUTO: 26.4 PG
MCHC RBC AUTO-ENTMCNC: 32.3 %
MCV RBC AUTO: 82 FL
MONOCYTES # BLD AUTO: 0.4 K/UL
MONOCYTES NFR BLD: 12.8 %
NEUTROPHILS # BLD AUTO: 1.9 K/UL
NEUTROPHILS NFR BLD: 58.4 %
PLATELET # BLD AUTO: 313 K/UL
PMV BLD AUTO: 7.1 FL
POTASSIUM SERPL-SCNC: 3.4 MMOL/L
PROT SERPL-MCNC: 6.4 G/DL
RBC # BLD AUTO: 3.55 M/UL
SODIUM SERPL-SCNC: 141 MMOL/L
VANCOMYCIN TROUGH SERPL-MCNC: 24.3 UG/ML
WBC # BLD AUTO: 3.3 K/UL

## 2017-03-28 PROCEDURE — 85025 COMPLETE CBC W/AUTO DIFF WBC: CPT

## 2017-03-28 PROCEDURE — 86140 C-REACTIVE PROTEIN: CPT

## 2017-03-28 PROCEDURE — 85651 RBC SED RATE NONAUTOMATED: CPT

## 2017-03-28 PROCEDURE — 36415 COLL VENOUS BLD VENIPUNCTURE: CPT

## 2017-03-28 PROCEDURE — 99024 POSTOP FOLLOW-UP VISIT: CPT | Mod: ,,, | Performed by: ORTHOPAEDIC SURGERY

## 2017-03-28 PROCEDURE — 80202 ASSAY OF VANCOMYCIN: CPT

## 2017-03-28 PROCEDURE — 80053 COMPREHEN METABOLIC PANEL: CPT

## 2017-03-28 PROCEDURE — 97605 NEG PRS WND THER DME<=50SQCM: CPT | Mod: PBBFAC,PO | Performed by: ORTHOPAEDIC SURGERY

## 2017-03-28 NOTE — TELEPHONE ENCOUNTER
----- Message from Aries Crain sent at 3/28/2017  9:41 AM CDT -----  Contact: self  Patient called have concerns about bag pump on leg.

## 2017-03-28 NOTE — TELEPHONE ENCOUNTER
Trough vanco high again (24) on 1500 q 12h; will reduce to 1250 q12 and recheck trough; creatinine normal  melchor

## 2017-03-29 LAB
FUNGUS SPEC CULT: NORMAL

## 2017-03-31 ENCOUNTER — LAB VISIT (OUTPATIENT)
Dept: LAB | Facility: HOSPITAL | Age: 67
End: 2017-03-31
Attending: INTERNAL MEDICINE
Payer: MEDICARE

## 2017-03-31 ENCOUNTER — TELEPHONE (OUTPATIENT)
Dept: INFECTIOUS DISEASES | Facility: CLINIC | Age: 67
End: 2017-03-31

## 2017-03-31 DIAGNOSIS — L03.116 CELLULITIS OF LEFT FOOT: Primary | ICD-10-CM

## 2017-03-31 PROBLEM — S81.802A OPEN WOUND OF LEFT LOWER EXTREMITY: Status: ACTIVE | Noted: 2017-03-31

## 2017-03-31 LAB — VANCOMYCIN TROUGH SERPL-MCNC: 21 UG/ML

## 2017-03-31 PROCEDURE — 80202 ASSAY OF VANCOMYCIN: CPT

## 2017-03-31 PROCEDURE — 36415 COLL VENOUS BLD VENIPUNCTURE: CPT

## 2017-03-31 NOTE — TELEPHONE ENCOUNTER
Teagan with [facility] called [phone]     March 28, 2017 - 24.3 vanc level     March 31, 2017   21 vanc level     On 03/28 vancomycin was reduced from 1500 to 1250 q12.     Kade wants to know if the vancomycin should be reduced or stay the same. Please advise.

## 2017-03-31 NOTE — TELEPHONE ENCOUNTER
I spoke with Dr. Putnam and he advised me to call [facility] and reduce Vancomycin to 1000 q12 and to repeat the labs. I spoke with Teagan at [facility] and she stated she will let the the home health know the new orders and they will repeat labs Monday morning.      Patient will see Dr. Mckinley on Monday 04/03/2017.

## 2017-04-03 ENCOUNTER — LAB VISIT (OUTPATIENT)
Dept: LAB | Facility: HOSPITAL | Age: 67
End: 2017-04-03
Attending: INTERNAL MEDICINE
Payer: MEDICARE

## 2017-04-03 ENCOUNTER — TELEPHONE (OUTPATIENT)
Dept: INFECTIOUS DISEASES | Facility: CLINIC | Age: 67
End: 2017-04-03

## 2017-04-03 ENCOUNTER — OFFICE VISIT (OUTPATIENT)
Dept: INFECTIOUS DISEASES | Facility: CLINIC | Age: 67
End: 2017-04-03
Payer: MEDICARE

## 2017-04-03 ENCOUNTER — OFFICE VISIT (OUTPATIENT)
Dept: SPORTS MEDICINE | Facility: CLINIC | Age: 67
End: 2017-04-03
Payer: MEDICARE

## 2017-04-03 VITALS
HEART RATE: 75 BPM | DIASTOLIC BLOOD PRESSURE: 83 MMHG | HEIGHT: 60 IN | BODY MASS INDEX: 29.45 KG/M2 | WEIGHT: 150 LBS | SYSTOLIC BLOOD PRESSURE: 152 MMHG

## 2017-04-03 VITALS
HEART RATE: 83 BPM | DIASTOLIC BLOOD PRESSURE: 77 MMHG | SYSTOLIC BLOOD PRESSURE: 149 MMHG | HEIGHT: 60 IN | WEIGHT: 143.31 LBS | BODY MASS INDEX: 28.13 KG/M2 | TEMPERATURE: 99 F

## 2017-04-03 DIAGNOSIS — L03.116 CELLULITIS OF LEFT FOOT: Primary | ICD-10-CM

## 2017-04-03 DIAGNOSIS — T84.59XA INFECTED PROSTHETIC KNEE JOINT, INITIAL ENCOUNTER: Primary | ICD-10-CM

## 2017-04-03 DIAGNOSIS — Z96.659 INFECTED PROSTHETIC KNEE JOINT, INITIAL ENCOUNTER: Primary | ICD-10-CM

## 2017-04-03 DIAGNOSIS — M25.562 CHRONIC PAIN OF LEFT KNEE: ICD-10-CM

## 2017-04-03 DIAGNOSIS — M23.92 INTERNAL DERANGEMENT OF LEFT KNEE: ICD-10-CM

## 2017-04-03 DIAGNOSIS — T84.54XA INFECTION OF PROSTHETIC LEFT KNEE JOINT: Primary | ICD-10-CM

## 2017-04-03 DIAGNOSIS — G89.29 CHRONIC PAIN OF LEFT KNEE: ICD-10-CM

## 2017-04-03 DIAGNOSIS — S81.802S OPEN WOUND OF LEFT LOWER EXTREMITY, SEQUELA: Primary | ICD-10-CM

## 2017-04-03 LAB
ALBUMIN SERPL BCP-MCNC: 3.6 G/DL
ALP SERPL-CCNC: 91 U/L
ALT SERPL W/O P-5'-P-CCNC: 12 U/L
ANION GAP SERPL CALC-SCNC: 9 MMOL/L
AST SERPL-CCNC: 17 U/L
BASOPHILS # BLD AUTO: 0 K/UL
BASOPHILS NFR BLD: 0.6 %
BILIRUB SERPL-MCNC: 0.3 MG/DL
BUN SERPL-MCNC: 7 MG/DL
CALCIUM SERPL-MCNC: 9.2 MG/DL
CHLORIDE SERPL-SCNC: 107 MMOL/L
CO2 SERPL-SCNC: 25 MMOL/L
CREAT SERPL-MCNC: 0.8 MG/DL
CRP SERPL-MCNC: 8.4 MG/L
DIFFERENTIAL METHOD: ABNORMAL
EOSINOPHIL # BLD AUTO: 0.1 K/UL
EOSINOPHIL NFR BLD: 3.3 %
ERYTHROCYTE [DISTWIDTH] IN BLOOD BY AUTOMATED COUNT: 15.5 %
ERYTHROCYTE [SEDIMENTATION RATE] IN BLOOD BY WESTERGREN METHOD: 12 MM/HR
EST. GFR  (AFRICAN AMERICAN): >60 ML/MIN/1.73 M^2
EST. GFR  (NON AFRICAN AMERICAN): >60 ML/MIN/1.73 M^2
GLUCOSE SERPL-MCNC: 109 MG/DL
HCT VFR BLD AUTO: 29.6 %
HGB BLD-MCNC: 9.5 G/DL
LYMPHOCYTES # BLD AUTO: 0.9 K/UL
LYMPHOCYTES NFR BLD: 26 %
MCH RBC QN AUTO: 26.2 PG
MCHC RBC AUTO-ENTMCNC: 32 %
MCV RBC AUTO: 82 FL
MONOCYTES # BLD AUTO: 0.3 K/UL
MONOCYTES NFR BLD: 9.8 %
NEUTROPHILS # BLD AUTO: 2 K/UL
NEUTROPHILS NFR BLD: 60.3 %
PLATELET # BLD AUTO: 222 K/UL
PMV BLD AUTO: 7.7 FL
POTASSIUM SERPL-SCNC: 3.3 MMOL/L
PROT SERPL-MCNC: 6.3 G/DL
RBC # BLD AUTO: 3.62 M/UL
SODIUM SERPL-SCNC: 141 MMOL/L
VANCOMYCIN TROUGH SERPL-MCNC: 15.6 UG/ML
WBC # BLD AUTO: 3.4 K/UL

## 2017-04-03 PROCEDURE — 99213 OFFICE O/P EST LOW 20 MIN: CPT | Mod: PBBFAC,27 | Performed by: INTERNAL MEDICINE

## 2017-04-03 PROCEDURE — 99999 PR PBB SHADOW E&M-EST. PATIENT-LVL III: CPT | Mod: PBBFAC,,, | Performed by: INTERNAL MEDICINE

## 2017-04-03 PROCEDURE — 85025 COMPLETE CBC W/AUTO DIFF WBC: CPT

## 2017-04-03 PROCEDURE — 99024 POSTOP FOLLOW-UP VISIT: CPT | Mod: ,,, | Performed by: ORTHOPAEDIC SURGERY

## 2017-04-03 PROCEDURE — 85651 RBC SED RATE NONAUTOMATED: CPT

## 2017-04-03 PROCEDURE — 99215 OFFICE O/P EST HI 40 MIN: CPT | Mod: S$PBB,,, | Performed by: INTERNAL MEDICINE

## 2017-04-03 PROCEDURE — 99999 PR PBB SHADOW E&M-EST. PATIENT-LVL IV: CPT | Mod: PBBFAC,,, | Performed by: ORTHOPAEDIC SURGERY

## 2017-04-03 PROCEDURE — 36415 COLL VENOUS BLD VENIPUNCTURE: CPT

## 2017-04-03 PROCEDURE — 80053 COMPREHEN METABOLIC PANEL: CPT

## 2017-04-03 PROCEDURE — 80202 ASSAY OF VANCOMYCIN: CPT

## 2017-04-03 PROCEDURE — 86140 C-REACTIVE PROTEIN: CPT

## 2017-04-03 RX ORDER — CLINDAMYCIN HYDROCHLORIDE 300 MG/1
300 CAPSULE ORAL 3 TIMES DAILY
Qty: 90 CAPSULE | Refills: 1 | Status: SHIPPED | OUTPATIENT
Start: 2017-04-03 | End: 2017-05-24

## 2017-04-03 RX ORDER — VANCOMYCIN HYDROCHLORIDE 1 G/20ML
INJECTION, POWDER, LYOPHILIZED, FOR SOLUTION INTRAVENOUS
COMMUNITY
Start: 2017-03-28 | End: 2017-04-19 | Stop reason: ALTCHOICE

## 2017-04-03 NOTE — PROGRESS NOTES
Subjective:          Chief Complaint: Ella Canales is a 66 y.o. female who had concerns including Follow-up of the Left Knee.    HPI Comments: Patient is here for a follow up for her left knee. Her wound vac got pulled on yesterday. She was seen by home health who applied an island dressing.      She was then taken to the OR by Dr. Patel Denise for an irrigation and debridement on 2/24/2017. She has see ID  Dr. Mckinley.        She is on a PICC line with vanocmycin and Cipro orally.        DATE OF PROCEDURE: 03/09/2017     ATTENDING SURGEON: Angie Mulligan M.D.     FIRST ASSISTANT: Paolo Abdi M.D. - Fellow.     SECOND ASSISTANT: SMA Prince - Assistant.     PREOPERATIVE DIAGNOSIS: Left knee sepsis.     POSTOPERATIVE DIAGNOSES:  1. Left knee hemarthrosis.  2. Left knee sepsis.  3. Left knee synovitis.  4. Left knee medial retinacular tear.  5. Left knee medial collateral ligament insufficiency.     PROCEDURES PERFORMED:  1. Left knee complex polyethylene liner exchange.  2. Left knee complex incision and drainage.  3. Left knee medial collateral ligament repair.  4. Left knee medial retinacular repair with application of a flexed HD graft.  5. Left knee complex open synovectomy with hemostasis control.  6. Wound VAC application.              DATE OF PROCEDURE: 02/24/2017     PREOPERATIVE DIAGNOSIS: Possible septic arthritis, left total knee replacement.     POSTOPERATIVE DIAGNOSIS: Possible septic arthritis, left total knee   replacement.      PROCEDURE: Arthrotomy, left knee, with irrigation and debridement (CPT #14733).     SURGEON: Patel Denise M.D.     ASSISTANTS: Andrey Ya M.D. (RES); Anant Issa M.D. 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 Negative for chills, fever, night sweats, weight gain and weight loss.   HENT: Negative for congestion, headaches, hearing loss and sore throat.    Eyes: Negative for blurred vision, double vision and visual disturbance.   Cardiovascular: Negative for chest pain, leg swelling and palpitations.   Respiratory: Negative for cough and shortness of breath.    Endocrine: Negative for polyuria.   Hematologic/Lymphatic: Negative for bleeding problem. Does not bruise/bleed easily.   Skin: Negative for itching, poor wound healing and rash.   Musculoskeletal: Positive for joint pain, joint swelling and stiffness. Negative for back pain, muscle cramps, muscle weakness and myalgias.   Gastrointestinal: Negative for abdominal pain, constipation, diarrhea, melena, nausea and vomiting.   Genitourinary: Negative.  Negative for frequency and hematuria.   Neurological: Negative for dizziness, loss of balance, numbness, paresthesias and sensory change.   Psychiatric/Behavioral: Negative for altered mental status and depression. The patient is not nervous/anxious.    Allergic/Immunologic: Negative for hives.       Pain Related Questions  Over the past 3 days, what was your average pain during activity? (I.e. running, jogging, walking, climbing stairs, getting dressed, ect.): 4  Over the past 3 days, what was your highest pain level?: 4  Over the past 3 days, what was your lowest pain level? : 1    Other  Was the patient's HEIGHT measured or patient reported?: Patient Reported  Was the patient's WEIGHT measured or patient reported?: Patient Reported      Objective:        General:  Ella is well-developed, well-nourished, appears stated age, in no acute distress, alert and oriented to time, place and person.     General    Vitals reviewed.  Constitutional: She is oriented to person, place, and time. She appears well-developed and well-nourished. No distress.   HENT:   Mouth/Throat: No oropharyngeal exudate.   Eyes: Right eye exhibits no discharge. Left eye exhibits no discharge.   Neck: Normal range of motion.   Cardiovascular: Normal rate and regular rhythm.    Pulmonary/Chest: Effort normal and breath sounds normal. No respiratory distress.   Neurological: She is alert and oriented to person, place, and time. She has normal reflexes. No cranial nerve deficit. Coordination normal.   Psychiatric: She has a normal mood and affect. Her behavior is normal. Judgment and thought content normal.     General Musculoskeletal Exam   Gait: abnormal       Right Knee Exam     Inspection   Erythema: absent  Scars: absent  Swelling: absent  Effusion: effusion  Deformity: deformity  Bruising: absent    Tenderness   The patient is experiencing no tenderness.         Range of Motion   Extension: 0   Flexion: 140     Tests   Meniscus   David:  Medial - negative Lateral - negative  Ligament Examination Lachman: normal (-1 to 2mm) PCL-Posterior Drawer: normal (0 to 2mm)     MCL - Valgus: normal (0 to 2mm)  LCL - Varus: normalPivot Shift: normal (Equal)Reverse Pivot Shift: normal (Equal)Dial Test at 30 degrees: normal (< 5 degrees)Dial Test at 90 degrees: normal (< 5 degrees)  Posterior Sag Test: negative  Posterolateral Corner: unstable (>15 degrees difference)  Patella   Patellar Apprehension: negative  Passive Patellar Tilt: neutral  Patellar Tracking: normal  Patellar Glide (quadrants): Lateral - 1   Medial - 2  Q-Angle at 90 degrees: normal  Patellar Grind: negative  J-Sign: none    Other   Meniscal Cyst: absent  Popliteal (Baker's) Cyst: absent  Sensation: normal    Left Knee Exam     Inspection    Erythema: present  Scars: present  Swelling: present  Effusion: absent  Deformity: deformity  Bruising: absent    Tenderness   Left knee tenderness location: global tenderness.    Range of Motion   Extension:  0 abnormal   Flexion:  90 abnormal     Tests   Meniscus   David:  Medial - negative Lateral - negative  Stability Lachman: normal (-1 to 2mm) PCL-Posterior Drawer: normal (0 to 2mm)  MCL - Valgus: normal (0 to 2mm)  LCL - Varus: normal (0 to 2mm)Pivot Shift: normal (Equal)Reverse Pivot Shift: normal (Equal)Dial Test at 30 degrees: normal (< 5 degrees)Dial Test at 90 degrees: normal (< 5 degrees)  Posterior Sag Test: negative  Posterolateral Corner: unstable (>15 degrees difference)  Patella   Patellar Apprehension: negative  Passive Patellar Tilt: neutral  Patellar Tracking: normal  Patellar Glide (Quadrants): Lateral - 1 Medial - 2  Q-Angle at 90 degrees: normal  Patellar Grind: negative  J-Sign: J sign absent    Other   Meniscal Cyst: absent  Popliteal (Baker's) Cyst: absent  Sensation: normal    Comments:  Vac dressing intact; mikle drain removed  New vac dressing placed     Right Hip Exam     Tests   Ivon: negative  Left Hip Exam     Tests   Ivon: negative          Muscle Strength   Right Lower Extremity   Hip Abduction: 5/5   Quadriceps:  5/5   Hamstrin/5   Left Lower Extremity   Hip Abduction: 5/5   Quadriceps:  4/5   Hamstrin/5     Reflexes     Left Side  Quadriceps:  2+  Achilles:  2+    Right Side   Quadriceps:  2+  Achilles:  2+    Vascular Exam     Right Pulses  Dorsalis Pedis:      2+  Posterior Tibial:      2+        Left Pulses  Dorsalis Pedis:      2+  Posterior Tibial:      2+        Edema  Right Lower Leg: absent  Left Lower Leg: absent            Assessment:       Encounter Diagnoses   Name Primary?    Open wound of left lower extremity, sequela Yes    Internal derangement of left knee     Chronic pain of left knee           Plan:       1. IKDC, SF-12 and KOOS was not filled  out today in clinic.     RTC in 1 weeks with Ronel Urban PA-C for postoperative follow-up. Patient will not fill out IKDC, SF-12 and KOOS on return.    2. Continue ABX per ID    3.  Wound vac dressing changed today on 12 cm length incision    4. Completion of IV abx and one - two weeks of the wound vac at this time    Progressive AAROM/PROM  With focus on increased superior and inferior patellar glide recommended    Gait training    Goal 0-130 motion                    Patient questionnaires may have been collected.

## 2017-04-03 NOTE — MR AVS SNAPSHOT
[facility]  [address]  [facility] [address]  Phone: [phone]  Fax: [phone]                  Ella Canales   4/3/2017 3:00 PM   Office Visit    Description:  Female : 1950   Provider:  Olman Mckinley MD   Department:  [facility]           Reason for Visit     Follow-up                To Do List           Future Appointments        Provider Department Dept Phone    4/10/2017 1:00 PM NICK Gerardo III - Sports Medicine [phone]    2017 11:00 AM MD Moe [facility] Infectious Diseases [phone]    2017 8:40 AM Adry Damian MD [facility] [phone]      Goals (5 Years of Data)     None       These Medications        Disp Refills Start End    clindamycin (CLEOCIN) 300 MG capsule 90 capsule 1 4/3/2017     Take 1 capsule (300 mg total) by mouth 3 (three) times daily. Take with food - Oral    Pharmacy: [facility] [address] [place] [address] [address] AT [facility] Ph #: [phone]         [facility]     [facility] -  Assistance  Unless otherwise directed by your provider, please contact [facility], our nurse care line that is available for  assistance.     Registered nurses in the [facility] provide: appointment scheduling, clinical advisement, health education, and other advisory services.  Call: [phone] (toll free)               Medications           Message regarding Medications     Verify the changes and/or additions to your medication regime listed below are the same as discussed with your clinician today.  If any of these changes or additions are incorrect, please notify your healthcare provider.        START taking these NEW medications        Refills    clindamycin (CLEOCIN) 300 MG capsule 1    Sig: Take 1 capsule (300 mg total) by mouth 3 (three) times daily. Take  with food    Class: Normal    Route: Oral           Verify that the below list of medications is an accurate representation of the medications you are currently taking.  If none reported, the list may be blank. If incorrect, please contact your healthcare provider. Carry this list with you in case of emergency.           Current Medications     ciprofloxacin HCl (CIPRO) 750 MG tablet Take 1 tablet (750 mg total) by mouth every 12 (twelve) hours.    clindamycin (CLEOCIN) 300 MG capsule Take 1 capsule (300 mg total) by mouth 3 (three) times daily. Take with food    dextrose 5 % SolP 250 mL with vancomycin 1,000 mg SolR 1,500 mg Inject 1,500 mg into the vein every 12 (twelve) hours.    ferrous sulfate 325 (65 FE) MG EC tablet Take 1 tablet (325 mg total) by mouth once daily.    fexofenadine (ALLEGRA) 60 MG tablet Take 60 mg by mouth once daily.    glucosamine-chondroit-vit C-Mn (GLUCOSAMINE-CHONDROITIN MAX ST) 500-400 mg Cap Twice a day    losartan (COZAAR) 100 MG tablet Take 1 tablet (100 mg total) by mouth once daily.    metoprolol succinate (TOPROL-XL) 50 MG 24 hr tablet Take 1 tablet (50 mg total) by mouth once daily.    omeprazole (PRILOSEC) 20 MG capsule Take 1 capsule (20 mg total) by mouth 2 (two) times daily.    oxycodone-acetaminophen (PERCOCET)  mg per tablet Take 1 tablet by mouth every 6 (six) hours as needed for Pain.    potassium chloride SA (K-DUR,KLOR-CON) 10 MEQ tablet Take 1 tablet (10 mEq total) by mouth 2 (two) times daily.    raloxifene (EVISTA) 60 mg tablet Take 1 tablet (60 mg total) by mouth once daily.    simvastatin (ZOCOR) 40 MG tablet Take 1 tablet (40 mg total) by mouth every evening.    vancomycin (VANCOCIN) 1,000 mg injection            Clinical Reference Information           Your Vitals Were     BP Pulse Temp Height Weight Last Period    149/77 83 98.5 °F (36.9 °C) (Oral) 5' (1.524 m) 65 kg (143 lb 4.8 oz) 04/03/2012    BMI                27.99 kg/m2          Blood Pressure           Most Recent Value    BP  (!)  149/77      Allergies as of 4/3/2017     Ace Inhibitors      Immunizations Administered on Date of Encounter - 4/3/2017     None      Language Assistance Services     ATTENTION: Language assistance services are available, free of charge. Please call [phone].      ATENCIÓN: Si habla dalia, tiene a priest disposición servicios gratuitos de asistencia lingüística. Llame al [phone].     CHÚ Ý: N?u b?n nói Ti?ng Vi?t, có các d?ch v? h? tr? ngôn ng? mi?n phí dành cho b?n. G?i s? [phone].         [facility] complies with applicable Federal civil rights laws and does not discriminate on the basis of race, color, national origin, age, disability, or sex.

## 2017-04-03 NOTE — MR AVS SNAPSHOT
[facility]  [address]  [facility] [address]  Phone: [phone]                  Ella Canales   4/3/2017 11:00 AM   Office Visit    Description:  Female : 1950   Provider:  Angie Mulligan MD   Department:  [facility]           Reason for Visit     Left Knee - Follow-up           Diagnoses this Visit        Comments    Open wound of left lower extremity, sequela    -  Primary     Internal derangement of left knee         Chronic pain of left knee                To Do List           Future Appointments        Provider Department Dept Phone    4/3/2017 3:00 PM Olman Mckinley MD [facility] - Infectious Diseases [phone]    2017 8:40 AM Adry Damian MD [facility] Medicine [phone]      Goals (5 Years of Data)     None      Follow-Up and Disposition     Return in about 1 week (around 4/10/2017) for RTC in 1 weeks with Ronel Urban PA-C for postoperative follow-up. Patient will not fill out IKDC.      [facility]     [facility] -  Assistance  Unless otherwise directed by your provider, please contact [facility], our nurse care line that is available for  assistance.     Registered nurses in the [facility] provide: appointment scheduling, clinical advisement, health education, and other advisory services.  Call: [phone] (toll free)               Medications           Message regarding Medications     Verify the changes and/or additions to your medication regime listed below are the same as discussed with your clinician today.  If any of these changes or additions are incorrect, please notify your healthcare provider.             Verify that the below list of medications is an accurate representation of the medications you are currently taking.  If none reported, the list may be blank. If incorrect, please contact your healthcare provider. Carry this list with you in case  of emergency.           Current Medications     ciprofloxacin HCl (CIPRO) 750 MG tablet Take 1 tablet (750 mg total) by mouth every 12 (twelve) hours.    dextrose 5 % SolP 250 mL with vancomycin 1,000 mg SolR 1,500 mg Inject 1,500 mg into the vein every 12 (twelve) hours.    ferrous sulfate 325 (65 FE) MG EC tablet Take 1 tablet (325 mg total) by mouth once daily.    fexofenadine (ALLEGRA) 60 MG tablet Take 60 mg by mouth once daily.    glucosamine-chondroit-vit C-Mn (GLUCOSAMINE-CHONDROITIN MAX ST) 500-400 mg Cap Twice a day    losartan (COZAAR) 100 MG tablet Take 1 tablet (100 mg total) by mouth once daily.    metoprolol succinate (TOPROL-XL) 50 MG 24 hr tablet Take 1 tablet (50 mg total) by mouth once daily.    omeprazole (PRILOSEC) 20 MG capsule Take 1 capsule (20 mg total) by mouth 2 (two) times daily.    oxycodone-acetaminophen (PERCOCET)  mg per tablet Take 1 tablet by mouth every 6 (six) hours as needed for Pain.    potassium chloride SA (K-DUR,KLOR-CON) 10 MEQ tablet Take 1 tablet (10 mEq total) by mouth 2 (two) times daily.    raloxifene (EVISTA) 60 mg tablet Take 1 tablet (60 mg total) by mouth once daily.    simvastatin (ZOCOR) 40 MG tablet Take 1 tablet (40 mg total) by mouth every evening.           Clinical Reference Information           Your Vitals Were     BP Pulse Height Weight Last Period BMI    152/83 75 5' (1.524 m) 68 kg (150 lb) 04/03/2012 29.29 kg/m2      Blood Pressure          Most Recent Value    BP  (!)  152/83      Allergies as of 4/3/2017     Ace Inhibitors      Immunizations Administered on Date of Encounter - 4/3/2017     None      Orders Placed During Today's Visit      Normal Orders This Visit    Aerobic culture     Ambulatory Referral to Physical/Occupational Therapy     Culture, Anaerobic     Fungus culture       Language Assistance Services     ATTENTION: Language assistance services are available, free of charge. Please call [phone].      ATENCIÓN: Filomena buitrago  español, tiene a priest disposición servicios gratuitos de asistencia lingüística. Yousif al [phone].     MARCE Ý: N?u b?n nói Ti?ng Vi?t, có các d?ch v? h? tr? ngôn ng? mi?n phí dành cho b?n. G?i s? [phone].         [facility] complies with applicable Federal civil rights laws and does not discriminate on the basis of race, color, national origin, age, disability, or sex.

## 2017-04-03 NOTE — PROGRESS NOTES
Subjective:      Patient ID: Ella Canales is a 66 y.o. female.    Chief Complaint:Follow-up      History of Present Illness    Day 38 of IV vanco and PO cipro today.  She is doing well with decreasing ESR (normal at 12) and CRP (8.4).    Cultures for pathogen were negative, so therapy remains empiric.    She wants to get rid of her PICC line. I told her we would have it DCed on Thursday, so she could go on a trip to see her granddaughter. I would begin clindamycin when vanco stopped and plan on extending oral therapy with cipro and clinda for an additional 6 weeks.     Review of Systems   Constitution: Negative for chills, decreased appetite, fever, weakness, malaise/fatigue, night sweats, weight gain and weight loss.   HENT: Negative for congestion, ear pain, headaches, hearing loss, hoarse voice, sore throat and tinnitus.    Eyes: Negative for blurred vision, redness and visual disturbance.   Cardiovascular: Negative for chest pain, leg swelling and palpitations.   Respiratory: Negative for cough, hemoptysis, shortness of breath and sputum production.    Hematologic/Lymphatic: Negative for adenopathy. Does not bruise/bleed easily.   Skin: Negative for dry skin, itching, rash and suspicious lesions.   Musculoskeletal: Negative for back pain, joint pain, myalgias and neck pain.   Gastrointestinal: Negative for abdominal pain, constipation, diarrhea, heartburn, nausea and vomiting.   Genitourinary: Negative for dysuria, flank pain, frequency, hematuria, hesitancy and urgency.   Neurological: Negative for dizziness, numbness and paresthesias.   Psychiatric/Behavioral: Negative for depression and memory loss. The patient does not have insomnia and is not nervous/anxious.      Objective:   Physical Exam   Constitutional: She is oriented to person, place, and time. She appears well-developed and well-nourished. No distress.   Neurological: She is alert and oriented to person, place, and time.   Skin: Skin is warm  and dry.   Psychiatric: She has a normal mood and affect. Her behavior is normal. Thought content normal.   Vitals reviewed.    Assessment:       1. Infection of prosthetic left knee joint          Plan:        1. Complete IV vanco and DC PICC on 4/6    2. Continue oral abx (cipro and clinda thereafter)   3. Will discuss further surgical plans and timing with Dr. Mulligan

## 2017-04-05 ENCOUNTER — TELEPHONE (OUTPATIENT)
Dept: INFECTIOUS DISEASES | Facility: CLINIC | Age: 67
End: 2017-04-05

## 2017-04-05 DIAGNOSIS — Z96.659 INFECTED PROSTHETIC KNEE JOINT, INITIAL ENCOUNTER: Primary | ICD-10-CM

## 2017-04-05 DIAGNOSIS — T84.59XA INFECTED PROSTHETIC KNEE JOINT, INITIAL ENCOUNTER: Primary | ICD-10-CM

## 2017-04-05 RX ORDER — ONDANSETRON 4 MG/1
4 TABLET, FILM COATED ORAL EVERY 12 HOURS PRN
Qty: 30 TABLET | Refills: 1 | Status: SHIPPED | OUTPATIENT
Start: 2017-04-05 | End: 2017-04-24

## 2017-04-05 NOTE — TELEPHONE ENCOUNTER
"----- Message from Mickie Branch MA sent at 4/5/2017  9:17 AM CDT -----  Patient stated she has been nauseated this morning. She states the phenergan is not working. Please advise.     No change in meds. No new foods. Slight nausea yesterday, worse this am. Tried phenergan but " it knocks me out". Will give zofran prn  melchor  "

## 2017-04-06 ENCOUNTER — HOSPITAL ENCOUNTER (OUTPATIENT)
Dept: RADIOLOGY | Facility: HOSPITAL | Age: 67
Discharge: HOME | End: 2017-04-06
Attending: INTERNAL MEDICINE
Payer: MEDICARE

## 2017-04-06 DIAGNOSIS — Z96.659 INFECTED PROSTHETIC KNEE JOINT, INITIAL ENCOUNTER: ICD-10-CM

## 2017-04-06 DIAGNOSIS — T84.59XA INFECTED PROSTHETIC KNEE JOINT, INITIAL ENCOUNTER: ICD-10-CM

## 2017-04-06 LAB — BACTERIA SPEC AEROBE CULT: NO GROWTH

## 2017-04-06 NOTE — NURSING
Pt presented for PICC line removal per MD orders. PICC removed 35 cm tip intact, pressure applied, no bleeding noted, pressure dressing applied, instructed to remove tonight with shower verbalized understanding. instructted to report to ER for any bleeding, drainage redness, temperature, pain verbalized understanding. AR

## 2017-04-10 ENCOUNTER — OFFICE VISIT (OUTPATIENT)
Dept: SPORTS MEDICINE | Facility: CLINIC | Age: 67
End: 2017-04-10
Payer: MEDICARE

## 2017-04-10 VITALS
HEART RATE: 65 BPM | HEIGHT: 60 IN | WEIGHT: 143 LBS | SYSTOLIC BLOOD PRESSURE: 135 MMHG | DIASTOLIC BLOOD PRESSURE: 78 MMHG | BODY MASS INDEX: 28.07 KG/M2

## 2017-04-10 DIAGNOSIS — G89.29 CHRONIC PAIN OF LEFT KNEE: ICD-10-CM

## 2017-04-10 DIAGNOSIS — M25.562 CHRONIC PAIN OF LEFT KNEE: ICD-10-CM

## 2017-04-10 DIAGNOSIS — S81.802S OPEN WOUND OF LEFT LOWER EXTREMITY, SEQUELA: Primary | ICD-10-CM

## 2017-04-10 DIAGNOSIS — T84.50XD PROSTHETIC JOINT INFECTION, SUBSEQUENT ENCOUNTER: ICD-10-CM

## 2017-04-10 LAB — BACTERIA SPEC ANAEROBE CULT: NORMAL

## 2017-04-10 PROCEDURE — 99999 PR PBB SHADOW E&M-EST. PATIENT-LVL III: CPT | Mod: PBBFAC,,, | Performed by: PHYSICIAN ASSISTANT

## 2017-04-10 PROCEDURE — 99213 OFFICE O/P EST LOW 20 MIN: CPT | Mod: PBBFAC,PO | Performed by: PHYSICIAN ASSISTANT

## 2017-04-10 PROCEDURE — 99024 POSTOP FOLLOW-UP VISIT: CPT | Mod: ,,, | Performed by: PHYSICIAN ASSISTANT

## 2017-04-10 NOTE — PROGRESS NOTES
Subjective:          Chief Complaint: Ella Canales is a 66 y.o. female who had concerns including Follow-up of the Left Knee.    HPI Comments: Patient is here for a follow up for her left knee. Here for wound vac change. No complaints or issues since last visit.     She was then taken to the OR by Dr. Patel Denise for an irrigation and debridement on 2/24/2017. She has see ID  Dr. Mckinley.      She was on a PICC line with vanocmycin and Cipro orally.    Now on oral cipro and clindamycin.    Denies fever, chills, erythema of knee.     DATE OF PROCEDURE: 03/09/2017     ATTENDING SURGEON: Angie Mulligan M.D.     FIRST ASSISTANT: Paolo Abdi M.D. - Fellow.     SECOND ASSISTANT: SMA Prince - Assistant.     PREOPERATIVE DIAGNOSIS: Left knee sepsis.     POSTOPERATIVE DIAGNOSES:  1. Left knee hemarthrosis.  2. Left knee sepsis.  3. Left knee synovitis.  4. Left knee medial retinacular tear.  5. Left knee medial collateral ligament insufficiency.     PROCEDURES PERFORMED:  1. Left knee complex polyethylene liner exchange.  2. Left knee complex incision and drainage.  3. Left knee medial collateral ligament repair.  4. Left knee medial retinacular repair with application of a flexed HD graft.  5. Left knee complex open synovectomy with hemostasis control.  6. Wound VAC application.      DATE OF PROCEDURE: 02/24/2017     PREOPERATIVE DIAGNOSIS: Possible septic arthritis, left total knee replacement.     POSTOPERATIVE DIAGNOSIS: Possible septic arthritis, left total knee   replacement.      PROCEDURE: Arthrotomy, left knee, with irrigation and debridement (CPT #40627).     SURGEON: Patel Denise M.D.     ASSISTANTS: Andrey Ya M.D. (RES); Anant Issa M.D. 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 Negative for chills, fever, night sweats, weight gain and weight loss.   HENT: Negative for congestion, headaches, hearing loss and sore throat.    Eyes: Negative for blurred vision, double vision and visual disturbance.   Cardiovascular: Negative for chest pain, leg swelling and palpitations.   Respiratory: Negative for cough and shortness of breath.    Endocrine: Negative for polyuria.   Hematologic/Lymphatic: Negative for bleeding problem. Does not bruise/bleed easily.   Skin: Negative for itching, poor wound healing and rash.   Musculoskeletal: Positive for joint pain, joint swelling and stiffness. Negative for back pain, muscle cramps, muscle weakness and myalgias.   Gastrointestinal: Negative for abdominal pain, constipation, diarrhea, melena, nausea and vomiting.   Genitourinary: Negative.  Negative for frequency and hematuria.   Neurological: Negative for dizziness, loss of balance, numbness, paresthesias and sensory change.   Psychiatric/Behavioral: Negative for altered mental status and depression. The patient is not nervous/anxious.    Allergic/Immunologic: Negative for hives.       Pain Related Questions  Over the past 3 days, what was your average pain during activity? (I.e. running, jogging, walking, climbing stairs, getting dressed, ect.): 2  Over the past 3 days, what was your highest pain level?: 2  Over the past 3 days, what was your lowest pain level? : 1    Other  How many nights a week are you awakened by your affected body part?: 0  Was the patient's HEIGHT measured or patient reported?: Patient Reported       Objective:        General: Ella is well-developed, well-nourished, appears stated age, in no acute distress, alert and oriented to time, place and person.     General    Vitals reviewed.  Constitutional: She is oriented to person, place, and time. She appears well-developed and well-nourished. No distress.   HENT:   Mouth/Throat: No oropharyngeal exudate.   Eyes: Right eye exhibits no discharge. Left eye exhibits no discharge.   Neck: Normal range of motion.   Cardiovascular: Normal rate and regular rhythm.    Pulmonary/Chest: Effort normal and breath sounds normal. No respiratory distress.   Neurological: She is alert and oriented to person, place, and time. She has normal reflexes. No cranial nerve deficit. Coordination normal.   Psychiatric: She has a normal mood and affect. Her behavior is normal. Judgment and thought content normal.     General Musculoskeletal Exam   Gait: abnormal       Right Knee Exam     Inspection   Erythema: absent  Scars: absent  Swelling: absent  Effusion: effusion  Deformity: deformity  Bruising: absent    Tenderness   The patient is experiencing no tenderness.         Range of Motion   Extension: 0   Flexion: 140     Tests   Meniscus   David:  Medial - negative Lateral - negative  Ligament Examination Lachman: normal (-1 to 2mm) PCL-Posterior Drawer: normal (0 to 2mm)     MCL - Valgus: normal (0 to 2mm)  LCL - Varus: normalPivot Shift: normal (Equal)Reverse Pivot Shift: normal (Equal)Dial Test at 30 degrees: normal (< 5 degrees)Dial Test at 90 degrees: normal (< 5 degrees)  Posterior Sag Test: negative  Posterolateral Corner: unstable (>15 degrees difference)  Patella   Patellar Apprehension: negative  Passive Patellar Tilt: neutral  Patellar Tracking: normal  Patellar Glide (quadrants): Lateral - 1   Medial - 2  Q-Angle at 90 degrees: normal  Patellar Grind: negative  J-Sign: none    Other   Meniscal Cyst: absent  Popliteal (Baker's) Cyst: absent  Sensation: normal    Left Knee  Exam     Inspection   Erythema: present  Scars: present  Swelling: present  Effusion: absent  Deformity: deformity  Bruising: absent    Tenderness   Left knee tenderness location: global tenderness.    Range of Motion   Extension:  0 abnormal   Flexion:  90 abnormal     Tests   Meniscus   David:  Medial - negative Lateral - negative  Stability Lachman: normal (-1 to 2mm) PCL-Posterior Drawer: normal (0 to 2mm)  MCL - Valgus: normal (0 to 2mm)  LCL - Varus: normal (0 to 2mm)Pivot Shift: normal (Equal)Reverse Pivot Shift: normal (Equal)Dial Test at 30 degrees: normal (< 5 degrees)Dial Test at 90 degrees: normal (< 5 degrees)  Posterior Sag Test: negative  Posterolateral Corner: unstable (>15 degrees difference)  Patella   Patellar Apprehension: negative  Passive Patellar Tilt: neutral  Patellar Tracking: normal  Patellar Glide (Quadrants): Lateral - 1 Medial - 2  Q-Angle at 90 degrees: normal  Patellar Grind: negative  J-Sign: J sign absent    Other   Meniscal Cyst: absent  Popliteal (Baker's) Cyst: absent  Sensation: normal    Comments:  Vac dressing intact; mikel drain removed  New vac dressing placed     Right Hip Exam     Tests   Ivon: negative  Left Hip Exam     Tests   Ivon: negative          Muscle Strength   Right Lower Extremity   Hip Abduction: 5/5   Quadriceps:  5/5   Hamstrin/5   Left Lower Extremity   Hip Abduction: 5/5   Quadriceps:  4/5   Hamstrin/5     Reflexes     Left Side  Quadriceps:  2+  Achilles:  2+    Right Side   Quadriceps:  2+  Achilles:  2+    Vascular Exam     Right Pulses  Dorsalis Pedis:      2+  Posterior Tibial:      2+        Left Pulses  Dorsalis Pedis:      2+  Posterior Tibial:      2+        Edema  Right Lower Leg: absent  Left Lower Leg: absent            Assessment:       Encounter Diagnoses   Name Primary?    Open wound of left lower extremity, sequela Yes    Prosthetic joint infection, subsequent encounter     Chronic pain of left knee           Plan:       1.  IKDC, SF-12 and KOOS was not filled out today in clinic.     RTC in 1 weeks with Dr. Mulligan for postoperative follow-up. Patient will not fill out IKDC, SF-12 and KOOS on return.    2. Continue ABX per ID    3.  Wound vac dressing changed today on 12 cm length incision    4. Completion of IV abx and  two weeks of the wound vac at this time    Progressive AAROM/PROM  With focus on increased superior and inferior patellar glide recommended    Gait training    Goal 0-130 motion    Dr. Abdi also saw patient today.                     Patient questionnaires may have been collected.

## 2017-04-12 ENCOUNTER — CLINICAL SUPPORT (OUTPATIENT)
Dept: REHABILITATION | Facility: HOSPITAL | Age: 67
End: 2017-04-12
Attending: ORTHOPAEDIC SURGERY
Payer: MEDICARE

## 2017-04-12 DIAGNOSIS — G89.29 CHRONIC PAIN OF LEFT KNEE: ICD-10-CM

## 2017-04-12 DIAGNOSIS — M23.92 INTERNAL DERANGEMENT OF LEFT KNEE: ICD-10-CM

## 2017-04-12 DIAGNOSIS — S81.802S OPEN WOUND OF LEFT LOWER EXTREMITY, SEQUELA: ICD-10-CM

## 2017-04-12 DIAGNOSIS — M25.562 CHRONIC PAIN OF LEFT KNEE: ICD-10-CM

## 2017-04-12 PROCEDURE — G8981 BODY POS CURRENT STATUS: HCPCS | Mod: CK,PN

## 2017-04-12 PROCEDURE — 97140 MANUAL THERAPY 1/> REGIONS: CPT | Mod: PN

## 2017-04-12 PROCEDURE — G8978 MOBILITY CURRENT STATUS: HCPCS | Mod: CK,PN

## 2017-04-12 PROCEDURE — G8982 BODY POS GOAL STATUS: HCPCS | Mod: CI,PN

## 2017-04-12 PROCEDURE — 97110 THERAPEUTIC EXERCISES: CPT | Mod: PN

## 2017-04-12 PROCEDURE — 97161 PT EVAL LOW COMPLEX 20 MIN: CPT | Mod: PN

## 2017-04-12 PROCEDURE — G8979 MOBILITY GOAL STATUS: HCPCS | Mod: CI,PN

## 2017-04-12 NOTE — PLAN OF CARE
"  TIME RECORD    Date: 04/12/2017    Start Time:  1103  Stop Time:  1155    PROCEDURES:    TIMED  Procedure Time Min.   There Ex Start:11:30  Stop:11:45 15   Manual Therapy Start:11:45  Stop:11:55 10    Start:  Stop:     Start:  Stop:          UNTIMED  Procedure Time Min.   Evaluation Start:11:03  Stop:11:30 27     Start:  Stop:      Total Timed Minutes:  25   Total Timed Units:  2   Total Untimed Units:  1   Charges Billed/# of units:  3    OUTPATIENT PHYSICAL THERAPY   PATIENT EVALUATION  Onset Date: 2/14/2017  Primary Diagnosis: Prosthetic Knee Infection.  Treatment Diagnosis: Internal Derangement of L Knee (s/p L TKA 2/14/2017 with subsequent infection/partial hardware replacement)  Past Medical History:   Diagnosis Date    Arthritis     bilateral knees    GERD (gastroesophageal reflux disease)     Hyperlipidemia     Hypertension     Osteopenia     S/P PICC central line placement     Ulcer      Precautions: Fall Risk; Wound vac  Prior Therapy: Pt received Home Health PT after TKA and revision (4 weeks ago) and is now progressing to Outpatient setting.   Medications: Ella Canales has a current medication list which includes the following prescription(s): ciprofloxacin hcl, clindamycin, dextrose 5 % SolP 250 mL with vancomycin 1,000 mg SolR 1,500 mg, ferrous sulfate, fexofenadine, glucosamine-chondroit-vit c-mn, losartan, metoprolol succinate, omeprazole, ondansetron, oxycodone-acetaminophen, potassium chloride sa, raloxifene, simvastatin, and vancomycin.  Nutrition:  Normal  History of Present Illness:  Pt reported she had L TKA done by Dr. Mulligan on 2/14/2017 "and within 3 days my knee was inflamed and painful."  Pt reported Dr. Lemon (same practice) opened the knee to clean it out and, subsequently, Dr. Mulligan revised the prosthesis by replacing "all the plastic parts" approximately 4 weeks ago.  The wound vac has been on the wound since the revision 4 weeks ago.  Pt is now presenting with limited " L knee ROM and pain with prolonged sitting and standing/walking.   Prior Level of Function: Independent  Social History: Pt lives alone and reports she travels frequently to watch her 8 grandchildren participate in sporting and competitive dance events. Place of Residence (Steps/Adaptations): Pt lives in a 2 story home with a 1 and a 4 step entry, but lives on the first floor.  Functional Deficits Leading to Referral/Nature of Injury: The pt has not been able to comfortably travel since her surgery due to pain with prolonged sitting and standing/walking.   Patient Therapy Goals:  To decrease pain and return to comfortable community ambulation and travels.    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  The patient typically travels frequently to watch her grandchildren participate in sporting and dance events. Mrs. Canales reported she takes Oxycodone at night to help her sleep and usually takes Aleve during the day.    Pain:    Location: knee   Description: Aching, Burning and Grabbing  Activities Which Increase Pain: Sitting, Standing, Walking, Extension and Flexing  Activities Which Decrease Pain: ice and rest  Pain Scale: 2/10 at best 2/10 now(prior to treatment & 5/10 during treatment)  5/10 at worst    Objective     Posture: Pt presented for outpatient PT evaluation ambulating with Modified [place] using a SC and with wound vac on left knee wound.  Postural alignment is WFLs, but affected by limited L knee extension in standing.  Palpation: Impaired patellar movement in all directions, Superior/Inferior impaired > Medial/Lateral  Sensation: Light Touch intact in B LEs  DTRs:  Not Tested  Range of Motion/Strength: -18 degrees of extension >> 82 degrees of flexion L knee.  All other ROM WNLs  Strength grossly 4-/5 L LE with pain during resisted knee  flex/ext.  R LE grossly 4+/5.         Flexibility: L hamstring length impaired  Gait: With AD.  Device Used -  Cane  Analysis: Assistance Modified Independent with decreased knee extension in stance phase.  Bed Mobility:Independent  Transfers: Independent  Special Tests: Circumference Measurements L knee:    2.5in above SPP: 42.2cm   At SPP: 41.1cm   At IPP: 37.3cm   Tibial Tub: 35.5cm   2in below TT: 32.9cm  Lower Extremity Functional Scale:  43/80=46.25% impairment  G-Code Current(Mobility):  CK  G-Code Goal (Mobility): CI    Other: Pt with wound vac at anterior surface of knee slightly inferior to joint line.  Treatment:  There ex:  Pt instructed in/performed passive/gravity assisted knee extension stretch with foot on bolster 3 x 1min each in supine; pt performed active ROM x 10 reps each: knee extension/quad set and flexion with PT overpressure.  PT demonstrated proper technique for pt to use for AROM with passive overpressure for HEP(all previously mentioned exs) to be performed x 5 reps each 3-5x daily and follow with CP as needed to assist with pain.  Manual Therapy: PT performed Grade I & II patellar glides: Sup, Inf, Med & Lat f/b CP to anterior L knee x 5 min to assist with pain of exercise performance.      Assessment       Initial Assessment (Pertinent finding, problem list and factors affecting outcome): Pt exhibits impaired ROM L knee, impaired strength L LE, gait disturbance and pain with activity. Pt would benefit from outpatient PT with HEP to increase ROM & strength, improve functional gait and decrease pain for return to active lifestyle that includes traveling.  Rehab Potiential: good    Short Term Goals (2 Weeks): Improve L knee ROM to -12 > 90 degrees; Increase L LE strength to 4/5; decrease pain to 4/10 at worst with activity.  Long Term Goals (4 Weeks): Improve L knee ROM to -5 > 110 degrees; Increase L LE strength to 4+/5; decrease pain to 2/10 at worst with activity.    Plan      Certification Period: 4/12/2017 to 7/12/2017  Recommended Treatment Plan: 3 times per week for 4 weeks: Gait Training prn as ROM improves, Manual Therapy, Moist Heat/ Ice, Patient Education, Therapeutic Activites and Therapeutic Exercise      Therapist: PAULO Rene CERTIFY THE NEED FOR THESE SERVICES FURNISHED UNDER THIS PLAN OF TREATMENT AND WHILE UNDER MY CARE    Physician's comments: ________________________________________________________________________________________________________________________________________________      Physician's Name: ___________________________________

## 2017-04-17 ENCOUNTER — OFFICE VISIT (OUTPATIENT)
Dept: SPORTS MEDICINE | Facility: CLINIC | Age: 67
End: 2017-04-17
Payer: MEDICARE

## 2017-04-17 VITALS
WEIGHT: 143 LBS | BODY MASS INDEX: 28.07 KG/M2 | HEIGHT: 60 IN | SYSTOLIC BLOOD PRESSURE: 156 MMHG | DIASTOLIC BLOOD PRESSURE: 83 MMHG | HEART RATE: 69 BPM

## 2017-04-17 DIAGNOSIS — S81.002A OPEN KNEE WOUND, LEFT, INITIAL ENCOUNTER: Primary | ICD-10-CM

## 2017-04-17 PROCEDURE — 97605 NEG PRS WND THER DME<=50SQCM: CPT | Mod: S$PBB,,, | Performed by: ORTHOPAEDIC SURGERY

## 2017-04-17 PROCEDURE — 87205 SMEAR GRAM STAIN: CPT

## 2017-04-17 PROCEDURE — 87102 FUNGUS ISOLATION CULTURE: CPT

## 2017-04-17 PROCEDURE — 87116 MYCOBACTERIA CULTURE: CPT

## 2017-04-17 PROCEDURE — 99215 OFFICE O/P EST HI 40 MIN: CPT | Mod: PBBFAC,PO,25 | Performed by: ORTHOPAEDIC SURGERY

## 2017-04-17 PROCEDURE — 99999 PR PBB SHADOW E&M-EST. PATIENT-LVL V: CPT | Mod: PBBFAC,,, | Performed by: ORTHOPAEDIC SURGERY

## 2017-04-17 PROCEDURE — 87075 CULTR BACTERIA EXCEPT BLOOD: CPT

## 2017-04-17 PROCEDURE — 87076 CULTURE ANAEROBE IDENT EACH: CPT

## 2017-04-17 PROCEDURE — 99024 POSTOP FOLLOW-UP VISIT: CPT | Mod: ,,, | Performed by: ORTHOPAEDIC SURGERY

## 2017-04-17 PROCEDURE — 87070 CULTURE OTHR SPECIMN AEROBIC: CPT

## 2017-04-17 PROCEDURE — 97605 NEG PRS WND THER DME<=50SQCM: CPT | Mod: PBBFAC,PO | Performed by: ORTHOPAEDIC SURGERY

## 2017-04-17 NOTE — PROGRESS NOTES
Subjective:          Chief Complaint: Ella Canales is a 66 y.o. female who had concerns including Pain of the Left Knee.    HPI Comments: Patient is here for a follow up for her left knee. Here for wound vac change. Had issues with her vac on Saturday, was serviced by the vac rep.     She was then taken to the OR by Dr. Patel Denise for an irrigation and debridement on 2/24/2017. She has see ID  Dr. Mckinley.      She was on a PICC line with vanocmycin and Cipro orally.    Now on oral cipro and clindamycin.    Denies fever, chills, erythema of knee.     DATE OF PROCEDURE: 03/09/2017     ATTENDING SURGEON: Angie Mulligan M.D.     FIRST ASSISTANT: Paolo Abdi M.D. - Fellow.     SECOND ASSISTANT: SMA Prince - Assistant.     PREOPERATIVE DIAGNOSIS: Left knee sepsis.     POSTOPERATIVE DIAGNOSES:  1. Left knee hemarthrosis.  2. Left knee sepsis.  3. Left knee synovitis.  4. Left knee medial retinacular tear.  5. Left knee medial collateral ligament insufficiency.     PROCEDURES PERFORMED:  1. Left knee complex polyethylene liner exchange.  2. Left knee complex incision and drainage.  3. Left knee medial collateral ligament repair.  4. Left knee medial retinacular repair with application of a flexed HD graft.  5. Left knee complex open synovectomy with hemostasis control.  6. Wound VAC application.      DATE OF PROCEDURE: 02/24/2017     PREOPERATIVE DIAGNOSIS: Possible septic arthritis, left total knee replacement.     POSTOPERATIVE DIAGNOSIS: Possible septic arthritis, left total knee   replacement.      PROCEDURE: Arthrotomy, left knee, with irrigation and debridement (CPT #07386).     SURGEON: Patel Denise M.D.     ASSISTANTS: Andrey Ya M.D. (RES); Anant Issa M.D. (RES)    DATE OF PROCEDURE: 2/14/2017     ATTENDING SURGEON: Surgeon(s) and Role:  * Angie Mulilgan MD - Primary      FIRST ASSISTANT:Paolo bAdi MD - Fellow  SECOND ASSISTANT: Ronel Urban PA-C - Assistant  THIRD ASSISTANT:  Dorys Levy [facility] - Assistant     PREOPERATIVE DIAGNOSIS: Left  Arthritis, Traumatic M12.50, DJD knee M17.9 and Genu Varum (acquired) M21.169     POSTOPERATIVE DIAGNOSIS:  Left  Arthritis, Traumatic M12.50, DJD knee M17.9 and Genu Varum (acquired) M21.169     PROCEDURES PERFORMED:  Left  Replacement, Knee, Medial and Lateral compartment (Total Knee) 69901    Pain   Associated symptoms include joint swelling. Pertinent negatives include no abdominal pain, chest pain, chills, congestion, coughing, fever, headaches, myalgias, nausea, numbness, rash, sore throat or vomiting.         Review of Systems   Constitution: Negative. Negative for chills, fever, night sweats, weight gain and weight loss.   HENT: Negative for congestion, headaches, hearing loss and sore throat.    Eyes: Negative for blurred vision, double vision and visual disturbance.   Cardiovascular: Negative for chest pain, leg swelling and palpitations.   Respiratory: Negative for cough and shortness of breath.    Endocrine: Negative for polyuria.   Hematologic/Lymphatic: Negative for bleeding problem. Does not bruise/bleed easily.   Skin: Negative for itching, poor wound healing and rash.   Musculoskeletal: Positive for joint pain, joint swelling and stiffness. Negative for back pain, muscle cramps, muscle weakness and myalgias.   Gastrointestinal: Negative for abdominal pain, constipation, diarrhea, melena, nausea and vomiting.   Genitourinary: Negative.  Negative for frequency and hematuria.   Neurological: Negative for dizziness, loss of balance, numbness, paresthesias and sensory change.   Psychiatric/Behavioral: Negative for altered mental status and depression. The patient is not nervous/anxious.    Allergic/Immunologic: Negative for hives.       Pain Related Questions  Over the past 3 days, what was your average pain during activity? (I.e. running, jogging, walking, climbing stairs, getting dressed, ect.): 2  Over the past 3 days, what was your  highest pain level?: 2  Over the past 3 days, what was your lowest pain level? : 1    Other  How many nights a week are you awakened by your affected body part?: 0  Was the patient's HEIGHT measured or patient reported?: Patient Reported      Objective:        General: Ella is well-developed, well-nourished, appears stated age, in no acute distress, alert and oriented to time, place and person.     General    Vitals reviewed.  Constitutional: She is oriented to person, place, and time. She appears well-developed and well-nourished. No distress.   HENT:   Mouth/Throat: No oropharyngeal exudate.   Eyes: Right eye exhibits no discharge. Left eye exhibits no discharge.   Neck: Normal range of motion.   Cardiovascular: Normal rate and regular rhythm.    Pulmonary/Chest: Effort normal and breath sounds normal. No respiratory distress.   Neurological: She is alert and oriented to person, place, and time. She has normal reflexes. No cranial nerve deficit. Coordination normal.   Psychiatric: She has a normal mood and affect. Her behavior is normal. Judgment and thought content normal.     General Musculoskeletal Exam   Gait: abnormal       Right Knee Exam     Inspection   Erythema: absent  Scars: absent  Swelling: absent  Effusion: effusion  Deformity: deformity  Bruising: absent    Tenderness   The patient is experiencing no tenderness.         Range of Motion   Extension: 0   Flexion: 140     Tests   Meniscus   David:  Medial - negative Lateral - negative  Ligament Examination Lachman: normal (-1 to 2mm) PCL-Posterior Drawer: normal (0 to 2mm)     MCL - Valgus: normal (0 to 2mm)  LCL - Varus: normalPivot Shift: normal (Equal)Reverse Pivot Shift: normal (Equal)Dial Test at 30 degrees: normal (< 5 degrees)Dial Test at 90 degrees: normal (< 5 degrees)  Posterior Sag Test: negative  Posterolateral Corner: unstable (>15 degrees difference)  Patella   Patellar Apprehension: negative  Passive Patellar Tilt: neutral  Patellar  Tracking: normal  Patellar Glide (quadrants): Lateral - 1   Medial - 2  Q-Angle at 90 degrees: normal  Patellar Grind: negative  J-Sign: none    Other   Meniscal Cyst: absent  Popliteal (Baker's) Cyst: absent  Sensation: normal    Left Knee Exam     Inspection   Erythema: present  Scars: present  Swelling: present  Effusion: absent  Deformity: deformity  Bruising: absent    Tenderness   Left knee tenderness location: global tenderness.    Range of Motion   Extension:  0 abnormal   Flexion:  90 abnormal     Tests   Meniscus   David:  Medial - negative Lateral - negative  Stability Lachman: normal (-1 to 2mm) PCL-Posterior Drawer: normal (0 to 2mm)  MCL - Valgus: normal (0 to 2mm)  LCL - Varus: normal (0 to 2mm)Pivot Shift: normal (Equal)Reverse Pivot Shift: normal (Equal)Dial Test at 30 degrees: normal (< 5 degrees)Dial Test at 90 degrees: normal (< 5 degrees)  Posterior Sag Test: negative  Posterolateral Corner: unstable (>15 degrees difference)  Patella   Patellar Apprehension: negative  Passive Patellar Tilt: neutral  Patellar Tracking: normal  Patellar Glide (Quadrants): Lateral - 1 Medial - 2  Q-Angle at 90 degrees: normal  Patellar Grind: negative  J-Sign: J sign absent    Other   Meniscal Cyst: absent  Popliteal (Baker's) Cyst: absent  Sensation: normal    Comments:  Vac dressing intact;  New vac dressing placed with small white sponge piece in wound.    Wound decreasing in size    Right Hip Exam     Tests   Ivon: negative  Left Hip Exam     Tests   Ivon: negative          Muscle Strength   Right Lower Extremity   Hip Abduction: 5/5   Quadriceps:  5/5   Hamstrin/5   Left Lower Extremity   Hip Abduction: 5/5   Quadriceps:  4/5   Hamstrin/5     Reflexes     Left Side  Quadriceps:  2+  Achilles:  2+    Right Side   Quadriceps:  2+  Achilles:  2+    Vascular Exam     Right Pulses  Dorsalis Pedis:      2+  Posterior Tibial:      2+        Left Pulses  Dorsalis Pedis:      2+  Posterior Tibial:       2+        Edema  Right Lower Leg: absent  Left Lower Leg: absent            Assessment:       Encounter Diagnosis   Name Primary?    Open knee wound, left, initial encounter Yes          Plan:       1. IKDC, SF-12 and KOOS was not filled out today in clinic.     RTC in 1 weeks with Dr. Mulligan for postoperative follow-up. Patient will not fill out IKDC, SF-12 and KOOS on return.    2. Continue ABX per ID    3.  Wound vac dressing changed today. May d/c next visit depending on wound size    Progressive AAROM/PROM  With focus on increased superior and inferior patellar glide recommended    Gait training    Goal 0-130 motion                        Patient questionnaires may have been collected.

## 2017-04-17 NOTE — MR AVS SNAPSHOT
[facility]  [address]  [facility] [address]  Phone: [phone]                  Ella Canales   2017 11:00 AM   Appointment    Description:  Female : 1950   Provider:  Angie Mulligan MD   Department:  [facility]                To Do List           Future Appointments        Provider Department Dept Phone    2017 11:00 AM Angie Mulligan MD [facility] [phone]    2017 8:00 AM Preeti Vargas PTA [facility] [phone]    2017 11:00 AM Olman Mckinley MD [facility] - Infectious Diseases [phone]    2017 8:40 AM Adry Damian MD [facility] [phone]      Goals (5 Years of Data)     None      [facility]     [facility] Care [facility] -  Assistance  Unless otherwise directed by your provider, please contact [facility], our nurse care line that is available for  assistance.     Registered nurses in the [facility] provide: appointment scheduling, clinical advisement, health education, and other advisory services.  Call: [phone] (toll free)               Medications           Message regarding Medications     Verify the changes and/or additions to your medication regime listed below are the same as discussed with your clinician today.  If any of these changes or additions are incorrect, please notify your healthcare provider.             Verify that the below list of medications is an accurate representation of the medications you are currently taking.  If none reported, the list may be blank. If incorrect, please contact your healthcare provider. Carry this list with you in case of emergency.           Current Medications     ciprofloxacin HCl (CIPRO) 750 MG tablet Take 1 tablet (750 mg total) by mouth every 12 (twelve) hours.    clindamycin (CLEOCIN) 300 MG capsule Take 1 capsule (300 mg total) by mouth 3 (three) times  daily. Take with food    dextrose 5 % SolP 250 mL with vancomycin 1,000 mg SolR 1,500 mg Inject 1,500 mg into the vein every 12 (twelve) hours.    ferrous sulfate 325 (65 FE) MG EC tablet Take 1 tablet (325 mg total) by mouth once daily.    fexofenadine (ALLEGRA) 60 MG tablet Take 60 mg by mouth once daily.    glucosamine-chondroit-vit C-Mn (GLUCOSAMINE-CHONDROITIN MAX ST) 500-400 mg Cap Twice a day    losartan (COZAAR) 100 MG tablet Take 1 tablet (100 mg total) by mouth once daily.    metoprolol succinate (TOPROL-XL) 50 MG 24 hr tablet Take 1 tablet (50 mg total) by mouth once daily.    omeprazole (PRILOSEC) 20 MG capsule Take 1 capsule (20 mg total) by mouth 2 (two) times daily.    ondansetron (ZOFRAN, AS HYDROCHLORIDE,) 4 MG tablet Take 1 tablet (4 mg total) by mouth every 12 (twelve) hours as needed for Nausea.    oxycodone-acetaminophen (PERCOCET)  mg per tablet Take 1 tablet by mouth every 6 (six) hours as needed for Pain.    potassium chloride SA (K-DUR,KLOR-CON) 10 MEQ tablet Take 1 tablet (10 mEq total) by mouth 2 (two) times daily.    raloxifene (EVISTA) 60 mg tablet Take 1 tablet (60 mg total) by mouth once daily.    simvastatin (ZOCOR) 40 MG tablet Take 1 tablet (40 mg total) by mouth every evening.    vancomycin (VANCOCIN) 1,000 mg injection            Clinical Reference Information           Your Vitals Were     Last Period                   04/03/2012           Allergies as of 4/17/2017     Ace Inhibitors      Immunizations Administered on Date of Encounter - 4/17/2017     None      Language Assistance Services     ATTENTION: Language assistance services are available, free of charge. Please call [phone].      ATENCIÓN: Si minna gómez, tiene a priest disposición servicios gratuitos de asistencia lingüística. Llame al [phone].     [facility] Ý: N?u b?n nói Ti?ng Vi?t, có các d?ch v? h? tr? ngôn ng? mi?n phí dành cho b?n. G?i s? [phone].         [facility] complies with  applicable Federal civil rights laws and does not discriminate on the basis of race, color, national origin, age, disability, or sex.

## 2017-04-18 ENCOUNTER — CLINICAL SUPPORT (OUTPATIENT)
Dept: REHABILITATION | Facility: HOSPITAL | Age: 67
End: 2017-04-18
Attending: ORTHOPAEDIC SURGERY
Payer: MEDICARE

## 2017-04-18 DIAGNOSIS — M23.92 INTERNAL DERANGEMENT OF LEFT KNEE: ICD-10-CM

## 2017-04-18 LAB
GRAM STN SPEC: NORMAL
GRAM STN SPEC: NORMAL

## 2017-04-18 PROCEDURE — 97010 HOT OR COLD PACKS THERAPY: CPT | Mod: PN

## 2017-04-18 PROCEDURE — 97140 MANUAL THERAPY 1/> REGIONS: CPT | Mod: PN

## 2017-04-18 PROCEDURE — 97110 THERAPEUTIC EXERCISES: CPT | Mod: PN

## 2017-04-18 NOTE — PROGRESS NOTES
"Name: Ella Santana [facility] Number: [id number]  Date of Treatment: 04/18/2017   Diagnosis:   Encounter Diagnosis   Name Primary?    Internal derangement of left knee        Time in: 0758  Time Out: 0905  Total Treatment Time: 65      Subjective:    Ella reports no pain.  "It just feels tight,"  pt reported during therapy.  Patient reports their pain to be 0/10 on a 0-10 scale with 0 being no pain and 10 being the worst pain imaginable.    Objective    Patient received individual therapy to increase strength, ROM and flexibility with activities as follows:     Ella received therapeutic exercises to develop strength, ROM and flexibility for 45 minutes including:     Bike x 10 minutes, 1/2 revolution  Standing gastroc stretch 3 x 30 sec B  HR/TR x 30  HSC x 30 TKE 4" step x 30  Hamstring stretch 3 x 30 sec B  LAQ x 30  SAQ X 30  SLR x 30  Heel slides with sheet x 30    CP x 10 minutes L knee for muscle soreness purposes.    Ella received the following manual therapy techniques: PROM knee extension, patellar mobs all direction were applied to the: L knee for 10 minutes.       Written Home Exercises Provided: cont HEP  Pt demo good understanding of the education provided. Ella demonstrated good return demonstration of activities.     Assessment:     Pain with passive knee extension and patellar mobs.  Pt will continue to benefit from skilled PT intervention. Medical Necessity is demonstrated by:  Pain limits function of effected part for some activities, Unable to participate fully in daily activities, Requires skilled supervision to complete and progress HEP and Weakness.    Patient is making good progress towards established goals.      Plan:  Continue with established Plan of Care towards PT goals.   "

## 2017-04-19 ENCOUNTER — OFFICE VISIT (OUTPATIENT)
Dept: INFECTIOUS DISEASES | Facility: CLINIC | Age: 67
End: 2017-04-19
Payer: MEDICARE

## 2017-04-19 ENCOUNTER — DOCUMENTATION ONLY (OUTPATIENT)
Dept: FAMILY MEDICINE | Facility: CLINIC | Age: 67
End: 2017-04-19

## 2017-04-19 VITALS
TEMPERATURE: 98 F | HEART RATE: 71 BPM | BODY MASS INDEX: 27.96 KG/M2 | DIASTOLIC BLOOD PRESSURE: 81 MMHG | HEIGHT: 60 IN | SYSTOLIC BLOOD PRESSURE: 157 MMHG | WEIGHT: 142.44 LBS

## 2017-04-19 DIAGNOSIS — T84.54XA INFECTION OF PROSTHETIC LEFT KNEE JOINT: Primary | ICD-10-CM

## 2017-04-19 DIAGNOSIS — S81.802S OPEN WOUND OF LEFT LOWER EXTREMITY, SEQUELA: ICD-10-CM

## 2017-04-19 LAB — FUNGUS SPEC CULT: NORMAL

## 2017-04-19 PROCEDURE — 99214 OFFICE O/P EST MOD 30 MIN: CPT | Mod: S$PBB,,, | Performed by: INTERNAL MEDICINE

## 2017-04-19 PROCEDURE — 99999 PR PBB SHADOW E&M-EST. PATIENT-LVL III: CPT | Mod: PBBFAC,,, | Performed by: INTERNAL MEDICINE

## 2017-04-19 PROCEDURE — 99213 OFFICE O/P EST LOW 20 MIN: CPT | Mod: PBBFAC | Performed by: INTERNAL MEDICINE

## 2017-04-19 NOTE — PROGRESS NOTES
Subjective:      Patient ID: Ella Canales is a 66 y.o. female.    Chief Complaint:Follow-up      History of Present Illness     2/14 pt had left TKR  2/24 seen in ER for redness and pain in left knee; was aspirated and had 8345 WBC; no crystals and all cultures then and subsequently have been sterile admitted to [facility] 2/24-28 and was treated empirically with IV vancomycin (1500 mg q12h and PO cipro 750 mg bid.  She had  explantation of the prosthesis on 3/9 and was sent home on IV vanco and PO cipro. Vanco and cipro start date was approximately 2/24.     She completed ~6 weeks of IV vanco and PO cipro on 4/6 and should complete an additional 6 weeks of PO cipro/clinda on May 8. Still has wound vac on leg wound. Seeing ortho weekly on Mondays.      Review of Systems   Constitution: Negative for chills, decreased appetite, fever, weakness, malaise/fatigue, night sweats, weight gain and weight loss.   HENT: Negative for congestion, ear pain, headaches, hearing loss, hoarse voice, sore throat and tinnitus.    Eyes: Negative for blurred vision, redness and visual disturbance.   Cardiovascular: Negative for chest pain, leg swelling and palpitations.   Respiratory: Negative for cough, hemoptysis, shortness of breath and sputum production.    Hematologic/Lymphatic: Negative for adenopathy. Does not bruise/bleed easily.   Skin: Negative for dry skin, itching, rash and suspicious lesions.   Musculoskeletal: Negative for back pain, joint pain, myalgias and neck pain.   Gastrointestinal: Positive for nausea. Negative for abdominal pain, constipation, diarrhea, heartburn and vomiting.   Genitourinary: Negative for dysuria, flank pain, frequency, hematuria, hesitancy and urgency.   Neurological: Negative for dizziness, numbness and paresthesias.   Psychiatric/Behavioral: Negative for depression and memory loss. The patient does not have insomnia and is not nervous/anxious.      Objective:   Physical Exam   Constitutional:  She is oriented to person, place, and time. She appears well-developed and well-nourished.   Musculoskeletal:   Wound vac on left lower leg anterior surgical wound   Neurological: She is alert and oriented to person, place, and time.   Psychiatric: She has a normal mood and affect. Her behavior is normal. Thought content normal.   Vitals reviewed.    Assessment:       1. Infection of prosthetic left knee joint    2. Open wound of left lower extremity, sequela      3. Empiric antibiotic rx with cipro and clinda to finish on 5/6    Plan:        1. DC abx on 5/6; additional surgical rx per Dr. Mulligan    2. CBC, CMP, ESR, CRP today

## 2017-04-19 NOTE — PATIENT INSTRUCTIONS
Walking for Fitness  Fitness walking has something for everyone, even people who are already fit. Walking is one of the safest ways to condition your body aerobically. It can boost energy, help you lose weight, and reduce stress.    Physical benefits  · Walking strengthens your heart and lungs, and tones your muscles.  · When walking, your feet land with less impact than in other sports. This reduces chances of muscle, bone, and joint injury.  · Regular walking improves your cholesterol levels and lowers your risk of heart disease. And it helps you control your blood sugar if you have diabetes.  · Walking is a weight-bearing activity, which helps maintain bone density. This can help prevent osteoporosis.  Personal rewards  · Taking walks can help you relax and manage stress. And fitness walking may make you feel better about yourself.  · Walking can help you sleep better at night and make you less likely to be depressed.  · Regular walking may help maintain your memory as you get older.  · Walking is a great way to spend extra time with friends and family members. Be sure to invite your dog along!  Q&A about fitness walking  Q: Will walking keep me fit?  A: Yes. Regular walking at the right pace gives you all the benefits of other aerobic activities, such as jogging and swimming.  Q: Will walking help me lose weight and keep it off?  A: Yes. Per mile, walking can burn as many calories as jogging. Your health care provider can help work walking into your weight-loss plan.  Q: Is walking safe for my health?  A: Yes. Walking is safe if you have high blood pressure, diabetes, heart disease, or other conditions. Talk to your health care provider before you start.  Date Last Reviewed: 5/9/2015  © 8997-9914 [employer]. [address] [place] [address], [place] [address]. All rights reserved. This information is not intended as a substitute for professional medical care. Always follow your healthcare professional's  instructions.        Weight Management: Getting Started  Healthy bodies come in all shapes and sizes. Not all bodies are made to be thin. For some people, a healthy weight is higher than the average weight listed on weight charts. Your healthcare provider can help you decide on a healthy weight for you.    Reasons to lose weight  Losing weight can help with some health problems, such as high blood pressure, heart disease, diabetes, sleep apnea, and arthritis. You may also feel more energy.  Set your long-term goal  Your goal doesn't even have to be a specific weight. You may decide on a fitness goal (such as being able to walk 10 miles a week), or a health goal (such as lowering your blood pressure). Choose a goal that is measurable and reasonable, so you know when you've reached it. A goal of reaching a BMI of less than 25 is not always reasonable (or possible).   Make an action plan  Habits dont change overnight. Setting your goals too high can leave you feeling discouraged if you cant reach them. Be realistic. Choose one or two small changes you can make now. Set an action plan for how you are going to make these changes. When you can stick to this plan, keep making a few more small changes. Taking small steps will help you stay on the path to success.  Track your progress  Write down your goals. Then, keep a daily record of your progress. Write down what you eat and how active you are. This record lets you look back on how much youve done. It may also help when youre feeling frustrated. Reward yourself for success. Even if you dont reach every goal, give yourself credit for what you do get done.  Get support  Encouragement from others can help make losing weight easier. Ask your family members and friends for support. They may even want to join you. Also look to your healthcare provider, registered dietitian, and  for help. Your local hospital can give you more information about  nutrition, exercise, and weight loss.  Date Last Reviewed: 1/31/2016 © 2000-2016 [employer]. [address] [place] [address], [place] [address]. All rights reserved. This information is not intended as a substitute for professional medical care. Always follow your healthcare professional's instructions.        Controlling High Blood Pressure  High blood pressure (hypertension) is often called the silent killer. This is because many people who have it dont know it. High blood pressure is defined as 140/90 mm Hg or higher. Know your blood pressure and remember to check it regularly. Doing so can save your life. Here are some things you can do to help control your blood pressure.    Choose heart-healthy foods  · Select low-salt, low-fat foods. Limit sodium intake to 2,400 mg per day or the amount suggested by your healthcare provider.  · Limit canned, dried, cured, packaged, and fast foods. These can contain a lot of salt.  · Eat 8 to 10 servings of fruits and vegetables every day.  · Choose lean meats, fish, or chicken.  · Eat whole-grain pasta, brown rice, and beans.  · Eat 2 to 3 servings of low-fat or fat-free dairy products.  · Ask your doctor about the DASH eating plan. This plan helps reduce blood pressure.  · When you go to a restaurant, ask that your meal be prepared with no added salt.  Maintain a healthy weight  · Ask your healthcare provider how many calories to eat a day. Then stick to that number.  · Ask your healthcare provider what weight range is healthiest for you. If you are overweight, a weight loss of only 3% to 5% of your body weight can help lower blood pressure. Generally, a good weight loss goal is to lose 10% of your body weight in a year.  · Limit snacks and sweets.  · Get regular exercise.  Get up and get active  · Choose activities you enjoy. Find ones you can do with friends or family. This includes bicycling, dancing, walking, and jogging.  · Park farther away from building  entrances.  · Use stairs instead of the elevator.  · When you can, walk or bike instead of driving.  · [place] leaves, garden, or do household repairs.  · Be active at a moderate to vigorous level of physical activity for at least 40 minutes for a minimum of 3 to 4 days a week.   Manage stress  · Make time to relax and enjoy life. Find time to laugh.  · Communicate your concerns with your loved ones and your healthcare provider.  · Visit with family and friends, and keep up with hobbies.  Limit alcohol and quit smoking  · Men should have no more than 2 drinks per day.  · Women should have no more than 1 drink per day.  · Talk with your healthcare provider about quitting smoking. Smoking significantly increases your risk for heart disease and stroke. Ask your healthcare provider about community smoking cessation programs and other options.  Medicines  If lifestyle changes arent enough, your healthcare provider may prescribe high blood pressure medicine. Take all medicines as prescribed. If you have any questions about your medicines, ask your healthcare provider before stopping or changing them.   Date Last Reviewed: 4/27/2016 © 2000-2016 [employer]. [address] [place] [address], [place] [address]. All rights reserved. This information is not intended as a substitute for professional medical care. Always follow your healthcare professional's instructions.

## 2017-04-19 NOTE — PROGRESS NOTES
Pre-Visit Chart Review  For Appointment Scheduled on 4-    Health Maintenance Due   Topic Date Due    Colonoscopy  04/03/2017

## 2017-04-20 ENCOUNTER — TELEPHONE (OUTPATIENT)
Dept: INFECTIOUS DISEASES | Facility: CLINIC | Age: 67
End: 2017-04-20

## 2017-04-20 ENCOUNTER — CLINICAL SUPPORT (OUTPATIENT)
Dept: REHABILITATION | Facility: HOSPITAL | Age: 67
End: 2017-04-20
Attending: ORTHOPAEDIC SURGERY
Payer: MEDICARE

## 2017-04-20 DIAGNOSIS — M23.92 INTERNAL DERANGEMENT OF LEFT KNEE: ICD-10-CM

## 2017-04-20 LAB
BACTERIA SPEC AEROBE CULT: NO GROWTH
FUNGUS SPEC CULT: NORMAL

## 2017-04-20 PROCEDURE — 97110 THERAPEUTIC EXERCISES: CPT | Mod: PN

## 2017-04-20 PROCEDURE — 97010 HOT OR COLD PACKS THERAPY: CPT | Mod: PN

## 2017-04-20 RX ORDER — FLUCONAZOLE 100 MG/1
TABLET ORAL
Qty: 3 TABLET | Refills: 0 | Status: SHIPPED | OUTPATIENT
Start: 2017-04-20 | End: 2017-05-20

## 2017-04-20 RX ORDER — CIPROFLOXACIN 750 MG/1
750 TABLET, FILM COATED ORAL EVERY 12 HOURS
Qty: 30 TABLET | Refills: 0 | Status: SHIPPED | OUTPATIENT
Start: 2017-04-20 | End: 2017-05-24

## 2017-04-20 NOTE — PROGRESS NOTES
"Name: Ella Santana [facility] Number: [id number]  Date of Treatment: 04/20/2017   Diagnosis:   Encounter Diagnosis   Name Primary?    Internal derangement of left knee        Time in: 1110  Time Out: 1210  Total Treatment Time: 60  Group Time: 0      Subjective:    Ella reports no pain prior to treatment.  Patient reports their pain to be 0/10 on a 0-10 scale with 0 being no pain and 10 being the worst pain imaginable.    Objective    Ella received therapeutic exercises to develop strength, endurance and flexibility for 50 minutes including:     Bike x10' without complete revolution  Hamstring stretch 3 x 30 sec B  Calf stretch 3x30" 1/2 roll U  HR/TR stand 3x10  Hamstring curls 3x10 reciprocal  TKE L 4" step 3x10  Quad sets 3x10 L  SAQ 3x10 L  SLR 3x10 L  Heel slide 3x10 L    Cold pack L knee with knee extension 10'    Pt demo good understanding of the education provided. Ella demonstrated good return demonstration of activities with cues for proper form.     Assessment:     Pt will continue to benefit from skilled PT intervention. Medical Necessity is demonstrated by:  Continued inability to participate in vocational pursuits, Pain limits function of effected part for some activities, Unable to participate fully in daily activities, Requires skilled supervision to complete and progress HEP and Weakness.    Plan:    Continue with established Plan of Care towards PT goals.   "

## 2017-04-20 NOTE — TELEPHONE ENCOUNTER
----- Message from Mickie Branch MA sent at 4/20/2017  9:32 AM CDT -----  Contact: Patient: [phone]      ----- Message -----     From: Teagan Do     Sent: 4/20/2017   9:17 AM       To: Elías PASTOR Staff    Patient called and stated that she needs a prescription and she is getting a yeast infection.  Patient can be reached at [phone].  Please call.    Pharmacy [facility] [address] [place] [address] N  [address] AT [facility] [phone] (Phone) [phone] (Fax).    Thanks

## 2017-04-21 ENCOUNTER — CLINICAL SUPPORT (OUTPATIENT)
Dept: REHABILITATION | Facility: HOSPITAL | Age: 67
End: 2017-04-21
Attending: ORTHOPAEDIC SURGERY
Payer: MEDICARE

## 2017-04-21 DIAGNOSIS — M23.92 INTERNAL DERANGEMENT OF LEFT KNEE: ICD-10-CM

## 2017-04-21 PROCEDURE — 97110 THERAPEUTIC EXERCISES: CPT | Mod: PN

## 2017-04-24 ENCOUNTER — OFFICE VISIT (OUTPATIENT)
Dept: SPORTS MEDICINE | Facility: CLINIC | Age: 67
End: 2017-04-24
Payer: MEDICARE

## 2017-04-24 ENCOUNTER — CLINICAL SUPPORT (OUTPATIENT)
Dept: REHABILITATION | Facility: HOSPITAL | Age: 67
End: 2017-04-24
Attending: ORTHOPAEDIC SURGERY
Payer: MEDICARE

## 2017-04-24 ENCOUNTER — OFFICE VISIT (OUTPATIENT)
Dept: FAMILY MEDICINE | Facility: CLINIC | Age: 67
End: 2017-04-24
Payer: MEDICARE

## 2017-04-24 VITALS
WEIGHT: 140 LBS | BODY MASS INDEX: 26.43 KG/M2 | HEART RATE: 79 BPM | SYSTOLIC BLOOD PRESSURE: 135 MMHG | DIASTOLIC BLOOD PRESSURE: 79 MMHG | HEIGHT: 61 IN

## 2017-04-24 VITALS
BODY MASS INDEX: 27.61 KG/M2 | SYSTOLIC BLOOD PRESSURE: 132 MMHG | DIASTOLIC BLOOD PRESSURE: 81 MMHG | WEIGHT: 140.63 LBS | TEMPERATURE: 98 F | HEART RATE: 70 BPM | HEIGHT: 60 IN

## 2017-04-24 DIAGNOSIS — M23.92 INTERNAL DERANGEMENT OF LEFT KNEE: ICD-10-CM

## 2017-04-24 DIAGNOSIS — M25.562 CHRONIC PAIN OF LEFT KNEE: ICD-10-CM

## 2017-04-24 DIAGNOSIS — I10 ESSENTIAL HYPERTENSION: Primary | ICD-10-CM

## 2017-04-24 DIAGNOSIS — E78.00 PURE HYPERCHOLESTEROLEMIA: ICD-10-CM

## 2017-04-24 DIAGNOSIS — G89.29 CHRONIC PAIN OF LEFT KNEE: ICD-10-CM

## 2017-04-24 DIAGNOSIS — S81.802S OPEN WOUND OF LEFT LOWER EXTREMITY, SEQUELA: Primary | ICD-10-CM

## 2017-04-24 DIAGNOSIS — T84.54XA INFECTION OF PROSTHETIC LEFT KNEE JOINT: ICD-10-CM

## 2017-04-24 PROCEDURE — 99214 OFFICE O/P EST MOD 30 MIN: CPT | Mod: S$PBB,,, | Performed by: FAMILY MEDICINE

## 2017-04-24 PROCEDURE — 97110 THERAPEUTIC EXERCISES: CPT | Mod: PN

## 2017-04-24 PROCEDURE — 99213 OFFICE O/P EST LOW 20 MIN: CPT | Mod: PBBFAC,27,PO | Performed by: FAMILY MEDICINE

## 2017-04-24 PROCEDURE — 99999 PR PBB SHADOW E&M-EST. PATIENT-LVL III: CPT | Mod: PBBFAC,,, | Performed by: FAMILY MEDICINE

## 2017-04-24 PROCEDURE — 99214 OFFICE O/P EST MOD 30 MIN: CPT | Mod: PBBFAC,PO | Performed by: ORTHOPAEDIC SURGERY

## 2017-04-24 PROCEDURE — 99999 PR PBB SHADOW E&M-EST. PATIENT-LVL IV: CPT | Mod: PBBFAC,,, | Performed by: ORTHOPAEDIC SURGERY

## 2017-04-24 PROCEDURE — 97010 HOT OR COLD PACKS THERAPY: CPT | Mod: PN

## 2017-04-24 PROCEDURE — 99024 POSTOP FOLLOW-UP VISIT: CPT | Mod: ,,, | Performed by: ORTHOPAEDIC SURGERY

## 2017-04-24 NOTE — PROGRESS NOTES
Subjective:          Chief Complaint: Ella Canales is a 66 y.o. female who had concerns including Post-op Evaluation of the Left Knee.    HPI Comments: Patient is here for a follow up for her left knee. She is doing PT at [facility].      She was then taken to the OR by Dr. Patel Denise for an irrigation and debridement on 2/24/2017. She has see ID  Dr. Mckinley.      Now on oral cipro and clindamycin.        DATE OF PROCEDURE: 03/09/2017     ATTENDING SURGEON: Angie Mulligan M.D.     FIRST ASSISTANT: Paolo Abdi M.D. - Fellow.     SECOND ASSISTANT: SMA Prince - Assistant.     PREOPERATIVE DIAGNOSIS: Left knee sepsis.     POSTOPERATIVE DIAGNOSES:  1. Left knee hemarthrosis.  2. Left knee sepsis.  3. Left knee synovitis.  4. Left knee medial retinacular tear.  5. Left knee medial collateral ligament insufficiency.     PROCEDURES PERFORMED:  1. Left knee complex polyethylene liner exchange.  2. Left knee complex incision and drainage.  3. Left knee medial collateral ligament repair.  4. Left knee medial retinacular repair with application of a flexed HD graft.  5. Left knee complex open synovectomy with hemostasis control.  6. Wound VAC application.      DATE OF PROCEDURE: 02/24/2017     PREOPERATIVE DIAGNOSIS: Possible septic arthritis, left total knee replacement.     POSTOPERATIVE DIAGNOSIS: Possible septic arthritis, left total knee   replacement.      PROCEDURE: Arthrotomy, left knee, with irrigation and debridement (CPT #12539).     SURGEON: Patel Denise M.D.     ASSISTANTS: Andrey Ya M.D. (RES); Anant Issa M.D. 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 Pertinent negatives include no abdominal pain, chest pain, chills, congestion, coughing, fever, headaches, myalgias, nausea, numbness, rash, sore throat or vomiting.         Review of Systems   Constitution: Negative. Negative for chills, fever, night sweats, weight gain and weight loss.   HENT: Negative for congestion, headaches, hearing loss and sore throat.    Eyes: Negative for blurred vision, double vision and visual disturbance.   Cardiovascular: Negative for chest pain, leg swelling and palpitations.   Respiratory: Negative for cough and shortness of breath.    Endocrine: Negative for polyuria.   Hematologic/Lymphatic: Negative for bleeding problem. Does not bruise/bleed easily.   Skin: Negative for itching, poor wound healing and rash.   Musculoskeletal: Positive for joint pain, joint swelling and stiffness. Negative for back pain, muscle cramps, muscle weakness and myalgias.   Gastrointestinal: Negative for abdominal pain, constipation, diarrhea, melena, nausea and vomiting.   Genitourinary: Negative.  Negative for frequency and hematuria.   Neurological: Negative for dizziness, loss of balance, numbness, paresthesias and sensory change.   Psychiatric/Behavioral: Negative for altered mental status and depression. The patient is not nervous/anxious.    Allergic/Immunologic: Negative for hives.       Pain Related Questions  Over the past 3 days, what was your average pain during activity? (I.e. running, jogging, walking, climbing stairs, getting dressed, ect.): 2  Over the past 3 days, what was your highest pain level?: 2  Over the past 3 days, what was your lowest pain level? : 0    Other  How many nights a week are you awakened by  your affected body part?: 0  Was the patient's HEIGHT measured or patient reported?: Patient Reported  Was the patient's WEIGHT measured or patient reported?: Measured      Objective:        General: Ella is well-developed, well-nourished, appears stated age, in no acute distress, alert and oriented to time, place and person.     General    Vitals reviewed.  Constitutional: She is oriented to person, place, and time. She appears well-developed and well-nourished. No distress.   HENT:   Mouth/Throat: No oropharyngeal exudate.   Eyes: Right eye exhibits no discharge. Left eye exhibits no discharge.   Neck: Normal range of motion.   Cardiovascular: Normal rate and regular rhythm.    Pulmonary/Chest: Effort normal and breath sounds normal. No respiratory distress.   Neurological: She is alert and oriented to person, place, and time. She has normal reflexes. No cranial nerve deficit. Coordination normal.   Psychiatric: She has a normal mood and affect. Her behavior is normal. Judgment and thought content normal.     General Musculoskeletal Exam   Gait: abnormal       Right Knee Exam     Inspection   Erythema: absent  Scars: absent  Swelling: absent  Effusion: effusion  Deformity: deformity  Bruising: absent    Tenderness   The patient is experiencing no tenderness.         Range of Motion   Extension: 0   Flexion: 140     Tests   Meniscus   David:  Medial - negative Lateral - negative  Ligament Examination Lachman: normal (-1 to 2mm) PCL-Posterior Drawer: normal (0 to 2mm)     MCL - Valgus: normal (0 to 2mm)  LCL - Varus: normalPivot Shift: normal (Equal)Reverse Pivot Shift: normal (Equal)Dial Test at 30 degrees: normal (< 5 degrees)Dial Test at 90 degrees: normal (< 5 degrees)  Posterior Sag Test: negative  Posterolateral Corner: unstable (>15 degrees difference)  Patella   Patellar Apprehension: negative  Passive Patellar Tilt: neutral  Patellar Tracking: normal  Patellar Glide (quadrants): Lateral - 1   Medial -  2  Q-Angle at 90 degrees: normal  Patellar Grind: negative  J-Sign: none    Other   Meniscal Cyst: absent  Popliteal (Baker's) Cyst: absent  Sensation: normal    Left Knee Exam     Inspection   Erythema: absent  Scars: present  Swelling: present  Effusion: absent  Deformity: deformity  Bruising: absent    Tenderness   Left knee tenderness location: global tenderness.    Range of Motion   Extension:  0 abnormal   Flexion:  90 abnormal     Tests   Meniscus   David:  Medial - negative Lateral - negative  Stability Lachman: normal (-1 to 2mm) PCL-Posterior Drawer: normal (0 to 2mm)  MCL - Valgus: normal (0 to 2mm)  LCL - Varus: normal (0 to 2mm)Pivot Shift: normal (Equal)Reverse Pivot Shift: normal (Equal)Dial Test at 30 degrees: normal (< 5 degrees)Dial Test at 90 degrees: normal (< 5 degrees)  Posterior Sag Test: negative  Posterolateral Corner: unstable (>15 degrees difference)  Patella   Patellar Apprehension: negative  Passive Patellar Tilt: neutral  Patellar Tracking: normal  Patellar Glide (Quadrants): Lateral - 1 Medial - 2  Q-Angle at 90 degrees: normal  Patellar Grind: negative  J-Sign: J sign absent    Other   Meniscal Cyst: absent  Popliteal (Baker's) Cyst: absent  Sensation: normal    Comments:    Wound 1x1x0.5cm    Right Hip Exam     Tests   Ivon: negative  Left Hip Exam     Tests   Ivon: negative          Muscle Strength   Right Lower Extremity   Hip Abduction: 5/5   Quadriceps:  5/5   Hamstrin/5   Left Lower Extremity   Hip Abduction: 5/5   Quadriceps:  4/5   Hamstrin/5     Reflexes     Left Side  Quadriceps:  2+  Achilles:  2+    Right Side   Quadriceps:  2+  Achilles:  2+    Vascular Exam     Right Pulses  Dorsalis Pedis:      2+  Posterior Tibial:      2+        Left Pulses  Dorsalis Pedis:      2+  Posterior Tibial:      2+        Edema  Right Lower Leg: absent  Left Lower Leg: absent            Assessment:       Encounter Diagnoses   Name Primary?    Open wound of left lower extremity,  sequela Yes    Infection of prosthetic left knee joint     Chronic pain of left knee           Plan:       1. IKDC, SF-12 and KOOS was not filled out today in clinic.     RTC in 2 weeks with Dr. Angie Mulligan Patient will not fill out IKDC, SF-12 and KOOS on return.    2. Continue ABX per ID    3.  Wound vac discontinued at this time. Wet to dry collagen packing with Michael today    4. Prescription for wound dressing changes at PT in [place]                             Patient questionnaires may have been collected.

## 2017-04-24 NOTE — MR AVS SNAPSHOT
[facility]  [address]  [facility] [address]  Phone: [phone]                  Ella Canales   2017 2:15 PM   Office Visit    Description:  Female : 1950   Provider:  Angie Mulligan MD   Department:  [facility]           Reason for Visit     Left Knee - Post-op Evaluation           Diagnoses this Visit        Comments    Open wound of left lower extremity, sequela    -  Primary     Infection of prosthetic left knee joint         Chronic pain of left knee                To Do List           Future Appointments        Provider Department Dept Phone    2017 9:00 AM GENERAL, PT [facility] [phone]    2017 9:00 AM GENERAL, PT [facility] [phone]    2017 9:00 AM Tita Lion, PT [facility] [phone]    5/3/2017 8:00 AM Aiyana Anaya, PT [facility] [phone]    2017 8:00 AM Kita Frankel, PTA [facility] [phone]      Goals (5 Years of Data)     None      Follow-Up and Disposition     Return in about 2 weeks (around 2017) for RTC in 2 weeks with Dr. Angie Mulligan Patient will not fill out IKDC, SF-12 and KOOS on return..      [facility]     [facility] Nurse Care Line -  Assistance  Unless otherwise directed by your provider, please contact [facility], our nurse care line that is available for  assistance.     Registered nurses in the [facility] provide: appointment scheduling, clinical advisement, health education, and other advisory services.  Call: [phone] (toll free)               Medications           Message regarding Medications     Verify the changes and/or additions to your medication regime listed below are the same as discussed with your clinician today.  If any of these changes or additions are incorrect, please notify your healthcare provider.            "  Verify that the below list of medications is an accurate representation of the medications you are currently taking.  If none reported, the list may be blank. If incorrect, please contact your healthcare provider. Carry this list with you in case of emergency.           Current Medications     ciprofloxacin HCl (CIPRO) 750 MG tablet Take 1 tablet (750 mg total) by mouth every 12 (twelve) hours.    clindamycin (CLEOCIN) 300 MG capsule Take 1 capsule (300 mg total) by mouth 3 (three) times daily. Take with food    fexofenadine (ALLEGRA) 60 MG tablet Take 60 mg by mouth once daily.    fluconazole (DIFLUCAN) 100 MG tablet One tablet weekly by  Mouth for yeast infection    glucosamine-chondroit-vit C-Mn (GLUCOSAMINE-CHONDROITIN MAX ST) 500-400 mg Cap Twice a day    losartan (COZAAR) 100 MG tablet Take 1 tablet (100 mg total) by mouth once daily.    metoprolol succinate (TOPROL-XL) 50 MG 24 hr tablet Take 1 tablet (50 mg total) by mouth once daily.    omeprazole (PRILOSEC) 20 MG capsule Take 1 capsule (20 mg total) by mouth 2 (two) times daily.    potassium chloride SA (K-DUR,KLOR-CON) 10 MEQ tablet Take 1 tablet (10 mEq total) by mouth 2 (two) times daily.    raloxifene (EVISTA) 60 mg tablet Take 1 tablet (60 mg total) by mouth once daily.    simvastatin (ZOCOR) 40 MG tablet Take 1 tablet (40 mg total) by mouth every evening.           Clinical Reference Information           Your Vitals Were     BP Pulse Height Weight Last Period BMI    135/79 79 5' 1" (1.549 m) 63.5 kg (140 lb) 04/03/2012 26.45 kg/m2      Blood Pressure          Most Recent Value    BP  135/79      Allergies as of 4/24/2017     Ace Inhibitors      Immunizations Administered on Date of Encounter - 4/24/2017     None      Orders Placed During Today's Visit      Normal Orders This Visit    Ambulatory Referral to Physical/Occupational Therapy       Language Assistance Services     ATTENTION: Language assistance services are available, free of charge. " Please call [phone].      ATENCIÓN: Si habla español, tiene a priest disposición servicios gratuitos de asistencia lingüística. Llame al [phone].     CHÚ Ý: N?u b?n nói Ti?ng Vi?t, có các d?ch v? h? tr? ngôn ng? mi?n phí dành cho b?n. G?i s? [phone].         [facility] complies with applicable Federal civil rights laws and does not discriminate on the basis of race, color, national origin, age, disability, or sex.

## 2017-04-24 NOTE — MR AVS SNAPSHOT
[facility]  [address]  [place] [address]  Phone: [phone]  Fax: [phone]                  Ella Canales   2017 8:40 AM   Office Visit    Description:  Female : 1950   Provider:  Adry Damian MD   Department:  [facility]           Reason for Visit     Follow-up                To Do List           Future Appointments        Provider Department Dept Phone    2017 10:00 AM GENERAL, PT [facility] [phone]    2017 2:15 PM Angie Mulligan MD [facility] [phone]    2017 9:00 AM GENERAL, PT [facility] [phone]    2017 9:00 AM GENERAL, PT [facility] [phone]    2017 9:00 AM Tita Lion, PT [facility] [phone]      Goals (5 Years of Data)     None      Follow-Up and Disposition     Return in about 3 months (around 2017).      [facility]     [facility] Care [facility] -  Assistance  Unless otherwise directed by your provider, please contact [facility], our nurse care line that is available for  assistance.     Registered nurses in the [facility] provide: appointment scheduling, clinical advisement, health education, and other advisory services.  Call: [phone] (toll free)               Medications           Message regarding Medications     Verify the changes and/or additions to your medication regime listed below are the same as discussed with your clinician today.  If any of these changes or additions are incorrect, please notify your healthcare provider.        STOP taking these medications     dextrose 5 % SolP 250 mL with vancomycin 1,000 mg SolR 1,500 mg Inject 1,500 mg into the vein every 12 (twelve) hours.    ferrous sulfate 325 (65 FE) MG EC tablet Take 1 tablet (325 mg total) by mouth once daily.    ondansetron (ZOFRAN, AS HYDROCHLORIDE,) 4 MG tablet  Take 1 tablet (4 mg total) by mouth every 12 (twelve) hours as needed for Nausea.    oxycodone-acetaminophen (PERCOCET)  mg per tablet Take 1 tablet by mouth every 6 (six) hours as needed for Pain.           Verify that the below list of medications is an accurate representation of the medications you are currently taking.  If none reported, the list may be blank. If incorrect, please contact your healthcare provider. Carry this list with you in case of emergency.           Current Medications     ciprofloxacin HCl (CIPRO) 750 MG tablet Take 1 tablet (750 mg total) by mouth every 12 (twelve) hours.    clindamycin (CLEOCIN) 300 MG capsule Take 1 capsule (300 mg total) by mouth 3 (three) times daily. Take with food    fexofenadine (ALLEGRA) 60 MG tablet Take 60 mg by mouth once daily.    fluconazole (DIFLUCAN) 100 MG tablet One tablet weekly by  Mouth for yeast infection    glucosamine-chondroit-vit C-Mn (GLUCOSAMINE-CHONDROITIN MAX ST) 500-400 mg Cap Twice a day    losartan (COZAAR) 100 MG tablet Take 1 tablet (100 mg total) by mouth once daily.    metoprolol succinate (TOPROL-XL) 50 MG 24 hr tablet Take 1 tablet (50 mg total) by mouth once daily.    omeprazole (PRILOSEC) 20 MG capsule Take 1 capsule (20 mg total) by mouth 2 (two) times daily.    potassium chloride SA (K-DUR,KLOR-CON) 10 MEQ tablet Take 1 tablet (10 mEq total) by mouth 2 (two) times daily.    raloxifene (EVISTA) 60 mg tablet Take 1 tablet (60 mg total) by mouth once daily.    simvastatin (ZOCOR) 40 MG tablet Take 1 tablet (40 mg total) by mouth every evening.           Clinical Reference Information           Your Vitals Were     BP Pulse Temp Height Weight Last Period    132/81 (BP Location: Right arm, Patient Position: Sitting, BP Method: Automatic) 70 97.7 °F (36.5 °C) (Oral) 5' (1.524 m) 63.8 kg (140 lb 10.5 oz) 04/03/2012    BMI                27.47 kg/m2          Blood Pressure          Most Recent Value    BP  132/81      Allergies as of  4/24/2017     Ace Inhibitors      Immunizations Administered on Date of Encounter - 4/24/2017     None      Instructions      Walking for Fitness  Fitness walking has something for everyone, even people who are already fit. Walking is one of the safest ways to condition your body aerobically. It can boost energy, help you lose weight, and reduce stress.    Physical benefits  · Walking strengthens your heart and lungs, and tones your muscles.  · When walking, your feet land with less impact than in other sports. This reduces chances of muscle, bone, and joint injury.  · Regular walking improves your cholesterol levels and lowers your risk of heart disease. And it helps you control your blood sugar if you have diabetes.  · Walking is a weight-bearing activity, which helps maintain bone density. This can help prevent osteoporosis.  Personal rewards  · Taking walks can help you relax and manage stress. And fitness walking may make you feel better about yourself.  · Walking can help you sleep better at night and make you less likely to be depressed.  · Regular walking may help maintain your memory as you get older.  · Walking is a great way to spend extra time with friends and family members. Be sure to invite your dog along!  Q&A about fitness walking  Q: Will walking keep me fit?  A: Yes. Regular walking at the right pace gives you all the benefits of other aerobic activities, such as jogging and swimming.  Q: Will walking help me lose weight and keep it off?  A: Yes. Per mile, walking can burn as many calories as jogging. Your health care provider can help work walking into your weight-loss plan.  Q: Is walking safe for my health?  A: Yes. Walking is safe if you have high blood pressure, diabetes, heart disease, or other conditions. Talk to your health care provider before you start.  Date Last Reviewed: 5/9/2015  © 8726-9317 [employer]. [address] [place] [address], [place] [address]. All rights reserved.  This information is not intended as a substitute for professional medical care. Always follow your healthcare professional's instructions.        Weight Management: Getting Started  Healthy bodies come in all shapes and sizes. Not all bodies are made to be thin. For some people, a healthy weight is higher than the average weight listed on weight charts. Your healthcare provider can help you decide on a healthy weight for you.    Reasons to lose weight  Losing weight can help with some health problems, such as high blood pressure, heart disease, diabetes, sleep apnea, and arthritis. You may also feel more energy.  Set your long-term goal  Your goal doesn't even have to be a specific weight. You may decide on a fitness goal (such as being able to walk 10 miles a week), or a health goal (such as lowering your blood pressure). Choose a goal that is measurable and reasonable, so you know when you've reached it. A goal of reaching a BMI of less than 25 is not always reasonable (or possible).   Make an action plan  Habits dont change overnight. Setting your goals too high can leave you feeling discouraged if you cant reach them. Be realistic. Choose one or two small changes you can make now. Set an action plan for how you are going to make these changes. When you can stick to this plan, keep making a few more small changes. Taking small steps will help you stay on the path to success.  Track your progress  Write down your goals. Then, keep a daily record of your progress. Write down what you eat and how active you are. This record lets you look back on how much youve done. It may also help when youre feeling frustrated. Reward yourself for success. Even if you dont reach every goal, give yourself credit for what you do get done.  Get support  Encouragement from others can help make losing weight easier. Ask your family members and friends for support. They may even want to join you. Also look to your healthcare  provider, registered dietitian, and  for help. Your local hospital can give you more information about nutrition, exercise, and weight loss.  Date Last Reviewed: 1/31/2016 © 2000-2016 [employer]. [address] [place] [address], [place] [address]. All rights reserved. This information is not intended as a substitute for professional medical care. Always follow your healthcare professional's instructions.        Controlling High Blood Pressure  High blood pressure (hypertension) is often called the silent killer. This is because many people who have it dont know it. High blood pressure is defined as 140/90 mm Hg or higher. Know your blood pressure and remember to check it regularly. Doing so can save your life. Here are some things you can do to help control your blood pressure.    Choose heart-healthy foods  · Select low-salt, low-fat foods. Limit sodium intake to 2,400 mg per day or the amount suggested by your healthcare provider.  · Limit canned, dried, cured, packaged, and fast foods. These can contain a lot of salt.  · Eat 8 to 10 servings of fruits and vegetables every day.  · Choose lean meats, fish, or chicken.  · Eat whole-grain pasta, brown rice, and beans.  · Eat 2 to 3 servings of low-fat or fat-free dairy products.  · Ask your doctor about the DASH eating plan. This plan helps reduce blood pressure.  · When you go to a restaurant, ask that your meal be prepared with no added salt.  Maintain a healthy weight  · Ask your healthcare provider how many calories to eat a day. Then stick to that number.  · Ask your healthcare provider what weight range is healthiest for you. If you are overweight, a weight loss of only 3% to 5% of your body weight can help lower blood pressure. Generally, a good weight loss goal is to lose 10% of your body weight in a year.  · Limit snacks and sweets.  · Get regular exercise.  Get up and get active  · Choose activities you enjoy. Find ones you can  do with friends or family. This includes bicycling, dancing, walking, and jogging.  · Park farther away from building entrances.  · Use stairs instead of the elevator.  · When you can, walk or bike instead of driving.  · Jeddo leaves, garden, or do household repairs.  · Be active at a moderate to vigorous level of physical activity for at least 40 minutes for a minimum of 3 to 4 days a week.   Manage stress  · Make time to relax and enjoy life. Find time to laugh.  · Communicate your concerns with your loved ones and your healthcare provider.  · Visit with family and friends, and keep up with hobbies.  Limit alcohol and quit smoking  · Men should have no more than 2 drinks per day.  · Women should have no more than 1 drink per day.  · Talk with your healthcare provider about quitting smoking. Smoking significantly increases your risk for heart disease and stroke. Ask your healthcare provider about community smoking cessation programs and other options.  Medicines  If lifestyle changes arent enough, your healthcare provider may prescribe high blood pressure medicine. Take all medicines as prescribed. If you have any questions about your medicines, ask your healthcare provider before stopping or changing them.   Date Last Reviewed: 4/27/2016  © 6284-9368 [employer]. [address] [place] [address]. All rights reserved. This information is not intended as a substitute for professional medical care. Always follow your healthcare professional's instructions.             Language Assistance Services     ATTENTION: Language assistance services are available, free of charge. Please call [phone].      ATENCIÓN: Si habla español, tiene a priest disposición servicios gratuitos de asistencia lingüística. Llame al [phone].     [facility] Ý: N?u b?n nói Ti?ng Vi?t, có các d?ch v? h? tr? ngôn ng? mi?n phí dành cho b?n. G?i s? [phone].         [facility] complies with applicable  Federal civil rights laws and does not discriminate on the basis of race, color, national origin, age, disability, or sex.

## 2017-04-24 NOTE — PROGRESS NOTES
"Name: Ella Santana [facility] Number: [id number]  Date of Treatment: 04/24/2017   Diagnosis:   Encounter Diagnosis   Name Primary?    Internal derangement of left knee        Time in: 1000  Time Out: 1105  Total Treatment Time: 55    Subjective:    Ella reports improvement of symptoms.  Patient reports no pain. Reports continues HEP daily without problems. To see Isaak Dumont today, hoping he will remove wound vac for good. Has not been using cryo at home.     Objective    Patient received individual therapy to increase strength, endurance, ROM, flexibility and posture with activities as follows:     Ella received therapeutic exercises to develop strength, endurance, ROM, flexibility and posture for 45 minutes including:     Bike 1/2 revs 10' seat 4  Standing airex HR/TR 10/3 B in // bars  Standing gastroc stretches L/R 3/30s on 1/2 foam roll in // bars  Standing hamstring curls L/R 10/3 in // bars  Seated hamstring stretches 3/30s L/R  Supine QS B 10/3 with wedge under L ankle for improved L knee extn  Supine L SLR 10/3 with cues for improved QS before each rep for improved knee extn  Supine SAQ B 10/3 over bolster   HS L with sheet 10/3 using active hamstrings and hip flexors for HS then sheet for overpressure x 5" holds  Seated LAQ 10/3 L with cues for increased extn    Manual PROM L knee f/b CP x 10' to L knee in long sitting (pt preference, although reports mild tightness sensation reported post L knee. Notable limited L hamstring flexibility in long sitting) with roll under L ankle for knee extn for pain and inflammation purposes after being cleared of contraindications.     Continue HEP daily.    Pt demo good understanding of the education provided. Ella demonstrated good return demonstration of activities.     Assessment:     Antalgic gait pattern with limited L knee AROM.     Pt will continue to benefit from skilled PT intervention. Medical Necessity is demonstrated by:  Requires skilled supervision to " complete and progress HEP and Weakness.    Patient is making good progress towards established goals.    Plan:    Continue with established Plan of Care towards PT goals.

## 2017-04-26 ENCOUNTER — CLINICAL SUPPORT (OUTPATIENT)
Dept: REHABILITATION | Facility: HOSPITAL | Age: 67
End: 2017-04-26
Attending: ORTHOPAEDIC SURGERY
Payer: MEDICARE

## 2017-04-26 ENCOUNTER — CLINICAL SUPPORT (OUTPATIENT)
Dept: REHABILITATION | Facility: HOSPITAL | Age: 67
End: 2017-04-26
Attending: PHYSICIAN ASSISTANT
Payer: MEDICARE

## 2017-04-26 DIAGNOSIS — M23.92 INTERNAL DERANGEMENT OF LEFT KNEE: ICD-10-CM

## 2017-04-26 DIAGNOSIS — S81.802A OPEN WOUND OF LEFT LOWER LEG, INITIAL ENCOUNTER: Primary | ICD-10-CM

## 2017-04-26 LAB — BACTERIA SPEC ANAEROBE CULT: NORMAL

## 2017-04-26 PROCEDURE — 97110 THERAPEUTIC EXERCISES: CPT | Mod: PN | Performed by: PHYSICAL THERAPIST

## 2017-04-26 NOTE — PROGRESS NOTES
Name: Ella Santana [facility] Number: [id number]  Date of Treatment: 04/26/2017   Diagnosis:   Encounter Diagnosis   Name Primary?    Internal derangement of left knee        Time in: 900  Time Out: 948  Total Treatment Time: 48  Group Time: 0      Subjective:    Ella reports she is going to have wound care at outpatient wound care at the hospital after her appt today .  Patient reports their pain to be 0/10 on a 0-10 scale with 0 being no pain and 10 being the worst pain imaginable.    Objective    Patient received individual therapy to increase strength, ROM and flexibility with 0 patients with activities as follows:     Ella received therapeutic exercises to develop strength, ROM and flexibility for 48 minutes including:     Bike, 1/2 rev for ROM, 10 min  Gastroc st on 1/2 roll, 3x30s  HSS, 3x30s  HR/TR on Airex, 30x  TKE, 4 in box, 30x  HSC, 30x  Mini squats, 3x10  LAQ, LLE 1#, 3x10  Seated marching, LLE, 1#, 3x10  Heel slides, 30x  SLR, 3x10  SAQ, 3x10    L knee ROM: -5*/-3*-105*/107*    Erythema present below the bandages that are covering the wounds.       Assessment:   ROM continues to be limited. Pt does not tolerate AAROM/PROM well, but this may be due to open wounds along the incision line.     Pt will continue to benefit from skilled PT intervention. Medical Necessity is demonstrated by:  Pain limits function of effected part for some activities, Unable to participate fully in daily activities, Requires skilled supervision to complete and progress HEP, Weakness and Edema.    Patient is making good progress towards established goals.    New/Revised goals: NA      Plan:  Continue with established Plan of Care towards PT goals.

## 2017-04-27 NOTE — PLAN OF CARE
PHYSICAL THERAPY WOUND CARE EVALUATION    Onset Date:  03-    Eval Date: 04-  Primary Diagnosis:  Infection of prosthetic LK/open wound   Treatment Diagnosis:  Wound care  Certification Period:  04- to 06-  Past Medical History:   Past Medical History:   Diagnosis Date    Arthritis     bilateral knees    GERD (gastroesophageal reflux disease)     Hyperlipidemia     Hypertension     Osteopenia     S/P PICC central line placement     Ulcer      Precautions:  universal  Prior Therapy:  See Medical Hx  Medications: Ella Canales has a current medication list which includes the following prescription(s): ciprofloxacin hcl, clindamycin, fexofenadine, fluconazole, glucosamine-chondroit-vit c-mn, losartan, metoprolol succinate, omeprazole, potassium chloride sa, raloxifene, and simvastatin.  Nutrition:  good  Social/Cultural Assessment:  Home alone, retired  Prior Level of Function: Independent    Social History:  Home  Alone, attends OP PT for LK rehabilitation  Signs of Abuse: No    Type of Wound: Surgical  Location Stage Measurement Odor Exudate Tracts/Underming   1.LK incision distal wound  1.3 x 0.5 x 0.6 cm no serosanguinous none   2.Lk incision proximal wound  0.5 x 0.3 cm superficial no serosanguinous none   3.          Treatment/Dressing Protocol:  Debridement PRN and dressing changes    Assessment:  65 y/o female with s/p TKA L 02- with onset of hemarthrosis//infection with s/p I and D and wash out  02- and 03-, partial prosthesis exchange and wound vac placement. Wound vac discontinued 04- and is now referred to OP PT/wound care 2x week x 60 days for selective and NSD of LK.     Short Term Goals in 4 weeks: maintain clean wound environment by  50%, decrease signs of infection by  50%, decrease edema by  50% and decrease amount of necrotic tissue by  50%    Long Term Goals in 4 weeks: decrease wound size by  100%, return tissue to max. integrity  by  100% and increase amount of granulation tissue by  100%    Recommended Treatment Plan (2 times per week for 8 weeks): Debridement (nonselective), Debridement (selective) and Patient Education    PROGRESS/CURRENT STATUS:    Subjective:     Patient ID: Ella Canales is a 66 y.o. female.  Pain: 2    States just came from OP PT for LK rehabilitation and is sore  Pt stated feeling of stiffness vs pain    Objective:     Pt presented ambulatory without assistive device with LK stiffness  Presented with LK wound covered by 2 small mepilex, no packing seen   LK incision  with 1 cratered wound distally and a superficial wound proximally- pt stated proximal wound just started since she started exercises  LK with mild erythema from previous wound vac use  Wounds irrigated with saline, distal wound gently debrided of loose, yellow slough, dressing consisted with wet to dry 1/4 inch plain packing strip. Proximal wound dressed with adaptic then both wounds covered with mepilex      Assessment:     Pt presents with complex and slow to heal LK wound    Plans and Goals:     Patient will be seen 2x week for skilled wound care, for selective and nonselective debridements    Progress Updates:     PT initial wound eval and treat    TIME RECORD:    Start Time:  1036  Stop Time:  1113    PROCEDURES:        UNTIMED  Procedure Time Min.   eval/NSD Start:1036  Stop: 1113 37    Start:  Stop:      Total Untimed Units:  1  Charges Billed/# of units:  1      Therapist's Name: Leatha Issa PT  Date: 04/27/2017    I CERTIFY THE NEED FOR THESE SERVICES FURNISHED UNDER THIS PLAN OF TREATMENT AND WHILE UNDER MY CARE    Physician's comments: ________________________________________________________________________________________________________________________________________________      Physician's Name: ___________________________________      Physician's Signature: _______________________________________________      Date:  ________________________

## 2017-04-28 ENCOUNTER — CLINICAL SUPPORT (OUTPATIENT)
Dept: REHABILITATION | Facility: HOSPITAL | Age: 67
End: 2017-04-28
Attending: ORTHOPAEDIC SURGERY
Payer: MEDICARE

## 2017-04-28 ENCOUNTER — CLINICAL SUPPORT (OUTPATIENT)
Dept: REHABILITATION | Facility: HOSPITAL | Age: 67
End: 2017-04-28
Attending: PHYSICIAN ASSISTANT
Payer: MEDICARE

## 2017-04-28 DIAGNOSIS — M23.92 INTERNAL DERANGEMENT OF LEFT KNEE: ICD-10-CM

## 2017-04-28 DIAGNOSIS — S81.802D OPEN WOUND OF LEFT LOWER LEG, SUBSEQUENT ENCOUNTER: Primary | ICD-10-CM

## 2017-04-28 PROCEDURE — 97110 THERAPEUTIC EXERCISES: CPT | Mod: PN

## 2017-04-28 NOTE — PROGRESS NOTES
TIME RECORD    Date:  04/28/2017    Start Time:  0809  Stop Time:  0837    PROCEDURES:    TIMED  Procedure Time Min.    Start:  Stop:     Start:  Stop:     Start:  Stop:     Start:  Stop:          UNTIMED  Procedure Time Min.   NSD Start:0809  Stop:0837 28    Start:  Stop:      Total Timed Minutes:  0  Total Timed Units:  0  Total Untimed Units:  1  Charges Billed/# of units:  1      Progress/Current Status    Subjective:     Patient ID: Ella Canales is a 66 y.o. female.  Diagnosis:   1. Open wound of left lower leg, subsequent encounter       Pain: 1 /10  Soreness as pt stated did a lot of standing and moving boxes as in the process of moving to new home  Pt has OP PT appointment after wound care    Objective:     Ambulatory without device  LK dressing removed, adaptic easily peeled off from proximal wound  Distal wound packing moistened with saline prior to removal  Wound irrigated with saline, selective debridement of loose slough.  Redressing with 1/4 inch plain packing strip wet to dry  At distal wound.   cut adaptic applied to proximal wound and both wounds covered with mepilex    Assessment:     Proximal wound with black scab  Distal wound cratered and yellow 100%  Less redness surrounding wound  No odor noted but has serosanguinous drainage at cratered wound    Patient Education/Response:     Pt maintaining clean wound environment    Plans and Goals:     Continue M/F for skilled wound care

## 2017-04-28 NOTE — PROGRESS NOTES
Name: Ella Santana [facility] Number: [id number]  Date of Treatment: 04/28/2017   Diagnosis:   Encounter Diagnosis   Name Primary?    Internal derangement of left knee        Time in: 858  Time Out: 950  Total Treatment Time: 52      Subjective:    Ella reports improvement of symptoms. Patient states she has an exercise bike at home and is going to start using it now that she can make it all the way around with her left leg. Patient reports their pain to be 2/10 on a 0-10 scale with 0 being no pain and 10 being the worst pain imaginable.    Objective    Ella received therapeutic exercises to develop strength, endurance, ROM and flexibility for 42 minutes including:    Bike, 1/2 rev for ROM, 6 min seat level 4. Seat level 5 x 4 min full rev.  Gastroc st on 1/2 roll, 3x30s B  HSS, 3x30s L  HR/TR on Airex, 30x B  TKE, 4 in box, 30x L  HSC, 30x L  Mini squats, 3x10  GS/QS x 30  LAQ, LLE 1#, 3x10 L  Seated marching, LLE, 1#, 3x10 B  Heel slides, 30x L  SLR, 3x10 1# L  SAQ, 3x10 1# L    CP applied to Left with small roll under ankle to encourage knee extension x 10 min.    Written Home Exercisesfair Provided: continue with current  Pt demo  understanding of the education provided. Ella demonstrated fair return demonstration of activities.     Assessment:   Pain level was the same after treatment and patient tolerated full revs. with seat adj to level 5. Patient tolerated adjustment and full revolutions well.  Pt will continue to benefit from skilled PT intervention. Medical Necessity is demonstrated by:  Pain limits function of effected part for some activities, Requires skilled supervision to complete and progress HEP and Weakness.    Patient is making fair progress towards established goals.      Plan:  Continue with established Plan of Care towards PT goals.

## 2017-04-29 LAB
ACID FAST MOD KINY STN SPEC: NORMAL
MYCOBACTERIUM SPEC QL CULT: NORMAL

## 2017-05-01 ENCOUNTER — TELEPHONE (OUTPATIENT)
Dept: REHABILITATION | Facility: HOSPITAL | Age: 67
End: 2017-05-01

## 2017-05-01 ENCOUNTER — CLINICAL SUPPORT (OUTPATIENT)
Dept: REHABILITATION | Facility: HOSPITAL | Age: 67
End: 2017-05-01
Attending: PHYSICIAN ASSISTANT
Payer: MEDICARE

## 2017-05-01 DIAGNOSIS — S81.802D OPEN WOUND OF LEFT LOWER LEG, SUBSEQUENT ENCOUNTER: Primary | ICD-10-CM

## 2017-05-01 PROCEDURE — 97602 WOUND(S) CARE NON-SELECTIVE: CPT | Mod: PN

## 2017-05-01 NOTE — PROGRESS NOTES
Subjective:       Patient ID: Ella Canales is a 66 y.o. female.    Chief Complaint: Follow-up    Patient Active Problem List   Diagnosis    HTN (hypertension)    Hyperlipidemia    Age-related bone loss    GERD (gastroesophageal reflux disease)    Hypokalemia    Anemia    Arthritis    Left knee pain    Genu varum of right lower extremity    Chronic rhinitis    Internal derangement of left knee    Infection of prosthetic left knee joint    Painful orthopaedic hardware    Open wound of left lower extremity   Since her last appt she had left tkr 2/14/17 complicated by a left knee infection.  Multiple surgeries and abx -doing well now.  Having wound vac removed today.  Moving well.      HPI  Review of Systems   Constitutional: Negative for fatigue and unexpected weight change.   Respiratory: Negative for chest tightness and shortness of breath.    Cardiovascular: Negative for chest pain, palpitations and leg swelling.   Gastrointestinal: Negative for abdominal pain.   Musculoskeletal: Negative for arthralgias.   Neurological: Negative for dizziness, syncope, light-headedness and headaches.       Objective:      Physical Exam   Constitutional: She is oriented to person, place, and time. She appears well-developed and well-nourished.   Cardiovascular: Normal rate, regular rhythm and normal heart sounds.    Pulmonary/Chest: Effort normal and breath sounds normal.   Musculoskeletal: She exhibits no edema.   Neurological: She is alert and oriented to person, place, and time.   Skin: Skin is warm and dry.   Psychiatric: She has a normal mood and affect.   Nursing note and vitals reviewed.      Assessment:       1. Essential hypertension    2. Pure hypercholesterolemia    3. Infection of prosthetic left knee joint        Plan:       1. Essential hypertension  Controlled on current medications.  Continue current medications.      2. Pure hypercholesterolemia  Stable condition.  Continue current medications.   Will adjust based on lab findings or if condition changes.      3. Infection of prosthetic left knee joint  Resolving.  Cont ortho care    [facility] goal documentation:  Patient readiness: acceptance and barriers:readiness  During the course of the visit the patient was educated and counseled about the following: Hypertension:   Dietary sodium restriction.  Regular aerobic exercise.  Goals: Hypertension: Reduce Blood Pressure  Goal/Outcomes met:Hypertension  The following self management tools provided:none  Patient Instructions (the written plan) was given to the patient/family: Yes  Time spent with patient: 20 minutes    Patient with be reevaluated in 3 months or sooner prn    Greater than 50% of this visit was spent counseling as described in above documentation:Yes

## 2017-05-01 NOTE — PROGRESS NOTES
TIME RECORD    Date:  05/01/2017    Start Time:  1300  Stop Time:  1331    PROCEDURES:    TIMED  Procedure Time Min.    Start:  Stop:     Start:  Stop:     Start:  Stop:     Start:  Stop:          UNTIMED  Procedure Time Min.   NSD Start:1300  Stop:1331 31    Start:  Stop:      Total Timed Minutes:  0  Total Timed Units:  0  Total Untimed Units:  1  Charges Billed/# of units:  1      Progress/Current Status    Subjective:     Patient ID: Ella Canales is a 66 y.o. female.  Diagnosis:   1. Open wound of left lower leg, subsequent encounter       Pain: 0 /10  Pt stated was active through weekend and is moving to new home on Wednesday  Pt stated missed this morning's appointment to OP PT     Objective:     Ambulatory with no device  LK mepilex dressing removed, packing moistened with saline prior to removal  Wound gently debrided of loose slough  Redressing with 1/4 inch plain packing strip wet to dry to distal cratered wound  Small proximal wound with scab- this was dressed with cut adaptic  Both wounds covered with mepilex    Assessment:     LK  With resolved erythema, no odor from wound  Distal knee wound remains yellow    Patient Education/Response:     Pt maintaining clean wound environment    Plans and Goals:     Continue with skilled wound care of selective and NSD Mondays and Fridays

## 2017-05-02 ENCOUNTER — CLINICAL SUPPORT (OUTPATIENT)
Dept: REHABILITATION | Facility: HOSPITAL | Age: 67
End: 2017-05-02
Attending: ORTHOPAEDIC SURGERY
Payer: MEDICARE

## 2017-05-02 DIAGNOSIS — M23.92 INTERNAL DERANGEMENT OF LEFT KNEE: ICD-10-CM

## 2017-05-02 PROCEDURE — 97110 THERAPEUTIC EXERCISES: CPT | Mod: PN

## 2017-05-02 NOTE — PROGRESS NOTES
"Name: Ella Santana [facility] Number: [id number]  Date of Treatment: 05/02/2017   Diagnosis:   Encounter Diagnosis   Name Primary?    Internal derangement of left knee        Time in: 0905  Time Out: 1000  Total Treatment Time: 55    Subjective:    Ella reports no pain, just soreness.      Objective    Patient received individual therapy to increase strength, endurance, ROM, flexibility, posture and core stabilization with activities as follows:     Ella received therapeutic exercises to develop strength, endurance, ROM, flexibility, posture and core stabilization for 55 minutes including:     Bike 10' seat 5 full revs after breif warmup  Standing airex HR/TR 10/3 B in // bars  Airex minisquats 10/3 B in // bars  Standing gastroc stretches L/R 3/30s on 1/2 foam roll in // bars  Standing hamstring curls L/R 10/3 in // bars  Seated hamstring stretches 3/30s L/R  Supine QS B 10/3 with wedge under L ankle for improved L knee extn  Supine L SLR 10/3 with cues for improved QS before each rep for improved knee extn  Supine SAQ B 10/3 over bolster 1# L   HS L with sheet 10/3 using active hamstrings and hip flexors for HS then sheet for overpressure x 5" holds  Seated LAQ 10/3 L with cues for increased extn  Seated knee flexion stretches 10 x 10" L    AROM L knee supine 5*-104*, PROM 4*-106*    Continue HEP daily. Instructed CP usage posterior knee for pain and inflammation control, avoiding anterior knee, to allow wound healing.     Pt demo good understanding of the education provided. Ella demonstrated good return demonstration of activities.     Assessment:     Progressing well with ex's and increasing ROM slowly.     Pt will continue to benefit from skilled PT intervention. Medical Necessity is demonstrated by:  Requires skilled supervision to complete and progress HEP and Weakness.    Patient is making good progress towards established goals.    Plan:    Continue with established Plan of Care towards PT goals.   "

## 2017-05-03 LAB — FUNGUS SPEC CULT: NORMAL

## 2017-05-05 ENCOUNTER — CLINICAL SUPPORT (OUTPATIENT)
Dept: REHABILITATION | Facility: HOSPITAL | Age: 67
End: 2017-05-05
Attending: ORTHOPAEDIC SURGERY
Payer: MEDICARE

## 2017-05-05 DIAGNOSIS — M23.92 INTERNAL DERANGEMENT OF LEFT KNEE: ICD-10-CM

## 2017-05-05 DIAGNOSIS — S81.802D OPEN WOUND OF LEFT LOWER LEG, SUBSEQUENT ENCOUNTER: Primary | ICD-10-CM

## 2017-05-05 PROCEDURE — 97150 GROUP THERAPEUTIC PROCEDURES: CPT | Mod: PN

## 2017-05-05 PROCEDURE — 97010 HOT OR COLD PACKS THERAPY: CPT | Mod: PN

## 2017-05-05 PROCEDURE — 97110 THERAPEUTIC EXERCISES: CPT | Mod: PN

## 2017-05-05 NOTE — PROGRESS NOTES
TIME RECORD    Date:  05/05/2017    Start Time:  0926  Stop Time:  0943    PROCEDURES:    TIMED  Procedure Time Min.    Start:  Stop:     Start:  Stop:     Start:  Stop:     Start:  Stop:          UNTIMED  Procedure Time Min.   NSD Start:0926  Stop:0943 17    Start:  Stop:      Total Timed Minutes:  0  Total Timed Units:  0  Total Untimed Units:  1  Charges Billed/# of units:  1      Progress/Current Status    Subjective:     Patient ID: Ella Canales is a 66 y.o. female.  Diagnosis:   1. Open wound of left lower leg, subsequent encounter       Pain: 0 /10  Pt stated came from OP PT at 8 this am and would like to have same appointment each time  Pt with no complaints  Stated part of  scab at proximal incision fell off    Objective:     Pt ambulatory with no AD  LK dressing removed with no staining on dressing  Packing moistened with saline prior to removal  Irrigated with saline, q tip to gently clean wound bed  Pink tissues middle of wound and peripherally ~30% of wound, rest is yellow  Collagen gel applied at cratered wound, reinforced with a whole adaptic to cover scab and open wound, island dressing to cover    Assessment:     wound with no odor,small amount of drainage on old packing.   Knee incision with no redness, very minimal swelling, no signs of infection  Pt observed to be ambulating well- pt agrees    Patient Education/Response:     Pt maintaining clean wound environment    Plans and Goals:   Collagen hydrogel applied today and packing discontinued as wound showing granulation  Continue wound care 2x week with next appointment Monday 05-

## 2017-05-05 NOTE — PROGRESS NOTES
"Name: Ella Santana [facility] Number: [id number]  Date of Treatment: 05/05/2017   Diagnosis:   Encounter Diagnosis   Name Primary?    Internal derangement of left knee        Time in: 0800  Time Out: 0900  Total Treatment Time: 60  Group time: 41  1-1 for 9 min    Subjective:    Ella reports worsening of symptoms, increased pain, "I moved back into my house Wednesday so I am very stiff and sore everywhere, I doubt I can even make it all the way around on the bike".  Patient reports their pain to be 4/10 on a 0-10 scale with 0 being no pain and 10 being the worst pain imaginable.    Objective    Patient received group therapy to increase strength, endurance, ROM and flexibility with 1 patients with activities as follows:     Ella received therapeutic exercises to develop strength, endurance, ROM and flexibility for 50 minutes including:    Bike 10' seat 5    Standing airex HR/TR 10/3 B in // bars   Airex minisquats 10/3 B in // bars   Standing gastroc stretches L/R 3/30s on 1/2 foam roll in // bars   Standing hamstring curls L/R 10/3 in // bars   Seated hamstring stretches 3/30s L/R   Supine QS B 10/3 with wedge under L ankle for improved L knee extn with 3-5 sec hold   Supine L SLR 10/3 with cues for improved QS before each rep for improved knee extn    Supine SAQ B 10/3 over bolster 1# L    HS L with sheet 10/3 using active hamstrings and hip flexors for HS then sheet for overpressure x 5" holds   Seated LAQ 10/3 L with cues for increased extn   Seated knee flexion stretches 10 x 10" L     CP x 10' after therex to decrease pain and inflammation.    Written Home Exercises Provided: Cont with HEP  Pt demo good understanding of the education provided. Ella demonstrated good return demonstration of activities.     Assessment:   Patient with good tolerance of therex, even with mod level of difficulty today due to increased pain level    Pt will continue to benefit from skilled PT intervention. Medical Necessity is " demonstrated by:  Continued inability to participate in vocational pursuits, Pain limits function of effected part for some activities, Requires skilled supervision to complete and progress HEP and Weakness.    Patient is making good progress towards established goals.    Plan:  Continue with established Plan of Care towards PT goals.

## 2017-05-08 ENCOUNTER — CLINICAL SUPPORT (OUTPATIENT)
Dept: REHABILITATION | Facility: HOSPITAL | Age: 67
End: 2017-05-08
Attending: ORTHOPAEDIC SURGERY
Payer: MEDICARE

## 2017-05-08 DIAGNOSIS — M23.92 INTERNAL DERANGEMENT OF LEFT KNEE: ICD-10-CM

## 2017-05-08 DIAGNOSIS — S81.802D OPEN WOUND OF LEFT LOWER LEG, SUBSEQUENT ENCOUNTER: Primary | ICD-10-CM

## 2017-05-08 PROCEDURE — 97110 THERAPEUTIC EXERCISES: CPT | Mod: PN

## 2017-05-08 NOTE — PROGRESS NOTES
TIME RECORD    Date:  05/08/2017    Start Time:  0802  Stop Time:  0839    PROCEDURES:    TIMED  Procedure Time Min.    Start:  Stop:     Start:  Stop:     Start:  Stop:     Start:  Stop:          UNTIMED  Procedure Time Min.   NSD Start:0802  Stop:0839 37    Start:  Stop:      Total Timed Minutes:  0  Total Timed Units:  0  Total Untimed Units:  1  Charges Billed/# of units:  1      Progress/Current Status    Subjective:     Patient ID: Ella Canales is a 66 y.o. female.  Diagnosis:   1. Open wound of left lower leg, subsequent encounter       Pain: 0 /10  Patient stated tired from opening boxes, moved to new home    Objective:     Ambulatory, pleasant  Island dressing removed from LK, clean with no soilage  Open wound distal LK incision and a scab proximally  Wound cleansed with saline, gentle debridement of crusty remnants of collagen gel peripherally and centrally  Tissue growth noted at L and center of wound at 2 and 3 o clock  Yellow slough from 4 to 1 o clock  Dressing change with cut adaptic covering scab at proximal wound and collagen gel to distal wound opening. Covered by adaptic and mepilex  To OP PT for TKA rehabilitation  post wound care    Assessment:     LK wound showing granulation tissue  wound with no signs of infection, no redness or odor    Patient Education/Response:     Patient maintaining clean wound environment    Plans and Goals:     Pt to see Dr Mulligan on Wednesday at 0800 in [facility]  Pt to call clinic for Friday appointment

## 2017-05-08 NOTE — PROGRESS NOTES
"Name: Ella Santana [facility] Number: [id number]  Date of Treatment: 05/08/2017   Diagnosis:   Encounter Diagnosis   Name Primary?    Internal derangement of left knee        Time in: 0901  Time Out: 1004  Total Treatment Time: 63      Subjective:    Ella reports "I am so stiff" and "my right knee is killing me".  Patient reports their pain to be 2/10 on a 0-10 scale with 0 being no pain and 10 being the worst pain imaginable.  Pt reports she was moving boxes and is sore.    Objective    Patient received individual therapy to increase strength, endurance, ROM and flexibility with activities as follows:     Ella received therapeutic exercises to develop strength, endurance, ROM and flexibility for 59 minutes including:     Bike seat 5 1/2 rev 10'  Standing bilateral gastroc stretch on 1/2 roll in // bars 3x30"  Standing HR/TR on Airex in // bars 30x each  Mini squats on Airex in // bars 30x  Marching on Airex in // bars 30x  TKE on L in // bars with 4" box 2x15  CKC on L in // bars with 4" box 3x10  Shuttle: 50# bilateral 30x, 25# L 30x  Seated hamstring stretch R/L 3x30" each  Seated knee flexion stretch L 10x10"  Seated LAQ L LE 1# cuff weight 3x10  Supine SAQ L LE 1# cuff weight 3x10  Supine SLR L LE 1# cuff weight 30x  Knee extension board 6'    Ella received the following manual therapy techniques: PROM knee extension, superior and lateral patellar mobilizations and Friction Massage to superior/mid scar were applied to the: L knee for 4 minutes total.     Written Home Exercises Provided: Continue with HEP  Pt demo good understanding of the education provided. Ella demonstrated good return demonstration of activities.     Assessment:       Pt will continue to benefit from skilled PT intervention. Medical Necessity is demonstrated by:  Unable to participate fully in daily activities, Requires skilled supervision to complete and progress HEP and Weakness.    Patient is making good progress towards established " "goals.    Pt required V/C to unweight R LE during TKE exercise to promote full benefit of L quad strengthening. V/C required for technique to prevent L knee from crossing ankle and to extend L knee fully during CKC exercise. Pt compensated during marching with torso rocking, V/C to keep torso aligned. Pt C/O LBP during knee extension board reporting "my back is really killing me now", knee extension board was removed after 6' and pt reported feeling better once seated. Pt was instructed in importance of working on knee extension at home, and was suggested to put ~5# weight (bag of rice) on knee for extension stretch.  Decreased tolerance for knee extension. VC to relax LLE during knee extension board stretch.    Plan:  Continue with established Plan of Care towards PT goals.   I certify that I was present in the room directing the student in service delivery and guiding them using my skilled judgment. As the co-signing therapist I have reviewed the students documentation and am responsible for the treatment, assessment, and plan.       "

## 2017-05-10 ENCOUNTER — HOSPITAL ENCOUNTER (OUTPATIENT)
Dept: RADIOLOGY | Facility: HOSPITAL | Age: 67
Discharge: HOME | End: 2017-05-10
Attending: ORTHOPAEDIC SURGERY
Payer: MEDICARE

## 2017-05-10 ENCOUNTER — OFFICE VISIT (OUTPATIENT)
Dept: SPORTS MEDICINE | Facility: CLINIC | Age: 67
End: 2017-05-10
Payer: MEDICARE

## 2017-05-10 VITALS
DIASTOLIC BLOOD PRESSURE: 77 MMHG | HEIGHT: 61 IN | SYSTOLIC BLOOD PRESSURE: 143 MMHG | BODY MASS INDEX: 26.43 KG/M2 | WEIGHT: 140 LBS | HEART RATE: 71 BPM

## 2017-05-10 DIAGNOSIS — S81.802S OPEN WOUND OF LEFT LOWER EXTREMITY, SEQUELA: ICD-10-CM

## 2017-05-10 DIAGNOSIS — M17.11 PRIMARY OSTEOARTHRITIS OF ONE KNEE, RIGHT: ICD-10-CM

## 2017-05-10 DIAGNOSIS — M25.562 LEFT KNEE PAIN, UNSPECIFIED CHRONICITY: Primary | ICD-10-CM

## 2017-05-10 DIAGNOSIS — M25.562 LEFT KNEE PAIN, UNSPECIFIED CHRONICITY: ICD-10-CM

## 2017-05-10 DIAGNOSIS — M21.161 GENU VARUM OF RIGHT LOWER EXTREMITY: ICD-10-CM

## 2017-05-10 PROBLEM — M17.10 PRIMARY OSTEOARTHRITIS OF ONE KNEE: Status: ACTIVE | Noted: 2017-05-10

## 2017-05-10 PROCEDURE — 99999 PR PBB SHADOW E&M-EST. PATIENT-LVL III: CPT | Mod: PBBFAC,,, | Performed by: ORTHOPAEDIC SURGERY

## 2017-05-10 PROCEDURE — 73560 X-RAY EXAM OF KNEE 1 OR 2: CPT | Mod: TC,PO,LT

## 2017-05-10 PROCEDURE — 73560 X-RAY EXAM OF KNEE 1 OR 2: CPT | Mod: 26,LT,, | Performed by: RADIOLOGY

## 2017-05-10 PROCEDURE — 99024 POSTOP FOLLOW-UP VISIT: CPT | Mod: ,,, | Performed by: ORTHOPAEDIC SURGERY

## 2017-05-10 NOTE — MR AVS SNAPSHOT
[facility]  [address]  [facility] [address]  Phone: [phone]                  Ella Canales   5/10/2017 9:00 AM   Appointment    Description:  Female : 1950   Provider:  Angie Mulligan MD   Department:  [facility]                To Do List           Future Appointments        Provider Department Dept Phone    5/10/2017 9:00 AM Angie Mulligan MD [facility] [phone]    2017 2:00 PM Tita Lion PT [facility] [phone]    8/3/2017 9:40 AM Adry Damian MD [facility] [phone]      Goals (5 Years of Data)     None      [facility]     [facility] Nurse Care Line -  Assistance  Unless otherwise directed by your provider, please contact [facility], our nurse care line that is available for  assistance.     Registered nurses in the [facility] provide: appointment scheduling, clinical advisement, health education, and other advisory services.  Call: [phone] (toll free)               Medications           Message regarding Medications     Verify the changes and/or additions to your medication regime listed below are the same as discussed with your clinician today.  If any of these changes or additions are incorrect, please notify your healthcare provider.             Verify that the below list of medications is an accurate representation of the medications you are currently taking.  If none reported, the list may be blank. If incorrect, please contact your healthcare provider. Carry this list with you in case of emergency.           Current Medications     ciprofloxacin HCl (CIPRO) 750 MG tablet Take 1 tablet (750 mg total) by mouth every 12 (twelve) hours.    clindamycin (CLEOCIN) 300 MG capsule Take 1 capsule (300 mg total) by mouth 3 (three) times daily. Take with food    fexofenadine (ALLEGRA) 60 MG tablet Take 60 mg by mouth once daily.     fluconazole (DIFLUCAN) 100 MG tablet One tablet weekly by  Mouth for yeast infection    glucosamine-chondroit-vit C-Mn (GLUCOSAMINE-CHONDROITIN MAX ST) 500-400 mg Cap Twice a day    losartan (COZAAR) 100 MG tablet Take 1 tablet (100 mg total) by mouth once daily.    metoprolol succinate (TOPROL-XL) 50 MG 24 hr tablet Take 1 tablet (50 mg total) by mouth once daily.    omeprazole (PRILOSEC) 20 MG capsule Take 1 capsule (20 mg total) by mouth 2 (two) times daily.    potassium chloride SA (K-DUR,KLOR-CON) 10 MEQ tablet Take 1 tablet (10 mEq total) by mouth 2 (two) times daily.    raloxifene (EVISTA) 60 mg tablet Take 1 tablet (60 mg total) by mouth once daily.    simvastatin (ZOCOR) 40 MG tablet Take 1 tablet (40 mg total) by mouth every evening.           Clinical Reference Information           Your Vitals Were     Last Period                   04/03/2012           Allergies as of 5/10/2017     Ace Inhibitors      Immunizations Administered on Date of Encounter - 5/10/2017     None      Language Assistance Services     ATTENTION: Language assistance services are available, free of charge. Please call [phone].      ATENCIÓN: Si minna gómez, tiene a priest disposición servicios gratuitos de asistencia lingüística. Llame al [phone].     MARCE Ý: N?u b?n nói Ti?ng Vi?t, có các d?ch v? h? tr? ngôn ng? mi?n phí dành cho b?n. G?i s? [phone].         [facility] complies with applicable Federal civil rights laws and does not discriminate on the basis of race, color, national origin, age, disability, or sex.

## 2017-05-10 NOTE — PROGRESS NOTES
Subjective:          Chief Complaint: Ella Canales is a 66 y.o. female who had concerns including Post-op Evaluation of the Left Knee.    HPI Comments: Patient is here for a follow up for her left knee. She is doing PT at [facility].     Had last injection right knee Dr. Hong-Aug/sept 2017,      DATE OF PROCEDURE: 03/09/2017     ATTENDING SURGEON: Angie Mulligan M.D.     FIRST ASSISTANT: Paolo Abdi M.D. - Fellow.     SECOND ASSISTANT: SMA Prince - Assistant.     PREOPERATIVE DIAGNOSIS: Left knee sepsis.     POSTOPERATIVE DIAGNOSES:  1. Left knee hemarthrosis.  2. Left knee sepsis.  3. Left knee synovitis.  4. Left knee medial retinacular tear.  5. Left knee medial collateral ligament insufficiency.     PROCEDURES PERFORMED:  1. Left knee complex polyethylene liner exchange.  2. Left knee complex incision and drainage.  3. Left knee medial collateral ligament repair.  4. Left knee medial retinacular repair with application of a flexed HD graft.  5. Left knee complex open synovectomy with hemostasis control.  6. Wound VAC application.      DATE OF PROCEDURE: 02/24/2017     PREOPERATIVE DIAGNOSIS: Possible septic arthritis, left total knee replacement.     POSTOPERATIVE DIAGNOSIS: Possible septic arthritis, left total knee   replacement.      PROCEDURE: Arthrotomy, left knee, with irrigation and debridement (CPT #43588).     SURGEON: Patel Denise M.D.     ASSISTANTS: Andrey Ya M.D. (RES); Anant Issa M.D. (RES)    DATE OF PROCEDURE: 2/14/2017     ATTENDING SURGEON: Surgeon(s) and Role:  * Angie Mulligan MD - Primary      FIRST ASSISTANT:Paolo Abdi MD - Fellow  SECOND ASSISTANT: Ronel Urban PA-C - Assistant  THIRD ASSISTANT: SMA Prince - Assistant     PREOPERATIVE DIAGNOSIS: Left  Arthritis, Traumatic M12.50, DJD knee M17.9 and Genu Varum (acquired) M21.169     POSTOPERATIVE DIAGNOSIS:  Left  Arthritis, Traumatic M12.50, DJD knee M17.9 and Genu Varum (acquired)  M21.169     PROCEDURES PERFORMED:  Left  Replacement, Knee, Medial and Lateral compartment (Total Knee) 65199    Pain   Associated symptoms include joint swelling. Pertinent negatives include no abdominal pain, chest pain, chills, congestion, coughing, fever, headaches, myalgias, nausea, numbness, rash, sore throat or vomiting.         Review of Systems   Constitution: Negative. Negative for chills, fever, night sweats, weight gain and weight loss.   HENT: Negative for congestion, headaches, hearing loss and sore throat.    Eyes: Negative for blurred vision, double vision and visual disturbance.   Cardiovascular: Negative for chest pain, leg swelling and palpitations.   Respiratory: Negative for cough and shortness of breath.    Endocrine: Negative for polyuria.   Hematologic/Lymphatic: Negative for bleeding problem. Does not bruise/bleed easily.   Skin: Negative for itching, poor wound healing and rash.   Musculoskeletal: Positive for joint pain, joint swelling and stiffness. Negative for back pain, muscle cramps, muscle weakness and myalgias.   Gastrointestinal: Negative for abdominal pain, constipation, diarrhea, melena, nausea and vomiting.   Genitourinary: Negative.  Negative for frequency and hematuria.   Neurological: Negative for dizziness, loss of balance, numbness, paresthesias and sensory change.   Psychiatric/Behavioral: Negative for altered mental status and depression. The patient is not nervous/anxious.    Allergic/Immunologic: Negative for hives.       Pain Related Questions  Over the past 3 days, what was your average pain during activity? (I.e. running, jogging, walking, climbing stairs, getting dressed, ect.): 2  Over the past 3 days, what was your highest pain level?: 2  Over the past 3 days, what was your lowest pain level? : 0    Other  How many nights a week are you awakened by your affected body part?: 0  Was the patient's HEIGHT measured or patient reported?: Patient Reported  Was the  patient's WEIGHT measured or patient reported?: Measured      Objective:        General: Ella is well-developed, well-nourished, appears stated age, in no acute distress, alert and oriented to time, place and person.     General    Vitals reviewed.  Constitutional: She is oriented to person, place, and time. She appears well-developed and well-nourished. No distress.   HENT:   Mouth/Throat: No oropharyngeal exudate.   Eyes: Right eye exhibits no discharge. Left eye exhibits no discharge.   Neck: Normal range of motion.   Cardiovascular: Normal rate and regular rhythm.    Pulmonary/Chest: Effort normal and breath sounds normal. No respiratory distress.   Neurological: She is alert and oriented to person, place, and time. She has normal reflexes. No cranial nerve deficit. Coordination normal.   Psychiatric: She has a normal mood and affect. Her behavior is normal. Judgment and thought content normal.     General Musculoskeletal Exam   Gait: abnormal and antalgic       Right Knee Exam     Inspection   Erythema: absent  Scars: absent  Swelling: absent  Effusion: effusion  Deformity: deformity  Bruising: absent    Tenderness   The patient is tender to palpation of the lateral joint line and patella (med/lat facet patella).    Crepitus   The patient has crepitus of the patella.    Range of Motion   Extension: 5   Flexion: 130     Tests   Meniscus   David:  Medial - negative Lateral - negative  Ligament Examination Lachman: normal (-1 to 2mm) PCL-Posterior Drawer: normal (0 to 2mm)     MCL - Valgus: normal (0 to 2mm)  LCL - Varus: normalPivot Shift: normal (Equal)Reverse Pivot Shift: normal (Equal)Dial Test at 30 degrees: normal (< 5 degrees)Dial Test at 90 degrees: normal (< 5 degrees)  Posterior Sag Test: negative  Posterolateral Corner: unstable (>15 degrees difference)  Patella   Patellar Apprehension: negative  Passive Patellar Tilt: neutral  Patellar Tracking: normal  Patellar Glide (quadrants): Lateral - 1    Medial - 2  Q-Angle at 90 degrees: normal  Patellar Grind: positive  J-Sign: none    Other   Meniscal Cyst: absent  Popliteal (Baker's) Cyst: absent  Sensation: normal    Left Knee Exam     Inspection   Erythema: absent  Scars: present  Swelling: present  Effusion: absent  Deformity: deformity  Bruising: absent    Tenderness   Left knee tenderness location: global tenderness.    Range of Motion   Extension:  0 abnormal   Flexion:  110 abnormal     Tests   Meniscus   David:  Medial - negative Lateral - negative  Stability Lachman: normal (-1 to 2mm) PCL-Posterior Drawer: normal (0 to 2mm)  MCL - Valgus: normal (0 to 2mm)  LCL - Varus: normal (0 to 2mm)Pivot Shift: normal (Equal)Reverse Pivot Shift: normal (Equal)Dial Test at 30 degrees: normal (< 5 degrees)Dial Test at 90 degrees: normal (< 5 degrees)  Posterior Sag Test: negative  Posterolateral Corner: unstable (>15 degrees difference)  Patella   Patellar Apprehension: negative  Passive Patellar Tilt: neutral  Patellar Tracking: normal  Patellar Glide (Quadrants): Lateral - 1 Medial - 2  Q-Angle at 90 degrees: normal  Patellar Grind: negative  J-Sign: J sign absent    Other   Meniscal Cyst: absent  Popliteal (Baker's) Cyst: absent  Sensation: normal    Comments:    Wound 1x1x0.5cm  No purulence    Right Hip Exam     Tests   Ivon: negative  Left Hip Exam     Tests   Ivon: negative          Muscle Strength   Right Lower Extremity   Hip Abduction: 5/5   Quadriceps:  5/5   Hamstrin/5   Left Lower Extremity   Hip Abduction: 5/5   Quadriceps:  4/5   Hamstrin/5     Reflexes     Left Side  Quadriceps:  2+  Achilles:  2+    Right Side   Quadriceps:  2+  Achilles:  2+    Vascular Exam     Right Pulses  Dorsalis Pedis:      2+  Posterior Tibial:      2+        Left Pulses  Dorsalis Pedis:      2+  Posterior Tibial:      2+        Edema  Right Lower Leg: absent  Left Lower Leg: absent    RADIOGRAPHS TODAY  2 views: There is a TKA in place good alignment.  There is  mild medial joint space narrowing.        Assessment:       Encounter Diagnoses   Name Primary?    Left knee pain, unspecified chronicity Yes    Genu varum of right lower extremity     Open wound of left lower extremity, sequela     Primary osteoarthritis of one knee, right           Plan:       1. IKDC, SF-12 and KOOS was not filled out today in clinic.     RTC in 6 weeks with Dr. Angie Mulligan Patient will fill out IKDC, SF-12 and KOOS and bilateral knee series on return.    2. Continue PT per protocol    3. She has had Synvisc series over 6 months ago right knee; previous pseudoseptic reaction left knee (Orthovisc)    The patient was seen in clinic today and was screened for participation in the Flexion - -234 study for osteoarthritis of the knee  (PI: Angie Mulligan MD, IRB: [email or web address].[id number]).    Based on my evaluation, the patient's medical history, and/or radiology findings are consistent with osteoarthritis that may qualify the patient to be a candidate for this study.    During the visit, the option of participating in the study was discussed the patient.  The study was briefly explained.  The patient did exhibit interest in learning more.      A copy of the IRB-approved consent form was given to the patient.    Contact information for our research coordinator, Winsome Alexander, was given to the patient. The Sports [facility] research coordinator, Winsome Alexander, was notified of this patient being a possible study candidate.    Patient was encouraged to contact us with any questions.                                     Patient questionnaires may have been collected.

## 2017-05-11 ENCOUNTER — CLINICAL SUPPORT (OUTPATIENT)
Dept: REHABILITATION | Facility: HOSPITAL | Age: 67
End: 2017-05-11
Attending: ORTHOPAEDIC SURGERY
Payer: MEDICARE

## 2017-05-11 DIAGNOSIS — M23.92 INTERNAL DERANGEMENT OF LEFT KNEE: ICD-10-CM

## 2017-05-11 PROCEDURE — G8978 MOBILITY CURRENT STATUS: HCPCS | Mod: CJ,PN | Performed by: PHYSICAL THERAPIST

## 2017-05-11 PROCEDURE — G8979 MOBILITY GOAL STATUS: HCPCS | Mod: CI,PN | Performed by: PHYSICAL THERAPIST

## 2017-05-11 PROCEDURE — 97010 HOT OR COLD PACKS THERAPY: CPT | Mod: PN | Performed by: PHYSICAL THERAPIST

## 2017-05-11 PROCEDURE — 97110 THERAPEUTIC EXERCISES: CPT | Mod: PN | Performed by: PHYSICAL THERAPIST

## 2017-05-11 NOTE — PLAN OF CARE
TIME RECORD    Date: 05/11/2017    Start Time:  145  Stop Time:  240    PROCEDURES:    TIMED  Procedure Time Min.    Start:  Stop:     Start:  Stop:     Start:  Stop:     Start:  Stop:          UNTIMED  Procedure Time Min.    Start:  Stop:     Start:  Stop:      Total Timed Minutes:  38  Total Timed Units:  3  Total Untimed Units:  0  Charges Billed/# of units:  3    PHYSICAL THERAPY UPDATED PLAN OF TREATMENT    Patient name: Ella Canales  Onset Date:  2/14/17  SOC Date:  4/12/17  Primary Diagnosis:    1. Internal derangement of left knee       Treatment Diagnosis:  Internal Derangement of L Knee (s/p L TKA 2/14/2017 with subsequent infection/partial hardware replacement)  Certification Period:  5/11/17 to 6/22/17  Precautions:  Fall risk, open wound at distal incision (pt is current receiving outpatient wound care at [facility])  Visits from SOC:  11  Functional Level Prior to SOC:  independent    Updated Assessment:    Subjective: Pt c/o being very stiff and sore today due to moving. States she has been up/down a ladder several times this morning, and that her right knee is hurting more than the left today. Pt rates her pain at 1-2/10.    Objective:    A/PROM L knee:   Extension: -10*/-5*   Flexion: 100*/110*    MMT  Right Left  Hip flex  4+/5 4+/5  Hip abd 4+/5 4+/5  Hip add 4+/5 4+/5  Knee flex 4/5 4/5  Knee ext 4+/5 4+/5    Left knee is warm to touch, slight tenderness. THere is drainage visible through the bandage that covers the wound.     LEFS: 50/80 (37% disability, GCodes current CJ, goal CI)    Gait: without AD, pt lacks full knee extension in the L knee during terminal swing and stance phases of gait.     Goals from initial Evaluation:   Short Term Goals (2 Weeks): Improve L knee ROM to -12 > 90 degrees; Increase L LE strength to 4/5; decrease pain to 4/10 at worst with activity.  Long Term Goals (4 Weeks): Improve L knee ROM to -5 > 110 degrees; Increase L LE strength to  4+/5; decrease pain to 2/10 at worst with activity.        Previous Short Term Goals Status:   Met  New Short Term Goals Status:   1. Pt will demonstrate >/=115* active knee flexion for improved tolerance for sitting. 2. Pt will demonstrate -7* active knee extension for improved knee extension during terminal swing/ stance phases of gait.  Long Term Goal Status:   modified:  1. Pt will demonstrate >/=120* active knee flexion for normal walking, sitting, stair negotiation. 2. Pt will demonstrate </=5* active knee extension for normalized gait pattern. 3. Pt will score >/=64/80 on LEFS.  Reasons for Recertification of Therapy:   Pt continues to demonstrate strength and ROM deficits s/p L TKA. Pt's progress has been slowed by infection of the prosthesis, therefore she requires additional therapy so she can return to her PLOF.     Certification Period:  5/11/17 to 6/22/17  Recommended Treatment Plan: 3 times per week for 6 weeks: Electrical Stimulation IFC for pain control/Russian stim for NMR, Gait Training, Manual Therapy, Moist Heat/ Ice, Neuromuscular Re-ed, Patient Education, Therapeutic Activites and Therapeutic Exercise  Other Recommendations: NA        Therapist's Name: Tita Lion PT, DPT, MTC   Date: 05/11/2017    I CERTIFY THE NEED FOR THESE SERVICES FURNISHED UNDER THIS PLAN OF TREATMENT AND WHILE UNDER MY CARE    Physician's comments: ________________________________________________________________________________________________________________________________________________      Physician's Name: ___________________________________

## 2017-05-15 ENCOUNTER — CLINICAL SUPPORT (OUTPATIENT)
Dept: REHABILITATION | Facility: HOSPITAL | Age: 67
End: 2017-05-15
Attending: ORTHOPAEDIC SURGERY
Payer: MEDICARE

## 2017-05-15 DIAGNOSIS — S81.802D OPEN WOUND OF LEFT LOWER LEG, SUBSEQUENT ENCOUNTER: Primary | ICD-10-CM

## 2017-05-15 PROCEDURE — 97602 WOUND(S) CARE NON-SELECTIVE: CPT | Mod: PN

## 2017-05-15 NOTE — PROGRESS NOTES
TIME RECORD    Date:  05/15/2017    Start Time:  0807  Stop Time:  0821    PROCEDURES:    TIMED  Procedure Time Min.    Start:  Stop:     Start:  Stop:     Start:  Stop:     Start:  Stop:          UNTIMED  Procedure Time Min.   NSD Start:0807  Stop:0821 14    Start:  Stop:      Total Timed Minutes:  0  Total Timed Units:  0  Total Untimed Units:  1  Charges Billed/# of units:  1      Progress/Current Status    Subjective:     Patient ID: Ella Canales is a 66 y.o. female.  Diagnosis:   1. Open wound of left lower leg, subsequent encounter       Pain: 0 /10  Pt stated  Dr Mulligan pleased about LK wound and was instructed to continue with wound care  Pt stated will be part of study for trial use of injections to RK as she is not ready for another TKA surgery  Stated RK is as bad as Lk    Objective:     Pt ambulatory, LK dressing removed . Wound irrigated with saline, use of sterile Q tip for debridement of loose slough  Dressing change with collagen gel, small adaptic and mepilex    Assessment:     LK wound with granulation tissues, no odor, no signs of infection    Patient Education/Response:     Pt maintaining clean wound environment    Plans and Goals:     Continue MWF

## 2017-05-16 ENCOUNTER — CLINICAL SUPPORT (OUTPATIENT)
Dept: REHABILITATION | Facility: HOSPITAL | Age: 67
End: 2017-05-16
Attending: ORTHOPAEDIC SURGERY
Payer: MEDICARE

## 2017-05-16 DIAGNOSIS — M23.92 INTERNAL DERANGEMENT OF LEFT KNEE: ICD-10-CM

## 2017-05-16 LAB
ACID FAST MOD KINY STN SPEC: NORMAL
MYCOBACTERIUM SPEC QL CULT: NORMAL

## 2017-05-16 PROCEDURE — 97140 MANUAL THERAPY 1/> REGIONS: CPT | Mod: PN

## 2017-05-16 PROCEDURE — 97110 THERAPEUTIC EXERCISES: CPT | Mod: PN

## 2017-05-16 PROCEDURE — 97010 HOT OR COLD PACKS THERAPY: CPT | Mod: PN

## 2017-05-16 NOTE — PROGRESS NOTES
"Name: Ella Santana [facility] Number: [id number]  Date of Treatment: 05/16/2017   Diagnosis:   Encounter Diagnosis   Name Primary?    Internal derangement of left knee        Time in: 1555  Time Out: 1708  Total Treatment Time: 75      Subjective:    Ella reports "It's my R knee and my back that hurts."  Pt reports 0/10 L knee pain  Patient reports their pain to be 0/10 on a 0-10 scale with 0 being no pain and 10 being the worst pain imaginable.    Objective    Patient received individual therapy to increase strength, ROM and flexibility with activities as follows:     Ella received therapeutic exercises to develop strength, ROM and flexibility for 54 minutes including:     Bike x 10 minutes, seat at 4  TKE 4" x 30  CKC 4" x 30  Mini squats on airex x 30  HR/TR on airex x 30  Shuttle: 50# B, 25# L x 30 each  Standing hamstring curls x 30  Hamstring stretch 3 x 30 sec B  LAQ 1# x 30  SLR 1# x 30  SAQ 3# x 30  Heel slides with sheet x 30    CP x 10 minutes with foot on wedge and 5# weight to increase knee extension.  Ella received the following manual therapy techniques: contract/relax to increase knee flexion, STM posterior lateral knee for 9 minutes.     Written Home Exercises Provided: cont HEP  Pt demo good understanding of the education provided. Ella demonstrated good return demonstration of activities.     Assessment:     Improved tolerance to PROM.    Suggested to pt she decrease number of boxes unpacked each day due to LB and RLE pain.  Limited knee ROM remains   Pt will continue to benefit from skilled PT intervention. Medical Necessity is demonstrated by:  Fall Risk, Pain limits function of effected part for some activities, Unable to participate fully in daily activities, Requires skilled supervision to complete and progress HEP and Weakness.    Patient is making good progress towards established goals.      Plan:  Continue with established Plan of Care towards PT goals.   "

## 2017-05-17 ENCOUNTER — HOSPITAL ENCOUNTER (OUTPATIENT)
Dept: RADIOLOGY | Facility: HOSPITAL | Age: 67
Discharge: HOME | End: 2017-05-17
Attending: ORTHOPAEDIC SURGERY
Payer: MEDICARE

## 2017-05-17 ENCOUNTER — RESEARCH ENCOUNTER (OUTPATIENT)
Dept: RESEARCH | Facility: HOSPITAL | Age: 67
End: 2017-05-17

## 2017-05-17 ENCOUNTER — HOSPITAL ENCOUNTER (OUTPATIENT)
Dept: CARDIOLOGY | Facility: CLINIC | Age: 67
Discharge: HOME | End: 2017-05-17
Payer: MEDICARE

## 2017-05-17 ENCOUNTER — TELEPHONE (OUTPATIENT)
Dept: RESEARCH | Facility: HOSPITAL | Age: 67
End: 2017-05-17

## 2017-05-17 DIAGNOSIS — M25.562 CHRONIC PAIN OF LEFT KNEE: ICD-10-CM

## 2017-05-17 DIAGNOSIS — M25.561 CHRONIC PAIN OF RIGHT KNEE: ICD-10-CM

## 2017-05-17 DIAGNOSIS — G89.29 CHRONIC PAIN OF LEFT KNEE: ICD-10-CM

## 2017-05-17 DIAGNOSIS — G89.29 CHRONIC PAIN OF RIGHT KNEE: ICD-10-CM

## 2017-05-17 DIAGNOSIS — Z00.6 EXAM FOR CLINICAL TRIAL: ICD-10-CM

## 2017-05-17 DIAGNOSIS — G89.29 CHRONIC PAIN OF LEFT KNEE: Primary | ICD-10-CM

## 2017-05-17 DIAGNOSIS — M25.562 CHRONIC PAIN OF LEFT KNEE: Primary | ICD-10-CM

## 2017-05-17 PROCEDURE — 73560 X-RAY EXAM OF KNEE 1 OR 2: CPT | Mod: 26,50,, | Performed by: RADIOLOGY

## 2017-05-17 PROCEDURE — 93005 ELECTROCARDIOGRAM TRACING: CPT | Mod: PBBFAC | Performed by: INTERNAL MEDICINE

## 2017-05-17 PROCEDURE — 93010 ELECTROCARDIOGRAM REPORT: CPT | Mod: S$PBB,,, | Performed by: INTERNAL MEDICINE

## 2017-05-18 ENCOUNTER — CLINICAL SUPPORT (OUTPATIENT)
Dept: REHABILITATION | Facility: HOSPITAL | Age: 67
End: 2017-05-18
Attending: ORTHOPAEDIC SURGERY
Payer: MEDICARE

## 2017-05-18 ENCOUNTER — CLINICAL SUPPORT (OUTPATIENT)
Dept: REHABILITATION | Facility: HOSPITAL | Age: 67
End: 2017-05-18
Attending: FAMILY MEDICINE
Payer: MEDICARE

## 2017-05-18 DIAGNOSIS — M23.92 INTERNAL DERANGEMENT OF LEFT KNEE: ICD-10-CM

## 2017-05-18 DIAGNOSIS — S81.802D OPEN WOUND OF LEFT LOWER LEG, SUBSEQUENT ENCOUNTER: Primary | ICD-10-CM

## 2017-05-18 PROCEDURE — 97110 THERAPEUTIC EXERCISES: CPT | Mod: PN

## 2017-05-18 PROCEDURE — 97010 HOT OR COLD PACKS THERAPY: CPT | Mod: PN

## 2017-05-18 NOTE — PROGRESS NOTES
"Name: Ella Santana [facility] Number: [id number]  Date of Treatment: 05/18/2017   Diagnosis:   Encounter Diagnosis   Name Primary?    Internal derangement of left knee        Time in: 1350  Time Out: 1442  Total Treatment Time: 52  Group Time: 0      Subjective:    Ella reports improvement of symptoms.  Patient reports their pain to be 2/10 on a 0-10 scale with 0 being no pain and 10 being the worst pain imaginable. Patient reports increased activities lately 2nd to graduation activities with grandson.    Objective    Ella received therapeutic exercises to develop strength, endurance, ROM and flexibility for 42 minutes including:     Bike Lv1 10', seat at 5>4  Hamstring stretch 3x30" B in sit  Calf stretch 3x30" B 1/2 roll  SAQ 1# L x30  Quad set L x30  LAQ 1# L x30 with ball squeeze  SLR L 3x10  // bar x30: HR/TR, march, hamstring curls    Cold pack L knee in long sit 10 minutes with 4# above knee and 4# below knee to increase knee extension    Pt demo good understanding of the education provided. Ella demonstrated good return demonstration of activities.     Assessment:     Pt will continue to benefit from skilled PT intervention. Medical Necessity is demonstrated by:  Pain limits function of effected part for some activities, Unable to participate fully in daily activities, Requires skilled supervision to complete and progress HEP and Weakness.    Patient is making good progress towards established goals.    Plan:    Continue with established Plan of Care towards PT goals.   "

## 2017-05-18 NOTE — PROGRESS NOTES
Research Study: Flexion OA Study   PI: Angie Mulligan MD  IRB: [email or web address].C  Visit: Screening Visit/ Informed Consent       Ms. Ella Canales saw Dr. Mulligan in clinic on 10May2017 for her knee pain. At this time Dr. Mulligan suggested the patient may be a candidate for the Flexion OA Study (PI: Isaak, IRB:2016.425.C). Dr. Mulligan gave a summary of the research and asked me to talk to the Ms. Canales about the study.     Ms. Canales took the consent form home to review and called me to express that she wanted to come in to consent the following day on 11May2017. Ms. Canales and I arranged to meet today, 17May2017, at 1pm to discuss the study and sign consent. Ms. Canales and ALFIE met in the private clinic room to discuss participation in the Flexion OA Study and she expressed interest in participation.    Its was discussed that eligibility would be determined after an evaluation of a study x-ray, EKG and labs. Ms. Canales verbalized understanding and expressed interest in pursuing participation.     Ms. Canales and I reviewed the consent form in great detail and encouraged her to take it home again if needed review further before making any decisions. Ms. Canales verbalized understanding but expressed that she wanted to sign the consent today.     It was explained to Ms. Canales that this is an experimental study. It was explained that participation in this study was voluntary and that she was under no obligation to participate. It was explained to Ms. Canales that she had the right to withdraw from the research at any time. Ms. Canales verbalized understanding.     It was explained that the purpose of the study is to evaluate the overall safety and general tolerability of repeat administration of an investigational drug . It was explained that the study drug has not yet been FDA approved. Ms. Canales verbalized understanding.     All of the study visits were explained in detail to Ms. Canales and she verbalized understanding.     The  potential risks, benefits, and alternate options were reviewed with Ms. Canales. She verbalized understanding.     Ms. Canales was encouraged to ask questions during the informed consent process.     After our consent review, I encouraged Ms. Canales to take the document home if needed review further before making any decisions. Ms. Canales verbalized understanding but expressed again that she wanted to sign the consent today.     A copy of the signed consent was given to Ms. Canales along with my contact information. Ms. Canales was encouraged to contact me with any questions regarding the study. Ms. Canales verbalized understanding.     Screening visit x-rays were ordered and completed per protocol.     Dr. Mulligan completed all necessary screening visit exams and evaluations, per protocol.     Study Fitbit was set up and a test sync was performed.     Screening visit labs were ordered and collected per protocol.     Screening visit ECG was ordered and Ms. Canales was given the number to call and schedule. Ms. Canales completed her ECG per protocol.    All other screening visit activities were completed per protocol.     Ms. Oneil Day 1 appointment was scheduled for 31May2017.    I thanked Ms. Canales for her participation and again encouraged her to contact me with any questions. She verbalized understanding.

## 2017-05-18 NOTE — PROGRESS NOTES
TIME RECORD    Date:  05/18/2017    Start Time:  1324  Stop Time:  1339    PROCEDURES:    TIMED  Procedure Time Min.    Start:  Stop:     Start:  Stop:     Start:  Stop:     Start:  Stop:          UNTIMED  Procedure Time Min.   NSD Start:1324  Stop:1339 15    Start:  Stop:      Total Timed Minutes:  0  Total Timed Units:  0  Total Untimed Units:  1  Charges Billed/# of units:  1      Progress/Current Status    Subjective:     Patient ID: Ella Canales is a 66 y.o. female.  Diagnosis:   1. Open wound of left lower leg, subsequent encounter       Pain: 0 /10  Stated is a candidate for injection study for RK  Pt stated changed dressing yesterday    Objective:     LK dressing removed with small brown tinged on gauze  Small LK wound irrigated with saline, debridement of loose slough using sterile Q tip  Dressing change with collagen gel, small adaptic and  mepilex    Assessment:     LK wound with granulation tissue, yellow peripherally    Patient Education/Response:     Pt maintaining clean dressing      Plans and Goals:     Continue as ordered

## 2017-05-19 ENCOUNTER — CLINICAL SUPPORT (OUTPATIENT)
Dept: REHABILITATION | Facility: HOSPITAL | Age: 67
End: 2017-05-19
Attending: ORTHOPAEDIC SURGERY
Payer: MEDICARE

## 2017-05-19 DIAGNOSIS — M23.92 INTERNAL DERANGEMENT OF LEFT KNEE: ICD-10-CM

## 2017-05-19 LAB — FUNGUS SPEC CULT: NORMAL

## 2017-05-19 PROCEDURE — 97110 THERAPEUTIC EXERCISES: CPT | Mod: PN

## 2017-05-19 PROCEDURE — 97010 HOT OR COLD PACKS THERAPY: CPT | Mod: PN

## 2017-05-19 NOTE — PROGRESS NOTES
Name: Ella Santana [facility] Number: [id number]  Date of Treatment: 05/19/2017   Diagnosis:   Encounter Diagnosis   Name Primary?    Internal derangement of left knee        Time in: 0855  Time Out: 0955  Total Treatment Time: 60        Subjective:    Ella reports her R knee is sore today. No pain in L knee,just feels stiff.   Patient reports their pain to be 0/10 on a 0-10 scale with 0 being no pain and 10 being the worst pain imaginable.    Objective    Patient received individual therapy to increase strength, endurance, ROM and flexibility with 0 patients with activities as follows:     Ella received therapeutic exercises to develop strength, endurance, ROM and flexibility for 50 minutes including:     Bike x 10 min L-1  Hamstring stretch 3 x 30 sec B LE  gastroc stretch 3 x 30 sec B LE  HR/TR x 30 reps  Mini squats x 30 reps  Marching x 30 reps  Hamstring curls x 30 reps  SLR 2# x 30 reps  Quad sets x 30 reps L LE with 5 sec hold  SAQ 2# 3 x 10 reps L LE  Heel slides with sheet x 30 reps  LAQ L LE 2# x 30 reps  Shuttle 50# B LE x 30 reps, 25# L LE x 30 reps    CP x 10 min to L knee to decrease pain and inflammation    Pt demo good understanding of the education provided. Ella demonstrated good return demonstration of activities.     Assessment:       Pt will continue to benefit from skilled PT intervention. Medical Necessity is demonstrated by:  Pain limits function of effected part for some activities, Unable to participate fully in daily activities, Requires skilled supervision to complete and progress HEP and Weakness.    Patient is making good progress towards established goals.          Plan:  Continue with established Plan of Care towards PT goals.

## 2017-05-22 LAB
ACID FAST MOD KINY STN SPEC: NORMAL
MYCOBACTERIUM SPEC QL CULT: NORMAL

## 2017-05-24 ENCOUNTER — TELEPHONE (OUTPATIENT)
Dept: FAMILY MEDICINE | Facility: CLINIC | Age: 67
End: 2017-05-24

## 2017-05-24 ENCOUNTER — CLINICAL SUPPORT (OUTPATIENT)
Dept: REHABILITATION | Facility: HOSPITAL | Age: 67
End: 2017-05-24
Attending: ORTHOPAEDIC SURGERY
Payer: MEDICARE

## 2017-05-24 ENCOUNTER — OFFICE VISIT (OUTPATIENT)
Dept: FAMILY MEDICINE | Facility: CLINIC | Age: 67
End: 2017-05-24
Payer: MEDICARE

## 2017-05-24 VITALS
WEIGHT: 140.63 LBS | HEIGHT: 61 IN | DIASTOLIC BLOOD PRESSURE: 70 MMHG | TEMPERATURE: 98 F | HEART RATE: 88 BPM | BODY MASS INDEX: 26.55 KG/M2 | SYSTOLIC BLOOD PRESSURE: 108 MMHG

## 2017-05-24 DIAGNOSIS — M23.92 INTERNAL DERANGEMENT OF LEFT KNEE: ICD-10-CM

## 2017-05-24 DIAGNOSIS — R30.0 DYSURIA: Primary | ICD-10-CM

## 2017-05-24 DIAGNOSIS — B37.31 VAGINITIS DUE TO CANDIDA: ICD-10-CM

## 2017-05-24 LAB
BILIRUB SERPL-MCNC: ABNORMAL MG/DL
BLOOD URINE, POC: ABNORMAL
COLOR, POC UA: YELLOW
GLUCOSE UR QL STRIP: ABNORMAL
KETONES UR QL STRIP: ABNORMAL
LEUKOCYTE ESTERASE URINE, POC: ABNORMAL
NITRITE, POC UA: ABNORMAL
PH, POC UA: 5
PROTEIN, POC: ABNORMAL
SPECIFIC GRAVITY, POC UA: 1.02
UROBILINOGEN, POC UA: ABNORMAL

## 2017-05-24 PROCEDURE — 97602 WOUND(S) CARE NON-SELECTIVE: CPT

## 2017-05-24 PROCEDURE — 87088 URINE BACTERIA CULTURE: CPT

## 2017-05-24 PROCEDURE — 87086 URINE CULTURE/COLONY COUNT: CPT

## 2017-05-24 PROCEDURE — 81001 URINALYSIS AUTO W/SCOPE: CPT | Mod: PBBFAC,PO | Performed by: FAMILY MEDICINE

## 2017-05-24 PROCEDURE — 99213 OFFICE O/P EST LOW 20 MIN: CPT | Mod: S$PBB,,, | Performed by: FAMILY MEDICINE

## 2017-05-24 PROCEDURE — 99999 PR PBB SHADOW E&M-EST. PATIENT-LVL III: CPT | Mod: PBBFAC,,, | Performed by: FAMILY MEDICINE

## 2017-05-24 PROCEDURE — 99213 OFFICE O/P EST LOW 20 MIN: CPT | Mod: PBBFAC,PO | Performed by: FAMILY MEDICINE

## 2017-05-24 PROCEDURE — 87077 CULTURE AEROBIC IDENTIFY: CPT

## 2017-05-24 PROCEDURE — 87186 SC STD MICRODIL/AGAR DIL: CPT

## 2017-05-24 PROCEDURE — 97110 THERAPEUTIC EXERCISES: CPT | Mod: PN

## 2017-05-24 RX ORDER — MULTIVITAMIN
1 TABLET ORAL DAILY
COMMUNITY

## 2017-05-24 RX ORDER — FLUCONAZOLE 150 MG/1
150 TABLET ORAL DAILY
Qty: 1 TABLET | Refills: 0 | Status: SHIPPED | OUTPATIENT
Start: 2017-05-24 | End: 2017-05-25

## 2017-05-24 RX ORDER — TERCONAZOLE 4 MG/G
1 CREAM VAGINAL NIGHTLY
Qty: 45 G | Refills: 0 | Status: SHIPPED | OUTPATIENT
Start: 2017-05-24 | End: 2017-08-03

## 2017-05-24 NOTE — TELEPHONE ENCOUNTER
----- Message from Angy Quintana sent at 5/24/2017  8:10 AM CDT -----  Patient has come down with a bladder infection which usually leads to a yeast infection. Office has prescribed a pill for her in the past and she is wondering if doctor would do it again. Please remit to:      [email or web address] [email or web address] - [place] - [address] [facility]  [address] [place] [address]  Phone: [phone] Fax: [phone]    Please call patient back when completed at [phone].

## 2017-05-24 NOTE — PROGRESS NOTES
"Name: Ella Santana [facility] Number: [id number]  Date of Treatment: 05/24/2017   Diagnosis:   Encounter Diagnosis   Name Primary?    Internal derangement of left knee        Time in: 0859  Time Out: 0950  Total Treatment Time: 51        Subjective:    Ella reports "I really don't have any pain unless I overdo it".    Patient reports their pain to be 0/10 on a 0-10 scale with 0 being no pain and 10 being the worst pain imaginable.    Objective    Patient received individual therapy to increase strength, endurance, ROM, flexibility and posture with 0 patients with activities as follows:     Ella received therapeutic exercises to develop strength, endurance, ROM, flexibility and posture for 51 minutes including:     Bike x 10 min L-1  Hamstring stretch 3 x 30 sec  gastroc stretch 3 x 30 sec  HR/TR on airex x 30 reps  Mini squats x 30 reps airex  Marching x 30 reps airex  Ham curls 2# L LE x 30 reps  LAQ 2# L LE x 30 reps   SAQ 2# L LE x 30 reps  SLR 2# L LE x 30 reps  Heel slides x 30 reps   Shuttle 50# B LE x 30 reps, 25# L LE x 30 reps    Pt demo good understanding of the education provided. Ella demonstrated good return demonstration of activities.     Assessment:       Pt will continue to benefit from skilled PT intervention. Medical Necessity is demonstrated by:  Pain limits function of effected part for some activities, Unable to participate fully in daily activities, Requires skilled supervision to complete and progress HEP and Weakness.    Patient is making good progress towards established goals.          Plan:  Continue with established Plan of Care towards PT goals.   "

## 2017-05-24 NOTE — PROGRESS NOTES
Subjective:       Patient ID: Ella Canales is a 66 y.o. female.    Chief Complaint: Urinary Tract Infection    Patient Active Problem List   Diagnosis    HTN (hypertension)    Hyperlipidemia    Age-related bone loss    GERD (gastroesophageal reflux disease)    Hypokalemia    Anemia    Arthritis    Left knee pain    Genu varum of right lower extremity    Chronic rhinitis    Internal derangement of left knee    Infection of prosthetic left knee joint    Painful orthopaedic hardware    Open wound of left lower extremity    Primary osteoarthritis of one knee   vag swelling , itching and several recurrent yeast infections in last months    Urinary Tract Infection    This is a new problem. The current episode started acute onset. The problem occurs every urination. The quality of the pain is described as burning. The pain is mild. There has been no fever. Associated symptoms include frequency and urgency. Pertinent negatives include no discharge, hematuria, nausea, vomiting or rash.     Review of Systems   Gastrointestinal: Negative for nausea and vomiting.   Genitourinary: Positive for frequency and urgency. Negative for hematuria.   Skin: Negative for rash.       Objective:      Physical Exam   Constitutional: She is oriented to person, place, and time. She appears well-developed and well-nourished.   Cardiovascular: Normal rate, regular rhythm and normal heart sounds.    Pulmonary/Chest: Effort normal and breath sounds normal. No respiratory distress.   Abdominal: Soft. Bowel sounds are normal. She exhibits no distension and no mass. There is no tenderness. There is no rebound and no guarding. No hernia.   Neurological: She is alert and oriented to person, place, and time.   Skin: Skin is warm and dry.   Psychiatric: She has a normal mood and affect.   Nursing note and vitals reviewed.      Assessment:       1. Dysuria    2. Vaginitis due to Candida        Plan:       1. Dysuria  Low suspicion for  uti.  Will verify with cx and treat prn  - POCT urinalysis, dipstick or tablet reag  - Urine culture    2. Vaginitis due to Candida  Treat:  - terconazole (TERAZOL 7) 0.4 % Crea; Place 1 applicator vaginally every evening.  Dispense: 45 g; Refill: 0  - fluconazole (DIFLUCAN) 150 MG Tab; Take 1 tablet (150 mg total) by mouth once daily.  Dispense: 1 tablet; Refill: 0  Reeval if sx persist/worsen

## 2017-05-26 ENCOUNTER — CLINICAL SUPPORT (OUTPATIENT)
Dept: REHABILITATION | Facility: HOSPITAL | Age: 67
End: 2017-05-26
Attending: ORTHOPAEDIC SURGERY
Payer: MEDICARE

## 2017-05-26 DIAGNOSIS — M23.92 INTERNAL DERANGEMENT OF LEFT KNEE: ICD-10-CM

## 2017-05-26 DIAGNOSIS — S81.802S OPEN WOUND OF LEFT LOWER EXTREMITY, SEQUELA: ICD-10-CM

## 2017-05-26 PROCEDURE — 97602 WOUND(S) CARE NON-SELECTIVE: CPT | Mod: PN | Performed by: PHYSICAL THERAPIST

## 2017-05-26 PROCEDURE — 97110 THERAPEUTIC EXERCISES: CPT | Mod: PN | Performed by: PHYSICAL THERAPIST

## 2017-05-26 NOTE — PROGRESS NOTES
"Name: Ella Canales  Clinic Number: [id number]  Date of Treatment: 05/26/2017   Diagnosis:  Internal derangment of left knee    Total Timed Minutes:  0  Total Timed Units:  0  Total Untimed Units:  1  Charges Billed/# of units:  1        Progress/Current Status     Subjective:      Patient ID: Ella Canales is a 66 y.o. female.  Diagnosis:   1. Open wound of left lower leg, subsequent encounter         Pain: 0 /10  Pt reports compliance with changing dressing at home. She states she has new bandages that are supposed to be waterproof for showers, so she used one yesterday. States she attempted to buy wound care supplies for herself at drug store, but she could not find several things she needed.      Objective:      Left knee distal open wound dressing removed with small amount of yellow serosanguenous fluid on bandage and adaptic. Wound is quite wet, there was mild draining in the wound bed.  Small distal left knee wound irrigated with saline, debridement of loose slough using sterile Q tip and gauze.  Dressing change with collagen gel (pt provided, was given to her in hospital outpatient wound care), and 2x2 mepilex sterile bandage. Due to excessive moisture on the wound bed, Adaptic was held today.     Assessment:      Center of  Wound bed with granulation tissue, yellow slough at the superior and inferior aspects of the wound bed.     Patient Education/Response:      Pt maintaining clean dressing. Advised to protect the dressing in shower with more dressings/wrapping due to increased moisture in the wound bed (pt feels this is likely due to it getting wet in shower with the "waterproof" bandage.        Plans and Goals:      Continue as ordered    "

## 2017-05-26 NOTE — PROGRESS NOTES
Name: Ella Santana [facility] Number: [id number]  Date of Treatment: 05/26/2017   Diagnosis:   Encounter Diagnosis   Name Primary?    Internal derangement of left knee        Time in: 905  Time Out: 953  Total Treatment Time: 48  Group Time: 0      Subjective:    Ella reports her right knee has been sore, left knee is doing pretty good.  Patient reports their pain to be 0/10 on a 0-10 scale with 0 being no pain and 10 being the worst pain imaginable.    Objective    Patient received individual therapy to increase strength, endurance, ROM and flexibility with 0 patients with activities as follows:     Ella received therapeutic exercises to develop strength, endurance, ROM and flexibility for 48 minutes including:     Bike x 10 min, L 3, seat 4/5  Gastroc st, 3x30s  HSS, 3x30s  SAQ, 2# B, 30x  SLR, 2# B, 3x10  LAQ, 2# L, 30x  Shuttle: 50# B, 25# L, 3x10  DF-100: flex and ext, 11#, 2x10  On Airex: HR/TR, minisquats and L TKE, 30x  Heelslides: 30x     A/PROM L knee flex: 105*/113*    Assessment:   Pt continues to slowly progress with AROM of the left knee. She tolerates progression of exercises well without c/o pain today.     Pt will continue to benefit from skilled PT intervention. Medical Necessity is demonstrated by:  Pain limits function of effected part for some activities, Unable to participate fully in daily activities and Weakness.    Patient is making good progress towards established goals.    New/Revised goals: na      Plan:  Continue with established Plan of Care towards PT goals.

## 2017-05-27 LAB — BACTERIA UR CULT: NORMAL

## 2017-05-29 ENCOUNTER — CLINICAL SUPPORT (OUTPATIENT)
Dept: REHABILITATION | Facility: HOSPITAL | Age: 67
End: 2017-05-29
Attending: ORTHOPAEDIC SURGERY
Payer: MEDICARE

## 2017-05-29 DIAGNOSIS — M23.92 INTERNAL DERANGEMENT OF LEFT KNEE: ICD-10-CM

## 2017-05-29 DIAGNOSIS — N39.0 URINARY TRACT INFECTION WITHOUT HEMATURIA, SITE UNSPECIFIED: Primary | ICD-10-CM

## 2017-05-29 DIAGNOSIS — M17.11 PRIMARY OSTEOARTHRITIS OF RIGHT KNEE: ICD-10-CM

## 2017-05-29 PROCEDURE — 97140 MANUAL THERAPY 1/> REGIONS: CPT | Mod: PN

## 2017-05-29 PROCEDURE — 97010 HOT OR COLD PACKS THERAPY: CPT | Mod: PN

## 2017-05-29 PROCEDURE — 97110 THERAPEUTIC EXERCISES: CPT | Mod: PN

## 2017-05-29 RX ORDER — CIPROFLOXACIN 250 MG/1
250 TABLET, FILM COATED ORAL 2 TIMES DAILY
Qty: 14 TABLET | Refills: 0 | Status: SHIPPED | OUTPATIENT
Start: 2017-05-29 | End: 2017-06-15 | Stop reason: ALTCHOICE

## 2017-05-29 NOTE — PROGRESS NOTES
"Name: Ella Santana [facility] Number: [id number]  Date of Treatment: 05/29/2017   Diagnosis: No diagnosis found.    Time in: 0855  Time Out: 1010  Total Treatment Time: 75      Subjective:    Ella reports no complaints of pain.  Patient reports their pain to be n/a/10 on a 0-10 scale with 0 being no pain and 10 being the worst pain imaginable.  Pt reports going to [facility] this week for trial study on R knee.    Objective    Patient received individual therapy to increase strength, endurance, ROM and flexibility with activities as follows:     Ella received therapeutic exercises to develop strength, endurance, ROM and flexibility for 53 minutes including:     Bike seat 4/3 x 6'/4'  Standing B gastroc stretch on 1/2 roll in // bars 3x30"  HR/TR, squats on Airex in // bars 30x each  L TKE in // bars with RTB 3x10  DF-100 22# flex, ext 3x10  Knee flexion stretch 14" step in // bars 3x30"  HSC 2# on L in // bars 3x10  Stool scoot to promote L knee flexion 30' x 2    CP to L knee over wedge with 4#x 7 minutes, 7# x 3 minutes    Ella received the following manual therapy techniques: superior/inferior, medial/lateral L patellar mobilizations and PROM L knee ext were applied to for 12 minutes.       Written Home Exercises Provided: Continue HEP  Pt demo good understanding of the education provided. Ella demonstrated good return demonstration of activities.     Assessment:     Pt demonstrated improvement on recumbent bike by tolerating closer seat which increased knee flexion.  Pt required V/C throughout exercises to encourage increased flex/ext ROM.  Pt required demonstration for TKE, knee flexion stretch and stool scoot exercise as this was her first time performing them.  Pt required cues to maintain neutral alignment of pelvis during PROM as she had a tendency to roll toward her L side.   Pt is very resistant to PROM.  Increased muscle guarding with knee extension stretch and patellar mobs.    Pt tends to pronate during " ambulation.  Discussed shoe inserts and pt stated she tried over the counter inserts in the past and had too much pain to continue to wear them.      Pt will continue to benefit from skilled PT intervention. Medical Necessity is demonstrated by:  Unable to participate fully in daily activities, Requires skilled supervision to complete and progress HEP and Weakness.    Patient is making good progress towards established goals.      Plan:  Continue with established Plan of Care towards PT goals.   I certify that I was present in the room directing the student in service delivery and guiding them using my skilled judgment. As the co-signing therapist I have reviewed the students documentation and am responsible for the treatment, assessment, and plan.

## 2017-05-31 ENCOUNTER — OFFICE VISIT (OUTPATIENT)
Dept: SPORTS MEDICINE | Facility: CLINIC | Age: 67
End: 2017-05-31
Payer: MEDICARE

## 2017-05-31 ENCOUNTER — RESEARCH ENCOUNTER (OUTPATIENT)
Dept: SPORTS MEDICINE | Facility: CLINIC | Age: 67
End: 2017-05-31

## 2017-05-31 VITALS
BODY MASS INDEX: 26.43 KG/M2 | WEIGHT: 140 LBS | TEMPERATURE: 98 F | HEART RATE: 87 BPM | DIASTOLIC BLOOD PRESSURE: 68 MMHG | SYSTOLIC BLOOD PRESSURE: 106 MMHG | HEIGHT: 61 IN

## 2017-05-31 DIAGNOSIS — M21.161 GENU VARUM OF RIGHT LOWER EXTREMITY: ICD-10-CM

## 2017-05-31 DIAGNOSIS — M25.562 CHRONIC PAIN OF LEFT KNEE: ICD-10-CM

## 2017-05-31 DIAGNOSIS — G89.29 CHRONIC PAIN OF LEFT KNEE: ICD-10-CM

## 2017-05-31 DIAGNOSIS — S81.802S OPEN WOUND OF LEFT LOWER EXTREMITY, SEQUELA: Primary | ICD-10-CM

## 2017-05-31 DIAGNOSIS — M23.92 INTERNAL DERANGEMENT OF LEFT KNEE: ICD-10-CM

## 2017-05-31 PROCEDURE — 99024 POSTOP FOLLOW-UP VISIT: CPT | Mod: ,,, | Performed by: ORTHOPAEDIC SURGERY

## 2017-05-31 PROCEDURE — 99999 PR PBB SHADOW E&M-EST. PATIENT-LVL IV: CPT | Mod: PBBFAC,,, | Performed by: ORTHOPAEDIC SURGERY

## 2017-05-31 PROCEDURE — 99214 OFFICE O/P EST MOD 30 MIN: CPT | Mod: PBBFAC,PO | Performed by: ORTHOPAEDIC SURGERY

## 2017-05-31 RX ORDER — CIPROFLOXACIN 250 MG/5ML
1 KIT ORAL 2 TIMES DAILY
COMMUNITY
End: 2017-06-15 | Stop reason: ALTCHOICE

## 2017-06-02 ENCOUNTER — CLINICAL SUPPORT (OUTPATIENT)
Dept: REHABILITATION | Facility: HOSPITAL | Age: 67
End: 2017-06-02
Attending: ORTHOPAEDIC SURGERY
Payer: MEDICARE

## 2017-06-02 DIAGNOSIS — G89.29 CHRONIC PAIN OF LEFT KNEE: ICD-10-CM

## 2017-06-02 DIAGNOSIS — M25.562 CHRONIC PAIN OF LEFT KNEE: ICD-10-CM

## 2017-06-02 DIAGNOSIS — M23.92 INTERNAL DERANGEMENT OF LEFT KNEE: ICD-10-CM

## 2017-06-02 PROCEDURE — 97110 THERAPEUTIC EXERCISES: CPT | Mod: PN

## 2017-06-02 PROCEDURE — 97140 MANUAL THERAPY 1/> REGIONS: CPT | Mod: PN

## 2017-06-02 NOTE — PROGRESS NOTES
"Name: Ella Santana [facility]  Clinic Number: [id number]  Date of Treatment: 06/02/2017   Diagnosis:   Encounter Diagnoses   Name Primary?    Internal derangement of left knee     Chronic pain of left knee        Time in: 0853  Time Out: 0959  Total Treatment Time: 66      Subjective:    Ella reports "The doctor wants me to go over there for wound care once a week.  He says it's not healing like he wants it to."  Pt reports, "The reason my back was hurting is because I have a UTI.  I'm on antibiotics."  Patient reports noticing improved endurance at home, pt has required less rest breaks while unpacking.  Patient reports their pain to be 0/10 on a 0-10 scale with 0 being no pain and 10 being the worst pain imaginable.    Objective    Patient received individual therapy to increase strength, ROM and flexibility with activities as follows:     Ella received therapeutic exercises to develop strength, ROM and flexibility for 56 minutes including:     Recumbent bike level 1, seat 3, x 10'  Standing B gastroc stretch on 1/2 roll in // bars 3x30"  HR/TR, squats on Airex in // bars 30x each  HSC L 2# in // bars 3x10, AAROM at end range to increase flexion  TKE L RTB in // bars 3x10  L Knee flexion stretch, 14" step in // bars 3x30"  Shuttle: 50# B, 25# L 3x10 each  DF-100 22# flex/ext 3x10 each  LAQ L 2# 30x  Supine heel slides 30x    Ella received the following manual therapy techniques: Soft tissue Mobilization to posterior knee region, PROM knee extension, and patellar mobs were applied to the: L knee region for 10 minutes.     Written Home Exercises Provided: Continue HEP  Pt demo good understanding of the education provided. Ella demonstrated good return demonstration of activities.     Assessment:     Pt was able to complete full 10 minutes on bike on seat 3.  Pt required V/C throughout exercises to increase flexion and extension ROM at end range.  Pt required tactile cues to bring hips back during squats, and V/C to " prevent pulling up through UE.  Pain posterior knee with STM which did decrease upon completion of STM.  Improved knee extension after MT completed.    Pt will continue to benefit from skilled PT intervention. Medical Necessity is demonstrated by:  Pain limits function of effected part for some activities, Unable to participate fully in daily activities, Requires skilled supervision to complete and progress HEP and Weakness.    Patient is making good progress towards established goals.      Plan:  Continue with established Plan of Care towards PT goals.   I certify that I was present in the room directing the student in service delivery and guiding them using my skilled judgment. As the co-signing therapist I have reviewed the students documentation and am responsible for the treatment, assessment, and plan.

## 2017-06-05 ENCOUNTER — CLINICAL SUPPORT (OUTPATIENT)
Dept: REHABILITATION | Facility: HOSPITAL | Age: 67
End: 2017-06-05
Attending: ORTHOPAEDIC SURGERY
Payer: MEDICARE

## 2017-06-05 DIAGNOSIS — G89.29 CHRONIC PAIN OF LEFT KNEE: ICD-10-CM

## 2017-06-05 DIAGNOSIS — M25.562 CHRONIC PAIN OF LEFT KNEE: ICD-10-CM

## 2017-06-05 DIAGNOSIS — M23.92 INTERNAL DERANGEMENT OF LEFT KNEE: ICD-10-CM

## 2017-06-05 PROCEDURE — 97110 THERAPEUTIC EXERCISES: CPT | Mod: PN | Performed by: PHYSICAL THERAPIST

## 2017-06-05 NOTE — PROGRESS NOTES
"Name: Ella Santana [facility] Number: [id number]  Date of Treatment: 06/05/2017   Diagnosis:   Encounter Diagnoses   Name Primary?    Internal derangement of left knee     Chronic pain of left knee        Time in: 900  Time Out: 954  Total Treatment Time: 54  Group Time: 0      Subjective:    Ella reports her knee is very stiff this morning.  Patient reports their pain to be 0/10 on a 0-10 scale with 0 being no pain and 10 being the worst pain imaginable. Pt states she has a "bladder infection" right now, and due to this she will not be able to participate in Dr. Mulligan' study. She also states we are no longer providing her wound care per Dr. Mulligan.     Objective    Patient received individual therapy to increase strength, endurance, ROM and flexibility with 0 patients with activities as follows:     Ella received therapeutic exercises to develop strength, endurance, ROM and flexibility for 54 minutes including:     Bike x 10 min  HSS, 3x30s  Gastroc st, 3x30s  SLR, 2#, 3x10, B  Quad sets, 30x5s  Airex: HR/TR, squats, 30x each  Standing HSC and hip abd, 2#, 3x10  Shuttle: B 50#, L 25#, 3x10  Heel slides, 30x  Low load/long duration knee ext stretch with ankle bridge, 3 min    Assessment:   Pt continues to demonstrate lack of terminal knee ext.     Pt will continue to benefit from skilled PT intervention. Medical Necessity is demonstrated by:  Pain limits function of effected part for some activities, Unable to participate fully in daily activities and Weakness.    Patient is making good progress towards established goals.    New/Revised goals: NA      Plan:  Continue with established Plan of Care towards PT goals.   "

## 2017-06-05 NOTE — PROGRESS NOTES
Research Study: Flexion OA Study   PI: Angie Mulligan MD  IRB: 2016.425.C  Visit: Day 1      Ms. Canales came in to clinic today for her day 1 study visit for the Flexion OA Study (PI:Angie Mulligan MD IRB:2016.425.C). I met Ms. Ms. Canales in the lobby when she arrived for her appointment. An overview of the visit activities was gone over with Ms. Canales and she was given a chance to ask any questions. Ms. Canales verbalized understanding confirmed that she wanted to continue his participation.      Ms. Canales and I reviewed her use of the Fitbit. Ms. Canales expressed that she was having no issues with the device. I confirmed that we had at least 7 consecutive days of Fitbit data, per protocol.      Ms. Canales and I reviewed her medications and she reported that she began taking an oral antibiotic for a UTI starting on [id number]. Ms. Canales and I discussed that oral antibiotics may exclude her from receiving the study drug.     I discussed this new medication with the study sponsor and confirmed that the patient would need to be rescreened after June 19th. Ms. Canales will be a screen failure at this time.     No further visit activities were performed     Ms. Canales and I will make arrangements to rescreen for the study after June 19th.

## 2017-06-07 ENCOUNTER — CLINICAL SUPPORT (OUTPATIENT)
Dept: REHABILITATION | Facility: HOSPITAL | Age: 67
End: 2017-06-07
Attending: ORTHOPAEDIC SURGERY
Payer: MEDICARE

## 2017-06-07 DIAGNOSIS — G89.29 CHRONIC PAIN OF LEFT KNEE: ICD-10-CM

## 2017-06-07 DIAGNOSIS — M23.92 INTERNAL DERANGEMENT OF LEFT KNEE: ICD-10-CM

## 2017-06-07 DIAGNOSIS — M25.562 CHRONIC PAIN OF LEFT KNEE: ICD-10-CM

## 2017-06-07 PROBLEM — T84.84XA PAINFUL ORTHOPAEDIC HARDWARE: Status: RESOLVED | Noted: 2017-03-09 | Resolved: 2017-06-07

## 2017-06-07 PROBLEM — T84.54XA INFECTION OF PROSTHETIC LEFT KNEE JOINT: Status: RESOLVED | Noted: 2017-03-06 | Resolved: 2017-06-07

## 2017-06-07 PROCEDURE — 97140 MANUAL THERAPY 1/> REGIONS: CPT | Mod: PN

## 2017-06-07 PROCEDURE — 97110 THERAPEUTIC EXERCISES: CPT | Mod: PN

## 2017-06-07 NOTE — PROGRESS NOTES
Name: Ella Santana [facility] Number: [id number]  Date of Treatment: 06/07/2017   Diagnosis:   Encounter Diagnoses   Name Primary?    Internal derangement of left knee     Chronic pain of left knee        Time in: 0854  Time Out: 0956  Total Treatment Time: 62      Subjective:    Ella reports having sinus problems today.  Patient reports their pain to be 0/10 on a 0-10 scale with 0 being no pain and 10 being the worst pain imaginable.    Objective    Patient received individual therapy to increase strength, ROM and flexibility with activities as follows:     Ella received therapeutic exercises to develop strength, ROM and flexibility for 52 minutes including:     Bike x 10 minutes  Standing gastroc stretch 3 x 30 sec B  Standing hip abduction, hamstring curls 2# x 30 each  Knee flexion stretch 16' step 3 x 30 sec  Airex: mini squats, HR/TR, x 30 each, SLS 3 x 30 sec  Shuttle: 56# B, 31# L x 30 each  Hamstring stretch 3 x 30 sec B  SLR 2# x 30  SAQ 2# x 30  Knee extension stretch w/6# x 5 minutes      STM posterior knee, patellar mobs, MFR L quad and ITB region x 10 minutes total    Written Home Exercises Provided: cont HEP  Pt demo good understanding of the education provided. Ella demonstrated good return demonstration of activities.     Assessment:     Improved knee extension today.  Pain remains with STM, however pain in posterior knee decreased after MT completed.  Pt will continue to benefit from skilled PT intervention. Medical Necessity is demonstrated by:  Pain limits function of effected part for some activities, Unable to participate fully in daily activities, Requires skilled supervision to complete and progress HEP and Weakness.    Patient is making good progress towards established goals.      Plan:  Continue with established Plan of Care towards PT goals.

## 2017-06-07 NOTE — PROGRESS NOTES
Subjective:          Chief Complaint: Ella Canales is a 66 y.o. female who had concerns including Injections of the Right Knee.    66 year old female presenting for reevaluation of her wound s/p ConforMIS iTotal and subsequent polyethylene liner exchange. She was having a new type of collagen placed over her wound and presents concerned about the appearance of the area.             Review of Systems   Constitution: Negative for fever and night sweats.   HENT: Negative for hearing loss.    Eyes: Negative for blurred vision and visual disturbance.   Cardiovascular: Negative for chest pain and leg swelling.   Respiratory: Negative for shortness of breath.    Endocrine: Negative for polyuria.   Hematologic/Lymphatic: Negative for bleeding problem.   Skin: Negative for rash.   Musculoskeletal: Negative for back pain, joint pain, joint swelling, muscle cramps and muscle weakness.   Gastrointestinal: Negative for melena.   Genitourinary: Negative for hematuria.   Neurological: Negative for loss of balance, numbness and paresthesias.   Psychiatric/Behavioral: Negative for altered mental status.       Pain Related Questions  Over the past 3 days, what was your average pain during activity? (I.e. running, jogging, walking, climbing stairs, getting dressed, ect.): 6  Over the past 3 days, what was your highest pain level?: 6  Over the past 3 days, what was your lowest pain level? : 2    Other  How many nights a week are you awakened by your affected body part?: 0  Was the patient's HEIGHT measured or patient reported?: Patient Reported  Was the patient's WEIGHT measured or patient reported?: Patient Reported      Objective:        General: Ella is well-developed, well-nourished, appears stated age, in no acute distress, alert and oriented to time, place and person.     General    Vitals reviewed.  Constitutional: She is oriented to person, place, and time. She appears well-developed and well-nourished. No distress.    HENT:   Mouth/Throat: No oropharyngeal exudate.   Eyes: Right eye exhibits no discharge. Left eye exhibits no discharge.   Neck: Normal range of motion.   Pulmonary/Chest: Effort normal and breath sounds normal. No respiratory distress.   Neurological: She is alert and oriented to person, place, and time. She has normal reflexes. No cranial nerve deficit. Coordination normal.   Psychiatric: She has a normal mood and affect. Her behavior is normal. Judgment and thought content normal.     General Musculoskeletal Exam   Gait: normal       Right Knee Exam     Inspection   Erythema: absent  Scars: absent  Swelling: absent  Effusion: effusion  Deformity: deformity  Bruising: absent    Tenderness   The patient is tender to palpation of the medial joint line.    Range of Motion   Extension: 0   Right knee flexion: 135.     Tests   Meniscus   David:  Medial - negative Lateral - negative  Ligament Examination Lachman: normal (-1 to 2mm) PCL-Posterior Drawer: normal (0 to 2mm)     MCL - Valgus: normal (0 to 2mm)  LCL - Varus: normalPivot Shift: normal (Equal)Reverse Pivot Shift: normal (Equal)Dial Test at 30 degrees: normal (< 5 degrees)Dial Test at 90 degrees: normal (< 5 degrees)  Posterior Sag Test: negative  Posterolateral Corner: unstable (>15 degrees difference)  Patella   Patellar Apprehension: negative  Passive Patellar Tilt: neutral  Patellar Tracking: normal  Patellar Glide (quadrants): Lateral - 1   Medial - 2  Q-Angle at 90 degrees: normal  Patellar Grind: negative  J-Sign: none    Other   Meniscal Cyst: absent  Popliteal (Baker's) Cyst: absent  Sensation: normal    Left Knee Exam     Inspection   Erythema: absent  Scars: present  Swelling: absent  Effusion: absent  Deformity: deformity  Bruising: absent    Tenderness   The patient tender to palpation of the medial retinaculum.    Range of Motion   Extension: 0   Flexion: 130     Tests   Meniscus   David:  Medial - negative Lateral - negative  Stability  Lachman: normal (-1 to 2mm) PCL-Posterior Drawer: normal (0 to 2mm)  MCL - Valgus: normal (0 to 2mm)  LCL - Varus: normal (0 to 2mm)Pivot Shift: normal (Equal)Reverse Pivot Shift: normal (Equal)Dial Test at 30 degrees: normal (< 5 degrees)Dial Test at 90 degrees: normal (< 5 degrees)  Posterior Sag Test: negative  Posterolateral Corner: unstable (>15 degrees difference)  Patella   Patellar Apprehension: negative  Passive Patellar Tilt: neutral  Patellar Tracking: normal  Patellar Glide (Quadrants): Lateral - 1 Medial - 2  Q-Angle at 90 degrees: normal  Patellar Grind: negative  J-Sign: J sign absent    Other   Meniscal Cyst: absent  Popliteal (Baker's) Cyst: absent  Sensation: normal    Comments:   anterior  pretibial wound with granulation tissue but moist appearance no drainage or tracking noted    Right Hip Exam     Tests   Ivon: negative  Left Hip Exam     Tests   Ivon: negative          Muscle Strength   Right Lower Extremity   Hip Abduction: 5/5   Quadriceps:  5/5   Hamstrin/5   Left Lower Extremity   Quadriceps:  5/5   Hamstrin/5     Reflexes     Left Side  Quadriceps:  2+  Achilles:  2+    Right Side   Quadriceps:  2+  Achilles:  2+    Vascular Exam     Right Pulses  Dorsalis Pedis:      2+  Posterior Tibial:      2+        Left Pulses  Dorsalis Pedis:      2+  Posterior Tibial:      2+                    Assessment:       Encounter Diagnoses   Name Primary?    Open wound of left lower extremity, sequela Yes    Chronic pain of left knee     Genu varum of right lower extremity     Internal derangement of left knee           Plan:       1. IKDC, SF-12 and KOOS was not filled out today in clinic.     RTC in 2 weeks with Dr. Angie Mulligan Patient will fill out IKDC, SF-12 and KOOS and bilateral knee series on return.  2. Wound Vac/Negative Pressure Dressing Change Procedure-[id number]    Utilizing sterile technique, wound vac/negative pressure dressing change was performed today on the  left Pes Anserine  Bursa  without complication.     Wound Documentation:  Wound Size: 1.0 x 1.0 cm  Wound Depth  0.5 cm  Wound Condition: Without obvious signs of infection  Tissue Condition: Absence of necrotic, devitalized or non-viable tissue, or other material in the wound that is expected to inhibit healing or promote adjacent tissue breakdown.     Debridement of the area was performed and application of Procollagen under sterile conditions was performed. A Mepilex bandage was applied.  Time Spent: This procedure required 20 minutes.       Plan of care: Wound healing proceeding as expected.   Plan: Will continue wound vac.  Goals: Continue wound vac at this time until granulation tissue sufficient to transition to wet to dry dressings.  Follow up: 2 week(s)    4.  A referral was placed to Mr. Michael Ambriz for wound care over the next two weeks and the patient was instructed on the use of and provided with more procollagen.                            [facility] patient questionnaires have been collected today.

## 2017-06-12 ENCOUNTER — CLINICAL SUPPORT (OUTPATIENT)
Dept: REHABILITATION | Facility: HOSPITAL | Age: 67
End: 2017-06-12
Attending: ORTHOPAEDIC SURGERY
Payer: MEDICARE

## 2017-06-12 DIAGNOSIS — M25.562 CHRONIC PAIN OF LEFT KNEE: ICD-10-CM

## 2017-06-12 DIAGNOSIS — G89.29 CHRONIC PAIN OF LEFT KNEE: ICD-10-CM

## 2017-06-12 DIAGNOSIS — S81.802S OPEN WOUND OF LEFT LOWER EXTREMITY, SEQUELA: ICD-10-CM

## 2017-06-12 DIAGNOSIS — M23.92 INTERNAL DERANGEMENT OF LEFT KNEE: ICD-10-CM

## 2017-06-12 PROCEDURE — 97110 THERAPEUTIC EXERCISES: CPT | Mod: PN

## 2017-06-12 PROCEDURE — G8994 SUB PT/OT GOAL STATUS: HCPCS | Mod: CH

## 2017-06-12 PROCEDURE — 97010 HOT OR COLD PACKS THERAPY: CPT | Mod: PN

## 2017-06-12 PROCEDURE — 97140 MANUAL THERAPY 1/> REGIONS: CPT | Mod: PN

## 2017-06-12 PROCEDURE — G8993 SUB PT/OT CURRENT STATUS: HCPCS | Mod: CH

## 2017-06-12 PROCEDURE — 97161 PT EVAL LOW COMPLEX 20 MIN: CPT

## 2017-06-12 NOTE — PLAN OF CARE
INITIAL EVALUATION PHYSICAL THERAPY WOUND CARE                   Patient Name:Ella Canales  Date of Initial Treatment: 6/12/2017  Date of referral to PT wound care:  5/31/17   Referring Physician:Angie Mulligan MD  Order: PT wound care.  Clinic Number:[id number]  PRECAUTIONS:       DIAGNOSIS:  1. Open wound of left lower extremity, sequela         HISTORY OF CURRENT ILLNESS AND WOUND: Patient is a 67 yo WF referred to OP PT for Wound Care secondary open wound L LE. Pt is s/p L TKR on 2/14/17. S/p I & D on 2/25/17. S/p L revision 3/9/17.    PAST MEDICAL HISTORY:   Past Medical History:   Diagnosis Date    Arthritis     bilateral knees    GERD (gastroesophageal reflux disease)     Hyperlipidemia     Hypertension     Osteopenia     S/P PICC central line placement     Ulcer        MEDICATIONS REQUIRED:   Current Outpatient Prescriptions on File Prior to Visit   Medication Sig Dispense Refill    ciprofloxacin 250 mg/5 ml (CIPRO) Take 1 mg by mouth 2 (two) times daily.      ciprofloxacin HCl (CIPRO) 250 MG tablet Take 1 tablet (250 mg total) by mouth 2 (two) times daily. 14 tablet 0    fexofenadine (ALLEGRA) 60 MG tablet Take 60 mg by mouth once daily.      glucosamine-chondroit-vit C-Mn (GLUCOSAMINE-CHONDROITIN MAX ST) 500-400 mg Cap Twice a day      losartan (COZAAR) 100 MG tablet Take 1 tablet (100 mg total) by mouth once daily. 90 tablet 3    metoprolol succinate (TOPROL-XL) 50 MG 24 hr tablet Take 1 tablet (50 mg total) by mouth once daily. 90 tablet 3    multivitamin (ONE DAILY MULTIVITAMIN) per tablet Take 1 tablet by mouth once daily.      omeprazole (PRILOSEC) 20 MG capsule Take 1 capsule (20 mg total) by mouth 2 (two) times daily. (Patient taking differently: Take 20 mg by mouth once daily. ) 180 capsule 1    potassium chloride SA (K-DUR,KLOR-CON) 10 MEQ tablet Take 1 tablet (10 mEq total) by mouth 2 (two) times daily. 180 tablet 4    raloxifene (EVISTA) 60 mg tablet Take 1 tablet (60 mg  total) by mouth once daily. 90 tablet 3    simvastatin (ZOCOR) 40 MG tablet Take 1 tablet (40 mg total) by mouth every evening. 90 tablet 3    terconazole (TERAZOL 7) 0.4 % Crea Place 1 applicator vaginally every evening. 45 g 0     No current facility-administered medications on file prior to visit.      ALLERGIES:     Review of patient's allergies indicates:   Allergen Reactions    Ace inhibitors      Other reaction(s): cough    Latex      denies         SUBJECTIVE: states she is moving her knee better. Still limited with flexion.                     OBJECTIVE:  Patient seen in PT Wound Care Area  General appearance: WDWN  Equipment present: none  Mentation:  A x O x 3  Functional mobility:  I with amb       WOUND #  1    Date:  3/9/17                   LOCATION: L knee  TYPE: surgical  BANDAGE: Mepore  DRAINAGE:  Min serous  APPEARANCE OF WOUND:  % Granulation tissue: 95%  % Viable non-granular tissue: 5%  % Necrotic tissue:----------------------  % Fibrin prior to debridement:100%  % Re-epithelialization:---------------  Borders of wound: rolled  Tissue periwound:----------------------  Phases of wound healing:-----------  DIMENSIONS:  Size: 1.2 x 0.7 x 0.2 cm  Surface Area: 0.84 cm2  Volume: 0.168 cm3  Undermining: none  Tract : none  Tunnel : none    TREATMENT:  Re-evaluation, magdiel satnam < 20 cm2    Selective Debridement:  Debrided fibrin and rolled edges with curette  Dressed with: collagen dressing, foam dressing, secured with tape.    PATIENT EDUCATION  The patient has received the following written and oral instructions and the patient expressed understanding:  Keep dressing dry and intact. Pt to change dressing on Thursday and return to PT next week.       ASSESSMENT / NEED FOR  CARE:   Patient tolerated today's treatment without any adverse effects.    Patient with diagnosis of open wound L knee.       Patient will  benefit from skilled PT wound care to maximize wound healing potential and to assist  wound to heal through appropriate healing phases.      No cultural, environmental, or spiritual barriers identified to treatment or learning.    Problems include:        The following goals were discussed with the patient and she agrees.         GOALS:  Timeframe: 4-6 weeks (7/24/17)   1. Promote moist wound healing with each dressing change.  2. PRN removal of nonviable tissue  3. Increase wound base to 100% granulation  4. Promote reepithelialization  5. Wound closure    PLAN: Pt will be weekly seen for wound care.       I certify the need for these services furnished under this plan of treatment and while under my care.       ________________________  Physician/Referring Practitioner                                 Date of Signature

## 2017-06-12 NOTE — PROGRESS NOTES
Name: Ella Santana [facility] Number: [id number]  Date of Treatment: 06/12/2017   Diagnosis:   Encounter Diagnoses   Name Primary?    Internal derangement of left knee     Chronic pain of left knee        Time in: 0854  Time Out: 1005  Total Treatment Time: 70      Subjective:    Ella reports no complaints.  Pt reports she has an appointment at Dr. Mulligan' office today.  Patient reports their pain to be 0/10 on a 0-10 scale with 0 being no pain and 10 being the worst pain imaginable.    Objective    Patient received individual therapy to increase strength, ROM and flexibility with activities as follows:     Ella received therapeutic exercises to develop strength, ROM and flexibility for 45 minutes including:     Bike x 10 minutes  Standing gastroc stretch 3 x 30 sec B  Standing hip abduction, hamstring curls 2# x 30 each  Knee flexion stretch 18' step 3 x 30 sec  Airex: mini squats, HR/TR, x 30 each,   SLS 3 x 30 sec  SportsArt extension 11# x 30 B  Shuttle: 56# B, 31# L x 30 each  Hamstring stretch 3 x 30 sec B  SLR 2# x 30  SAQ 4# x 30    Knee extension stretch w/6# x 10 minutes in conjunction with CP    Ella received the following manual therapy techniques: MFR with therapy bar to posterior knee, quad region, scar massage, PROM knee extension for 15 minutes.       Written Home Exercises Provided: cont HEP  Pt demo good understanding of the education provided. Ella demonstrated good return demonstration of activities.     Assessment:     Pt remains with limited knee flexion and extension.  Decreased muscle tightness and pain posterior knee and quad region.  Increased tolerance for MFR and STM to quad and posterior knee region.  Pt will continue to benefit from skilled PT intervention. Medical Necessity is demonstrated by:  Fall Risk, Pain limits function of effected part for some activities, Unable to participate fully in daily activities, Requires skilled supervision to complete and progress HEP and  Weakness.    Patient is making fair progress towards established goals.      Plan:  Continue with established Plan of Care towards PT goals.

## 2017-06-12 NOTE — PROGRESS NOTES
INITIAL EVALUATION PHYSICAL THERAPY WOUND CARE                   Patient Name:Ella Canales  Date of Initial Treatment: 6/12/2017  Date of referral to PT wound care:  5/31/17   Referring Physician:Angie Mulligan MD  Order: PT wound care.  Clinic Number:[id number]  PRECAUTIONS:       DIAGNOSIS:  1. Open wound of left lower extremity, sequela         HISTORY OF CURRENT ILLNESS AND WOUND: Patient is a 65 yo WF referred to OP PT for Wound Care secondary open wound L LE. Pt is s/p L TKR on 2/14/17. S/p I & D on 2/25/17. S/p L revision 3/9/17.    PAST MEDICAL HISTORY:   Past Medical History:   Diagnosis Date    Arthritis     bilateral knees    GERD (gastroesophageal reflux disease)     Hyperlipidemia     Hypertension     Osteopenia     S/P PICC central line placement     Ulcer        MEDICATIONS REQUIRED:   Current Outpatient Prescriptions on File Prior to Visit   Medication Sig Dispense Refill    ciprofloxacin 250 mg/5 ml (CIPRO) Take 1 mg by mouth 2 (two) times daily.      ciprofloxacin HCl (CIPRO) 250 MG tablet Take 1 tablet (250 mg total) by mouth 2 (two) times daily. 14 tablet 0    fexofenadine (ALLEGRA) 60 MG tablet Take 60 mg by mouth once daily.      glucosamine-chondroit-vit C-Mn (GLUCOSAMINE-CHONDROITIN MAX ST) 500-400 mg Cap Twice a day      losartan (COZAAR) 100 MG tablet Take 1 tablet (100 mg total) by mouth once daily. 90 tablet 3    metoprolol succinate (TOPROL-XL) 50 MG 24 hr tablet Take 1 tablet (50 mg total) by mouth once daily. 90 tablet 3    multivitamin (ONE DAILY MULTIVITAMIN) per tablet Take 1 tablet by mouth once daily.      omeprazole (PRILOSEC) 20 MG capsule Take 1 capsule (20 mg total) by mouth 2 (two) times daily. (Patient taking differently: Take 20 mg by mouth once daily. ) 180 capsule 1    potassium chloride SA (K-DUR,KLOR-CON) 10 MEQ tablet Take 1 tablet (10 mEq total) by mouth 2 (two) times daily. 180 tablet 4    raloxifene (EVISTA) 60 mg tablet Take 1 tablet (60 mg  total) by mouth once daily. 90 tablet 3    simvastatin (ZOCOR) 40 MG tablet Take 1 tablet (40 mg total) by mouth every evening. 90 tablet 3    terconazole (TERAZOL 7) 0.4 % Crea Place 1 applicator vaginally every evening. 45 g 0     No current facility-administered medications on file prior to visit.      ALLERGIES:     Review of patient's allergies indicates:   Allergen Reactions    Ace inhibitors      Other reaction(s): cough    Latex      denies         SUBJECTIVE: states she is moving her knee better. Still limited with flexion.                     OBJECTIVE:  Patient seen in PT Wound Care Area  General appearance: WDWN  Equipment present: none  Mentation:  A x O x 3  Functional mobility:  I with amb       WOUND #  1    Date:  3/9/17                   LOCATION: L knee  TYPE: surgical  BANDAGE: Mepore  DRAINAGE:  Min serous  APPEARANCE OF WOUND:  % Granulation tissue: 95%  % Viable non-granular tissue: 5%  % Necrotic tissue:----------------------  % Fibrin prior to debridement:100%  % Re-epithelialization:---------------  Borders of wound: rolled  Tissue periwound:----------------------  Phases of wound healing:-----------  DIMENSIONS:  Size: 1.2 x 0.7 x 0.2 cm  Surface Area: 0.84 cm2  Volume: 0.168 cm3  Undermining: none  Tract : none  Tunnel : none    TREATMENT:  Re-evaluation, magdiel satnam < 20 cm2    Selective Debridement:  Debrided fibrin and rolled edges with curette  Dressed with: collagen dressing, foam dressing, secured with tape.    PATIENT EDUCATION  The patient has received the following written and oral instructions and the patient expressed understanding:  Keep dressing dry and intact. Pt to change dressing on Thursday and return to PT next week.       ASSESSMENT / NEED FOR  CARE:   Patient tolerated today's treatment without any adverse effects.    Patient with diagnosis of open wound L knee.       Patient will  benefit from skilled PT wound care to maximize wound healing potential and to assist  wound to heal through appropriate healing phases.      No cultural, environmental, or spiritual barriers identified to treatment or learning.    Problems include:        The following goals were discussed with the patient and she agrees.         GOALS:  Timeframe: 4-6 weeks (7/24/17)   1. Promote moist wound healing with each dressing change.  2. PRN removal of nonviable tissue  3. Increase wound base to 100% granulation  4. Promote reepithelialization  5. Wound closure    PLAN: Pt will be weekly seen for wound care.       I certify the need for these services furnished under this plan of treatment and while under my care.       ________________________  Physician/Referring Practitioner                                 Date of Signature

## 2017-06-14 ENCOUNTER — TELEPHONE (OUTPATIENT)
Dept: FAMILY MEDICINE | Facility: CLINIC | Age: 67
End: 2017-06-14

## 2017-06-14 ENCOUNTER — CLINICAL SUPPORT (OUTPATIENT)
Dept: REHABILITATION | Facility: HOSPITAL | Age: 67
End: 2017-06-14
Attending: ORTHOPAEDIC SURGERY
Payer: MEDICARE

## 2017-06-14 DIAGNOSIS — M25.562 CHRONIC PAIN OF LEFT KNEE: ICD-10-CM

## 2017-06-14 DIAGNOSIS — M23.92 INTERNAL DERANGEMENT OF LEFT KNEE: ICD-10-CM

## 2017-06-14 DIAGNOSIS — G89.29 CHRONIC PAIN OF LEFT KNEE: ICD-10-CM

## 2017-06-14 PROCEDURE — 97110 THERAPEUTIC EXERCISES: CPT | Mod: PN

## 2017-06-14 NOTE — PROGRESS NOTES
"Name: Ella Santana [facility] Number: [id number]  Date of Treatment: 06/14/2017   Diagnosis:   Encounter Diagnoses   Name Primary?    Internal derangement of left knee     Chronic pain of left knee        Time in: 0855  Time Out: 0950  Total Treatment Time: 55      Subjective:    Ella reports when in wound care on Monday, "He scraped it real good.  It's still bleeding a little bit."  Patient reports their pain to be n/a/10 on a 0-10 scale with 0 being no pain and 10 being the worst pain imaginable.  Only discomfort is from wound site.    Objective    Patient received individual therapy to increase strength, ROM and flexibility with activities as follows:     Ella received therapeutic exercises to develop strength, ROM and flexibility for 40 minutes including:     Bike x 10 minutes  Standing gastroc stretch 3 x 30 sec B  Standing hip abduction, hamstring curls 2# x 30 each  SLS 3 x 30 sec on airex  SportsArt flexion, extension 11# x 30 B each  Shuttle: 56# B, 31# L x 30 each  LAQ 2# x 30  SLR 2# x 30  SAQ 4# x 30    Ella received the following manual therapy techniques: Myofacial release with therapy bar and Soft tissue Mobilization were applied to the: quad region, posterior knee, scar massage for 15 minutes.     Written Home Exercises Provided: cont HEP  Pt demo good understanding of the education provided. Ella demonstrated good return demonstration of activities.     Assessment:     No passive flexion today due to bleeding on wound at knee.  Pt with decreased edema.  Improved knee extension during ambulation.  Muscle tightness remains inferior quad region, but is getting better overall.  Pt will continue to benefit from skilled PT intervention. Medical Necessity is demonstrated by:  Fall Risk, Pain limits function of effected part for some activities, Unable to participate fully in daily activities, Requires skilled supervision to complete and progress HEP and Weakness.    Patient is making good progress " towards established goals.      Plan:  Continue with established Plan of Care towards PT goals.

## 2017-06-15 ENCOUNTER — OFFICE VISIT (OUTPATIENT)
Dept: FAMILY MEDICINE | Facility: CLINIC | Age: 67
End: 2017-06-15
Payer: MEDICARE

## 2017-06-15 VITALS
BODY MASS INDEX: 26.85 KG/M2 | SYSTOLIC BLOOD PRESSURE: 130 MMHG | DIASTOLIC BLOOD PRESSURE: 70 MMHG | HEIGHT: 61 IN | TEMPERATURE: 98 F | HEART RATE: 76 BPM | WEIGHT: 142.19 LBS

## 2017-06-15 DIAGNOSIS — H10.30 ACUTE CONJUNCTIVITIS, UNSPECIFIED ACUTE CONJUNCTIVITIS TYPE, UNSPECIFIED LATERALITY: Primary | ICD-10-CM

## 2017-06-15 PROCEDURE — 1126F AMNT PAIN NOTED NONE PRSNT: CPT | Mod: ,,, | Performed by: FAMILY MEDICINE

## 2017-06-15 PROCEDURE — 1159F MED LIST DOCD IN RCRD: CPT | Mod: ,,, | Performed by: FAMILY MEDICINE

## 2017-06-15 PROCEDURE — 99999 PR PBB SHADOW E&M-EST. PATIENT-LVL III: CPT | Mod: PBBFAC,,, | Performed by: FAMILY MEDICINE

## 2017-06-15 PROCEDURE — 99214 OFFICE O/P EST MOD 30 MIN: CPT | Mod: S$PBB,,, | Performed by: FAMILY MEDICINE

## 2017-06-15 PROCEDURE — 99213 OFFICE O/P EST LOW 20 MIN: CPT | Mod: PBBFAC,PO | Performed by: FAMILY MEDICINE

## 2017-06-15 RX ORDER — GENTAMICIN SULFATE 3 MG/ML
1 SOLUTION/ DROPS OPHTHALMIC EVERY 4 HOURS
Qty: 5 ML | Refills: 0 | Status: SHIPPED | OUTPATIENT
Start: 2017-06-15 | End: 2017-06-25

## 2017-06-15 NOTE — PROGRESS NOTES
Subjective:       Patient ID: Ella Canales is a 66 y.o. female.    Chief Complaint: Conjunctivitis (Right eye)    Eye itchy, red; crusted d/c in AM.  Wears contacts.  No FB history      Conjunctivitis   This is a new problem. The current episode started in the past 7 days. The problem occurs constantly. The problem has been gradually improving. Pertinent negatives include no abdominal pain, chest pain, fever, headaches, nausea or rash.     Review of Systems   Constitutional: Negative for fever.   Respiratory: Negative for shortness of breath.    Cardiovascular: Negative for chest pain.   Gastrointestinal: Negative for abdominal pain and nausea.   Skin: Negative for rash.   Neurological: Negative for headaches.   All other systems reviewed and are negative.      Objective:      Physical Exam   Constitutional: She appears well-developed. No distress.   Eyes: EOM are normal. Pupils are equal, round, and reactive to light. Right eye exhibits discharge. Left eye exhibits no discharge. Right conjunctiva is injected. Left conjunctiva is not injected.   No FB noted with BLLEME   Cardiovascular: Normal rate and regular rhythm.    No murmur heard.  Pulmonary/Chest: Effort normal and breath sounds normal.       Assessment:       1. Acute conjunctivitis, unspecified acute conjunctivitis type, unspecified laterality        Plan:         Ella was seen today for conjunctivitis.    Diagnoses and all orders for this visit:    Acute conjunctivitis, unspecified acute conjunctivitis type, unspecified laterality    Other orders  -     gentamicin (GARAMYCIN) 0.3 % ophthalmic solution; Place 1 drop into the left eye every 4 (four) hours.    Use glasses and no contacts for 24 hrs after eye looks and feels totally normal.

## 2017-06-19 ENCOUNTER — CLINICAL SUPPORT (OUTPATIENT)
Dept: REHABILITATION | Facility: HOSPITAL | Age: 67
End: 2017-06-19
Attending: ORTHOPAEDIC SURGERY
Payer: MEDICARE

## 2017-06-19 DIAGNOSIS — G89.29 CHRONIC PAIN OF LEFT KNEE: ICD-10-CM

## 2017-06-19 DIAGNOSIS — M23.92 INTERNAL DERANGEMENT OF LEFT KNEE: ICD-10-CM

## 2017-06-19 DIAGNOSIS — M25.562 CHRONIC PAIN OF LEFT KNEE: ICD-10-CM

## 2017-06-19 PROCEDURE — G8979 MOBILITY GOAL STATUS: HCPCS | Mod: CI,PN | Performed by: PHYSICAL THERAPIST

## 2017-06-19 PROCEDURE — 97110 THERAPEUTIC EXERCISES: CPT | Mod: PN | Performed by: PHYSICAL THERAPIST

## 2017-06-19 PROCEDURE — G8978 MOBILITY CURRENT STATUS: HCPCS | Mod: CJ,PN | Performed by: PHYSICAL THERAPIST

## 2017-06-19 NOTE — PLAN OF CARE
TIME RECORD    Date: 06/19/2017    Start Time:  900  Stop Time:  1000    PROCEDURES:    TIMED  Procedure Time Min.    Start:  Stop:     Start:  Stop:     Start:  Stop:     Start:  Stop:          UNTIMED  Procedure Time Min.    Start:  Stop:     Start:  Stop:      Total Timed Minutes:  40  Total Timed Units:  3  Total Untimed Units:  0  Charges Billed/# of units:  3    PHYSICAL THERAPY UPDATED PLAN OF TREATMENT    Patient name: Ella Canales  Onset Date:  2/14/17  SOC Date:  4/12/17  Primary Diagnosis:    1. Internal derangement of left knee     2. Chronic pain of left knee       Treatment Diagnosis:  S/p L TKA  Certification Period:  6/19/17 to 7/3/17  Precautions:  Osteopenia  Visits from SOC:  23  Functional Level Prior to SOC:  Independent    Updated Assessment:    S: Pt is feeling stiff today because she admits she did not do any exercise this weekend and because she was travelling and watching her grandson play baseball she did a lot of sitting. No pain, and she states she no longer has any pain with her normal activities.    O:   A/PROM L knee  Ext: -8*/-3*  Flex: 105*/115*    L knee flex is 4+/5 and ext strength is 5/5    Goals from previous POC:  New Short Term Goals Status:   1. Pt will demonstrate >/=115* active knee flexion for improved tolerance for sitting. 2. Pt will demonstrate -7* active knee extension for improved knee extension during terminal swing/ stance phases of gait.  Long Term Goal Status:   modified:  1. Pt will demonstrate >/=120* active knee flexion for normal walking, sitting, stair negotiation. 2. Pt will demonstrate </=5* active knee extension for normalized gait pattern. 3. Pt will score >/=64/80 on LEFS.      Previous Short Term Goals Status:   Ongoing  New Short Term Goals Status:   Continue as per last POC update  Long Term Goal Status:   continue per previous plan of care.  Reasons for Recertification of Therapy:   Pt will benefit from continued PT to focus on achieving max  possible ROM for her left knee and weaning her to an HEP.    Certification Period: 6/19/17 to 7/3/17  Recommended Treatment Plan: 2 times per week for 2 weeks: Electrical Stimulation IFC for pain control and Russian stim for muscle facilitation as needed, Manual Therapy, Moist Heat/ Ice, Neuromuscular Re-ed, Therapeutic Activites and Therapeutic Exercise  Other Recommendations: NA        Therapist's Name: Tita Lion PT, DPT, MTC   Date: 06/19/2017    I CERTIFY THE NEED FOR THESE SERVICES FURNISHED UNDER THIS PLAN OF TREATMENT AND WHILE UNDER MY CARE    Physician's comments: ________________________________________________________________________________________________________________________________________________      Physician's Name: ___________________________________

## 2017-06-20 LAB
ACID FAST MOD KINY STN SPEC: NORMAL
MYCOBACTERIUM SPEC QL CULT: NORMAL

## 2017-06-21 ENCOUNTER — TELEPHONE (OUTPATIENT)
Dept: RESEARCH | Facility: HOSPITAL | Age: 67
End: 2017-06-21

## 2017-06-21 ENCOUNTER — CLINICAL SUPPORT (OUTPATIENT)
Dept: REHABILITATION | Facility: HOSPITAL | Age: 67
End: 2017-06-21
Attending: ORTHOPAEDIC SURGERY
Payer: MEDICARE

## 2017-06-21 DIAGNOSIS — Z00.6 CLINICAL TRIAL PARTICIPANT: ICD-10-CM

## 2017-06-21 DIAGNOSIS — M23.92 INTERNAL DERANGEMENT OF LEFT KNEE: ICD-10-CM

## 2017-06-21 DIAGNOSIS — M25.562 CHRONIC PAIN OF LEFT KNEE: ICD-10-CM

## 2017-06-21 DIAGNOSIS — G89.29 CHRONIC PAIN OF LEFT KNEE: ICD-10-CM

## 2017-06-21 DIAGNOSIS — Z00.6 RESEARCH STUDY PATIENT: Primary | ICD-10-CM

## 2017-06-21 PROCEDURE — 97140 MANUAL THERAPY 1/> REGIONS: CPT | Mod: PN

## 2017-06-21 PROCEDURE — 97010 HOT OR COLD PACKS THERAPY: CPT | Mod: PN

## 2017-06-21 PROCEDURE — 97110 THERAPEUTIC EXERCISES: CPT | Mod: PN

## 2017-06-21 NOTE — PROGRESS NOTES
"Name: Elal Santana [facility] Number: [id number]  Date of Treatment: 06/21/2017   Diagnosis:   Encounter Diagnoses   Name Primary?    Internal derangement of left knee     Chronic pain of left knee        Time in: 0850  Time Out: 0910  Total Treatment Time: 80    Subjective:    Ella reports no pain, just stiffness "probably 2* weather".    Objective:    Patient received individual therapy to increase strength, endurance, ROM, flexibility and posture with activities as follows:     Ella received therapeutic exercises to develop strength, endurance, ROM, flexibility and posture for 55 minutes including:     Bike x 10 minutes  Airex HR/TR 10/3 B in // bars  Airex minisquats 10/3 B in // bars  Standing gastroc stretch 3 x 30 sec B  Standing hip abduction 2# 10/3 L in // bars  Standing L hamstring curls 2# x 30   Standing L knee flexion stretch over 14" step in // bars 3/30s  SLS 3 x 30 sec on airex  Seated hamstring stretches 3/30s L/R  Shuttle: 56# B, 31# L x 30 each  LAQ 2# x 30  Supine HS with sheet 10/3 L  SLR 2# x 30     Ella received the following manual therapy techniques: Myofacial release with therapy bar and Soft tissue Mobilization were applied to the: quad region, posterior knee, scar massage for 10 minutes.     CP applied to the L knee x 10 minutes in supine position in extn stretch with heel floated after being cleared for contraindications. Thick covering over entire wound area to protect.     Continue HEP daily.    Pt demo good understanding of the education provided. Ella demonstrated good return demonstration of activities.     Assessment:     Stiffness reported with difficulty achieving previous PROM/AROM in supine. A/PROM L knee supine: 105*/112* flexion, -11*/-8* extn. Grimaced after completion of minisquats, reporting B hip fatigue.     Pt will continue to benefit from skilled PT intervention. Medical Necessity is demonstrated by:  Requires skilled supervision to complete and progress HEP and " Weakness.    Patient is making good progress towards established goals.      Plan:    Continue with established Plan of Care towards PT goals.

## 2017-06-26 ENCOUNTER — CLINICAL SUPPORT (OUTPATIENT)
Dept: REHABILITATION | Facility: HOSPITAL | Age: 67
End: 2017-06-26
Attending: ORTHOPAEDIC SURGERY
Payer: MEDICARE

## 2017-06-26 DIAGNOSIS — M25.562 ACUTE PAIN OF LEFT KNEE: ICD-10-CM

## 2017-06-26 DIAGNOSIS — M23.92 INTERNAL DERANGEMENT OF LEFT KNEE: ICD-10-CM

## 2017-06-26 PROCEDURE — 97110 THERAPEUTIC EXERCISES: CPT | Mod: PN

## 2017-06-26 PROCEDURE — 97140 MANUAL THERAPY 1/> REGIONS: CPT | Mod: PN

## 2017-06-26 NOTE — PROGRESS NOTES
"Name: Ella Santana [facility] Number: [id number]  Date of Treatment: 06/26/2017   Diagnosis:   Encounter Diagnoses   Name Primary?    Internal derangement of left knee     Acute pain of left knee        Time in: 0805  Time Out: 0859  Total Treatment Time: 54      Subjective:    Ella reports doing well.  Patient reports their pain to be 0/10 on a 0-10 scale with 0 being no pain and 10 being the worst pain imaginable.    Objective    Patient received individual therapy to increase strength, ROM and flexibility with activities as follows:     Ella received therapeutic exercises to develop strength, ROM and flexibility for 44 minutes including:     Bike x 10 minutes  Standing gastroc stretch 3 x 30 sec B  Step up 6" step x 30 L  Mini squats x 30 on airex  SLS 3 x 30 sec L on airex  Standing hip abduction, hamstring curls 2# x 30 each  SportsArt flexion, extension 22# x 30 B each  Shuttle: 56# B, 31# L x 30 each  SLR 2# x 30  SAQ 3# x 30    Ella received the following manual therapy techniques: PROM knee flexion with hip at 90 deg, letting lower leg drop against gravity, knee extension, patellar mobs, STM, MFR with therapy bar to L distal quad region for 10 minutes.       Written Home Exercises Provided: cont HEP  Pt demo good understanding of the education provided. Ella demonstrated good return demonstration of activities.     Assessment:     Decreased patellar mobility, tightness distal quads remains.  Decreased tolerance to PROM knee flexion and extension.  Pt will continue to benefit from skilled PT intervention. Medical Necessity is demonstrated by:  Pain limits function of effected part for some activities, Unable to participate fully in daily activities, Requires skilled supervision to complete and progress HEP and Weakness.    Patient is making good progress towards established goals.      Plan:  Continue with established Plan of Care towards PT goals.   "

## 2017-06-27 ENCOUNTER — OFFICE VISIT (OUTPATIENT)
Dept: SPORTS MEDICINE | Facility: CLINIC | Age: 67
End: 2017-06-27
Payer: MEDICARE

## 2017-06-27 ENCOUNTER — RESEARCH ENCOUNTER (OUTPATIENT)
Dept: RESEARCH | Facility: HOSPITAL | Age: 67
End: 2017-06-27

## 2017-06-27 ENCOUNTER — HOSPITAL ENCOUNTER (OUTPATIENT)
Dept: CARDIOLOGY | Facility: CLINIC | Age: 67
Discharge: HOME | End: 2017-06-27
Payer: MEDICARE

## 2017-06-27 ENCOUNTER — LAB VISIT (OUTPATIENT)
Dept: LAB | Facility: HOSPITAL | Age: 67
End: 2017-06-27
Attending: ORTHOPAEDIC SURGERY
Payer: MEDICARE

## 2017-06-27 VITALS
HEIGHT: 60 IN | BODY MASS INDEX: 27.88 KG/M2 | WEIGHT: 142 LBS | DIASTOLIC BLOOD PRESSURE: 68 MMHG | TEMPERATURE: 98 F | HEART RATE: 89 BPM | SYSTOLIC BLOOD PRESSURE: 110 MMHG | RESPIRATION RATE: 22 BRPM

## 2017-06-27 DIAGNOSIS — M25.561 RIGHT KNEE PAIN: ICD-10-CM

## 2017-06-27 DIAGNOSIS — M17.11 OSTEOARTHRITIS OF RIGHT KNEE: ICD-10-CM

## 2017-06-27 DIAGNOSIS — Z00.6 RESEARCH STUDY PATIENT: ICD-10-CM

## 2017-06-27 DIAGNOSIS — Z00.6 CLINICAL TRIAL PARTICIPANT: ICD-10-CM

## 2017-06-27 DIAGNOSIS — Z00.6 EXAMINATION OF PARTICIPANT IN CLINICAL TRIAL: Primary | ICD-10-CM

## 2017-06-27 LAB — DRUG STUDY SPECIMEN TYPE: NORMAL

## 2017-06-27 PROCEDURE — 93010 ELECTROCARDIOGRAM REPORT: CPT | Mod: S$PBB,,, | Performed by: INTERNAL MEDICINE

## 2017-06-27 PROCEDURE — 1159F MED LIST DOCD IN RCRD: CPT | Mod: ,,, | Performed by: ORTHOPAEDIC SURGERY

## 2017-06-27 PROCEDURE — 99214 OFFICE O/P EST MOD 30 MIN: CPT | Mod: S$PBB,,, | Performed by: ORTHOPAEDIC SURGERY

## 2017-06-27 PROCEDURE — 99999 PR PBB SHADOW E&M-EST. PATIENT-LVL III: CPT | Mod: PBBFAC,,, | Performed by: ORTHOPAEDIC SURGERY

## 2017-06-27 PROCEDURE — 1126F AMNT PAIN NOTED NONE PRSNT: CPT | Mod: ,,, | Performed by: ORTHOPAEDIC SURGERY

## 2017-06-27 PROCEDURE — 36415 COLL VENOUS BLD VENIPUNCTURE: CPT | Mod: PO

## 2017-06-27 NOTE — PROGRESS NOTES
Subjective:          Chief Complaint: Ella Canales is a 66 y.o. female who had concerns including Follow-up of the Right Knee.    Patient is here for a follow up for her right knee for a Flexion screening visit.      DATE OF PROCEDURE: 03/09/2017     ATTENDING SURGEON: Angie Mulligan M.D.     FIRST ASSISTANT: Paolo Abdi M.D. - Fellow.     SECOND ASSISTANT: SMA Prince - Assistant.     PREOPERATIVE DIAGNOSIS: Left knee sepsis.     POSTOPERATIVE DIAGNOSES:  1. Left knee hemarthrosis.  2. Left knee sepsis.  3. Left knee synovitis.  4. Left knee medial retinacular tear.  5. Left knee medial collateral ligament insufficiency.     PROCEDURES PERFORMED:  1. Left knee complex polyethylene liner exchange.  2. Left knee complex incision and drainage.  3. Left knee medial collateral ligament repair.  4. Left knee medial retinacular repair with application of a flexed HD graft.  5. Left knee complex open synovectomy with hemostasis control.  6. Wound VAC application.      DATE OF PROCEDURE: 02/24/2017     PREOPERATIVE DIAGNOSIS: Possible septic arthritis, left total knee replacement.     POSTOPERATIVE DIAGNOSIS: Possible septic arthritis, left total knee   replacement.      PROCEDURE: Arthrotomy, left knee, with irrigation and debridement (CPT #91705).     SURGEON: Patel Denise M.D.     ASSISTANTS: Andrey Ya M.D. (RES); Anant Issa M.D. 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 Pertinent negatives include no abdominal pain, chest pain, chills, congestion, coughing, fever, headaches, myalgias, nausea, numbness, rash, sore throat or vomiting.         Review of Systems   Constitution: Negative. Negative for chills, fever, night sweats, weight gain and weight loss.   HENT: Negative for congestion, headaches, hearing loss and sore throat.    Eyes: Negative for blurred vision, double vision and visual disturbance.   Cardiovascular: Negative for chest pain, leg swelling and palpitations.   Respiratory: Negative for cough and shortness of breath.    Endocrine: Negative for polyuria.   Hematologic/Lymphatic: Negative for bleeding problem. Does not bruise/bleed easily.   Skin: Negative for itching, poor wound healing and rash.   Musculoskeletal: Positive for joint pain, joint swelling and stiffness. Negative for back pain, muscle cramps, muscle weakness and myalgias.   Gastrointestinal: Negative for abdominal pain, constipation, diarrhea, melena, nausea and vomiting.   Genitourinary: Negative.  Negative for frequency and hematuria.   Neurological: Negative for dizziness, loss of balance, numbness, paresthesias and sensory change.   Psychiatric/Behavioral: Negative for altered mental status and depression. The patient is not nervous/anxious.    Allergic/Immunologic: Negative for hives.       Pain Related Questions  Over the past 3 days, what was your average pain during activity? (I.e. running, jogging, walking, climbing stairs, getting dressed, ect.): 0  Over the past 3 days, what was your highest pain level?: 0  Over the past 3 days, what was your lowest pain level? : 0    Other  How many nights a week are you awakened by your affected body part?: 0  Was the patient's HEIGHT measured or patient reported?: Patient Reported  Was the patient's WEIGHT measured or patient reported?: Measured      Objective:        General:  Ella is well-developed, well-nourished, appears stated age, in no acute distress, alert and oriented to time, place and person.     General    Vitals reviewed.  Constitutional: She is oriented to person, place, and time. She appears well-developed and well-nourished. No distress.   HENT:   Mouth/Throat: No oropharyngeal exudate.   Eyes: Right eye exhibits no discharge. Left eye exhibits no discharge.   Neck: Normal range of motion.   Cardiovascular: Normal rate and regular rhythm.    Pulmonary/Chest: Effort normal and breath sounds normal. No respiratory distress.   Neurological: She is alert and oriented to person, place, and time. She has normal reflexes. No cranial nerve deficit. Coordination normal.   Psychiatric: She has a normal mood and affect. Her behavior is normal. Judgment and thought content normal.     General Musculoskeletal Exam   Gait: abnormal and antalgic       Right Knee Exam     Inspection   Erythema: absent  Scars: absent  Swelling: absent  Effusion: effusion  Deformity: deformity  Bruising: absent    Tenderness   The patient is tender to palpation of the lateral joint line and medial joint line.    Crepitus   The patient has crepitus of the patella.    Range of Motion   Extension: 5   Flexion: 130     Tests   Meniscus   David:  Medial - negative Lateral - negative  Ligament Examination Lachman: normal (-1 to 2mm) PCL-Posterior Drawer: normal (0 to 2mm)     MCL - Valgus: normal (0 to 2mm)  LCL - Varus: normalPivot Shift: normal (Equal)Reverse Pivot Shift: normal (Equal)Dial Test at 30 degrees: normal (< 5 degrees)Dial Test at 90 degrees: normal (< 5 degrees)  Posterior Sag Test: negative  Posterolateral Corner: unstable (>15 degrees difference)  Patella   Patellar Apprehension: negative  Passive Patellar Tilt: neutral  Patellar Tracking: normal  Patellar Glide (quadrants): Lateral - 1   Medial - 2  Q-Angle at 90 degrees: normal  Patellar Grind: positive  J-Sign: none    Other   Meniscal  Cyst: absent  Popliteal (Baker's) Cyst: present  Sensation: normal    Left Knee Exam     Inspection   Erythema: absent  Scars: present  Swelling: present  Effusion: absent  Deformity: deformity  Bruising: absent    Tenderness   Left knee tenderness location: global tenderness.    Range of Motion   Extension:  0 abnormal   Flexion:  110 abnormal     Tests   Meniscus   David:  Medial - negative Lateral - negative  Stability Lachman: normal (-1 to 2mm) PCL-Posterior Drawer: normal (0 to 2mm)  MCL - Valgus: normal (0 to 2mm)  LCL - Varus: normal (0 to 2mm)Pivot Shift: normal (Equal)Reverse Pivot Shift: normal (Equal)Dial Test at 30 degrees: normal (< 5 degrees)Dial Test at 90 degrees: normal (< 5 degrees)  Posterior Sag Test: negative  Posterolateral Corner: unstable (>15 degrees difference)  Patella   Patellar Apprehension: negative  Passive Patellar Tilt: neutral  Patellar Tracking: normal  Patellar Glide (Quadrants): Lateral - 1 Medial - 2  Q-Angle at 90 degrees: normal  Patellar Grind: negative  J-Sign: J sign absent    Other   Meniscal Cyst: absent  Popliteal (Baker's) Cyst: absent  Sensation: normal    Comments:    Wound 1x1x0.5cm  No purulence    Right Hip Exam     Tests   Ivon: negative  Left Hip Exam     Tests   Ivon: negative          Muscle Strength   Right Lower Extremity   Hip Abduction: 5/5   Quadriceps:  5/5   Hamstrin/5   Left Lower Extremity   Hip Abduction: 5/5   Quadriceps:  4/5   Hamstrin/5     Reflexes     Left Side  Quadriceps:  2+  Achilles:  2+    Right Side   Quadriceps:  2+  Achilles:  2+    Vascular Exam     Right Pulses  Dorsalis Pedis:      2+  Posterior Tibial:      2+        Left Pulses  Dorsalis Pedis:      2+  Posterior Tibial:      2+        Edema  Right Lower Leg: absent  Left Lower Leg: absent            Assessment:       Encounter Diagnoses   Name Primary?    Examination of participant in clinical trial Yes    Right knee pain           Plan:       1. IKDC, SF-12 and  KOOS was not filled out today in clinic.     RTC in 1 weeks with Dr. Angie Mulligan Patient will not fill out IKDC, SF-12 and KOOS on return.    2.    The patient was seen in clinic today and was screened for participation in the Flexion - -728 study for osteoarthritis of the knee  (PI: Angie Mulligan MD, IRB: [email or web address].C).    Based on my evaluation, the patient's medical history, and/or radiology findings are consistent with osteoarthritis that may qualify the patient to be a candidate for this study.    During the visit, the option of participating in the study was discussed the patient.  The study was briefly explained.  The patient did exhibit interest in learning more.      A copy of the IRB-approved consent form was given to the patient.    Contact information for our research coordinator, Winsome Alexander, was given to the patient. The Sports Med research coordinator, Winsome Alexander, was notified of this patient being a possible study candidate.    Patient was encouraged to contact us with any questions.

## 2017-06-27 NOTE — PROGRESS NOTES
Subjective:          Chief Complaint: Ella Canales is a 66 y.o. female who had concerns including Follow-up of the Right Knee.    Ms. Canales returns to clinic for her initial Flexion injection evaluation.  She reports 15 years of chronic right knee pain.   No recent injuries.  She has tried Orthovisc, Synvisc, and cortisone injections in the past.          ROS    Pain Related Questions  Over the past 3 days, what was your average pain during activity? (I.e. running, jogging, walking, climbing stairs, getting dressed, ect.): 0  Over the past 3 days, what was your highest pain level?: 0  Over the past 3 days, what was your lowest pain level? : 0    Other  How many nights a week are you awakened by your affected body part?: 0  Was the patient's HEIGHT measured or patient reported?: Patient Reported  Was the patient's WEIGHT measured or patient reported?: Measured      Objective:        General: Ella is well-developed, well-nourished, appears stated age, in no acute distress, alert and oriented to time, place and person.     Ortho/SPM Exam            Assessment:       Encounter Diagnoses   Name Primary?    Examination of participant in clinical trial Yes    Right knee pain           Plan:         1. Follow up next week for Flexion injection    2. The patient was seen in clinic today and was screened for participation in the Flexion - -984 study for osteoarthritis of the knee  (PI: Angie Mulligan MD, IRB: [email or web address].C).    Based on my evaluation, the patient's medical history, and/or radiology findings are consistent with osteoarthritis that may qualify the patient to be a candidate for this study.    During the visit, the option of participating in the study was discussed the patient.  The study was briefly explained.  The patient {Desc; did/not:03657} exhibit interest in learning more.      A copy of the IRB-approved consent form was given to the patient.    Contact information for our research coordinator,  Winsome Alexander, was given to the patient. The Sports Med research coordinator, Winsome Alexander, was notified of this patient being a possible study candidate.    Patient was encouraged to contact us with any questions.                             Patient questionnaires may have been collected.

## 2017-06-28 ENCOUNTER — CLINICAL SUPPORT (OUTPATIENT)
Dept: REHABILITATION | Facility: HOSPITAL | Age: 67
End: 2017-06-28
Attending: ORTHOPAEDIC SURGERY
Payer: MEDICARE

## 2017-06-28 DIAGNOSIS — M25.562 ACUTE PAIN OF LEFT KNEE: ICD-10-CM

## 2017-06-28 DIAGNOSIS — M23.92 INTERNAL DERANGEMENT OF LEFT KNEE: ICD-10-CM

## 2017-06-28 PROCEDURE — G8980 MOBILITY D/C STATUS: HCPCS | Mod: CJ,PN | Performed by: PHYSICAL THERAPIST

## 2017-06-28 PROCEDURE — G8978 MOBILITY CURRENT STATUS: HCPCS | Mod: CJ,PN | Performed by: PHYSICAL THERAPIST

## 2017-06-28 PROCEDURE — G8979 MOBILITY GOAL STATUS: HCPCS | Mod: CI,PN | Performed by: PHYSICAL THERAPIST

## 2017-06-28 PROCEDURE — 97110 THERAPEUTIC EXERCISES: CPT | Mod: PN | Performed by: PHYSICAL THERAPIST

## 2017-06-28 NOTE — PLAN OF CARE
Name: Ella Santana [facility]   Clinic Number: [id number]   Age: 66 y.o.   Diagnosis:   Encounter Diagnoses   Name Primary?    Internal derangement of left knee     Acute pain of left knee       Physician: Angie Mulligan MD   Original Orders : PT eval and treat  Initial visit: 4/12/17  Date of Last visit: 6/28/17t  Date of Discharge Note:  6/28/17  Total Visits Received: 27  Missed Visits: few    Subjective: Pt reports no pain today. States she has some stiffness in her knee at times, but typically does not have pain with everyday activities. Pt will be out of town for most of July on vacation.     Objective:  Treatment :    Included:Therapeutic exercise, Soft tissue mobilizations, Proprioception exercises , Stretching, Balance and Coordination ex and cold pack.  Wound care.       A/PROM L knee   Ext: -8*/-3*   Flex: 105*/115*       LE Strength: L knee flex is 4+/5 and ext strength is 5/5      LEFS: 50/80    Treatment today:    Time In: 1000  Time Out: 1042     Monitored pt for correct form with all exercises to continue as part of HEP, and pt was also issued advanced HEP in written/illustrated form.     Assessment:  Pt has reached a plateau in progress in regard to ROM. However, she states she is able to do all activities that she desires without pain or difficulty. Pt's main continued complaint is of stiffness, and this is usually after she has not done her HEP for multiple days in a row because she is busy with other activities. Pt will be travelling for the next month and would like to discontinue formal PT at this time. Pt demonstrates independence with HEP and I feel she is appropriate for DC at this time due to her high functional status.   Goals Achieved: Partially.  Goals Not achieved and why: Likely due to slow wound healing early in recovery.     Discharge reason : Patient has maximized benefit from PT at this time    Discharge plan :Continue HEP and Pt to follow-up with MD as planned    Plan:  This patient  is discharged from Physical Therapy Services.

## 2017-06-30 DIAGNOSIS — Z00.6 CLINICAL TRIAL PARTICIPANT: Primary | ICD-10-CM

## 2017-06-30 NOTE — PROGRESS NOTES
Research Study: Flexion OA Study   PI: Angie Mulligan MD  IRB: [email or web address].C  Visit: RE-Screening/RE-Consent         Ms. Ella Canales  Had previously screen failed for this research study after taking antibiotics. Ms. Canales is being consented and re-screened for particiaption because it has been 2 weeks since she stopped taking her antibiotics.       Ms. Canales and I arranged to meet today, 27Jun2017, to complete the re-consent Ms. Canales and I met in the private clinic room to discuss participation in the Flexion OA Study and she expressed interest in participation.     Its was discussed that eligibility would be determined after an evaluation of a study EKG and labs. Ms. Canales's x-ray from her initial screening visit will be utilized per the sponsor. Ms. Canales verbalized understanding and expressed interest in pursuing participation.      Ms. Canales and ALFIE reviewed the consent form in great detail once again today. Ms. Canales verbalized understanding but expressed that she wanted to sign the consent today.      It was explained to Ms. Canales that this is an experimental study. It was explained that participation in this study was voluntary and that she was under no obligation to participate. It was explained to Ms. Canales that she had the right to withdraw from the research at any time. Ms. Canales verbalized understanding.      It was explained that the purpose of the study is to evaluate the overall safety and general tolerability of repeat administration of an investigational drug . It was explained that the study drug has not yet been FDA approved. Ms. Canales verbalized understanding.      All of the study visits were explained in detail to Ms. Canales and she verbalized understanding.      The potential risks, benefits, and alternate options were reviewed with Ms. Canales. She verbalized understanding.      Ms. Canales was encouraged to ask questions during the informed consent process.      A copy of the signed  consent was given to Ms. Canales along with my contact information. Ms. Canales was encouraged to contact me with any questions regarding the study. Ms. Canales verbalized understanding.      Screening visit x-rays were utilized form her previous screening visit, per sponsor.      Dr. Canada completed all necessary screening visit exams and evaluations, per protocol.      Study Fitbit was set up and a test sync was performed.      Screening visit labs were ordered and collected per protocol.      Screening visit ECG was ordered and Ms. Canales was given the number to call and schedule. Ms. Canales completed her ECG per protocol.     All other screening visit activities were completed per protocol.      Ms. Oneil Day 1 appointment was scheduled for [id number].     I thanked Ms. Canales for her participation and again encouraged her to contact me with any questions. She verbalized understanding.

## 2017-07-01 DIAGNOSIS — K21.9 GASTROESOPHAGEAL REFLUX DISEASE WITHOUT ESOPHAGITIS: ICD-10-CM

## 2017-07-03 RX ORDER — OMEPRAZOLE 20 MG/1
CAPSULE, DELAYED RELEASE ORAL
Qty: 180 CAPSULE | Refills: 1 | Status: SHIPPED | OUTPATIENT
Start: 2017-07-03 | End: 2017-10-03 | Stop reason: SDUPTHER

## 2017-07-05 ENCOUNTER — RESEARCH ENCOUNTER (OUTPATIENT)
Dept: RESEARCH | Facility: HOSPITAL | Age: 67
End: 2017-07-05

## 2017-07-05 ENCOUNTER — OFFICE VISIT (OUTPATIENT)
Dept: SPORTS MEDICINE | Facility: CLINIC | Age: 67
End: 2017-07-05
Payer: MEDICARE

## 2017-07-05 VITALS
WEIGHT: 142 LBS | HEIGHT: 60 IN | DIASTOLIC BLOOD PRESSURE: 78 MMHG | BODY MASS INDEX: 27.88 KG/M2 | HEART RATE: 67 BPM | RESPIRATION RATE: 19 BRPM | SYSTOLIC BLOOD PRESSURE: 153 MMHG | TEMPERATURE: 97 F

## 2017-07-05 DIAGNOSIS — M23.92 INTERNAL DERANGEMENT OF LEFT KNEE: ICD-10-CM

## 2017-07-05 DIAGNOSIS — M21.161 GENU VARUM OF RIGHT LOWER EXTREMITY: ICD-10-CM

## 2017-07-05 DIAGNOSIS — M25.561 RIGHT KNEE PAIN, UNSPECIFIED CHRONICITY: Primary | ICD-10-CM

## 2017-07-05 DIAGNOSIS — M17.11 PRIMARY OSTEOARTHRITIS OF RIGHT KNEE: ICD-10-CM

## 2017-07-05 DIAGNOSIS — M25.562 ACUTE PAIN OF LEFT KNEE: ICD-10-CM

## 2017-07-05 PROCEDURE — 99214 OFFICE O/P EST MOD 30 MIN: CPT | Mod: S$PBB,,, | Performed by: ORTHOPAEDIC SURGERY

## 2017-07-05 PROCEDURE — 99999 PR PBB SHADOW E&M-EST. PATIENT-LVL V: CPT | Mod: PBBFAC,,, | Performed by: ORTHOPAEDIC SURGERY

## 2017-07-05 PROCEDURE — 1126F AMNT PAIN NOTED NONE PRSNT: CPT | Mod: ,,, | Performed by: ORTHOPAEDIC SURGERY

## 2017-07-05 PROCEDURE — 99215 OFFICE O/P EST HI 40 MIN: CPT | Mod: PBBFAC,PO | Performed by: ORTHOPAEDIC SURGERY

## 2017-07-05 PROCEDURE — 1159F MED LIST DOCD IN RCRD: CPT | Mod: ,,, | Performed by: ORTHOPAEDIC SURGERY

## 2017-07-05 NOTE — PROGRESS NOTES
Subjective:          Chief Complaint: Ella Canales is a 66 y.o. female who had concerns including Post-op Evaluation of the Left Knee and Follow-up of the Right Knee.    Patient is here for a follow up for her right knee for her flexion injection.     Her left knee is doing okay but still hasn't healed completely. She is seeing Michael for wound care. She is using her stationary bike 45 minutes 3 times a week. She finished PT on the [facility] last week.       DATE OF PROCEDURE: 03/09/2017     ATTENDING SURGEON: Angie Mulligan M.D.     FIRST ASSISTANT: Paolo Abdi M.D. - Fellow.     SECOND ASSISTANT: SMA Prince - Assistant.     PREOPERATIVE DIAGNOSIS: Left knee sepsis.     POSTOPERATIVE DIAGNOSES:  1. Left knee hemarthrosis.  2. Left knee sepsis.  3. Left knee synovitis.  4. Left knee medial retinacular tear.  5. Left knee medial collateral ligament insufficiency.     PROCEDURES PERFORMED:  1. Left knee complex polyethylene liner exchange.  2. Left knee complex incision and drainage.  3. Left knee medial collateral ligament repair.  4. Left knee medial retinacular repair with application of a flexed HD graft.  5. Left knee complex open synovectomy with hemostasis control.  6. Wound VAC application.      DATE OF PROCEDURE: 02/24/2017     PREOPERATIVE DIAGNOSIS: Possible septic arthritis, left total knee replacement.     POSTOPERATIVE DIAGNOSIS: Possible septic arthritis, left total knee   replacement.      PROCEDURE: Arthrotomy, left knee, with irrigation and debridement (CPT #74754).     SURGEON: Patel Denise M.D.     ASSISTANTS: Andrey Ya M.D. (RES); Anant Issa M.D. 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 Pertinent negatives include no abdominal pain, chest pain, chills, congestion, coughing, fever, headaches, myalgias, nausea, numbness, rash, sore throat or vomiting.         Review of Systems   Constitution: Negative. Negative for chills, fever, night sweats, weight gain and weight loss.   HENT: Negative for congestion, headaches, hearing loss and sore throat.    Eyes: Negative for blurred vision, double vision and visual disturbance.   Cardiovascular: Negative for chest pain, leg swelling and palpitations.   Respiratory: Negative for cough and shortness of breath.    Endocrine: Negative for polyuria.   Hematologic/Lymphatic: Negative for bleeding problem. Does not bruise/bleed easily.   Skin: Negative for itching, poor wound healing and rash.   Musculoskeletal: Positive for joint pain, joint swelling and stiffness. Negative for back pain, muscle cramps, muscle weakness and myalgias.   Gastrointestinal: Negative for abdominal pain, constipation, diarrhea, melena, nausea and vomiting.   Genitourinary: Negative.  Negative for frequency and hematuria.   Neurological: Negative for dizziness, loss of balance, numbness, paresthesias and sensory change.   Psychiatric/Behavioral: Negative for altered mental status and depression. The patient is not nervous/anxious.    Allergic/Immunologic: Negative for hives.       Pain Related Questions  Over the past 3 days, what was your average pain during activity? (I.e. running, jogging, walking, climbing stairs, getting dressed, ect.): 0  Over the past 3 days, what was your highest pain level?: 0  Over the past 3 days, what was your lowest pain level? :  0    Other  How many nights a week are you awakened by your affected body part?: 0  Was the patient's HEIGHT measured or patient reported?: Patient Reported  Was the patient's WEIGHT measured or patient reported?: Measured      Objective:        General: Ella is well-developed, well-nourished, appears stated age, in no acute distress, alert and oriented to time, place and person.     General    Vitals reviewed.  Constitutional: She is oriented to person, place, and time. She appears well-developed and well-nourished. No distress.   HENT:   Mouth/Throat: No oropharyngeal exudate.   Eyes: Right eye exhibits no discharge. Left eye exhibits no discharge.   Neck: Normal range of motion.   Cardiovascular: Normal rate and regular rhythm.    Pulmonary/Chest: Effort normal and breath sounds normal. No respiratory distress.   Neurological: She is alert and oriented to person, place, and time. She has normal reflexes. No cranial nerve deficit. Coordination normal.   Psychiatric: She has a normal mood and affect. Her behavior is normal. Judgment and thought content normal.     General Musculoskeletal Exam   Gait: abnormal and antalgic       Right Knee Exam     Inspection   Erythema: absent  Scars: absent  Swelling: absent  Effusion: effusion  Deformity: deformity  Bruising: absent    Tenderness   The patient is tender to palpation of the lateral joint line and medial joint line.    Crepitus   The patient has crepitus of the patella.    Range of Motion   Extension:  5 abnormal   Flexion: 130     Tests   Meniscus   David:  Medial - negative Lateral - negative  Ligament Examination Lachman: normal (-1 to 2mm) PCL-Posterior Drawer: normal (0 to 2mm)     MCL - Valgus: normal (0 to 2mm)  LCL - Varus: normalPivot Shift: normal (Equal)Reverse Pivot Shift: normal (Equal)Dial Test at 30 degrees: normal (< 5 degrees)Dial Test at 90 degrees: normal (< 5 degrees)  Posterior Sag Test: negative  Posterolateral Corner: unstable (>15  degrees difference)  Patella   Patellar Apprehension: negative  Passive Patellar Tilt: neutral  Patellar Tracking: normal  Patellar Glide (quadrants): Lateral - 1   Medial - 2  Q-Angle at 90 degrees: normal  Patellar Grind: positive  J-Sign: none    Other   Meniscal Cyst: absent  Popliteal (Baker's) Cyst: present  Sensation: normal    Left Knee Exam     Inspection   Erythema: absent  Scars: present  Swelling: present  Effusion: absent  Deformity: deformity  Bruising: absent    Tenderness   Left knee tenderness location: global tenderness.    Range of Motion   Extension:  5 abnormal   Flexion:  90 (95) abnormal     Tests   Meniscus   David:  Medial - negative Lateral - negative  Stability Lachman: normal (-1 to 2mm) PCL-Posterior Drawer: normal (0 to 2mm)  MCL - Valgus: normal (0 to 2mm)  LCL - Varus: normal (0 to 2mm)Pivot Shift: normal (Equal)Reverse Pivot Shift: normal (Equal)Dial Test at 30 degrees: normal (< 5 degrees)Dial Test at 90 degrees: normal (< 5 degrees)  Posterior Sag Test: negative  Posterolateral Corner: unstable (>15 degrees difference)  Patella   Patellar Apprehension: negative  Passive Patellar Tilt: neutral  Patellar Tracking: normal  Patellar Glide (Quadrants): Lateral - 1 Medial - 2  Q-Angle at 90 degrees: normal  Patellar Grind: negative  J-Sign: J sign absent    Other   Meniscal Cyst: absent  Popliteal (Baker's) Cyst: absent  Sensation: normal    Comments:    Wound 1x1x0.5cm  No purulence    Right Hip Exam     Tests   Ivon: negative  Left Hip Exam     Tests   Ivon: negative          Muscle Strength   Right Lower Extremity   Hip Abduction: 5/5   Quadriceps:  5/5   Hamstrin/5   Left Lower Extremity   Hip Abduction: 5/5   Quadriceps:  4/5   Hamstrin/5     Reflexes     Left Side  Quadriceps:  2+  Achilles:  2+    Right Side   Quadriceps:  2+  Achilles:  2+    Vascular Exam     Right Pulses  Dorsalis Pedis:      2+  Posterior Tibial:      2+        Left Pulses  Dorsalis Pedis:       2+  Posterior Tibial:      2+        Edema  Right Lower Leg: absent  Left Lower Leg: absent            Assessment:       No diagnosis found.       Plan:       1. IKDC, SF-12 and KOOS was not filled out today in clinic.     RTC in 3 months with Dr. Angie Mulligan Patient will fill out IKDC, SF-12 and KOOS on return.    2. Aspiration/Injection Procedure right knee    After time out was performed, including verification of patient ID, procedure, site and side, availability of information and equipment, review of safety issues, and agreement with consent, the procedure site was marked and the patient was prepped aseptically. 0cc's of normal' joint fluid was aspirated from the right Knee Joint using a 21.5g x 1.5 needle in sterile fashion without complication. Following aspiration, a diagnostic and therapeutic injection of 5cc  Flexion and ethyl chloride spray was given under sterile technique using a 21.5g x 1.5 needle into the right Knee Joint in seated position.     The patient had no adverse reactions to the medication. Pain decreased. The patient was instructed to apply ice to the joint for 20 minutes and avoid strenuous activities for 24-36 hours following the injection. Patient was warned of possible blood sugar and/or blood pressure changes during that time. Following that time, patient can resume regular activities.    Ultrasound Guidance Procedure  right Knee Joint visualized with documented inflammation. Dynamic visualization of the 21.5g x 1.5 needle performed.    3. Physical therapy in [place] reordered today

## 2017-07-05 NOTE — PROGRESS NOTES
Research Study: Flexion OA Study  PI: Angie Mulligan MD  IRB: [id number]  Visit: Day 1      came in to clinic today for her day 1 study visit for the Flexion OA Study (PI:Angie Mulligan MD IRB:[id number]). I met  in the lobby when she arrived for her appointment. An overview of the visit activities was gone over with  and she was given a chance to ask any questions.  verbalized understanding confirmed that she wanted to continue her participation.      Ms. Canales and I reviewed her use of the Fitbit.  expressed that she was having no issues with the device. I confirmed that we had at least 7 days of Fitbit data, per protocol.       completed all required baseline assessments per protocol.    Ms. Canales and ALFIE reviewed her medications. Pt reported no change to medications.    Ms.. Canales was monitored for AEs following the injection, but had none.      saw Dr. Mulligan for her baseline exam, study aspiration, and injection. Dr. Mulligan reviewed the Screening Eligibility and determined that Ms. Canales was still a good candidate for the study. Dr. Mulligan reviewed the study procedure with  and asked if she had any questions.  verbalized understanding and had no questions. Dr. Mulligan completed the study aspiration/injection procedure per protocol.    All other baseline visits activities were performed per protocol. All study documents for the baseline visit were completed per protocol.     reports no changes in health since last visit.      is scheduled to return for this 4 week follow-up on Melan.

## 2017-07-18 ENCOUNTER — TELEPHONE (OUTPATIENT)
Dept: REHABILITATION | Facility: HOSPITAL | Age: 67
End: 2017-07-18

## 2017-07-25 ENCOUNTER — CLINICAL SUPPORT (OUTPATIENT)
Dept: REHABILITATION | Facility: HOSPITAL | Age: 67
End: 2017-07-25
Attending: ORTHOPAEDIC SURGERY
Payer: MEDICARE

## 2017-07-25 DIAGNOSIS — M25.562 ACUTE PAIN OF LEFT KNEE: ICD-10-CM

## 2017-07-25 DIAGNOSIS — M17.11 PRIMARY OSTEOARTHRITIS OF RIGHT KNEE: Primary | ICD-10-CM

## 2017-07-25 DIAGNOSIS — M23.92 INTERNAL DERANGEMENT OF LEFT KNEE: ICD-10-CM

## 2017-07-25 DIAGNOSIS — M25.561 RIGHT KNEE PAIN, UNSPECIFIED CHRONICITY: ICD-10-CM

## 2017-07-25 PROBLEM — S81.802A OPEN WOUND OF LEFT LOWER EXTREMITY: Status: RESOLVED | Noted: 2017-03-31 | Resolved: 2017-07-25

## 2017-07-25 PROCEDURE — G8978 MOBILITY CURRENT STATUS: HCPCS | Mod: CL,PN

## 2017-07-25 PROCEDURE — 97161 PT EVAL LOW COMPLEX 20 MIN: CPT | Mod: PN

## 2017-07-25 PROCEDURE — G8979 MOBILITY GOAL STATUS: HCPCS | Mod: CK,PN

## 2017-07-25 PROCEDURE — 97110 THERAPEUTIC EXERCISES: CPT | Mod: PN

## 2017-07-25 NOTE — PLAN OF CARE
TIME RECORD    Date: 07/25/2017    Start Time:  1610  Stop Time:  1700    PROCEDURES:    TIMED  Procedure Time Min.   There ex Start:1640  Stop:1700 20    Start:  Stop:     Start:  Stop:     Start:  Stop:          UNTIMED  Procedure Time Min.   assessment Start:1610  Stop:1639 29    Start:  Stop:      Total Timed Minutes:  20  Total Timed Units:  1  Total Untimed Units:  1  Charges Billed/# of units:  2    OUTPATIENT PHYSICAL THERAPY   PATIENT EVALUATION  Onset Date: L TKA 2/14/17, R knee injection 7/5/17  Primary Diagnosis: osteoarthritis R knee, R knee pain, L knee pain  Treatment Diagnosis: limited L knee flexion, gait abnormality  Past Medical History:   Diagnosis Date    Arthritis     bilateral knees    GERD (gastroesophageal reflux disease)     Hyperlipidemia     Hypertension     Osteopenia     S/P PICC central line placement     Ulcer      Precautions: none  Prior Therapy: last out[t PT at this facility 6/28/17  Medications: Ella Canales has a current medication list which includes the following prescription(s): fexofenadine, glucosamine-chondroit-vit c-mn, losartan, metoprolol succinate, multivitamin, omeprazole, potassium chloride sa, raloxifene, simvastatin, and terconazole, and the following Facility-Administered Medications: inv [id number].  Nutrition:  Overweight  History of Present Illness: Pt had slow healing incision following L TKA 2/14/17. She required wound vac approx 5 weeks. Area now well healed. Pt has limited active and passive L knee flexion. She denies having any pain. She is encouraged by decrease in R knee as well following injection 7/5/17. She has just returned from family vacation and is ready to resume PT.   Prior Level of Function: Independent  Social History: single, hobbies: travel, spending time w family  Place of Residence (Steps/Adaptations): 1 step to  Enter, one step to get up in to kitchen. Second floor not used  Functional Deficits Leading to Referral/Nature of  Injury: squatting  Patient Therapy Goals: get the L knee to bend better    Subjective     Ella Canales states she is participating in clinical study w injection to R knee. She keeps track of daily steps and reports currently having no R knee pain. Pt has been walking more regularly and uses stationary bike at home at least 3 days per week.    Pain:  None over the past 2 weeks    Objective     Posture: L knee 20.2 cm healed incision ant knee  Palpation: nontender at incision site  Sensation: intact    Range of Motion/Strength:   Knee  Right   Left     AROM PROM MMT AROM PROM MMT   Flexion prone 106 110 5 90 95 5   Flexion supine  130 5 90 95 5   Extension 0  5 0  5        Flexibility: -38* B  Gait: Without AD  Analysis:   Bed Mobility:Independent  Transfers: Independent  Special Tests:   Lower Extremity Functional scale:71/80=11.25% impairment    5 x sit to stand: 32.4 sec (60-80% impairment)  G code modifier current:CL  G code modifier goal:CK    Other:   Ascend/descend 6 stairs:8.6 sec  Single leg stance: R:10 sec  L:7 sec   Treatment: Discussed goals w pt in agreement w proposed POC. Pt performed 2 x 10 reps R/L knee flexion and extension on  w 22# wt, stationary bike x 5' w seat at setting 1, Shuttle 56# B, 31# R/L x 15. Encouraged daily scar massage.    Assessment       Initial Assessment (Pertinent finding, problem list and factors affecting outcome): Pt presents 5 months post L TKA. She had complications w incision site healing delaying recovery. She is now ambulation without AD and incision is well healed. Pt with limited L knee flexion. She would benefit from skilled PT to assist pt w return to full ROM and resume functional, pain free mobility  Rehab Potiential: good    Short Term Goals (2 Weeks):   1. Instruct in HEP  2. Pt with active R knee flexion 110* L when prone  3. Pt performs 10 deep squats with good control/no HHA  4. Improve L knee patellar mobility  Long Term Goals (4 Weeks):   1.  Pt I w HEP  2. Pt with active L knee flexion 120* prone  3. 5 x sit to   less than 20 sec  4. SLS 20 sec R and L    Plan     Certification Period: 7/25/17 to 9/1/17  Recommended Treatment Plan: 2 times per week for 4 weeks: Electrical Stimulation prn, Gait Training, Group Therapy, Manual Therapy, Moist Heat/ Ice, Neuromuscular Re-ed, Patient Education, Therapeutic Activites, Therapeutic Exercise and Ultrasound/Phonophoresis  Other Recommendations: kinesiotaping      Therapist: Lyssa Dumont, PT    I CERTIFY THE NEED FOR THESE SERVICES FURNISHED UNDER THIS PLAN OF TREATMENT AND WHILE UNDER MY CARE    Physician's comments: ________________________________________________________________________________________________________________________________________________      Physician's Name: ___________________________________

## 2017-08-01 ENCOUNTER — CLINICAL SUPPORT (OUTPATIENT)
Dept: REHABILITATION | Facility: HOSPITAL | Age: 67
End: 2017-08-01
Attending: ORTHOPAEDIC SURGERY
Payer: MEDICARE

## 2017-08-01 DIAGNOSIS — M17.11 PRIMARY OSTEOARTHRITIS OF RIGHT KNEE: ICD-10-CM

## 2017-08-01 DIAGNOSIS — M23.92 INTERNAL DERANGEMENT OF LEFT KNEE: ICD-10-CM

## 2017-08-01 DIAGNOSIS — M21.161 GENU VARUM OF RIGHT LOWER EXTREMITY: ICD-10-CM

## 2017-08-01 PROCEDURE — 97110 THERAPEUTIC EXERCISES: CPT | Mod: PN

## 2017-08-01 NOTE — PROGRESS NOTES
"Name: Ella Santana [facility] Number: [id number]  Date of Treatment: 08/01/2017   Diagnosis:   Encounter Diagnoses   Name Primary?    Primary osteoarthritis of right knee     Internal derangement of left knee     Genu varum of right lower extremity        Time in: 0901  Time Out: 1000  Total Treatment Time: 59        Subjective:    Ella reports no pain just stiffness..  Patient reports their pain to be 0/10 on a 0-10 scale with 0 being no pain and 10 being the worst pain imaginable.    Objective    Patient received individual therapy to increase strength, endurance, ROM and flexibility with 0 patients with activities as follows:     Ella received therapeutic exercises to develop strength, endurance, ROM and flexibility for 59 minutes including:       Bike x 10 min L-1  DF-100 22# flex/ext x 30 reps  Shuttle 56# B LE x 30 reps, 31# L LE x 30 reps  gastroc stretch 3 x 30 sec  Hamstring stretch 3 x 30 sec B LE  HR/TR airex x 30 reps B LE  Mini squats x 30 reps airex  SLS 3 x 30 sec B LE  Step ups 6" step x 30 reps B LE  Quad sets x 30 reps B LE  SLR 2# x 30 reps B LE  S/L abd 2# x 30 reps B LE  S/L add 2# x 30 reps B LE  Prone quad stretch 3 x 30 sec B LE with strap    Pt demo good understanding of the education provided. Ella demonstrated good return demonstration of activities.     Assessment:     No pain reported with exercises today.  Pt will continue to benefit from skilled PT intervention. Medical Necessity is demonstrated by:  Requires skilled supervision to complete and progress HEP and Weakness.    Patient is making good progress towards established goals.          Plan:  Continue with established Plan of Care towards PT goals.   "

## 2017-08-02 ENCOUNTER — DOCUMENTATION ONLY (OUTPATIENT)
Dept: FAMILY MEDICINE | Facility: CLINIC | Age: 67
End: 2017-08-02

## 2017-08-02 NOTE — PROGRESS NOTES
Pre-Visit Chart Review  For Appointment Scheduled on 8-3-17    Health Maintenance Due   Topic Date Due    Colonoscopy  04/03/2017    Influenza Vaccine  08/01/2017

## 2017-08-03 ENCOUNTER — CLINICAL SUPPORT (OUTPATIENT)
Dept: REHABILITATION | Facility: HOSPITAL | Age: 67
End: 2017-08-03
Attending: ORTHOPAEDIC SURGERY
Payer: MEDICARE

## 2017-08-03 ENCOUNTER — OFFICE VISIT (OUTPATIENT)
Dept: FAMILY MEDICINE | Facility: CLINIC | Age: 67
End: 2017-08-03
Payer: MEDICARE

## 2017-08-03 VITALS
HEART RATE: 73 BPM | HEIGHT: 60 IN | SYSTOLIC BLOOD PRESSURE: 115 MMHG | TEMPERATURE: 98 F | WEIGHT: 146.81 LBS | BODY MASS INDEX: 28.82 KG/M2 | DIASTOLIC BLOOD PRESSURE: 75 MMHG

## 2017-08-03 DIAGNOSIS — M23.92 INTERNAL DERANGEMENT OF LEFT KNEE: ICD-10-CM

## 2017-08-03 DIAGNOSIS — I10 ESSENTIAL HYPERTENSION: ICD-10-CM

## 2017-08-03 DIAGNOSIS — E78.5 HYPERLIPIDEMIA, UNSPECIFIED HYPERLIPIDEMIA TYPE: Primary | ICD-10-CM

## 2017-08-03 DIAGNOSIS — M17.11 PRIMARY OSTEOARTHRITIS OF RIGHT KNEE: ICD-10-CM

## 2017-08-03 DIAGNOSIS — M21.161 GENU VARUM OF RIGHT LOWER EXTREMITY: ICD-10-CM

## 2017-08-03 DIAGNOSIS — Z96.652 STATUS POST TOTAL LEFT KNEE REPLACEMENT: ICD-10-CM

## 2017-08-03 PROCEDURE — 99214 OFFICE O/P EST MOD 30 MIN: CPT | Mod: S$PBB,,, | Performed by: FAMILY MEDICINE

## 2017-08-03 PROCEDURE — 97110 THERAPEUTIC EXERCISES: CPT | Mod: PN

## 2017-08-03 PROCEDURE — 1126F AMNT PAIN NOTED NONE PRSNT: CPT | Mod: ,,, | Performed by: FAMILY MEDICINE

## 2017-08-03 PROCEDURE — 99999 PR PBB SHADOW E&M-EST. PATIENT-LVL III: CPT | Mod: PBBFAC,,, | Performed by: FAMILY MEDICINE

## 2017-08-03 PROCEDURE — 3008F BODY MASS INDEX DOCD: CPT | Mod: ,,, | Performed by: FAMILY MEDICINE

## 2017-08-03 PROCEDURE — 99213 OFFICE O/P EST LOW 20 MIN: CPT | Mod: PBBFAC,PO | Performed by: FAMILY MEDICINE

## 2017-08-03 PROCEDURE — 1159F MED LIST DOCD IN RCRD: CPT | Mod: ,,, | Performed by: FAMILY MEDICINE

## 2017-08-03 NOTE — PROGRESS NOTES
Subjective:       Patient ID: Ella Canales is a 66 y.o. female.    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 Mulligan for research study on time release steroid in right knee.  Had injection 7/5/17 and knee is feeling great.  Able to walk stairs again.  Left knee is healing well after left tkr complication by septic arthritis.        HPI  Review of Systems   Constitutional: Negative for fatigue and unexpected weight change.   Respiratory: Negative for chest tightness and shortness of breath.    Cardiovascular: Negative for chest pain, palpitations and leg swelling.   Gastrointestinal: Negative for abdominal pain.   Musculoskeletal: Positive for arthralgias.   Neurological: Negative for dizziness, syncope, light-headedness and headaches.       Objective:      Physical Exam   Constitutional: She is oriented to person, place, and time. She appears well-developed and well-nourished.   Cardiovascular: Normal rate, regular rhythm and normal heart sounds.    Pulmonary/Chest: Effort normal and breath sounds normal.   Musculoskeletal: She exhibits no edema.   Neurological: She is alert and oriented to person, place, and time.   Skin: Skin is warm and dry.   Psychiatric: She has a normal mood and affect.   Nursing note and vitals reviewed.      Assessment:       1. Hyperlipidemia, unspecified hyperlipidemia type    2. Essential hypertension    3. Primary osteoarthritis of right knee    4. 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 Hyperlipidemia, unspecified  hyperlipidemia type  Stable condition.  Continue current medications.  Will adjust based on lab findings or if condition changes.    - CBC auto differential; Future  - Comprehensive metabolic panel; Future  - Lipid panel; Future    2. Essential hypertension  Controlled on current medications.  Continue current medications.      3. Primary osteoarthritis of right knee  Cont sports med care and doing well on research injectable    4. 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  Regular aerobic exercise.  Goals: Hypertension: Reduce Blood Pressure  Goal/Outcomes met:Hypertension  The following self management tools provided:none  Patient Instructions (the written plan) was given to the patient/family: Yes  Time spent with patient: 20 minutes    Patient with be reevaluated in 6 months or sooner prn    Greater than 50% of this visit was spent counseling as described in above documentation:Yes

## 2017-08-03 NOTE — PATIENT INSTRUCTIONS
Controlling High Blood Pressure  High blood pressure (hypertension) is often called the silent killer. This is because many people who have it dont know it. High blood pressure is defined as 140/90 mm Hg or higher. Know your blood pressure and remember to check it regularly. Doing so can save your life. Here are some things you can do to help control your blood pressure.    Choose heart-healthy foods  · Select low-salt, low-fat foods. Limit sodium intake to 2,400 mg per day or the amount suggested by your healthcare provider.  · Limit canned, dried, cured, packaged, and fast foods. These can contain a lot of salt.  · Eat 8 to 10 servings of fruits and vegetables every day.  · Choose lean meats, fish, or chicken.  · Eat whole-grain pasta, brown rice, and beans.  · Eat 2 to 3 servings of low-fat or fat-free dairy products.  · Ask your doctor about the DASH eating plan. This plan helps reduce blood pressure.  · When you go to a restaurant, ask that your meal be prepared with no added salt.  Maintain a healthy weight  · Ask your healthcare provider how many calories to eat a day. Then stick to that number.  · Ask your healthcare provider what weight range is healthiest for you. If you are overweight, a weight loss of only 3% to 5% of your body weight can help lower blood pressure. Generally, a good weight loss goal is to lose 10% of your body weight in a year.  · Limit snacks and sweets.  · Get regular exercise.  Get up and get active  · Choose activities you enjoy. Find ones you can do with friends or family. This includes bicycling, dancing, walking, and jogging.  · Park farther away from building entrances.  · Use stairs instead of the elevator.  · When you can, walk or bike instead of driving.  · Union leaves, garden, or do household repairs.  · Be active at a moderate to vigorous level of physical activity for at least 40 minutes for a minimum of 3 to 4 days a week.   Manage stress  · Make time to relax and enjoy  life. Find time to laugh.  · Communicate your concerns with your loved ones and your healthcare provider.  · Visit with family and friends, and keep up with hobbies.  Limit alcohol and quit smoking  · Men should have no more than 2 drinks per day.  · Women should have no more than 1 drink per day.  · Talk with your healthcare provider about quitting smoking. Smoking significantly increases your risk for heart disease and stroke. Ask your healthcare provider about community smoking cessation programs and other options.  Medicines  If lifestyle changes arent enough, your healthcare provider may prescribe high blood pressure medicine. Take all medicines as prescribed. If you have any questions about your medicines, ask your healthcare provider before stopping or changing them.   Date Last Reviewed: 4/27/2016  © 4491-1825 [employer]. [address] [place] [address], [place] [address]. All rights reserved. This information is not intended as a substitute for professional medical care. Always follow your healthcare professional's instructions.

## 2017-08-03 NOTE — PROGRESS NOTES
Name: Ella Santana [facility] Number: [id number]  Date of Treatment: 08/03/2017   Diagnosis:   Encounter Diagnoses   Name Primary?    Primary osteoarthritis of right knee     Internal derangement of left knee     Genu varum of right lower extremity        Time in: 0802  Time Out: 0856  Total Treatment Time: 54        Subjective:    Ella reports no pain in her knee today but it does feel stiff.  Patient reports their pain to be 0/10 on a 0-10 scale with 0 being no pain and 10 being the worst pain imaginable.    Objective    Patient received individual therapy to increase strength, endurance, ROM and flexibility with 0 patients with activities as follows:     Ella received therapeutic exercises to develop strength, endurance, ROM and flexibility for 54 minutes including:     Bike x 10 min  gastroc stretch 3 x 30 sec  Soleus stretch 3 x 30 sec  HR/TR airex x 30 reps  Mini squats airex x 30 reps  SLS airex x 30 reps  Shuttle 56# x 30 reps B LE; 31# L LE x 30 reps  DF-100 22# flex/ext x 30 reps  Quad stretch prone with strap B LE 3 x 30 sec  SLR 2# x 30 reps B LE  S/L abd 2# x 30 reps B LE  Hamstring stretch 3 x 30 sec B LE      Pt demo good understanding of the education provided. Ella demonstrated good return demonstration of activities.     Assessment:   No increased pain with exercises today.    Pt will continue to benefit from skilled PT intervention. Medical Necessity is demonstrated by:  Requires skilled supervision to complete and progress HEP and Weakness.    Patient is making good progress towards established goals.          Plan:  Continue with established Plan of Care towards PT goals.

## 2017-08-07 ENCOUNTER — RESEARCH ENCOUNTER (OUTPATIENT)
Dept: RESEARCH | Facility: HOSPITAL | Age: 67
End: 2017-08-07

## 2017-08-07 NOTE — PROGRESS NOTES
Research Study: Flexion OA Study   PI: Angie Mulligan MD  IRB: 2016.425.C  Visit: 4 week Follow-up     Ms. Canales came in today to complete her 4 week follow-up visit. I met with Ms. Canales in the conference room at the [facility]. I thanked Ms. Canales for her participation and reminded Ms. Canales that her involvement in the Flexion Study is voluntary.  verbalized understanding and expressed interest in continued participation.      completed all required study questionnaires per protocol.    Ms. Canales states that she has been doing well since her last visit and has not experienced changes in health or adverse events.     Ms. Canales has had no changes to her medications and no new medications since her last visit.      has been compliant with her Fitbit. she continues to wear and sync her activity information.     I again thanked  for her participation and encouraged her to contact me with any questions.  verbalized understanding.

## 2017-08-08 ENCOUNTER — CLINICAL SUPPORT (OUTPATIENT)
Dept: REHABILITATION | Facility: HOSPITAL | Age: 67
End: 2017-08-08
Attending: ORTHOPAEDIC SURGERY
Payer: MEDICARE

## 2017-08-08 DIAGNOSIS — M17.11 PRIMARY OSTEOARTHRITIS OF RIGHT KNEE: ICD-10-CM

## 2017-08-08 DIAGNOSIS — M23.92 INTERNAL DERANGEMENT OF LEFT KNEE: ICD-10-CM

## 2017-08-08 DIAGNOSIS — M21.161 GENU VARUM OF RIGHT LOWER EXTREMITY: ICD-10-CM

## 2017-08-08 PROCEDURE — 97110 THERAPEUTIC EXERCISES: CPT | Mod: PN

## 2017-08-08 PROCEDURE — 97140 MANUAL THERAPY 1/> REGIONS: CPT | Mod: PN

## 2017-08-08 NOTE — PROGRESS NOTES
"Name: Ella Santana [facility] Number: [id number]  Date of Treatment: 08/08/2017   Diagnosis:   Encounter Diagnoses   Name Primary?    Primary osteoarthritis of right knee     Internal derangement of left knee     Genu varum of right lower extremity        Time in: 0806  Time Out: 0900  Total Treatment Time: 54        Subjective:    Ella reports no pain today "just stiffness".  Patient reports their pain to be 0/10 on a 0-10 scale with 0 being no pain and 10 being the worst pain imaginable.    Objective    Patient received individual therapy to increase strength, endurance, ROM and flexibility with 0 patients with activities as follows:     Ella received therapeutic exercises to develop strength, endurance, ROM and flexibility for 44 minutes including:       Bike x 10 min L-1  gastroc stretch 3 x 30 sec  Soleus stretch 3 x 30 sec  HR/TR x 30 reps airex  Mini squats x 30 reps  SLS airex x 30 reps  DF-100 22# x 30 reps flex/ext  Shuttle 56# B LE x 30 reps, 31# R/L x 30 reps  SLR 2# x 30 reps  S/L abd 2# x 30 reps  S/L add 2# x 30 reps      Ella received the following manual therapy techniques: Soft tissue Mobilization were applied to the: L knee (anterior/posterior)  for 10 minutes.     Pt demo good understanding of the education provided. Ella demonstrated good return demonstration of activities.     Assessment:   Decreased stiffness in L knee following manual PT    Pt will continue to benefit from skilled PT intervention. Medical Necessity is demonstrated by:  Requires skilled supervision to complete and progress HEP and Weakness.    Patient is making good progress towards established goals.          Plan:  Continue with established Plan of Care towards PT goals.   "

## 2017-08-10 ENCOUNTER — CLINICAL SUPPORT (OUTPATIENT)
Dept: REHABILITATION | Facility: HOSPITAL | Age: 67
End: 2017-08-10
Attending: ORTHOPAEDIC SURGERY
Payer: MEDICARE

## 2017-08-10 DIAGNOSIS — M23.92 INTERNAL DERANGEMENT OF LEFT KNEE: ICD-10-CM

## 2017-08-10 DIAGNOSIS — M17.11 PRIMARY OSTEOARTHRITIS OF RIGHT KNEE: ICD-10-CM

## 2017-08-10 DIAGNOSIS — M21.161 GENU VARUM OF RIGHT LOWER EXTREMITY: ICD-10-CM

## 2017-08-10 PROCEDURE — 97110 THERAPEUTIC EXERCISES: CPT | Mod: PN

## 2017-08-10 NOTE — PROGRESS NOTES
Name: Ella Santana [facility] Number: [id number]  Date of Treatment: 08/10/2017   Diagnosis:   Encounter Diagnoses   Name Primary?    Primary osteoarthritis of right knee     Internal derangement of left knee     Genu varum of right lower extremity        Time in: 905  Time Out: 1000  Total Treatment Time: 55  Group Time: 0      Subjective:    Ella reports being very busy w family matters having little time for exercises and stretching.  Patient reports no  Pain only stiffness. She is very happy w decreased pain R knee since     Objective    Patient received individual therapy to increase strength, endurance, ROM, flexibility and balance and gait with 0 patients with activities as follows:     Ella received therapeutic exercises to develop strength, endurance, ROM, flexibility and balance and gait for 55 minutes including:     Bike x 10 min  gastroc stretch 3 x 30 sec R/L  HR/TR airex x 30 reps  SLS airex x 30 reps  Wall slides 3 x 10 reps  Shuttle   B 56#  3 x 1'   L/R 31# 3 x 1'  DF-100:   22# flex/ext x 30 reps  Knee flexion/quad stretch prone w strap 3 x 1'    active L flexion 95*   Passive L flexion 110*  Declined CP    Cont w HEP, adding wall slides  Pt demo good understanding of the education provided. Ella demonstrated good return demonstration of activities.     Assessment:     Pt w improving knee flexion ROM.  Pt will continue to benefit from skilled PT intervention. Medical Necessity is demonstrated by:  Requires skilled supervision to complete and progress HEP and Weakness.    Patient is making good progress towards established goals.    New/Revised goals: n/a      Plan:  Continue with established Plan of Care towards PT goals.

## 2017-08-15 ENCOUNTER — CLINICAL SUPPORT (OUTPATIENT)
Dept: REHABILITATION | Facility: HOSPITAL | Age: 67
End: 2017-08-15
Attending: ORTHOPAEDIC SURGERY
Payer: MEDICARE

## 2017-08-15 DIAGNOSIS — M17.11 PRIMARY OSTEOARTHRITIS OF RIGHT KNEE: ICD-10-CM

## 2017-08-15 DIAGNOSIS — M23.92 INTERNAL DERANGEMENT OF LEFT KNEE: ICD-10-CM

## 2017-08-15 DIAGNOSIS — M21.161 GENU VARUM OF RIGHT LOWER EXTREMITY: ICD-10-CM

## 2017-08-15 PROCEDURE — 97110 THERAPEUTIC EXERCISES: CPT | Mod: PN

## 2017-08-15 NOTE — PROGRESS NOTES
"Name: Ella Santana [facility] Number: [id number]  Date of Treatment: 08/15/2017   Diagnosis:   Encounter Diagnoses   Name Primary?    Primary osteoarthritis of right knee     Internal derangement of left knee     Genu varum of right lower extremity        Time in: 0801  Time Out: 0856  Total Treatment Time: 26  NC ([id number])      Subjective:    Ella reports "It's feeling good, just stiff."  Patient reports their pain to be 0/10 on a 0-10 scale with 0 being no pain and 10 being the worst pain imaginable.    Objective  Ella received therapeutic exercises to develop strength, endurance, ROM and flexibility for 55 minutes including:    Bike x 10 min   Gastroc stretch 3 x 30 sec B   Soleus stretch 3 x 30 sec B     HR/TR airex x 30 reps   Wall slides 3 x 10 reps   Shuttle     B 56#  3 x 10    L/R 31# 3 x 10   DF-100:    22# flex/ext x 30 reps   SLR 2# 3/10 R/L   S/L hip abd 2# 3/10 R/L   S/L hip add 2# 3/10 R/L   Prone hip ext 2# 3/10 R/L   Prone hamstring curl 2# 3/10 R/L   LAQ 2# 3/10 L   Knee flexion/quad stretch prone w strap 3 x 1'                          Written Home Exercises Provided: Cont with HEP  Pt demo good understanding of the education provided. Ella demonstrated good return demonstration of activities.     Assessment:   Patient remains with decreased flexion in L LE. Strength continues to improve in L quad.    Pt will continue to benefit from skilled PT intervention. Medical Necessity is demonstrated by:  Continued inability to participate in vocational pursuits, Unable to participate fully in daily activities, Requires skilled supervision to complete and progress HEP and Weakness.    Patient is making good progress towards established goals.    Plan:  Continue with established Plan of Care towards PT goals.   "

## 2017-08-17 ENCOUNTER — CLINICAL SUPPORT (OUTPATIENT)
Dept: REHABILITATION | Facility: HOSPITAL | Age: 67
End: 2017-08-17
Attending: ORTHOPAEDIC SURGERY
Payer: MEDICARE

## 2017-08-17 DIAGNOSIS — M21.161 GENU VARUM OF RIGHT LOWER EXTREMITY: ICD-10-CM

## 2017-08-17 DIAGNOSIS — M23.92 INTERNAL DERANGEMENT OF LEFT KNEE: ICD-10-CM

## 2017-08-17 DIAGNOSIS — M17.11 PRIMARY OSTEOARTHRITIS OF RIGHT KNEE: ICD-10-CM

## 2017-08-17 PROCEDURE — 97110 THERAPEUTIC EXERCISES: CPT | Mod: PN

## 2017-08-17 NOTE — PROGRESS NOTES
"Name: Ella Santana [facility] Number: [id number]  Date of Treatment: 08/17/2017   Diagnosis:   Encounter Diagnoses   Name Primary?    Primary osteoarthritis of right knee     Internal derangement of left knee     Genu varum of right lower extremity        Time in: 0755  Time Out: 0850  Total Treatment Time: 55  Group Time: 0      Subjective:    Ella reports fatigued today from lack of sleep.  Patient reports their pain to be 0/10 on a 0-10 scale with 0 being no pain and 10 being the worst pain imaginable.    Objective    Ella received therapeutic exercises to develop strength, endurance, flexibility, core stabilization and balance for 55 minutes including:     Bike Lv1 10'  Gastroc stretch 3x30" 1/2 roll B  Soleus stretch 3x30" 1/2 roll B  Knee flexion stretch 3x30" in sit L  HR/TR Airex x30  SLS Airex 3x30" R/L  Wall slides 3 x 10 reps  Shuttle x30: 56# B, 31# R/L   DF-100 x30: 22# B flex/ext  Hip 4-way 2# R/L 3x10  Prone HS curls 2# R/L    Pt demo good understanding of the education provided. Ella demonstrated good return demonstration of activities.     Assessment:     Pt will continue to benefit from skilled PT intervention. Medical Necessity is demonstrated by:  Unable to participate fully in daily activities, Requires skilled supervision to complete and progress HEP and Weakness.    Patient is making good progress towards established goals.    Plan:    Continue with established Plan of Care towards PT goals.   "

## 2017-09-06 ENCOUNTER — RESEARCH ENCOUNTER (OUTPATIENT)
Dept: RESEARCH | Facility: HOSPITAL | Age: 67
End: 2017-09-06

## 2017-09-12 NOTE — PROGRESS NOTES
Research Study: Flexion OA Study   PI: Angie Mulligan MD  IRB: 2016.425.C  Visit: 8 week Follow-up      Ms. Canales came in today to complete her 8 week follow-up visit. I met with Ms. Canales in the conference room at the [facility]. I thanked Ms. Canales for her participation and reminded Ms. Canales that her involvement in the Flexion Study is voluntary.  verbalized understanding and expressed interest in continued participation.       completed all required study questionnaires per protocol.     Ms. Canales states that she has been doing well since her last visit and has not experienced changes in health or adverse events.      Ms. Canales has had no changes to her medications and no new medications since her last visit.       has been compliant with her Fitbit. she continues to wear and sync her activity information.      I again thanked  for her participation and encouraged her to contact me with any questions.  verbalized understanding.

## 2017-10-03 DIAGNOSIS — K21.9 GASTROESOPHAGEAL REFLUX DISEASE WITHOUT ESOPHAGITIS: ICD-10-CM

## 2017-10-03 DIAGNOSIS — E78.5 HYPERLIPIDEMIA, UNSPECIFIED HYPERLIPIDEMIA TYPE: ICD-10-CM

## 2017-10-03 DIAGNOSIS — I10 ESSENTIAL HYPERTENSION: ICD-10-CM

## 2017-10-04 RX ORDER — SIMVASTATIN 40 MG/1
TABLET, FILM COATED ORAL
Qty: 90 TABLET | Refills: 3 | Status: SHIPPED | OUTPATIENT
Start: 2017-10-04 | End: 2018-06-04 | Stop reason: SDUPTHER

## 2017-10-04 RX ORDER — RALOXIFENE HYDROCHLORIDE 60 MG/1
TABLET, FILM COATED ORAL
Qty: 90 TABLET | Refills: 3 | Status: SHIPPED | OUTPATIENT
Start: 2017-10-04 | End: 2018-06-04 | Stop reason: SDUPTHER

## 2017-10-04 RX ORDER — OMEPRAZOLE 20 MG/1
CAPSULE, DELAYED RELEASE ORAL
Qty: 180 CAPSULE | Refills: 3 | Status: SHIPPED | OUTPATIENT
Start: 2017-10-04 | End: 2018-06-04 | Stop reason: SDUPTHER

## 2017-10-04 RX ORDER — METOPROLOL SUCCINATE 50 MG/1
TABLET, EXTENDED RELEASE ORAL
Qty: 90 TABLET | Refills: 3 | Status: SHIPPED | OUTPATIENT
Start: 2017-10-04 | End: 2018-06-04 | Stop reason: SDUPTHER

## 2017-10-11 ENCOUNTER — OFFICE VISIT (OUTPATIENT)
Dept: SPORTS MEDICINE | Facility: CLINIC | Age: 67
End: 2017-10-11
Payer: MEDICARE

## 2017-10-11 ENCOUNTER — RESEARCH ENCOUNTER (OUTPATIENT)
Dept: RESEARCH | Facility: HOSPITAL | Age: 67
End: 2017-10-11

## 2017-10-11 ENCOUNTER — HOSPITAL ENCOUNTER (OUTPATIENT)
Dept: RADIOLOGY | Facility: HOSPITAL | Age: 67
Discharge: HOME | End: 2017-10-11
Attending: ORTHOPAEDIC SURGERY
Payer: MEDICARE

## 2017-10-11 VITALS
HEART RATE: 65 BPM | DIASTOLIC BLOOD PRESSURE: 81 MMHG | BODY MASS INDEX: 28.66 KG/M2 | WEIGHT: 146 LBS | HEIGHT: 60 IN | SYSTOLIC BLOOD PRESSURE: 141 MMHG

## 2017-10-11 DIAGNOSIS — M25.561 RIGHT KNEE PAIN, UNSPECIFIED CHRONICITY: ICD-10-CM

## 2017-10-11 DIAGNOSIS — M25.561 RIGHT KNEE PAIN, UNSPECIFIED CHRONICITY: Primary | ICD-10-CM

## 2017-10-11 DIAGNOSIS — M23.92 INTERNAL DERANGEMENT OF LEFT KNEE: ICD-10-CM

## 2017-10-11 DIAGNOSIS — Z96.652 STATUS POST TOTAL LEFT KNEE REPLACEMENT: ICD-10-CM

## 2017-10-11 DIAGNOSIS — M17.11 PRIMARY OSTEOARTHRITIS OF RIGHT KNEE: ICD-10-CM

## 2017-10-11 PROCEDURE — 73564 X-RAY EXAM KNEE 4 OR MORE: CPT | Mod: 26,50,, | Performed by: RADIOLOGY

## 2017-10-11 PROCEDURE — 99214 OFFICE O/P EST MOD 30 MIN: CPT | Mod: PBBFAC,25,PO | Performed by: ORTHOPAEDIC SURGERY

## 2017-10-11 PROCEDURE — 99214 OFFICE O/P EST MOD 30 MIN: CPT | Mod: S$PBB,,, | Performed by: ORTHOPAEDIC SURGERY

## 2017-10-11 PROCEDURE — 73564 X-RAY EXAM KNEE 4 OR MORE: CPT | Mod: TC,50,PO

## 2017-10-11 PROCEDURE — 99999 PR PBB SHADOW E&M-EST. PATIENT-LVL IV: CPT | Mod: PBBFAC,,, | Performed by: ORTHOPAEDIC SURGERY

## 2017-10-11 NOTE — PROGRESS NOTES
Research Study: Flexion OA Study   PI: Angie Mulligan MD  IRB: 2016.425.C  Visit: 12 week Follow-up     Ms.Marie Esther Canales came in today to complete her 12  week follow-up visit. I met with Ms.Marie Esther Canales in the conference room at the [facility]. I thanked Ms.Marie Esther Canales for her participation and reminded that her involvement in the Flexion Study is voluntary. Ms.Marie Esther Canales verbalized understanding and expressed interest in continued participation.     Ms.Marie Esther Canales completed all required study questionnaires per protocol.    Ms.Marie Esther Canales states that she has been doing well since her last visit and has not experienced changes in health or adverse events.     Ms.Marie Esther Canales has had no changes to her medications and no new medications since her last visit.     Ms.Marie Esther Canaleshas retured her Fitbit today.    An Evaluation for repeat administration was done on Ms.Marie Esther Canales and it was determined that a repeat administration was not needed at this visit.     I again thanked Ms.Marie Esther Canales for her participation and encouraged that she  contact me with any questions. Ms.Marie Esther Canales verbalized understanding.

## 2017-10-11 NOTE — PROGRESS NOTES
Subjective:          Chief Complaint: Ella Canales is a 67 y.o. female who had concerns including Follow-up of the Right Knee.    Patient is here for a follow up for her right knee for her flexion injection 7/5/17. She reports no pain in right knee but does have some discomfort when more active.    Her left knee is doing well. She is using her stationary bike 45 minutes 3 times a week. She finished PT on the [facility] last week.       DATE OF PROCEDURE: 03/09/2017     ATTENDING SURGEON: Angie Mulligan M.D.     FIRST ASSISTANT: Paolo Abdi M.D. - Fellow.     SECOND ASSISTANT: SMA Prince - Assistant.     PREOPERATIVE DIAGNOSIS: Left knee sepsis.     POSTOPERATIVE DIAGNOSES:  1. Left knee hemarthrosis.  2. Left knee sepsis.  3. Left knee synovitis.  4. Left knee medial retinacular tear.  5. Left knee medial collateral ligament insufficiency.     PROCEDURES PERFORMED:  1. Left knee complex polyethylene liner exchange.  2. Left knee complex incision and drainage.  3. Left knee medial collateral ligament repair.  4. Left knee medial retinacular repair with application of a flexed HD graft.  5. Left knee complex open synovectomy with hemostasis control.  6. Wound VAC application.      DATE OF PROCEDURE: 02/24/2017     PREOPERATIVE DIAGNOSIS: Possible septic arthritis, left total knee replacement.     POSTOPERATIVE DIAGNOSIS: Possible septic arthritis, left total knee   replacement.      PROCEDURE: Arthrotomy, left knee, with irrigation and debridement (CPT #45535).     SURGEON: Patel Denise M.D.     ASSISTANTS: Andrey Ya M.D. (RES); Anant Issa M.D. (RES)    DATE OF PROCEDURE: 2/14/2017     ATTENDING SURGEON: Surgeon(s) and Role:  * Angie Mulligan MD - Primary      FIRST ASSISTANT:Paolo Abdi MD - Fellow  SECOND ASSISTANT: Ronel Urban PA-C - Assistant  THIRD ASSISTANT: SMA Prince - Assistant     PREOPERATIVE DIAGNOSIS: Left  Arthritis, Traumatic M12.50, DJD knee M17.9 and  Genu Varum (acquired) M21.169     POSTOPERATIVE DIAGNOSIS:  Left  Arthritis, Traumatic M12.50, DJD knee M17.9 and Genu Varum (acquired) M21.169     PROCEDURES PERFORMED:  Left  Replacement, Knee, Medial and Lateral compartment (Total Knee) 10778        Pain   Associated symptoms include joint swelling. Pertinent negatives include no abdominal pain, chest pain, chills, congestion, coughing, fever, headaches, myalgias, nausea, numbness, rash, sore throat or vomiting.         Review of Systems   Constitution: Negative. Negative for chills, fever, night sweats, weight gain and weight loss.   HENT: Negative for congestion, hearing loss and sore throat.    Eyes: Negative for blurred vision, double vision and visual disturbance.   Cardiovascular: Negative for chest pain, leg swelling and palpitations.   Respiratory: Negative for cough and shortness of breath.    Endocrine: Negative for polyuria.   Hematologic/Lymphatic: Negative for bleeding problem. Does not bruise/bleed easily.   Skin: Negative for itching, poor wound healing and rash.   Musculoskeletal: Positive for joint pain, joint swelling and stiffness. Negative for back pain, muscle cramps, muscle weakness and myalgias.   Gastrointestinal: Negative for abdominal pain, constipation, diarrhea, melena, nausea and vomiting.   Genitourinary: Negative.  Negative for frequency and hematuria.   Neurological: Negative for dizziness, headaches, loss of balance, numbness, paresthesias and sensory change.   Psychiatric/Behavioral: Negative for altered mental status and depression. The patient is not nervous/anxious.    Allergic/Immunologic: Negative for hives.       Pain Related Questions  Over the past 3 days, what was your average pain during activity? (I.e. running, jogging, walking, climbing stairs, getting dressed, ect.): 4  Over the past 3 days, what was your highest pain level?: 4  Over the past 3 days, what was your lowest pain level? : 0    Other  How many nights a  week are you awakened by your affected body part?: 0  Was the patient's HEIGHT measured or patient reported?: Patient Reported  Was the patient's WEIGHT measured or patient reported?: Measured      Objective:        General: Ella is well-developed, well-nourished, appears stated age, in no acute distress, alert and oriented to time, place and person.     General    Vitals reviewed.  Constitutional: She is oriented to person, place, and time. She appears well-developed and well-nourished. No distress.   HENT:   Mouth/Throat: No oropharyngeal exudate.   Eyes: Right eye exhibits no discharge. Left eye exhibits no discharge.   Neck: Normal range of motion.   Cardiovascular: Normal rate and regular rhythm.    Pulmonary/Chest: Effort normal and breath sounds normal. No respiratory distress.   Neurological: She is alert and oriented to person, place, and time. She has normal reflexes. No cranial nerve deficit. Coordination normal.   Psychiatric: She has a normal mood and affect. Her behavior is normal. Judgment and thought content normal.     General Musculoskeletal Exam   Gait: normal       Right Knee Exam     Inspection   Erythema: absent  Scars: absent  Swelling: absent  Effusion: effusion  Deformity: deformity  Bruising: absent    Tenderness   The patient is experiencing no tenderness.         Crepitus   The patient has crepitus of the patella (mild).    Range of Motion   Extension:  0 abnormal   Flexion: 130     Tests   Meniscus   David:  Medial - negative Lateral - negative  Ligament Examination Lachman: normal (-1 to 2mm) PCL-Posterior Drawer: normal (0 to 2mm)     MCL - Valgus: normal (0 to 2mm)  LCL - Varus: normalPivot Shift: normal (Equal)Reverse Pivot Shift: normal (Equal)Dial Test at 30 degrees: normal (< 5 degrees)Dial Test at 90 degrees: normal (< 5 degrees)  Posterior Sag Test: negative  Posterolateral Corner: unstable (>15 degrees difference)  Patella   Patellar Apprehension: negative  Passive Patellar  Tilt: neutral  Patellar Tracking: normal  Patellar Glide (quadrants): Lateral - 1   Medial - 2  Q-Angle at 90 degrees: normal  Patellar Grind: negative  J-Sign: none    Other   Meniscal Cyst: absent  Popliteal (Baker's) Cyst: absent  Sensation: normal    Comments:  Mild varus noted    Left Knee Exam     Inspection   Erythema: absent  Scars: absent  Swelling: absent  Effusion: absent  Deformity: deformity    Tenderness   The patient is experiencing no tenderness (global tenderness).         Range of Motion   Extension: 0   Flexion: abnormal Left knee flexion: 125.     Tests   Meniscus   David:  Medial - negative Lateral - negative  Stability Lachman: normal (-1 to 2mm) PCL-Posterior Drawer: normal (0 to 2mm)  MCL - Valgus: normal (0 to 2mm)  LCL - Varus: normal (0 to 2mm)Pivot Shift: normal (Equal)Reverse Pivot Shift: normal (Equal)Dial Test at 30 degrees: normal (< 5 degrees)Dial Test at 90 degrees: normal (< 5 degrees)  Posterior Sag Test: negative  Posterolateral Corner: unstable (>15 degrees difference)  Patella   Patellar Apprehension: negative  Passive Patellar Tilt: neutral  Patellar Tracking: normal  Patellar Glide (Quadrants): Lateral - 1 Medial - 2  Q-Angle at 90 degrees: normal  Patellar Grind: negative  J-Sign: J sign absent    Other   Meniscal Cyst: absent  Popliteal (Baker's) Cyst: absent  Sensation: normal    Comments:    Incision well healed without signs/sypmtoms of infection    Right Hip Exam     Tests   Ivon: negative  Left Hip Exam     Tests   Ivon: negative          Muscle Strength   Right Lower Extremity   Hip Abduction: 5/5   Quadriceps:  5/5   Hamstrin/5   Left Lower Extremity   Hip Abduction: 5/5   Quadriceps:  5/5   Hamstrin/5     Reflexes     Left Side  Quadriceps:  2+  Achilles:  2+    Right Side   Quadriceps:  2+  Achilles:  2+    Vascular Exam     Right Pulses  Dorsalis Pedis:      2+  Posterior Tibial:      2+        Left Pulses  Dorsalis Pedis:      2+  Posterior Tibial:       2+        Edema  Right Lower Leg: absent  Left Lower Leg: absent    Radiographs today:  Left ConforMIS TKR in good position with maintanance of alignment and reduction; no signs of loosening noted; Right knee medial DJD with severe medial wear noted and spur formation at the patellofemoral and medial compartments          Assessment:       Encounter Diagnoses   Name Primary?   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 IKDC, SF-12 and KOOS was filled out today in clinic.     RTC in 1 months with Dr. Angie Mulligan Patient will fill out IKDC, SF-12 and KOOS on return.    2. Cont HEP    3. No AEs noted per flexion study; Winsome Alexander available during the visit

## 2017-11-14 ENCOUNTER — RESEARCH ENCOUNTER (OUTPATIENT)
Dept: RESEARCH | Facility: HOSPITAL | Age: 67
End: 2017-11-14

## 2017-11-14 ENCOUNTER — OFFICE VISIT (OUTPATIENT)
Dept: SPORTS MEDICINE | Facility: CLINIC | Age: 67
End: 2017-11-14
Payer: MEDICARE

## 2017-11-14 VITALS
BODY MASS INDEX: 28.65 KG/M2 | WEIGHT: 145.94 LBS | HEIGHT: 60 IN | HEART RATE: 70 BPM | DIASTOLIC BLOOD PRESSURE: 81 MMHG | SYSTOLIC BLOOD PRESSURE: 140 MMHG

## 2017-11-14 DIAGNOSIS — M25.562 CHRONIC PAIN OF LEFT KNEE: ICD-10-CM

## 2017-11-14 DIAGNOSIS — G89.29 CHRONIC PAIN OF LEFT KNEE: ICD-10-CM

## 2017-11-14 DIAGNOSIS — M17.12 DEGENERATIVE ARTHRITIS OF LEFT KNEE: Primary | ICD-10-CM

## 2017-11-14 PROCEDURE — 99214 OFFICE O/P EST MOD 30 MIN: CPT | Mod: S$PBB,,, | Performed by: ORTHOPAEDIC SURGERY

## 2017-11-14 PROCEDURE — 99999 PR PBB SHADOW E&M-EST. PATIENT-LVL III: CPT | Mod: PBBFAC,,, | Performed by: ORTHOPAEDIC SURGERY

## 2017-11-14 PROCEDURE — 99213 OFFICE O/P EST LOW 20 MIN: CPT | Mod: PBBFAC,PO | Performed by: ORTHOPAEDIC SURGERY

## 2017-11-14 NOTE — PROGRESS NOTES
Research Study: Flexion OA Study   PI: Angie Mulligan MD  IRB: 2016.425.C  Visit: 16 week Follow-up / Reconsent      Ms. Canales came in today to complete her 16 week follow-up visit. I met with Ms. Canales in the conference room at the [facility]. I thanked Ms. Canales for her participation and reminded that her involvement in the Flexion Study is voluntary.      I informed Ms. Canales that there had been some study updates and that the consent form had been updated.  I provided Ms. Canales with a copy for the new consent form and walked her through the changes that had been made. The main change to the form consists of an update since the study drug is not FDA approved. Ms. Canales expressed understanding of the changes to the study. We also reviewed all of the remaining study visit activities, overall study objectives, risks/benefits and the reminder of the items on the consent form. Ms. Canales expressed understanding and desire to continue participation. Ms. Canales signed the new document and was given a copy for her records. I encouraged her to contact me with any questions.       Ms. Canales then completed all required study questionnaires required for the 16wk visit per protocol.    Ms. Canales was then seen by Dr. Canada and evaluated for retreatment. It was determined that Ms. Canales did not require treatment as she is still having pain relief. Ms. Canales reports that if she is very active she will sometimes have slight pain, but notes that when not active her knee is pain free. Ms. Canales expressed that she does not feel she needs another injection at this time.  Ms. Canales will be evaluated again next month on [id number] by Dr. Mulligan.     Ms. Canales reported no changes in health or medications since her last visit.     All other study procedures were completed per protocol.     I again thanked Ms. Canales for her participation and encouraged her to contact me with any questions. Ms. Canales verbalized  understanding.

## 2017-11-14 NOTE — PROGRESS NOTES
Subjective:          Chief Complaint: Ella Canales is a 67 y.o. female who had concerns including Pain of the Right Knee.    Patient is here for a follow up for her right knee for her flexion injection 7/5/17. She reports minor medial sided pain.    Her left knee is doing well. She is using her stationary bike 45 minutes 3 times a week. She finished PT on the [facility] last week.       DATE OF PROCEDURE: 03/09/2017     ATTENDING SURGEON: Angie Mulligan M.D.     FIRST ASSISTANT: Paolo Abdi M.D. - Fellow.     SECOND ASSISTANT: SMA Prince - Assistant.     PREOPERATIVE DIAGNOSIS: Left knee sepsis.     POSTOPERATIVE DIAGNOSES:  1. Left knee hemarthrosis.  2. Left knee sepsis.  3. Left knee synovitis.  4. Left knee medial retinacular tear.  5. Left knee medial collateral ligament insufficiency.     PROCEDURES PERFORMED:  1. Left knee complex polyethylene liner exchange.  2. Left knee complex incision and drainage.  3. Left knee medial collateral ligament repair.  4. Left knee medial retinacular repair with application of a flexed HD graft.  5. Left knee complex open synovectomy with hemostasis control.  6. Wound VAC application.      DATE OF PROCEDURE: 02/24/2017     PREOPERATIVE DIAGNOSIS: Possible septic arthritis, left total knee replacement.     POSTOPERATIVE DIAGNOSIS: Possible septic arthritis, left total knee   replacement.      PROCEDURE: Arthrotomy, left knee, with irrigation and debridement (CPT #43997).     SURGEON: Patel Denise M.D.     ASSISTANTS: Andrey Ya M.D. (RES); Anant Issa M.D. (RES)    DATE OF PROCEDURE: 2/14/2017     ATTENDING SURGEON: Surgeon(s) and Role:  * Angie Mulligan MD - Primary      FIRST ASSISTANT:Paolo Abdi MD - Fellow  SECOND ASSISTANT: Ronel Urban PA-C - Assistant  THIRD ASSISTANT: SMA Prince - Assistant     PREOPERATIVE DIAGNOSIS: Left  Arthritis, Traumatic M12.50, DJD knee M17.9 and Genu Varum (acquired) M21.169     POSTOPERATIVE  DIAGNOSIS:  Left  Arthritis, Traumatic M12.50, DJD knee M17.9 and Genu Varum (acquired) M21.169     PROCEDURES PERFORMED:  Left  Replacement, Knee, Medial and Lateral compartment (Total Knee) 69057        Pain   Associated symptoms include joint swelling. Pertinent negatives include no abdominal pain, chest pain, chills, congestion, coughing, fever, headaches, myalgias, nausea, numbness, rash, sore throat or vomiting.         Review of Systems   Constitution: Negative. Negative for chills, fever, night sweats, weight gain and weight loss.   HENT: Negative for congestion, hearing loss and sore throat.    Eyes: Negative for blurred vision, double vision and visual disturbance.   Cardiovascular: Negative for chest pain, leg swelling and palpitations.   Respiratory: Negative for cough and shortness of breath.    Endocrine: Negative for polyuria.   Hematologic/Lymphatic: Negative for bleeding problem. Does not bruise/bleed easily.   Skin: Negative for itching, poor wound healing and rash.   Musculoskeletal: Positive for joint pain, joint swelling and stiffness. Negative for back pain, muscle cramps, muscle weakness and myalgias.   Gastrointestinal: Negative for abdominal pain, constipation, diarrhea, melena, nausea and vomiting.   Genitourinary: Negative.  Negative for frequency and hematuria.   Neurological: Negative for dizziness, headaches, loss of balance, numbness, paresthesias and sensory change.   Psychiatric/Behavioral: Negative for altered mental status and depression. The patient is not nervous/anxious.    Allergic/Immunologic: Negative for hives.       Pain Related Questions  Over the past 3 days, what was your average pain during activity? (I.e. running, jogging, walking, climbing stairs, getting dressed, ect.): 2  Over the past 3 days, what was your highest pain level?: 2  Over the past 3 days, what was your lowest pain level? : 0    Other  How many nights a week are you awakened by your affected body part?:  0  Was the patient's HEIGHT measured or patient reported?: Patient Reported  Was the patient's WEIGHT measured or patient reported?: Patient Reported      Objective:        General: Ella is well-developed, well-nourished, appears stated age, in no acute distress, alert and oriented to time, place and person.     General    Vitals reviewed.  Constitutional: She is oriented to person, place, and time. She appears well-developed and well-nourished. No distress.   HENT:   Mouth/Throat: No oropharyngeal exudate.   Eyes: Right eye exhibits no discharge. Left eye exhibits no discharge.   Neck: Normal range of motion.   Cardiovascular: Normal rate and regular rhythm.    Pulmonary/Chest: Effort normal and breath sounds normal. No respiratory distress.   Neurological: She is alert and oriented to person, place, and time. She has normal reflexes. No cranial nerve deficit. Coordination normal.   Psychiatric: She has a normal mood and affect. Her behavior is normal. Judgment and thought content normal.     General Musculoskeletal Exam   Gait: normal       Right Knee Exam     Inspection   Erythema: absent  Scars: absent  Swelling: absent  Effusion: effusion  Deformity: deformity  Bruising: absent    Tenderness   The patient is experiencing no tenderness.         Crepitus   The patient has crepitus of the patella (mild).    Range of Motion   Extension:  0 abnormal   Flexion: 130     Tests   Meniscus   David:  Medial - negative Lateral - negative  Ligament Examination Lachman: normal (-1 to 2mm) PCL-Posterior Drawer: normal (0 to 2mm)     MCL - Valgus: normal (0 to 2mm)  LCL - Varus: normalPivot Shift: normal (Equal)Reverse Pivot Shift: normal (Equal)Dial Test at 30 degrees: normal (< 5 degrees)Dial Test at 90 degrees: normal (< 5 degrees)  Posterior Sag Test: negative  Posterolateral Corner: unstable (>15 degrees difference)  Patella   Patellar Apprehension: negative  Passive Patellar Tilt: neutral  Patellar Tracking:  normal  Patellar Glide (quadrants): Lateral - 1   Medial - 2  Q-Angle at 90 degrees: normal  Patellar Grind: negative  J-Sign: none    Other   Meniscal Cyst: absent  Popliteal (Baker's) Cyst: absent  Sensation: normal    Comments:  Mild varus noted    Left Knee Exam     Inspection   Erythema: absent  Scars: absent  Swelling: absent  Effusion: absent  Deformity: deformity    Tenderness   The patient is experiencing no tenderness (global tenderness).         Range of Motion   Extension: 0   Flexion: abnormal Left knee flexion: 125.     Tests   Meniscus   David:  Medial - negative Lateral - negative  Stability Lachman: normal (-1 to 2mm) PCL-Posterior Drawer: normal (0 to 2mm)  MCL - Valgus: normal (0 to 2mm)  LCL - Varus: normal (0 to 2mm)Pivot Shift: normal (Equal)Reverse Pivot Shift: normal (Equal)Dial Test at 30 degrees: normal (< 5 degrees)Dial Test at 90 degrees: normal (< 5 degrees)  Posterior Sag Test: negative  Posterolateral Corner: unstable (>15 degrees difference)  Patella   Patellar Apprehension: negative  Passive Patellar Tilt: neutral  Patellar Tracking: normal  Patellar Glide (Quadrants): Lateral - 1 Medial - 2  Q-Angle at 90 degrees: normal  Patellar Grind: negative  J-Sign: J sign absent    Other   Meniscal Cyst: absent  Popliteal (Baker's) Cyst: absent  Sensation: normal    Comments:    Incision well healed without signs/sypmtoms of infection    Right Hip Exam     Tests   Ivon: negative  Left Hip Exam     Tests   Ivon: negative          Muscle Strength   Right Lower Extremity   Hip Abduction: 5/5   Quadriceps:  5/5   Hamstrin/5   Left Lower Extremity   Hip Abduction: 5/5   Quadriceps:  5/5   Hamstrin/5     Reflexes     Left Side  Quadriceps:  2+  Achilles:  2+    Right Side   Quadriceps:  2+  Achilles:  2+    Vascular Exam     Right Pulses  Dorsalis Pedis:      2+  Posterior Tibial:      2+        Left Pulses  Dorsalis Pedis:      2+  Posterior Tibial:      2+        Edema  Right Lower  Leg: absent  Left Lower Leg: absent    Radiographs today:  Left ConforMIS TKR in good position with maintanance of alignment and reduction; no signs of loosening noted; Right knee medial DJD with severe medial wear noted and spur formation at the patellofemoral and medial compartments          Assessment:       No diagnosis found.       Plan:       RTC in 1 months with Dr. Angie Mulligan Patient will fill out IKDC, SF-12 and KOOS on return.    2. Cont HEP    3. No AEs noted per flexion study; Winsome Alexander available during the visit

## 2017-12-04 ENCOUNTER — RESEARCH ENCOUNTER (OUTPATIENT)
Dept: RESEARCH | Facility: HOSPITAL | Age: 67
End: 2017-12-04

## 2017-12-04 ENCOUNTER — OFFICE VISIT (OUTPATIENT)
Dept: SPORTS MEDICINE | Facility: CLINIC | Age: 67
End: 2017-12-04
Payer: MEDICARE

## 2017-12-04 ENCOUNTER — HOSPITAL ENCOUNTER (OUTPATIENT)
Dept: RADIOLOGY | Facility: HOSPITAL | Age: 67
Discharge: HOME | End: 2017-12-04
Attending: ORTHOPAEDIC SURGERY
Payer: MEDICARE

## 2017-12-04 VITALS
HEIGHT: 60 IN | WEIGHT: 145 LBS | HEART RATE: 71 BPM | BODY MASS INDEX: 28.47 KG/M2 | DIASTOLIC BLOOD PRESSURE: 78 MMHG | SYSTOLIC BLOOD PRESSURE: 127 MMHG

## 2017-12-04 DIAGNOSIS — M17.11 PRIMARY OSTEOARTHRITIS OF RIGHT KNEE: ICD-10-CM

## 2017-12-04 DIAGNOSIS — M25.561 RIGHT KNEE PAIN, UNSPECIFIED CHRONICITY: ICD-10-CM

## 2017-12-04 DIAGNOSIS — Z00.6 RESEARCH STUDY PATIENT: Primary | ICD-10-CM

## 2017-12-04 DIAGNOSIS — M21.161 GENU VARUM OF RIGHT LOWER EXTREMITY: ICD-10-CM

## 2017-12-04 DIAGNOSIS — M25.561 RIGHT KNEE PAIN, UNSPECIFIED CHRONICITY: Primary | ICD-10-CM

## 2017-12-04 PROCEDURE — 99214 OFFICE O/P EST MOD 30 MIN: CPT | Mod: S$PBB,,, | Performed by: ORTHOPAEDIC SURGERY

## 2017-12-04 PROCEDURE — 99999 PR PBB SHADOW E&M-EST. PATIENT-LVL III: CPT | Mod: PBBFAC,,, | Performed by: ORTHOPAEDIC SURGERY

## 2017-12-04 PROCEDURE — 73564 X-RAY EXAM KNEE 4 OR MORE: CPT | Mod: TC,50,PO

## 2017-12-04 PROCEDURE — 73564 X-RAY EXAM KNEE 4 OR MORE: CPT | Mod: 26,50,, | Performed by: RADIOLOGY

## 2017-12-04 PROCEDURE — 99213 OFFICE O/P EST LOW 20 MIN: CPT | Mod: PBBFAC,25,PO | Performed by: ORTHOPAEDIC SURGERY

## 2017-12-04 NOTE — PROGRESS NOTES
Subjective:          Chief Complaint: Ella Canales is a 67 y.o. female who had concerns including Pain of the Right Knee.    Patient is here for a follow up for her right knee for her flexion injection 7/5/17. She is requesting a repeat injection today.     Her left knee is doing well. She is using her stationary bike 45 minutes 3 times a week. She finished PT on the [facility] last week.       DATE OF PROCEDURE: 03/09/2017     ATTENDING SURGEON: Angie Mulligan M.D.     FIRST ASSISTANT: Paolo Abdi M.D. - Fellow.     SECOND ASSISTANT: SMA Prince - Assistant.     PREOPERATIVE DIAGNOSIS: Left knee sepsis.     POSTOPERATIVE DIAGNOSES:  1. Left knee hemarthrosis.  2. Left knee sepsis.  3. Left knee synovitis.  4. Left knee medial retinacular tear.  5. Left knee medial collateral ligament insufficiency.     PROCEDURES PERFORMED:  1. Left knee complex polyethylene liner exchange.  2. Left knee complex incision and drainage.  3. Left knee medial collateral ligament repair.  4. Left knee medial retinacular repair with application of a flexed HD graft.  5. Left knee complex open synovectomy with hemostasis control.  6. Wound VAC application.      DATE OF PROCEDURE: 02/24/2017     PREOPERATIVE DIAGNOSIS: Possible septic arthritis, left total knee replacement.     POSTOPERATIVE DIAGNOSIS: Possible septic arthritis, left total knee   replacement.      PROCEDURE: Arthrotomy, left knee, with irrigation and debridement (CPT #86267).     SURGEON: Patel Denise M.D.     ASSISTANTS: Andrey Ya M.D. (RES); Anant Issa M.D. (RES)    DATE OF PROCEDURE: 2/14/2017     ATTENDING SURGEON: Surgeon(s) and Role:  * Angie Mulligan MD - Primary      FIRST ASSISTANT:Paolo Abdi MD - Fellow  SECOND ASSISTANT: Ronel Urban PA-C - Assistant  THIRD ASSISTANT: SMA Prince - Assistant     PREOPERATIVE DIAGNOSIS: Left  Arthritis, Traumatic M12.50, DJD knee M17.9 and Genu Varum (acquired)  M21.169     POSTOPERATIVE DIAGNOSIS:  Left  Arthritis, Traumatic M12.50, DJD knee M17.9 and Genu Varum (acquired) M21.169     PROCEDURES PERFORMED:  Left  Replacement, Knee, Medial and Lateral compartment (Total Knee) 32944        Pain   Associated symptoms include joint swelling. Pertinent negatives include no abdominal pain, chest pain, chills, congestion, coughing, fever, headaches, myalgias, nausea, numbness, rash, sore throat or vomiting.         Review of Systems   Constitution: Negative. Negative for chills, fever, night sweats, weight gain and weight loss.   HENT: Negative for congestion, hearing loss and sore throat.    Eyes: Negative for blurred vision, double vision and visual disturbance.   Cardiovascular: Negative for chest pain, leg swelling and palpitations.   Respiratory: Negative for cough and shortness of breath.    Endocrine: Negative for polyuria.   Hematologic/Lymphatic: Negative for bleeding problem. Does not bruise/bleed easily.   Skin: Negative for itching, poor wound healing and rash.   Musculoskeletal: Positive for joint pain, joint swelling and stiffness. Negative for back pain, muscle cramps, muscle weakness and myalgias.   Gastrointestinal: Negative for abdominal pain, constipation, diarrhea, melena, nausea and vomiting.   Genitourinary: Negative.  Negative for frequency and hematuria.   Neurological: Negative for dizziness, headaches, loss of balance, numbness, paresthesias and sensory change.   Psychiatric/Behavioral: Negative for altered mental status and depression. The patient is not nervous/anxious.    Allergic/Immunologic: Negative for hives.       Pain Related Questions  Over the past 3 days, what was your average pain during activity? (I.e. running, jogging, walking, climbing stairs, getting dressed, ect.): 8  Over the past 3 days, what was your highest pain level?: 8  Over the past 3 days, what was your lowest pain level? : 2    Other  How many nights a week are you awakened by  your affected body part?: 0  Was the patient's HEIGHT measured or patient reported?: Patient Reported  Was the patient's WEIGHT measured or patient reported?: Measured      Objective:        General: Ella is well-developed, well-nourished, appears stated age, in no acute distress, alert and oriented to time, place and person.     General    Vitals reviewed.  Constitutional: She is oriented to person, place, and time. She appears well-developed and well-nourished. No distress.   HENT:   Mouth/Throat: No oropharyngeal exudate.   Eyes: Right eye exhibits no discharge. Left eye exhibits no discharge.   Neck: Normal range of motion.   Cardiovascular: Normal rate and regular rhythm.    Pulmonary/Chest: Effort normal and breath sounds normal. No respiratory distress.   Neurological: She is alert and oriented to person, place, and time. She has normal reflexes. No cranial nerve deficit. Coordination normal.   Psychiatric: She has a normal mood and affect. Her behavior is normal. Judgment and thought content normal.     General Musculoskeletal Exam   Gait: normal       Right Knee Exam     Inspection   Erythema: absent  Scars: absent  Swelling: absent  Effusion: effusion  Deformity: deformity  Bruising: absent    Tenderness   The patient is experiencing no tenderness.         Crepitus   The patient has crepitus of the patella (mild).    Range of Motion   Extension:  0 abnormal   Flexion: 130     Tests   Meniscus   David:  Medial - negative Lateral - negative  Ligament Examination Lachman: normal (-1 to 2mm) PCL-Posterior Drawer: normal (0 to 2mm)     MCL - Valgus: normal (0 to 2mm)  LCL - Varus: normalPivot Shift: normal (Equal)Reverse Pivot Shift: normal (Equal)Dial Test at 30 degrees: normal (< 5 degrees)Dial Test at 90 degrees: normal (< 5 degrees)  Posterior Sag Test: negative  Posterolateral Corner: unstable (>15 degrees difference)  Patella   Patellar Apprehension: negative  Passive Patellar Tilt: neutral  Patellar  Tracking: normal  Patellar Glide (quadrants): Lateral - 1   Medial - 2  Q-Angle at 90 degrees: normal  Patellar Grind: negative  J-Sign: none    Other   Meniscal Cyst: absent  Popliteal (Baker's) Cyst: absent  Sensation: normal    Comments:  Mild varus noted    Left Knee Exam     Inspection   Erythema: absent  Scars: absent  Swelling: absent  Effusion: absent  Deformity: deformity    Tenderness   The patient is experiencing no tenderness (global tenderness).         Range of Motion   Extension: 0   Flexion: abnormal Left knee flexion: 125.     Tests   Meniscus   David:  Medial - negative Lateral - negative  Stability Lachman: normal (-1 to 2mm) PCL-Posterior Drawer: normal (0 to 2mm)  MCL - Valgus: normal (0 to 2mm)  LCL - Varus: normal (0 to 2mm)Pivot Shift: normal (Equal)Reverse Pivot Shift: normal (Equal)Dial Test at 30 degrees: normal (< 5 degrees)Dial Test at 90 degrees: normal (< 5 degrees)  Posterior Sag Test: negative  Posterolateral Corner: unstable (>15 degrees difference)  Patella   Patellar Apprehension: negative  Passive Patellar Tilt: neutral  Patellar Tracking: normal  Patellar Glide (Quadrants): Lateral - 1 Medial - 2  Q-Angle at 90 degrees: normal  Patellar Grind: negative  J-Sign: J sign absent    Other   Meniscal Cyst: absent  Popliteal (Baker's) Cyst: absent  Sensation: normal    Comments:    Incision well healed without signs/sypmtoms of infection    Right Hip Exam     Tests   Ivon: negative  Left Hip Exam     Tests   Ivon: negative          Muscle Strength   Right Lower Extremity   Hip Abduction: 5/5   Quadriceps:  5/5   Hamstrin/5   Left Lower Extremity   Hip Abduction: 5/5   Quadriceps:  5/5   Hamstrin/5     Reflexes     Left Side  Quadriceps:  2+  Achilles:  2+    Right Side   Quadriceps:  2+  Achilles:  2+    Vascular Exam     Right Pulses  Dorsalis Pedis:      2+  Posterior Tibial:      2+        Left Pulses  Dorsalis Pedis:      2+  Posterior Tibial:       2+        Edema  Right Lower Leg: absent  Left Lower Leg: absent              Assessment:       Encounter Diagnoses   Name Primary?    Right knee pain, unspecified chronicity Yes    Primary osteoarthritis of right knee     Genu varum of right lower extremity           Plan:       1. IKDC, SF-12 and KOOS was filled out today in clinic.     RTC in 7 months with Dr. Angie Mulligan Patient will fill out IKDC, SF-12 and KOOS on return.    2. Aspiration/Injection Procedure right knee    After time out was performed, including verification of patient ID, procedure, site and side, availability of information and equipment, review of safety issues, and agreement with consent, the procedure site was marked and the patient was prepped aseptically. 10cc's of normal joint fluid was aspirated from the right Knee Joint using a 21.5g x 1.5 needle in sterile fashion without complication. Following aspiration, a diagnostic and therapeutic injection of 5cc Flexion and ethyl chloride spray was given under sterile technique using a 21.5g x 1.5 needle into the right Knee Joint in seated position.     The patient had no adverse reactions to the medication. Pain decreased. The patient was instructed to apply ice to the joint for 20 minutes and avoid strenuous activities for 24-36 hours following the injection. Patient was warned of possible blood sugar and/or blood pressure changes during that time. Following that time, patient can resume regular activities.    Ultrasound Guidance Procedure  right Knee Joint visualized with documented effusion and inflammation. Dynamic visualization of the 21.5g x 1.5 needle performed.      3. No AEs noted per flexion study; Winsome Alexander available during the visit

## 2017-12-04 NOTE — PROGRESS NOTES
Research Study: Flexion OA Study   PI: Angie Mulligan MD  IRB: 2016.425.C  Visit: 20 week Follow-up     Ms. Canales came in today to complete her 20 week follow-up visit. I met with Ms. Canales in the waiting area at the [facility]. I reviewed the study activities for today's visit with Ms. Canales. She verbalized understanding and expressed interest in continued participation.     Ms.Marie Esther Canales completed all required study questionnaires per protocol.    Ms.Marie Esther Canales states that she has been doing well since her last visit and has not experienced changes in health or adverse events.     Ms.Marie Esther Canales has had no changes to her medications and no new medications since her last visit.     Ms. Canales's study labs were drawn, processed and sent per protocol.    An Evaluation for repeat administration was done on Ms.Marie Esther Canales and it was determined that a repeat administration was needed.  Ms. Canales reports that her pain has been returning and requested another injection at this visit. A repeat Physical Examination and Index Knee Assessment was done on Ms.Marie Eshter Canales. A repeat Index Knee aspiration and collection of synovial fluid was done on Ms.Marie Esther Canales . 10cc's of synovial fluid was collected form Ms. Canales's knee. This synovial fluid was processed and frozen per protocol.     I again thanked Ms. Canales for her participation and encouraged her to contact me contact me with any questions. Ms. Canales verbalized understanding.

## 2017-12-20 ENCOUNTER — RESEARCH ENCOUNTER (OUTPATIENT)
Dept: RESEARCH | Facility: HOSPITAL | Age: 67
End: 2017-12-20

## 2017-12-20 NOTE — PROGRESS NOTES
Research Study: Flexion OA Study   PI: Angie Mulligan MD  IRB: 2016.425.C  Visit: 24 week Follow-up      Ms. Canales came in today to complete her 24 week follow-up visit. I met with Ms. Canales in the waiting area at the [facility]. I reviewed the study activities for today's visit with Ms. Canales. She verbalized understanding and expressed interest in continued participation.      Ms.Marie Esther Canales completed all required study questionnaires per protocol.     Ms.Marie Esther Canales states that she has been doing well since her last visit and has not experienced changes in health or adverse events.      Ms.Marie Esther Canales has had no changes to her medications and no new medications since her last visit.      Follow up in 4 weeks for her 28 week visit     I again thanked Ms. Canales for her participation and encouraged her to contact me contact me with any questions. Ms. Canales verbalized understanding.

## 2017-12-21 ENCOUNTER — TELEPHONE (OUTPATIENT)
Dept: FAMILY MEDICINE | Facility: CLINIC | Age: 67
End: 2017-12-21

## 2017-12-21 DIAGNOSIS — Z20.828 EXPOSURE TO THE FLU: Primary | ICD-10-CM

## 2017-12-21 NOTE — TELEPHONE ENCOUNTER
patient states that son is at her home for a few days. Was diagnosis with flu today at urgent care. She wants to know if she needs to start tamiflu. Please advise

## 2017-12-21 NOTE — TELEPHONE ENCOUNTER
----- Message from Yulissa Hatch sent at 12/21/2017 12:01 PM CST -----  Contact: pt [phone]  Patient called she states she was exposed to the FLU she did not have the FLU shot. Patient would like to know what can be done.

## 2017-12-23 RX ORDER — OSELTAMIVIR PHOSPHATE 75 MG/1
75 CAPSULE ORAL DAILY
Qty: 10 CAPSULE | Refills: 0 | Status: SHIPPED | OUTPATIENT
Start: 2017-12-23 | End: 2017-12-28

## 2017-12-23 NOTE — TELEPHONE ENCOUNTER
Tamiflu called in to start for prevention once daily for 10 days.  Use universal precautions to avoid spread.  If sx begin-fever, body aches, headache then increase to bid until out

## 2017-12-27 DIAGNOSIS — I10 ESSENTIAL HYPERTENSION: ICD-10-CM

## 2017-12-27 RX ORDER — LOSARTAN POTASSIUM 100 MG/1
TABLET ORAL
Qty: 90 TABLET | Refills: 0 | Status: SHIPPED | OUTPATIENT
Start: 2017-12-27 | End: 2018-03-14 | Stop reason: SDUPTHER

## 2017-12-31 DIAGNOSIS — E78.5 HYPERLIPIDEMIA, UNSPECIFIED HYPERLIPIDEMIA TYPE: ICD-10-CM

## 2018-01-01 RX ORDER — SIMVASTATIN 40 MG/1
TABLET, FILM COATED ORAL
Qty: 90 TABLET | Refills: 3 | Status: SHIPPED | OUTPATIENT
Start: 2018-01-01 | End: 2018-02-08 | Stop reason: SDUPTHER

## 2018-01-24 ENCOUNTER — RESEARCH ENCOUNTER (OUTPATIENT)
Dept: RESEARCH | Facility: HOSPITAL | Age: 68
End: 2018-01-24

## 2018-01-25 NOTE — PROGRESS NOTES
Research Study: Flexion OA Study   PI: Angie Mulligan MD  IRB: 2016.425.C  Visit: 28 week Follow-up      Ms. Canales came in today to complete her 28 week follow-up visit. I met with Ms. Canales in the waiting area at the [facility]. I reviewed the study activities for today's visit with Ms. Canales. She verbalized understanding and expressed interest in continued participation.      Ms.Marie Esther Canales completed all required study questionnaires per protocol.     Ms.Marie Esther Canales states that she has been doing well since her last visit and has not experienced changes in health or adverse events.      Ms.Marie Esther Canales has had no changes to her medications and no new medications since her last visit.      Follow up in 4 weeks for her 32 week visit     I again thanked Ms. Canaels for her participation and encouraged her to contact me contact me with any questions. Ms. Canales verbalized understanding.

## 2018-02-02 ENCOUNTER — LAB VISIT (OUTPATIENT)
Dept: LAB | Facility: HOSPITAL | Age: 68
End: 2018-02-02
Attending: FAMILY MEDICINE
Payer: MEDICARE

## 2018-02-02 DIAGNOSIS — E78.5 HYPERLIPIDEMIA, UNSPECIFIED HYPERLIPIDEMIA TYPE: ICD-10-CM

## 2018-02-02 DIAGNOSIS — R07.89 OTHER CHEST PAIN: Primary | ICD-10-CM

## 2018-02-02 DIAGNOSIS — E78.9 DISORDER OF LIPOPROTEIN AND LIPID METABOLISM: ICD-10-CM

## 2018-02-02 DIAGNOSIS — I20.0 INTERMEDIATE CORONARY SYNDROME: ICD-10-CM

## 2018-02-02 DIAGNOSIS — Z79.899 NEED FOR PROPHYLACTIC CHEMOTHERAPY: ICD-10-CM

## 2018-02-02 LAB
ALBUMIN SERPL BCP-MCNC: 3.9 G/DL
ALP SERPL-CCNC: 87 U/L
ALT SERPL W/O P-5'-P-CCNC: 26 U/L
ANION GAP SERPL CALC-SCNC: 10 MMOL/L
AST SERPL-CCNC: 25 U/L
BASOPHILS # BLD AUTO: 0.04 K/UL
BASOPHILS NFR BLD: 1 %
BILIRUB SERPL-MCNC: 0.4 MG/DL
BUN SERPL-MCNC: 12 MG/DL
CALCIUM SERPL-MCNC: 9.7 MG/DL
CHLORIDE SERPL-SCNC: 105 MMOL/L
CHOLEST SERPL-MCNC: 186 MG/DL
CHOLEST/HDLC SERPL: 3.3 {RATIO}
CO2 SERPL-SCNC: 25 MMOL/L
CREAT SERPL-MCNC: 0.8 MG/DL
DIFFERENTIAL METHOD: NORMAL
EOSINOPHIL # BLD AUTO: 0.1 K/UL
EOSINOPHIL NFR BLD: 1.8 %
ERYTHROCYTE [DISTWIDTH] IN BLOOD BY AUTOMATED COUNT: 13.6 %
EST. GFR  (AFRICAN AMERICAN): >60 ML/MIN/1.73 M^2
EST. GFR  (NON AFRICAN AMERICAN): >60 ML/MIN/1.73 M^2
GLUCOSE SERPL-MCNC: 97 MG/DL
HCT VFR BLD AUTO: 37.3 %
HDLC SERPL-MCNC: 56 MG/DL
HDLC SERPL: 30.1 %
HGB BLD-MCNC: 12.1 G/DL
IMM GRANULOCYTES # BLD AUTO: 0.02 K/UL
IMM GRANULOCYTES NFR BLD AUTO: 0.5 %
LDLC SERPL CALC-MCNC: 101.8 MG/DL
LYMPHOCYTES # BLD AUTO: 1.4 K/UL
LYMPHOCYTES NFR BLD: 36.1 %
MCH RBC QN AUTO: 28.5 PG
MCHC RBC AUTO-ENTMCNC: 32.4 G/DL
MCV RBC AUTO: 88 FL
MONOCYTES # BLD AUTO: 0.4 K/UL
MONOCYTES NFR BLD: 11 %
NEUTROPHILS # BLD AUTO: 1.9 K/UL
NEUTROPHILS NFR BLD: 49.6 %
NONHDLC SERPL-MCNC: 130 MG/DL
NRBC BLD-RTO: 0 /100 WBC
PLATELET # BLD AUTO: 199 K/UL
PMV BLD AUTO: 9.4 FL
POTASSIUM SERPL-SCNC: 4.3 MMOL/L
PROT SERPL-MCNC: 7.4 G/DL
RBC # BLD AUTO: 4.24 M/UL
SODIUM SERPL-SCNC: 140 MMOL/L
TRIGL SERPL-MCNC: 141 MG/DL
WBC # BLD AUTO: 3.91 K/UL

## 2018-02-02 PROCEDURE — 85025 COMPLETE CBC W/AUTO DIFF WBC: CPT

## 2018-02-02 PROCEDURE — 36415 COLL VENOUS BLD VENIPUNCTURE: CPT | Mod: PO

## 2018-02-05 DIAGNOSIS — Z78.0 ASYMPTOMATIC MENOPAUSAL STATE: Primary | ICD-10-CM

## 2018-02-07 ENCOUNTER — DOCUMENTATION ONLY (OUTPATIENT)
Dept: FAMILY MEDICINE | Facility: CLINIC | Age: 68
End: 2018-02-07

## 2018-02-07 NOTE — PROGRESS NOTES
Pre-Visit Chart Review  For Appointment Scheduled on 2-8-18    Health Maintenance Due   Topic Date Due    Colonoscopy  04/03/2017    Influenza Vaccine  08/01/2017    DEXA SCAN  11/12/2017

## 2018-02-08 ENCOUNTER — OFFICE VISIT (OUTPATIENT)
Dept: FAMILY MEDICINE | Facility: CLINIC | Age: 68
End: 2018-02-08
Payer: MEDICARE

## 2018-02-08 ENCOUNTER — LAB VISIT (OUTPATIENT)
Dept: LAB | Facility: HOSPITAL | Age: 68
End: 2018-02-08
Attending: FAMILY MEDICINE
Payer: MEDICARE

## 2018-02-08 VITALS
TEMPERATURE: 98 F | HEART RATE: 77 BPM | DIASTOLIC BLOOD PRESSURE: 80 MMHG | HEIGHT: 60 IN | BODY MASS INDEX: 31.77 KG/M2 | SYSTOLIC BLOOD PRESSURE: 158 MMHG | WEIGHT: 161.81 LBS

## 2018-02-08 DIAGNOSIS — M25.561 CHRONIC PAIN OF RIGHT KNEE: Primary | ICD-10-CM

## 2018-02-08 DIAGNOSIS — E66.9 OBESITY (BMI 30-39.9): ICD-10-CM

## 2018-02-08 DIAGNOSIS — I10 ESSENTIAL HYPERTENSION: ICD-10-CM

## 2018-02-08 DIAGNOSIS — D64.9 ANEMIA, UNSPECIFIED TYPE: ICD-10-CM

## 2018-02-08 DIAGNOSIS — G89.29 CHRONIC PAIN OF RIGHT KNEE: Primary | ICD-10-CM

## 2018-02-08 DIAGNOSIS — N95.9 MENOPAUSAL AND PERIMENOPAUSAL DISORDER: ICD-10-CM

## 2018-02-08 DIAGNOSIS — Z23 FLU VACCINE NEED: ICD-10-CM

## 2018-02-08 LAB
BASOPHILS # BLD AUTO: 0.03 K/UL
BASOPHILS NFR BLD: 0.7 %
DIFFERENTIAL METHOD: ABNORMAL
EOSINOPHIL # BLD AUTO: 0.1 K/UL
EOSINOPHIL NFR BLD: 1.4 %
ERYTHROCYTE [DISTWIDTH] IN BLOOD BY AUTOMATED COUNT: 13.5 %
HCT VFR BLD AUTO: 36.5 %
HGB BLD-MCNC: 11.7 G/DL
IMM GRANULOCYTES # BLD AUTO: 0.02 K/UL
IMM GRANULOCYTES NFR BLD AUTO: 0.5 %
LYMPHOCYTES # BLD AUTO: 1.4 K/UL
LYMPHOCYTES NFR BLD: 32.7 %
MCH RBC QN AUTO: 28.8 PG
MCHC RBC AUTO-ENTMCNC: 32.1 G/DL
MCV RBC AUTO: 90 FL
MONOCYTES # BLD AUTO: 0.5 K/UL
MONOCYTES NFR BLD: 12.1 %
NEUTROPHILS # BLD AUTO: 2.3 K/UL
NEUTROPHILS NFR BLD: 52.6 %
NRBC BLD-RTO: 0 /100 WBC
PLATELET # BLD AUTO: 215 K/UL
PMV BLD AUTO: 9.7 FL
RBC # BLD AUTO: 4.06 M/UL
WBC # BLD AUTO: 4.31 K/UL

## 2018-02-08 PROCEDURE — 36415 COLL VENOUS BLD VENIPUNCTURE: CPT | Mod: PO

## 2018-02-08 PROCEDURE — 99999 PR PBB SHADOW E&M-EST. PATIENT-LVL IV: CPT | Mod: PBBFAC,,, | Performed by: FAMILY MEDICINE

## 2018-02-08 PROCEDURE — 1159F MED LIST DOCD IN RCRD: CPT | Mod: ,,, | Performed by: FAMILY MEDICINE

## 2018-02-08 PROCEDURE — 90662 IIV NO PRSV INCREASED AG IM: CPT | Mod: PBBFAC,PO

## 2018-02-08 PROCEDURE — 85025 COMPLETE CBC W/AUTO DIFF WBC: CPT

## 2018-02-08 PROCEDURE — 99214 OFFICE O/P EST MOD 30 MIN: CPT | Mod: S$PBB,,, | Performed by: FAMILY MEDICINE

## 2018-02-08 PROCEDURE — 99214 OFFICE O/P EST MOD 30 MIN: CPT | Mod: PBBFAC,PO,25 | Performed by: FAMILY MEDICINE

## 2018-02-08 PROCEDURE — 1126F AMNT PAIN NOTED NONE PRSNT: CPT | Mod: ,,, | Performed by: FAMILY MEDICINE

## 2018-02-08 RX ORDER — HYDROCODONE BITARTRATE AND ACETAMINOPHEN 5; 325 MG/1; MG/1
1 TABLET ORAL EVERY 6 HOURS PRN
Qty: 20 TABLET | Refills: 0 | Status: SHIPPED | OUTPATIENT
Start: 2018-02-08 | End: 2018-06-04

## 2018-02-08 NOTE — PROGRESS NOTES
Subjective:       Patient ID: Ella Canales is a 67 y.o. female.    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  Going to [facility] soon-plans to use manual wheelchair with family member pushing her.  Takes aleve as needed but pain is sometimes severe.  Wants another opinion to see if alternative to surgery may help    HPI  Review of Systems   Constitutional: Negative for fatigue and unexpected weight change.   Respiratory: Negative for chest tightness and shortness of breath.    Cardiovascular: Negative for chest pain, palpitations and leg swelling.   Gastrointestinal: Negative for abdominal pain.   Musculoskeletal: Positive for arthralgias.   Neurological: Negative for dizziness, syncope, light-headedness and headaches.       Objective:      Physical Exam   Constitutional: She is oriented to person, place, and time. She appears well-developed and well-nourished.   Cardiovascular: Normal rate, regular rhythm and normal heart sounds.    Pulmonary/Chest: Effort normal and breath sounds normal.   Musculoskeletal: She exhibits no edema.   Neurological: She is alert and oriented to person, place, and time.   Skin: Skin is warm and dry.   Psychiatric: She has a normal mood and affect.   Nursing note and vitals reviewed.      Assessment:       1. Chronic pain of right knee    2. Flu vaccine need    3. Menopausal and perimenopausal disorder    4. Anemia, unspecified type    5. Essential hypertension    6.  Obesity (BMI 30-39.9)        Plan:       1. Chronic pain of right knee  Refer for consult , eval and treat  - Ambulatory referral to Physical Medicine Rehab  Aleve 1-2 bid prn and/or tylenol otc. Heat/rice and if severe:  - hydrocodone-acetaminophen 5-325mg (NORCO) 5-325 mg per tablet; Take 1 tablet by mouth every 6 (six) hours as needed for Pain.  Dispense: 20 tablet; Refill: 0    2. Flu vaccine need  Immunize today.  Counseled patient on risks, benefits and side effects.  Patient elected to proceed with vaccination.    - Influenza - High Dose (65+) (PF) (IM)    3. Menopausal and perimenopausal disorder  Screen and treat as indicated:    - DXA Bone Density Spine And Hip; Future    4. Anemia, unspecified type  Screen and treat as indicated:    - CBC auto differential; Future    5. Essential hypertension  Uncontrolled but did not take meds today.  May be pain related as well    6. Obesity (BMI 30-39.9)  Counseled patient on his ideal body weight, health consequences of being obese and current recommendations including weekly exercise and a heart healthy diet.  Current BMI is:Estimated body mass index is 31.6 kg/m² as calculated from the following:    Height as of this encounter: 5' (1.524 m).    Weight as of this encounter: 73.4 kg (161 lb 13.1 oz)..  Patient is aware that ideal BMI < 25 or Weight in (lb) to have BMI = 25: 127.7.        [facility] goal documentation:  Patient readiness: acceptance and barriers:readiness  During the course of the visit the patient was educated and counseled about the following: Hypertension:   Dietary sodium restriction.  Regular aerobic exercise.  Goals: Hypertension: Reduce Blood Pressure  Goal/Outcomes met:Hypertension  The following self management tools provided:blood pressure log  Patient Instructions (the written plan) was given to the patient/family: Yes  Time spent with patient: 20 minutes    Patient with be reevaluated in 3 months or sooner prn    Greater than 50% of this visit  was spent counseling as described in above documentation:Yes

## 2018-02-19 ENCOUNTER — RESEARCH ENCOUNTER (OUTPATIENT)
Dept: RESEARCH | Facility: HOSPITAL | Age: 68
End: 2018-02-19

## 2018-02-19 NOTE — PROGRESS NOTES
Research Study: Flexion OA Study   PI: Angie Mulligan MD  IRB: 2016.425.C  Visit: 32 week Follow-up      Ms. Canales came in today to complete her 32 week follow-up visit. I met with Ms. Canales in the waiting area at the [facility]. I reviewed the study activities for today's visit with Ms. Canales. She verbalized understanding and expressed interest in continued participation.      Ms.Marie Esther Canales completed all required study questionnaires per protocol.     Ms.Marie Esther Canales states that she has been doing well since her last visit and has not experienced changes in health or adverse events.      Ms.Marie Esther Canales has had no changes to her medications and no new medications since her last visit.      Follow up in 4 weeks for her 36 week visit     I again thanked Ms. Canales for her participation and encouraged her to contact me contact me with any questions. Ms. Canales verbalized understanding.

## 2018-02-21 ENCOUNTER — HOSPITAL ENCOUNTER (OUTPATIENT)
Dept: RADIOLOGY | Facility: CLINIC | Age: 68
Discharge: HOME | End: 2018-02-21
Attending: FAMILY MEDICINE
Payer: MEDICARE

## 2018-02-21 ENCOUNTER — INITIAL CONSULT (OUTPATIENT)
Dept: PHYSICAL MEDICINE AND REHAB | Facility: CLINIC | Age: 68
End: 2018-02-21
Payer: MEDICARE

## 2018-02-21 VITALS
DIASTOLIC BLOOD PRESSURE: 84 MMHG | HEIGHT: 60 IN | SYSTOLIC BLOOD PRESSURE: 138 MMHG | WEIGHT: 161 LBS | BODY MASS INDEX: 31.61 KG/M2 | HEART RATE: 93 BPM

## 2018-02-21 DIAGNOSIS — M25.561 CHRONIC PAIN OF RIGHT KNEE: Primary | ICD-10-CM

## 2018-02-21 DIAGNOSIS — G89.29 CHRONIC PAIN OF RIGHT KNEE: Primary | ICD-10-CM

## 2018-02-21 DIAGNOSIS — N95.9 MENOPAUSAL AND PERIMENOPAUSAL DISORDER: ICD-10-CM

## 2018-02-21 DIAGNOSIS — M17.11 PRIMARY OSTEOARTHRITIS OF RIGHT KNEE: ICD-10-CM

## 2018-02-21 PROCEDURE — 99213 OFFICE O/P EST LOW 20 MIN: CPT | Mod: PBBFAC,25,PN | Performed by: PHYSICAL MEDICINE & REHABILITATION

## 2018-02-21 PROCEDURE — 99204 OFFICE O/P NEW MOD 45 MIN: CPT | Mod: S$PBB,,, | Performed by: PHYSICAL MEDICINE & REHABILITATION

## 2018-02-21 PROCEDURE — 99999 PR PBB SHADOW E&M-EST. PATIENT-LVL III: CPT | Mod: PBBFAC,,, | Performed by: PHYSICAL MEDICINE & REHABILITATION

## 2018-02-21 PROCEDURE — 1126F AMNT PAIN NOTED NONE PRSNT: CPT | Mod: ,,, | Performed by: PHYSICAL MEDICINE & REHABILITATION

## 2018-02-21 PROCEDURE — 1159F MED LIST DOCD IN RCRD: CPT | Mod: ,,, | Performed by: PHYSICAL MEDICINE & REHABILITATION

## 2018-02-21 PROCEDURE — 77080 DXA BONE DENSITY AXIAL: CPT | Mod: 26,,, | Performed by: RADIOLOGY

## 2018-02-21 PROCEDURE — 77080 DXA BONE DENSITY AXIAL: CPT | Mod: TC,PO

## 2018-02-21 NOTE — LETTER
February 21, 2018      Adry Damian MD  [address]  [place] [address]           [facility]  [address] [place] [address] [address] [facility] [address]  [place] [address]  Phone: [phone]  Fax: [phone]          Patient: Ella Canales   MR Number: [id number]   YOB: 1950   Date of Visit: 2/21/2018       Dear Dr. Adry Damian:    Thank you for referring Ella Canales to me for evaluation. Attached you will find relevant portions of my assessment and plan of care.    If you have questions, please do not hesitate to call me. I look forward to following Ella Canales along with you.    Sincerely,    Shivam Simon MD    Enclosure  CC:  No Recipients    If you would like to receive this communication electronically, please contact [email or web address] or [phone] to request more information on [employer] access.    For providers and/or their staff who would like to refer a patient to [facility], please contact us through our one-stop-shop provider referral line, [facility], at [phone].    If you feel you have received this communication in error or would no longer like to receive these types of communications, please e-mail [email or web address]

## 2018-02-21 NOTE — PROGRESS NOTES
[facility]    Consulting Provider: Dr. Adry Damian    CHIEF COMPLAINT:   Chief Complaint   Patient presents with    Knee Pain     right knee pain     HISTORY OF PRESENT ILLNESS: Ella Canales is a 67 y.o. female who presents to me for the first time for evaluation and treatment of right knee pain.  She said pain in the right knee chronically over many years.  She had left knee replaced but this was compensated by persistent swelling and synovitis.  She underwent 2 subsequent revisions.  Due to her difficulty with the left knee she is reluctant to pursue knee replacement on the right.  She notes that she has good days and bad days.  She notes pain diffusely about the right knee with occasional catching.  She denies any significant swelling on the right.  She has received many injections over the years including corticosteroid and Visco supplementation.  The shots would typically help her, however the effects are becoming more short-lived over recent months.  She also received investigational long-acting steroid injection in December 2017.  She did receive several weeks of good relief however the effects have worn off.    Review of Systems   Constitutional: Negative for fever.   HENT: Negative for drooling.    Eyes: Negative for discharge.   Respiratory: Negative for choking.    Cardiovascular: Negative for chest pain.   Genitourinary: Negative for flank pain.   Skin: Negative for wound.   Allergic/Immunologic: Negative for immunocompromised state.   Neurological: Negative for tremors and syncope.   Psychiatric/Behavioral: Negative for behavioral problems.     Past Medical History:   Past Medical History:   Diagnosis Date    Arthritis     bilateral knees    GERD (gastroesophageal reflux disease)     Hyperlipidemia     Hypertension     Osteopenia     S/P PICC central line placement     Ulcer        Past Surgical History:   Past Surgical History:   Procedure Laterality Date     ECTOPIC PREGNANCY SURGERY      fess      FRACTURE SURGERY      ORIF right arm.    TOTAL KNEE ARTHROPLASTY Left     TUBAL LIGATION         Family History:   Family History   Problem Relation Age of Onset    Heart disease Mother     Heart disease Father     Heart disease Brother 47     cabg       Medications:   Current Outpatient Prescriptions on File Prior to Visit   Medication Sig Dispense Refill    fexofenadine (ALLEGRA) 60 MG tablet Take 60 mg by mouth once daily.      glucosamine-chondroit-vit C-Mn (GLUCOSAMINE-CHONDROITIN MAX ST) 500-400 mg Cap Twice a day      hydrocodone-acetaminophen 5-325mg (NORCO) 5-325 mg per tablet Take 1 tablet by mouth every 6 (six) hours as needed for Pain. 20 tablet 0    LACTOBACILLUS COMBO NO.6 (PROBIOTIC COMPLEX ORAL) Take 1 tablet by mouth once daily.      losartan (COZAAR) 100 MG tablet TAKE 1 TABLET(100 MG) BY MOUTH EVERY DAY 90 tablet 0    metoprolol succinate (TOPROL-XL) 50 MG 24 hr tablet TAKE 1 TABLET(50 MG) BY MOUTH EVERY DAY 90 tablet 3    multivitamin (ONE DAILY MULTIVITAMIN) per tablet Take 1 tablet by mouth once daily.      omeprazole (PRILOSEC) 20 MG capsule TAKE 1 CAPSULE(20 MG) BY MOUTH TWICE DAILY 180 capsule 3    potassium chloride SA (K-DUR,KLOR-CON) 10 MEQ tablet Take 1 tablet (10 mEq total) by mouth 2 (two) times daily. 180 tablet 4    raloxifene (EVISTA) 60 mg tablet TAKE 1 TABLET(60 MG) BY MOUTH EVERY DAY 90 tablet 3    simvastatin (ZOCOR) 40 MG tablet TAKE 1 TABLET(40 MG) BY MOUTH EVERY EVENING 90 tablet 3    TURMERIC ROOT EXTRACT ORAL Take by mouth.       Current Facility-Administered Medications on File Prior to Visit   Medication Dose Route Frequency Provider Last Rate Last Dose    INV  40 mg/5 mL injection 40 mg  40 mg Intra-articular Once Olman Bush III, PA-C           Allergies:   Review of patient's allergies indicates:   Allergen Reactions    Ace inhibitors      Other reaction(s): cough    Latex      denies       Social  History:   Social History     Social History    Marital status:      Spouse name: N/A    Number of children: N/A    Years of education: N/A     Social History Main Topics    Smoking status: Never Smoker    Smokeless tobacco: Never Used    Alcohol use Yes      Comment: occasional    Drug use: No    Sexual activity: No     Other Topics Concern    None     Social History Narrative    None     PHYSICAL EXAMINATION:   General    Vitals:    02/21/18 1318   BP: 138/84   Pulse: 93   Weight: 73 kg (161 lb)   Height: 5' (1.524 m)     Constitutional: Oriented to person, place, and time. No apparent distress. Appears well-developed and well-nourished. Pleasant.  HENT:   Head: Normocephalic and atraumatic.   Eyes: Right eye exhibits no discharge. Left eye exhibits no discharge. No scleral icterus.   Pulmonary/Chest: Effort normal. No respiratory distress.   Abdominal: There is no guarding.   Neurological: Alert and oriented to person, place, and time.   Psychiatric: Behavior is normal.   Right Knee Exam     Tenderness   The patient is experiencing no tenderness.         Range of Motion   Extension: 0   Flexion: 130     Tests   David:  Medial - negative Lateral - negative  Lachman:  Anterior - negative    Posterior - negative  Varus: negative  Valgus: negative  Patellar Apprehension: negative    Other   Erythema: absent  Scars: absent  Sensation: normal  Pulse: present  Swelling: none  Other tests: no effusion present      Left Knee Exam     Tenderness   The patient is experiencing no tenderness.         Range of Motion   Extension: 0   Flexion: 100     Tests   Varus: negative  Valgus: negative    Other   Erythema: absent  Scars: present  Sensation: normal  Pulse: present  Swelling: none  Effusion: no effusion present        INSPECTION: There is no swelling, ecchymoses, erythema of the right knee.  GAIT/DYNAMIC: Mildly antalgic.    Imaging  X-ray of the right knee from 12/4/2017: Postoperative changes are  "again identified relating to a left knee arthroplasty procedure, the prostheses appearing unremarkable.  Medial compartment tibiofemoral joint space narrowing and some marginal tibiofemoral spurring and minimal patellar spurring are again noted on the right side.  Osseous structures demonstrate no evidence of recent or healing fracture, lytic destructive process, development of abnormal periprosthetic lucency, or other significant detrimental change since 10/11/17.  A persistent joint effusion is observed on the left side, with no evidence of a joint effusion on the right.    Data Reviewed: X-ray    Supportive Actions: Independent visualization of images or test specimens    ASSESSMENT:   1. Chronic pain of right knee    2. Primary osteoarthritis of right knee      PLAN:     1. Time was spent reviewing the above diagnosis in depth with Ella today, including acute management and rehabilitation.     2. Ms. Canales has exhausted several forms of conservative treatment in the form of corticosteroid and hyaluronic acid injections.  These procedures are producing less beneficial effects over recent months.  She continues to remain reluctant to undergo total knee arthroplasty on the right considering her complications on the left.  We discussed other options such as biologic injections such as platelet rich plasma, mesenchymal stem cell; as well as the genicular radiofrequency ablation.  At this point she would like to contemplate these options and she was issued some literature on the above procedures.    3. RTC prn.    This is a consult from Dr. Adry Damian. Please see the "Communications" section of Epic to see how the consulting physician received the report of today's findings and recommendations. If it's an [facility] physician, it will be forwarded to his/her "in basket".    The above note was completed, in part, with the aid of Dragon dictation software/hardware. Translation errors may be present.    "

## 2018-03-14 DIAGNOSIS — E87.6 HYPOKALEMIA: ICD-10-CM

## 2018-03-14 DIAGNOSIS — I10 ESSENTIAL HYPERTENSION: ICD-10-CM

## 2018-03-14 RX ORDER — LOSARTAN POTASSIUM 100 MG/1
TABLET ORAL
Qty: 90 TABLET | Refills: 3 | Status: SHIPPED | OUTPATIENT
Start: 2018-03-14 | End: 2018-06-04

## 2018-03-14 RX ORDER — POTASSIUM CHLORIDE 750 MG/1
TABLET, EXTENDED RELEASE ORAL
Qty: 180 TABLET | Refills: 3 | Status: SHIPPED | OUTPATIENT
Start: 2018-03-14 | End: 2018-06-04 | Stop reason: SDUPTHER

## 2018-03-22 ENCOUNTER — RESEARCH ENCOUNTER (OUTPATIENT)
Dept: RESEARCH | Facility: HOSPITAL | Age: 68
End: 2018-03-22

## 2018-03-22 NOTE — PROGRESS NOTES
Research Study: Flexion OA Study   PI: Angie Mulligan MD  IRB: 2016.425.C  Visit: 36 week Follow-up      Ms. Canales came in today to complete her 36 week follow-up visit. I met with Ms. Canales in the waiting area at the [facility]. I reviewed the study activities for today's visit with Ms. Canales. She verbalized understanding and expressed interest in continued participation.      Ms.Marie Esther Canales completed all required study questionnaires per protocol.     Ms.Marie Esther Canales states that she has been doing well since her last visit and has not experienced changes in health or adverse events.      Ms.Marie Esther Canales has had no changes to her medications and no new medications since her last visit.      Follow up in 4 weeks for her 40 week visit     I again thanked Ms. Canales for her participation and encouraged her to contact me contact me with any questions. Ms. Canales verbalized understanding.

## 2018-04-18 ENCOUNTER — RESEARCH ENCOUNTER (OUTPATIENT)
Dept: RESEARCH | Facility: HOSPITAL | Age: 68
End: 2018-04-18

## 2018-04-18 NOTE — PROGRESS NOTES
Research Study: Flexion OA Study   PI: Angie Mulligan MD  IRB: 2016.425.C  Visit: 40 week Follow-up      Ms. Canales came in today to complete her 40 week follow-up visit. I met with Ms. Canales in the waiting area at the [facility]. I reviewed the study activities for today's visit with Ms. Canales. She verbalized understanding and expressed interest in continued participation.      Ms.Marie Esther Canales completed all required study questionnaires per protocol.     Ms.Marie Esther Canales states that she has been doing well since her last visit and has not experienced changes in health or adverse events.      Ms.Marie Esther Canales has had no changes to her medications and no new medications since her last visit.      Follow up in 4 weeks for her 44 week visit     I again thanked Ms. Canales for her participation and encouraged her to contact me contact me with any questions. Ms. Canales verbalized understanding.

## 2018-05-14 ENCOUNTER — RESEARCH ENCOUNTER (OUTPATIENT)
Dept: RESEARCH | Facility: HOSPITAL | Age: 68
End: 2018-05-14

## 2018-05-14 NOTE — PROGRESS NOTES
Research Study: Flexion OA Study   PI: Angie Mulligan MD  IRB: 2016.425.C  Visit: 44 week Follow-up      Ms. Canales came in today to complete her 44 week follow-up visit. I met with Ms. Canales in the waiting area at the [facility]. I reviewed the study activities for today's visit with Ms. Canales. She verbalized understanding and expressed interest in continued participation.      Ms.Marie Esther Canales completed all required study questionnaires per protocol.     Ms.Marie Esther Canales states that she has been doing well since her last visit and has not experienced changes in health or adverse events.      Ms.Marie Esther Canales has no significant health changes but has started taking hydrochlorothiazide for her blood pressure.     Follow up in 4 weeks for her 48 week visit     I again thanked Ms. Canales for her participation and encouraged her to contact me contact me with any questions. Ms. Canales verbalized understanding.

## 2018-05-23 ENCOUNTER — LAB VISIT (OUTPATIENT)
Dept: LAB | Facility: HOSPITAL | Age: 68
End: 2018-05-23
Attending: SPECIALIST
Payer: MEDICARE

## 2018-05-23 DIAGNOSIS — R07.89 OTHER CHEST PAIN: Primary | ICD-10-CM

## 2018-05-23 LAB
ANION GAP SERPL CALC-SCNC: 8 MMOL/L
BUN SERPL-MCNC: 17 MG/DL
CALCIUM SERPL-MCNC: 9.8 MG/DL
CHLORIDE SERPL-SCNC: 103 MMOL/L
CO2 SERPL-SCNC: 28 MMOL/L
CREAT SERPL-MCNC: 0.8 MG/DL
EST. GFR  (AFRICAN AMERICAN): >60 ML/MIN/1.73 M^2
EST. GFR  (NON AFRICAN AMERICAN): >60 ML/MIN/1.73 M^2
GLUCOSE SERPL-MCNC: 93 MG/DL
POTASSIUM SERPL-SCNC: 4.3 MMOL/L
SODIUM SERPL-SCNC: 139 MMOL/L

## 2018-05-23 PROCEDURE — 36415 COLL VENOUS BLD VENIPUNCTURE: CPT | Mod: PO

## 2018-05-23 PROCEDURE — 80048 BASIC METABOLIC PNL TOTAL CA: CPT

## 2018-05-29 ENCOUNTER — TELEPHONE (OUTPATIENT)
Dept: RESEARCH | Facility: HOSPITAL | Age: 68
End: 2018-05-29

## 2018-05-29 DIAGNOSIS — Z00.6 RESEARCH SUBJECT: Primary | ICD-10-CM

## 2018-06-04 ENCOUNTER — OFFICE VISIT (OUTPATIENT)
Dept: FAMILY MEDICINE | Facility: CLINIC | Age: 68
End: 2018-06-04
Payer: MEDICARE

## 2018-06-04 VITALS
HEART RATE: 73 BPM | BODY MASS INDEX: 32.59 KG/M2 | TEMPERATURE: 98 F | SYSTOLIC BLOOD PRESSURE: 140 MMHG | HEIGHT: 60 IN | WEIGHT: 166 LBS | DIASTOLIC BLOOD PRESSURE: 78 MMHG

## 2018-06-04 DIAGNOSIS — K21.9 GASTROESOPHAGEAL REFLUX DISEASE WITHOUT ESOPHAGITIS: ICD-10-CM

## 2018-06-04 DIAGNOSIS — Z86.010 HISTORY OF COLON POLYPS: ICD-10-CM

## 2018-06-04 DIAGNOSIS — E78.5 HYPERLIPIDEMIA, UNSPECIFIED HYPERLIPIDEMIA TYPE: ICD-10-CM

## 2018-06-04 DIAGNOSIS — I10 ESSENTIAL HYPERTENSION: ICD-10-CM

## 2018-06-04 DIAGNOSIS — E87.6 HYPOKALEMIA: ICD-10-CM

## 2018-06-04 DIAGNOSIS — M81.0 OSTEOPOROSIS, UNSPECIFIED OSTEOPOROSIS TYPE, UNSPECIFIED PATHOLOGICAL FRACTURE PRESENCE: Primary | ICD-10-CM

## 2018-06-04 DIAGNOSIS — R93.89 ABNORMAL FINDINGS ON DIAGNOSTIC IMAGING OF OTHER SPECIFIED BODY STRUCTURES: ICD-10-CM

## 2018-06-04 PROCEDURE — 99214 OFFICE O/P EST MOD 30 MIN: CPT | Mod: S$PBB,,, | Performed by: FAMILY MEDICINE

## 2018-06-04 PROCEDURE — 99999 PR PBB SHADOW E&M-EST. PATIENT-LVL IV: CPT | Mod: PBBFAC,,, | Performed by: FAMILY MEDICINE

## 2018-06-04 PROCEDURE — 99214 OFFICE O/P EST MOD 30 MIN: CPT | Mod: PBBFAC,PO | Performed by: FAMILY MEDICINE

## 2018-06-04 RX ORDER — LOSARTAN POTASSIUM AND HYDROCHLOROTHIAZIDE 25; 100 MG/1; MG/1
1 TABLET ORAL DAILY
Qty: 90 TABLET | Refills: 3 | Status: SHIPPED | OUTPATIENT
Start: 2018-06-04 | End: 2019-03-20

## 2018-06-04 RX ORDER — OMEPRAZOLE 20 MG/1
CAPSULE, DELAYED RELEASE ORAL
Qty: 180 CAPSULE | Refills: 3 | Status: SHIPPED | OUTPATIENT
Start: 2018-06-04 | End: 2019-07-09 | Stop reason: SDUPTHER

## 2018-06-04 RX ORDER — ALENDRONATE SODIUM 70 MG/1
70 TABLET ORAL
Qty: 4 TABLET | Refills: 11 | Status: SHIPPED | OUTPATIENT
Start: 2018-06-04 | End: 2019-06-18 | Stop reason: SDUPTHER

## 2018-06-04 RX ORDER — POTASSIUM CHLORIDE 750 MG/1
TABLET, EXTENDED RELEASE ORAL
Qty: 180 TABLET | Refills: 3 | Status: SHIPPED | OUTPATIENT
Start: 2018-06-04 | End: 2019-05-23 | Stop reason: SDUPTHER

## 2018-06-04 RX ORDER — HYDROCHLOROTHIAZIDE 12.5 MG/1
12.5 TABLET ORAL DAILY
COMMUNITY
End: 2018-06-04

## 2018-06-04 RX ORDER — METOPROLOL SUCCINATE 50 MG/1
TABLET, EXTENDED RELEASE ORAL
Qty: 90 TABLET | Refills: 3 | Status: SHIPPED | OUTPATIENT
Start: 2018-06-04 | End: 2019-05-23 | Stop reason: SDUPTHER

## 2018-06-04 RX ORDER — RALOXIFENE HYDROCHLORIDE 60 MG/1
TABLET, FILM COATED ORAL
Qty: 90 TABLET | Refills: 3 | Status: SHIPPED | OUTPATIENT
Start: 2018-06-04 | End: 2018-06-04

## 2018-06-04 RX ORDER — SIMVASTATIN 40 MG/1
TABLET, FILM COATED ORAL
Qty: 90 TABLET | Refills: 3 | Status: SHIPPED | OUTPATIENT
Start: 2018-06-04 | End: 2019-05-23 | Stop reason: SDUPTHER

## 2018-06-12 ENCOUNTER — RESEARCH ENCOUNTER (OUTPATIENT)
Dept: RESEARCH | Facility: HOSPITAL | Age: 68
End: 2018-06-12

## 2018-06-12 NOTE — PROGRESS NOTES
Subjective:       Patient ID: Ella Canales is a 67 y.o. female.    Chief Complaint: Follow-up    HPI  Review of Systems   Constitutional: Negative for fatigue and unexpected weight change.   Respiratory: Negative for chest tightness and shortness of breath.    Cardiovascular: Negative for chest pain, palpitations and leg swelling.   Gastrointestinal: Negative for abdominal pain.   Musculoskeletal: Negative for arthralgias.   Neurological: Negative for dizziness, syncope, light-headedness and headaches.       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    Lab Visit on 05/23/2018   Component Date Value Ref Range Status    Sodium 05/23/2018 139  136 - 145 mmol/L Final    Potassium 05/23/2018 4.3  3.5 - 5.1 mmol/L Final    Chloride 05/23/2018 103  95 - 110 mmol/L Final    CO2 05/23/2018 28  23 - 29 mmol/L Final    Glucose 05/23/2018 93  70 - 110 mg/dL Final    BUN, Bld 05/23/2018 17  8 - 23 mg/dL Final    Creatinine 05/23/2018 0.8  0.5 - 1.4 mg/dL Final    Calcium 05/23/2018 9.8  8.7 - 10.5 mg/dL Final    Anion Gap 05/23/2018 8  8 - 16 mmol/L Final    eGFR if African American 05/23/2018 >60.0  >60 mL/min/1.73 m^2 Final    eGFR if non African American 05/23/2018 >60.0  >60 mL/min/1.73 m^2 Final   }  Objective:      Physical Exam   Constitutional: She is oriented to person, place, and time. She appears well-developed and well-nourished.   Cardiovascular: Normal rate, regular rhythm and normal heart sounds.    Pulmonary/Chest: Effort normal and breath sounds normal.   Musculoskeletal: She exhibits  no edema.   Neurological: She is alert and oriented to person, place, and time.   Skin: Skin is warm and dry.   Psychiatric: She has a normal mood and affect.   Nursing note and vitals reviewed.      Assessment:       1. Osteoporosis, unspecified osteoporosis type, unspecified pathological fracture presence    2. Essential hypertension    3. Gastroesophageal reflux disease without esophagitis    4. Hypokalemia    5. Hyperlipidemia, unspecified hyperlipidemia type    6. History of colon polyps    7. Abnormal findings on diagnostic imaging of other specified body structures         Plan:       1. Essential hypertension  treat  - metoprolol succinate (TOPROL-XL) 50 MG 24 hr tablet; TAKE 1 TABLET(50 MG) BY MOUTH EVERY DAY  Dispense: 90 tablet; Refill: 3    2. Gastroesophageal reflux disease without esophagitis  Counseled patient on prevention of reflux with changes in diet and behavior.  I recommended avoidance of greasy and spicy foods, caffeine and eating within 3 hours of bedtime.  I counseled the patient to avoid eating large meals and instead eating more frequent small meals.  I also recommended weight loss and elevation of the head of the bed by 6 inches.  If symptoms persist after these changes medication may be needed to control GERD.      - omeprazole (PRILOSEC) 20 MG capsule; TAKE 1 CAPSULE(20 MG) BY MOUTH TWICE DAILY  Dispense: 180 capsule; Refill: 3    3. Hypokalemia  Cont supplement  - potassium chloride SA (K-DUR,KLOR-CON) 10 MEQ tablet; TAKE 1 TABLET(10 MEQ) BY MOUTH TWICE DAILY  Dispense: 180 tablet; Refill: 3    4. Hyperlipidemia, unspecified hyperlipidemia type  Stable condition.  Continue current medications.  Will adjust based on lab findings or if condition changes.    - simvastatin (ZOCOR) 40 MG tablet; TAKE 1 TABLET(40 MG) BY MOUTH EVERY EVENING  Dispense: 90 tablet; Refill: 3  - CBC auto differential; Future  - Comprehensive metabolic panel; Future  - Lipid panel; Future    5. Osteoporosis,  unspecified osteoporosis type, unspecified pathological fracture presence  treat  - alendronate (FOSAMAX) 70 MG tablet; Take 1 tablet (70 mg total) by mouth every 7 days.  Dispense: 4 tablet; Refill: 11  - Vitamin D; Future  - TSH; Future    6. History of colon polyps  Refer for surveillance  - Ambulatory referral to Gastroenterology    7. Abnormal findings on diagnostic imaging of other specified body structures   Screen and treat as indicated:    - TSH; Future      Reeval 6 months

## 2018-06-12 NOTE — PROGRESS NOTES
Research Study: Flexion OA Study   PI: Angie Mulligan MD  IRB: 2016.425.C  Visit: 48 week Follow-up      Ms. Canales came in today to complete her 48 week follow-up visit. I met with Ms. Canales in the waiting area at the [facility]. I reviewed the study activities for today's visit with Ms. Canales. She verbalized understanding and expressed interest in continued participation.      Ms.Marie Esther Canales completed all required study questionnaires per protocol.     Ms.Marie Esther Canales states that she has been doing well since her last visit and has not experienced changes in health or adverse events.      Ms.Marie Esther Canales has had no changes to her medications and no new medications since her last visit.      Follow up in 4 weeks for her 52 week visit     I again thanked Ms. Canales for her participation and encouraged her to contact me contact me with any questions. Ms. Canales verbalized understanding.

## 2018-06-20 ENCOUNTER — TELEPHONE (OUTPATIENT)
Dept: GASTROENTEROLOGY | Facility: CLINIC | Age: 68
End: 2018-06-20

## 2018-06-20 NOTE — TELEPHONE ENCOUNTER
----- Message from Lauren Singh LPN sent at 6/20/2018 12:36 PM CDT -----      ----- Message -----  From: Angy Quintana  Sent: 6/20/2018  11:57 AM  To: [facility] Staff    Type: Needs Medical Advice    Who Called:  Patient  Best Call Back Number: [phone]  Additional Information: Patient has a nurse appointment on 6/25/18 to get a colonoscopy that she needs to reschedule. Please call back with availability.

## 2018-07-10 ENCOUNTER — TELEPHONE (OUTPATIENT)
Dept: RESEARCH | Facility: HOSPITAL | Age: 68
End: 2018-07-10

## 2018-07-10 DIAGNOSIS — M25.561 RIGHT KNEE PAIN, UNSPECIFIED CHRONICITY: Primary | ICD-10-CM

## 2018-07-11 ENCOUNTER — HOSPITAL ENCOUNTER (OUTPATIENT)
Dept: RADIOLOGY | Facility: HOSPITAL | Age: 68
Discharge: HOME | End: 2018-07-11
Attending: ORTHOPAEDIC SURGERY
Payer: MEDICARE

## 2018-07-11 ENCOUNTER — OFFICE VISIT (OUTPATIENT)
Dept: SPORTS MEDICINE | Facility: CLINIC | Age: 68
End: 2018-07-11
Payer: MEDICARE

## 2018-07-11 VITALS
WEIGHT: 166 LBS | HEIGHT: 60 IN | TEMPERATURE: 99 F | SYSTOLIC BLOOD PRESSURE: 117 MMHG | DIASTOLIC BLOOD PRESSURE: 71 MMHG | RESPIRATION RATE: 20 BRPM | HEART RATE: 98 BPM | BODY MASS INDEX: 32.59 KG/M2

## 2018-07-11 DIAGNOSIS — M25.561 RIGHT KNEE PAIN, UNSPECIFIED CHRONICITY: ICD-10-CM

## 2018-07-11 DIAGNOSIS — M17.11 PRIMARY OSTEOARTHRITIS OF RIGHT KNEE: ICD-10-CM

## 2018-07-11 DIAGNOSIS — M17.10 PRIMARY OSTEOARTHRITIS OF ONE KNEE: ICD-10-CM

## 2018-07-11 DIAGNOSIS — M23.92 INTERNAL DERANGEMENT OF LEFT KNEE: ICD-10-CM

## 2018-07-11 DIAGNOSIS — M25.561 RIGHT KNEE PAIN, UNSPECIFIED CHRONICITY: Primary | ICD-10-CM

## 2018-07-11 DIAGNOSIS — M21.161 GENU VARUM OF RIGHT LOWER EXTREMITY: ICD-10-CM

## 2018-07-11 PROCEDURE — 73564 X-RAY EXAM KNEE 4 OR MORE: CPT | Mod: TC,50,FY,PO

## 2018-07-11 PROCEDURE — 99214 OFFICE O/P EST MOD 30 MIN: CPT | Mod: S$PBB,,, | Performed by: ORTHOPAEDIC SURGERY

## 2018-07-11 PROCEDURE — 99214 OFFICE O/P EST MOD 30 MIN: CPT | Mod: PBBFAC,25,PO | Performed by: ORTHOPAEDIC SURGERY

## 2018-07-11 PROCEDURE — 99999 PR PBB SHADOW E&M-EST. PATIENT-LVL IV: CPT | Mod: PBBFAC,,, | Performed by: ORTHOPAEDIC SURGERY

## 2018-07-11 PROCEDURE — 73564 X-RAY EXAM KNEE 4 OR MORE: CPT | Mod: 26,50,, | Performed by: RADIOLOGY

## 2018-07-11 NOTE — PROGRESS NOTES
Subjective:          Chief Complaint: Ella Canales is a 67 y.o. female who had concerns including Pain of the Right Knee.    Patient is here for a follow up for  left knee s/p  TKA, and continued right knee pain. Using Aleve when the pain is at its worse. She reports minimal swelling and difficulty with stairs.      DATE OF PROCEDURE: 03/09/2017     ATTENDING SURGEON: Angie Mulligan M.D.     FIRST ASSISTANT: Paolo Abdi M.D. - Fellow.     SECOND ASSISTANT: SMA Prince - Assistant.     PREOPERATIVE DIAGNOSIS: Left knee sepsis.     POSTOPERATIVE DIAGNOSES:  1. Left knee hemarthrosis.  2. Left knee sepsis.  3. Left knee synovitis.  4. Left knee medial retinacular tear.  5. Left knee medial collateral ligament insufficiency.     PROCEDURES PERFORMED:  1. Left knee complex polyethylene liner exchange.  2. Left knee complex incision and drainage.  3. Left knee medial collateral ligament repair.  4. Left knee medial retinacular repair with application of a flexed HD graft.  5. Left knee complex open synovectomy with hemostasis control.  6. Wound VAC application.      DATE OF PROCEDURE: 02/24/2017     PREOPERATIVE DIAGNOSIS: Possible septic arthritis, left total knee replacement.     POSTOPERATIVE DIAGNOSIS: Possible septic arthritis, left total knee   replacement.      PROCEDURE: Arthrotomy, left knee, with irrigation and debridement (CPT #45493).     SURGEON: Patel Denise M.D.     ASSISTANTS: Andrey Ya M.D. (RES); Anant Issa M.D. (RES)    DATE OF PROCEDURE: 2/14/2017     ATTENDING SURGEON: Surgeon(s) and Role:  * Angie Mulligan MD - Primary      FIRST ASSISTANT:Paolo Abdi MD - Fellow  SECOND ASSISTANT: Ronel Urban PA-C - Assistant  THIRD ASSISTANT: SMA Prince - Assistant     PREOPERATIVE DIAGNOSIS: Left  Arthritis, Traumatic M12.50, DJD knee M17.9 and Genu Varum (acquired) M21.169     POSTOPERATIVE DIAGNOSIS:  Left  Arthritis, Traumatic M12.50, DJD knee M17.9 and Genu Varum  (acquired) M21.169     PROCEDURES PERFORMED:  Left  Replacement, Knee, Medial and Lateral compartment (Total Knee) 24708        Pain   Associated symptoms include joint swelling. Pertinent negatives include no abdominal pain, chest pain, chills, congestion, coughing, fever, headaches, myalgias, nausea, numbness, rash, sore throat or vomiting.         Review of Systems   Constitution: Negative. Negative for chills, fever, night sweats, weight gain and weight loss.   HENT: Negative for congestion, hearing loss and sore throat.    Eyes: Negative for blurred vision, double vision and visual disturbance.   Cardiovascular: Negative for chest pain, leg swelling and palpitations.   Respiratory: Negative for cough and shortness of breath.    Endocrine: Negative for polyuria.   Hematologic/Lymphatic: Negative for bleeding problem. Does not bruise/bleed easily.   Skin: Negative for itching, poor wound healing and rash.   Musculoskeletal: Positive for joint pain, joint swelling and stiffness. Negative for back pain, muscle cramps, muscle weakness and myalgias.   Gastrointestinal: Negative for abdominal pain, constipation, diarrhea, melena, nausea and vomiting.   Genitourinary: Negative.  Negative for frequency and hematuria.   Neurological: Negative for dizziness, headaches, loss of balance, numbness, paresthesias and sensory change.   Psychiatric/Behavioral: Negative for altered mental status and depression. The patient is not nervous/anxious.    Allergic/Immunologic: Negative for hives.       Pain Related Questions  Over the past 3 days, what was your average pain during activity? (I.e. running, jogging, walking, climbing stairs, getting dressed, ect.): 8  Over the past 3 days, what was your highest pain level?: 8  Over the past 3 days, what was your lowest pain level? : 5    Other  How many nights a week are you awakened by your affected body part?: 0  Was the patient's HEIGHT measured or patient reported?: Patient  Reported  Was the patient's WEIGHT measured or patient reported?: Measured      Objective:        General: Ella is well-developed, well-nourished, appears stated age, in no acute distress, alert and oriented to time, place and person.     General    Vitals reviewed.  Constitutional: She is oriented to person, place, and time. She appears well-developed and well-nourished. No distress.   HENT:   Mouth/Throat: No oropharyngeal exudate.   Eyes: Right eye exhibits no discharge. Left eye exhibits no discharge.   Neck: Normal range of motion.   Cardiovascular: Normal rate and regular rhythm.    Pulmonary/Chest: Effort normal and breath sounds normal. No respiratory distress.   Neurological: She is alert and oriented to person, place, and time. She has normal reflexes. No cranial nerve deficit. Coordination normal.   Psychiatric: She has a normal mood and affect. Her behavior is normal. Judgment and thought content normal.     General Musculoskeletal Exam   Gait: normal       Right Knee Exam     Inspection   Erythema: absent  Scars: absent  Swelling: absent  Effusion: effusion  Deformity: deformity  Bruising: absent    Tenderness   The patient is experiencing no tenderness.         Crepitus   The patient has crepitus of the patella (mild).    Range of Motion   Extension:  0 abnormal   Flexion: 130     Tests   Meniscus   David:  Medial - negative Lateral - negative  Ligament Examination Lachman: normal (-1 to 2mm) PCL-Posterior Drawer: normal (0 to 2mm)     MCL - Valgus: normal (0 to 2mm)  LCL - Varus: normalPivot Shift: normal (Equal)Reverse Pivot Shift: normal (Equal)Dial Test at 30 degrees: normal (< 5 degrees)Dial Test at 90 degrees: normal (< 5 degrees)  Posterior Sag Test: negative  Posterolateral Corner: unstable (>15 degrees difference)  Patella   Patellar Apprehension: negative  Passive Patellar Tilt: neutral  Patellar Tracking: normal  Patellar Glide (quadrants): Lateral - 1   Medial - 2  Q-Angle at 90  degrees: normal  Patellar Grind: negative  J-Sign: none    Other   Meniscal Cyst: absent  Popliteal (Baker's) Cyst: absent  Sensation: normal    Comments:  Mild varus noted    Left Knee Exam     Inspection   Erythema: absent  Scars: absent  Swelling: absent  Effusion: absent  Deformity: deformity  Bruising: absent    Tenderness   The patient is experiencing no tenderness (global tenderness).         Range of Motion   Extension: 0   Flexion: abnormal Left knee flexion: 125.     Tests   Meniscus   David:  Medial - negative Lateral - negative  Stability Lachman: normal (-1 to 2mm) PCL-Posterior Drawer: normal (0 to 2mm)  MCL - Valgus: normal (0 to 2mm)  LCL - Varus: normal (0 to 2mm)Pivot Shift: normal (Equal)Reverse Pivot Shift: normal (Equal)Dial Test at 30 degrees: normal (< 5 degrees)Dial Test at 90 degrees: normal (< 5 degrees)  Posterior Sag Test: negative  Posterolateral Corner: unstable (>15 degrees difference)  Patella   Patellar Apprehension: negative  Passive Patellar Tilt: neutral  Patellar Tracking: normal  Patellar Glide (Quadrants): Lateral - 1 Medial - 2  Q-Angle at 90 degrees: normal  Patellar Grind: negative  J-Sign: J sign absent    Other   Meniscal Cyst: absent  Popliteal (Baker's) Cyst: absent  Sensation: normal    Comments:  Incision well healed without signs/sypmtoms of infection    Right Hip Exam     Tests   Ivon: negative  Left Hip Exam     Tests   Ivon: negative          Muscle Strength   Right Lower Extremity   Hip Abduction: 5/5   Quadriceps:  5/5   Hamstrin/5   Left Lower Extremity   Hip Abduction: 5/5   Quadriceps:  5/5   Hamstrin/5     Reflexes     Left Side  Quadriceps:  2+  Achilles:  2+    Right Side   Quadriceps:  2+  Achilles:  2+    Vascular Exam     Right Pulses  Dorsalis Pedis:      2+  Posterior Tibial:      2+        Left Pulses  Dorsalis Pedis:      2+  Posterior Tibial:      2+        Edema  Right Lower Leg: absent  Left Lower Leg: absent              Assessment:        Encounter Diagnoses   Name Primary?    Right knee pain, unspecified chronicity Yes    Genu varum of right lower extremity     Internal derangement of left knee     Primary osteoarthritis of one knee     Primary osteoarthritis of right knee           Plan:       1. IKDC, SF-12 and KOOS was filled out today in clinic.     RTC in 3 months with Dr. Angie Mulligan Patient will fill out IKDC, SF-12 and KOOS on return.    2.  Final visit for flexion injection study; Winsome Alexander available during the visit    3. Patient let us know when she is ready to discuss TKA on the Right Knee.

## 2018-07-16 ENCOUNTER — RESEARCH ENCOUNTER (OUTPATIENT)
Dept: RESEARCH | Facility: HOSPITAL | Age: 68
End: 2018-07-16

## 2018-07-16 NOTE — PROGRESS NOTES
Research Study: Flexion OA Study   PI: Angie Mulligan MD  IRB: [id number]  Visit: 52 week Follow-up      Ms. Canales came in today [id number] to complete her 52 week follow-up visit. I met with Ms. Canales in the conference room at the [facility]. I thanked Ms. Canales for her continued participation and reminded that her involvement in the Flexion Study is voluntary. Ms. Canales verbalized understanding and expressed interest in continued participation. I explained all of the visit activities, and explain it was the last study visit.        Ms. Canales completed all required study questionnaires per protocol.      Jesus Terrazas accompanied Ms. Canales to have her study labs drawn. Labs were drawn, processed and shipped according to protocol.      Ms. Canales had her study x-rays completed and transferred per protocol.      Ms. Canales saw Dr. Mulligan for her study visit. All study activities were completed per protocol. Dr. Mulligan performed a knee exam and upon the exam Dr. Mulligan noted that Ms. Canales had tenderness, contracture and genu varum and noted them as clinically significant. All AEs were reported and completed per protocol. Dr. Mulligan and Ms. Canales discussed possible treatments for after the study.  Ms. Canales states that she is having discomfort with her knees, but would like to wait before moving forward with surgery. She will return in 3 months to see Dr. Mulligan again to check in.      A review of Ms. Canales medications was completed. There have been some changes to her medications. All previous medications are still ongoing and we added some information about new nutritional supplements shes been taking. These changes have been documented and reported per study protocol.      I again thanked Ms. Canales for her participation, updated her on her ClinCard balance and encouraged that she contact us with any questions. Ms. Canales verbalized understanding.

## 2018-07-17 DIAGNOSIS — Z86.010 HISTORY OF COLON POLYPS: Primary | ICD-10-CM

## 2018-07-19 ENCOUNTER — PATIENT OUTREACH (OUTPATIENT)
Dept: ADMINISTRATIVE | Facility: HOSPITAL | Age: 68
End: 2018-07-19

## 2018-07-25 ENCOUNTER — TELEPHONE (OUTPATIENT)
Dept: GASTROENTEROLOGY | Facility: CLINIC | Age: 68
End: 2018-07-25

## 2018-07-25 NOTE — TELEPHONE ENCOUNTER
----- Message from Rusty Short sent at 7/25/2018  9:42 AM CDT -----  Contact: Pt  Name of Who is Calling: Ella      What is the request in detail: Pt is calling requesting to cancel her upcoming procedure      Can the clinic reply by [facility]: no      [facility] [phone] (home)

## 2018-09-10 ENCOUNTER — TELEPHONE (OUTPATIENT)
Dept: FAMILY MEDICINE | Facility: CLINIC | Age: 68
End: 2018-09-10

## 2018-09-10 ENCOUNTER — LAB VISIT (OUTPATIENT)
Dept: LAB | Facility: HOSPITAL | Age: 68
End: 2018-09-10
Attending: FAMILY MEDICINE
Payer: MEDICARE

## 2018-09-10 DIAGNOSIS — R30.9 PAIN WITH URINATION: ICD-10-CM

## 2018-09-10 DIAGNOSIS — R30.9 PAIN WITH URINATION: Primary | ICD-10-CM

## 2018-09-10 LAB
BACTERIA #/AREA URNS AUTO: ABNORMAL /HPF
BILIRUB UR QL STRIP: NEGATIVE
CLARITY UR REFRACT.AUTO: ABNORMAL
COLOR UR AUTO: YELLOW
GLUCOSE UR QL STRIP: NEGATIVE
HGB UR QL STRIP: ABNORMAL
HYALINE CASTS UR QL AUTO: 0 /LPF
KETONES UR QL STRIP: NEGATIVE
LEUKOCYTE ESTERASE UR QL STRIP: ABNORMAL
MICROSCOPIC COMMENT: ABNORMAL
NITRITE UR QL STRIP: NEGATIVE
NON-SQ EPI CELLS #/AREA URNS AUTO: 1 /HPF
PH UR STRIP: 7 [PH] (ref 5–8)
PROT UR QL STRIP: ABNORMAL
RBC #/AREA URNS AUTO: 16 /HPF (ref 0–4)
SP GR UR STRIP: 1 (ref 1–1.03)
SQUAMOUS #/AREA URNS AUTO: 1 /HPF
URN SPEC COLLECT METH UR: ABNORMAL
UROBILINOGEN UR STRIP-ACNC: NEGATIVE EU/DL
WBC #/AREA URNS AUTO: >100 /HPF (ref 0–5)
WBC CLUMPS UR QL AUTO: ABNORMAL
YEAST UR QL AUTO: ABNORMAL

## 2018-09-10 PROCEDURE — 81001 URINALYSIS AUTO W/SCOPE: CPT

## 2018-09-10 PROCEDURE — 87086 URINE CULTURE/COLONY COUNT: CPT

## 2018-09-10 NOTE — TELEPHONE ENCOUNTER
Spoke to patient. Patient has complaints of burning when urinating and blood in urine patient completed UA and culture but the results are still in process requesting medication to be called in

## 2018-09-10 NOTE — TELEPHONE ENCOUNTER
Spoke to patient. Patient has complaints of pain when urinating. No appointments available today appointment scheduled for ua and culture.

## 2018-09-10 NOTE — TELEPHONE ENCOUNTER
----- Message from Aimee Martinez sent at 9/10/2018  8:05 AM CDT -----  Contact: patient   Patient calling to schedule a same day appointment today. No available appointments. Please advise. Patient states she has a bladder infection and is experiencing a lot of discomfort.   Call back    Thanks!

## 2018-09-10 NOTE — TELEPHONE ENCOUNTER
----- Message from Bisi Gould RT sent at 9/10/2018  1:10 PM CDT -----  Contact: pt    pt , checking the status of her call back concerning her UTI, thanks.

## 2018-09-11 DIAGNOSIS — N39.0 URINARY TRACT INFECTION WITHOUT HEMATURIA, SITE UNSPECIFIED: Primary | ICD-10-CM

## 2018-09-11 LAB
BACTERIA UR CULT: NORMAL
BACTERIA UR CULT: NORMAL

## 2018-09-11 RX ORDER — NITROFURANTOIN 25; 75 MG/1; MG/1
100 CAPSULE ORAL 2 TIMES DAILY
Qty: 14 CAPSULE | Refills: 0 | Status: SHIPPED | OUTPATIENT
Start: 2018-09-11 | End: 2019-01-14

## 2018-10-19 ENCOUNTER — TELEPHONE (OUTPATIENT)
Dept: FAMILY MEDICINE | Facility: CLINIC | Age: 68
End: 2018-10-19

## 2018-10-19 ENCOUNTER — LAB VISIT (OUTPATIENT)
Dept: LAB | Facility: HOSPITAL | Age: 68
End: 2018-10-19
Attending: FAMILY MEDICINE
Payer: MEDICARE

## 2018-10-19 DIAGNOSIS — R35.0 FREQUENCY OF URINATION: Primary | ICD-10-CM

## 2018-10-19 DIAGNOSIS — R39.15 URGENCY OF URINATION: ICD-10-CM

## 2018-10-19 DIAGNOSIS — R35.0 FREQUENCY OF URINATION: ICD-10-CM

## 2018-10-19 LAB
BILIRUB UR QL STRIP: NEGATIVE
CLARITY UR REFRACT.AUTO: ABNORMAL
COLOR UR AUTO: YELLOW
GLUCOSE UR QL STRIP: NEGATIVE
HGB UR QL STRIP: ABNORMAL
KETONES UR QL STRIP: NEGATIVE
LEUKOCYTE ESTERASE UR QL STRIP: ABNORMAL
MICROSCOPIC COMMENT: ABNORMAL
NITRITE UR QL STRIP: NEGATIVE
PH UR STRIP: 6 [PH] (ref 5–8)
PROT UR QL STRIP: NEGATIVE
RBC #/AREA URNS AUTO: >100 /HPF (ref 0–4)
SP GR UR STRIP: 1.01 (ref 1–1.03)
SQUAMOUS #/AREA URNS AUTO: 3 /HPF
URN SPEC COLLECT METH UR: ABNORMAL
UROBILINOGEN UR STRIP-ACNC: NEGATIVE EU/DL
WBC #/AREA URNS AUTO: >100 /HPF (ref 0–5)
WBC CLUMPS UR QL AUTO: ABNORMAL
YEAST UR QL AUTO: ABNORMAL

## 2018-10-19 PROCEDURE — 87086 URINE CULTURE/COLONY COUNT: CPT

## 2018-10-19 PROCEDURE — 81001 URINALYSIS AUTO W/SCOPE: CPT

## 2018-10-19 NOTE — TELEPHONE ENCOUNTER
----- Message from Christiano Pena sent at 10/19/2018  3:13 PM CDT -----  Type: Needs Medical Advice    Who Called:  Patient  Best Call Back Number: [phone]  Additional Information: Patient has possible UTI or bladder infection - please call to advise.

## 2018-10-19 NOTE — TELEPHONE ENCOUNTER
Spoke to patient. Patient has complaints of frequent urination and urgency of urination.   Order ua and culture. Patient states she will come in today. Appointment scheduled.

## 2018-10-20 LAB
BACTERIA UR CULT: NORMAL
BACTERIA UR CULT: NORMAL

## 2018-10-22 ENCOUNTER — TELEPHONE (OUTPATIENT)
Dept: FAMILY MEDICINE | Facility: CLINIC | Age: 68
End: 2018-10-22

## 2018-10-22 NOTE — TELEPHONE ENCOUNTER
Returned call to patient regarding test results, no answer left detail voice message for patient to call back with any question.

## 2018-10-22 NOTE — TELEPHONE ENCOUNTER
----- Message from RT Vickie sent at 10/22/2018  8:37 AM CDT -----  Contact: pt    pt , requesting UA test results, and thinks she have a bad UTI, thanks.

## 2018-11-27 ENCOUNTER — PATIENT OUTREACH (OUTPATIENT)
Dept: ADMINISTRATIVE | Facility: HOSPITAL | Age: 68
End: 2018-11-27

## 2018-11-27 NOTE — LETTER
December 5, 2018    Ella Canales  [address] [place] [address]             [facility]  [address]  [facility] [address]  Phone: [phone] Dear [facility] is committed to your overall health and would like to ensure that you are up to date on your recommended test and/or procedures.   Adry Damian MD  has found that your chart shows you may be due for the following:     COLORECTAL CANCER SCREENING       If you have had any of the above done at another facility, please let us know so that we may obtain copies from that facility.  If you have a copy of these records, please provide a copy for us to scan into your chart.  You are welcome to request that the report be faxed to us at  ([phone]).     Otherwise, please schedule these appointments at your earliest convenience by calling [phone] or going to [email or web address].         Sincerely,   [facility]   MD Genny Navarro LPN Clinical Care Coordinator   [facility]   [address] [place] [address] [place] [address]   Phone [phone]   Fax [phone]

## 2018-12-10 ENCOUNTER — LAB VISIT (OUTPATIENT)
Dept: LAB | Facility: HOSPITAL | Age: 68
End: 2018-12-10
Attending: FAMILY MEDICINE
Payer: MEDICARE

## 2018-12-10 DIAGNOSIS — E78.5 HYPERLIPIDEMIA, UNSPECIFIED HYPERLIPIDEMIA TYPE: ICD-10-CM

## 2018-12-10 DIAGNOSIS — R93.89 ABNORMAL FINDINGS ON DIAGNOSTIC IMAGING OF OTHER SPECIFIED BODY STRUCTURES: ICD-10-CM

## 2018-12-10 DIAGNOSIS — M81.0 OSTEOPOROSIS, UNSPECIFIED OSTEOPOROSIS TYPE, UNSPECIFIED PATHOLOGICAL FRACTURE PRESENCE: ICD-10-CM

## 2018-12-10 LAB
25(OH)D3+25(OH)D2 SERPL-MCNC: 44 NG/ML
ALBUMIN SERPL BCP-MCNC: 4.1 G/DL
ALP SERPL-CCNC: 68 U/L
ALT SERPL W/O P-5'-P-CCNC: 33 U/L
ANION GAP SERPL CALC-SCNC: 10 MMOL/L
AST SERPL-CCNC: 28 U/L
BASOPHILS # BLD AUTO: 0.03 K/UL
BASOPHILS NFR BLD: 0.7 %
BILIRUB SERPL-MCNC: 0.5 MG/DL
BUN SERPL-MCNC: 15 MG/DL
CALCIUM SERPL-MCNC: 9.9 MG/DL
CHLORIDE SERPL-SCNC: 103 MMOL/L
CHOLEST SERPL-MCNC: 186 MG/DL
CHOLEST/HDLC SERPL: 3.3 {RATIO}
CO2 SERPL-SCNC: 28 MMOL/L
CREAT SERPL-MCNC: 0.8 MG/DL
DIFFERENTIAL METHOD: NORMAL
EOSINOPHIL # BLD AUTO: 0.1 K/UL
EOSINOPHIL NFR BLD: 2.6 %
ERYTHROCYTE [DISTWIDTH] IN BLOOD BY AUTOMATED COUNT: 14.5 %
EST. GFR  (AFRICAN AMERICAN): >60 ML/MIN/1.73 M^2
EST. GFR  (NON AFRICAN AMERICAN): >60 ML/MIN/1.73 M^2
GLUCOSE SERPL-MCNC: 102 MG/DL
HCT VFR BLD AUTO: 38.8 %
HDLC SERPL-MCNC: 57 MG/DL
HDLC SERPL: 30.6 %
HGB BLD-MCNC: 12.4 G/DL
IMM GRANULOCYTES # BLD AUTO: 0.02 K/UL
IMM GRANULOCYTES NFR BLD AUTO: 0.4 %
LDLC SERPL CALC-MCNC: 98.6 MG/DL
LYMPHOCYTES # BLD AUTO: 1.5 K/UL
LYMPHOCYTES NFR BLD: 33.5 %
MCH RBC QN AUTO: 28.9 PG
MCHC RBC AUTO-ENTMCNC: 32 G/DL
MCV RBC AUTO: 90 FL
MONOCYTES # BLD AUTO: 0.5 K/UL
MONOCYTES NFR BLD: 9.8 %
NEUTROPHILS # BLD AUTO: 2.4 K/UL
NEUTROPHILS NFR BLD: 53 %
NONHDLC SERPL-MCNC: 129 MG/DL
NRBC BLD-RTO: 0 /100 WBC
PLATELET # BLD AUTO: 242 K/UL
PMV BLD AUTO: 9.5 FL
POTASSIUM SERPL-SCNC: 3.8 MMOL/L
PROT SERPL-MCNC: 7.4 G/DL
RBC # BLD AUTO: 4.29 M/UL
SODIUM SERPL-SCNC: 141 MMOL/L
TRIGL SERPL-MCNC: 152 MG/DL
TSH SERPL DL<=0.005 MIU/L-ACNC: 2.39 UIU/ML
WBC # BLD AUTO: 4.57 K/UL

## 2018-12-10 PROCEDURE — 36415 COLL VENOUS BLD VENIPUNCTURE: CPT | Mod: PO

## 2018-12-10 PROCEDURE — 84443 ASSAY THYROID STIM HORMONE: CPT

## 2018-12-10 PROCEDURE — 80061 LIPID PANEL: CPT

## 2018-12-10 PROCEDURE — 80053 COMPREHEN METABOLIC PANEL: CPT

## 2018-12-10 PROCEDURE — 85025 COMPLETE CBC W/AUTO DIFF WBC: CPT

## 2018-12-10 PROCEDURE — 82306 VITAMIN D 25 HYDROXY: CPT

## 2018-12-12 ENCOUNTER — OFFICE VISIT (OUTPATIENT)
Dept: FAMILY MEDICINE | Facility: CLINIC | Age: 68
End: 2018-12-12
Payer: MEDICARE

## 2018-12-12 VITALS
BODY MASS INDEX: 32.59 KG/M2 | SYSTOLIC BLOOD PRESSURE: 146 MMHG | DIASTOLIC BLOOD PRESSURE: 80 MMHG | HEIGHT: 60 IN | OXYGEN SATURATION: 95 % | HEART RATE: 76 BPM | TEMPERATURE: 98 F | WEIGHT: 166 LBS

## 2018-12-12 DIAGNOSIS — E78.5 HYPERLIPIDEMIA, UNSPECIFIED HYPERLIPIDEMIA TYPE: ICD-10-CM

## 2018-12-12 DIAGNOSIS — Z23 NEEDS FLU SHOT: ICD-10-CM

## 2018-12-12 DIAGNOSIS — M17.11 PRIMARY OSTEOARTHRITIS OF RIGHT KNEE: ICD-10-CM

## 2018-12-12 DIAGNOSIS — I10 ESSENTIAL HYPERTENSION: Primary | ICD-10-CM

## 2018-12-12 DIAGNOSIS — D50.9 IRON DEFICIENCY ANEMIA, UNSPECIFIED IRON DEFICIENCY ANEMIA TYPE: ICD-10-CM

## 2018-12-12 DIAGNOSIS — K21.9 GASTROESOPHAGEAL REFLUX DISEASE WITHOUT ESOPHAGITIS: ICD-10-CM

## 2018-12-12 DIAGNOSIS — Z86.010 HISTORY OF COLON POLYPS: ICD-10-CM

## 2018-12-12 DIAGNOSIS — E66.9 OBESITY (BMI 30.0-34.9): ICD-10-CM

## 2018-12-12 DIAGNOSIS — E87.6 HYPOKALEMIA: ICD-10-CM

## 2018-12-12 DIAGNOSIS — M81.0 OSTEOPOROSIS, UNSPECIFIED OSTEOPOROSIS TYPE, UNSPECIFIED PATHOLOGICAL FRACTURE PRESENCE: ICD-10-CM

## 2018-12-12 DIAGNOSIS — R05.9 COUGH: ICD-10-CM

## 2018-12-12 PROBLEM — E66.811 OBESITY (BMI 30.0-34.9): Status: ACTIVE | Noted: 2018-12-12

## 2018-12-12 PROCEDURE — 99214 OFFICE O/P EST MOD 30 MIN: CPT | Mod: S$PBB,,, | Performed by: NURSE PRACTITIONER

## 2018-12-12 PROCEDURE — 90662 IIV NO PRSV INCREASED AG IM: CPT | Mod: PBBFAC,PO

## 2018-12-12 PROCEDURE — 99999 PR PBB SHADOW E&M-EST. PATIENT-LVL IV: CPT | Mod: PBBFAC,,, | Performed by: NURSE PRACTITIONER

## 2018-12-12 PROCEDURE — 99214 OFFICE O/P EST MOD 30 MIN: CPT | Mod: PBBFAC,PO,25 | Performed by: NURSE PRACTITIONER

## 2018-12-12 NOTE — PROGRESS NOTES
Subjective:       Patient ID: Ella Canales is a 68 y.o. female.    Chief Complaint: Annual Exam (cough)    Ms. Canales presents today for her 6 month follow up appointment. She complains of a cough and post-nasal drainage, but she tells me this is chronic. She sees an ENT and had sinus surgery in the past. She has flares of the cough/sinus symptoms every few months and they are alleviated with OTC treatments such as flonase, saline nasal spray and allegra. She takes allegra daily. Otherwise she is feeling well. She is due for a flu vaccine and colonoscopy. She did not complete the colonoscopy she was referred for from the last visit because she did not understand the over-night prep. This was discussed in detail today and there referral was resubmitted. She has osteoarthritis of both knees, she has had the left knee replaced. She had many complications and is hesitant to have the right done because of it. She last saw a sports medicine doctor in July. Labs reviewed with patient.       Cough   This is a chronic problem. The current episode started more than 1 year ago. The problem has been unchanged. The problem occurs every few minutes. The cough is productive of sputum (clear, cloudy, thick ). Associated symptoms include nasal congestion, postnasal drip and rhinorrhea. Pertinent negatives include no chills, fever, myalgias or shortness of breath. The symptoms are aggravated by dust. She has tried OTC cough suppressant (simply saline, flonase and allegra daily) for the symptoms. The treatment provided mild relief. 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   HENT: Positive for congestion, postnasal drip and rhinorrhea.    Eyes: Negative for pain and visual disturbance.   Respiratory: Positive for cough. Negative for shortness of breath.    Genitourinary: Negative for dysuria, frequency, hematuria and urgency.   Musculoskeletal: Positive for arthralgias (bilateral knees). Negative for back pain and myalgias.        Left knee     All other systems reviewed and are negative.      Objective:      Physical Exam   Constitutional: She is oriented to person, place, and time. Vital signs are normal. She appears well-developed and well-nourished. She is cooperative.   HENT:   Head: Normocephalic and atraumatic.   Nose: Nose normal.   Mouth/Throat: Oropharynx is clear and moist.   Eyes: Pupils are equal, round, and reactive to light.   Neck: Normal range of motion. Neck supple.   Cardiovascular: Normal rate, regular rhythm and normal heart sounds.   No murmur heard.  Pulmonary/Chest: Breath sounds normal. She has no wheezes. She has no rales.   Abdominal: Soft. Normal appearance and bowel sounds are normal.   Musculoskeletal: She exhibits no edema.        Right knee: She exhibits decreased range of motion (chronic arthritis).        Left knee: She exhibits decreased range of motion (s/p left total knee arthroplasty ).   Neurological: She is alert and oriented to person, place, and time.   Skin: Skin is warm and dry. No cyanosis. Nails show no clubbing.   Psychiatric: She has a normal mood and affect. Her behavior is normal. Thought content normal. Cognition and memory are normal.   Vitals reviewed.      Assessment:       1. Essential hypertension    2. Hyperlipidemia, unspecified hyperlipidemia type    3. Obesity (BMI 30.0-34.9)    4. Hypokalemia    5. Osteoporosis, unspecified osteoporosis type, unspecified  pathological fracture presence    6. Gastroesophageal reflux disease without esophagitis    7. Iron deficiency anemia, unspecified iron deficiency anemia type    8. History of colon polyps    9. Cough    10. Primary osteoarthritis of right knee    11. Needs flu shot        Plan:     Ella was seen today for annual exam.    Diagnoses and all orders for this visit:    Essential hypertension  -     CBC auto differential; Future  -     Comprehensive metabolic panel; Future  -     Lipid panel; Future  Stable, continue current regimen    Hyperlipidemia, unspecified hyperlipidemia type  Stable, continue current regimen    Hypokalemia  -     Comprehensive metabolic panel; Future  Stable, continue current regimen     Osteoporosis, unspecified osteoporosis type, unspecified pathological fracture presence  -     Vitamin D; Future  -vitamin D stable, continue current regimen     Gastroesophageal reflux disease without esophagitis  -stable, continue current regimen    Iron deficiency anemia, unspecified iron deficiency anemia type  -     CBC auto differential; Future  -stable, continue current regimen    History of colon polyps  -     Ambulatory referral to Gastroenterology    Cough  -Chronic, continue seeing ENT; encouraged that the patient make an appointment if symptoms become wose (has not seen in about 1 year)    Primary osteoarthritis of right knee  -continue under care of sports medicine     Needs flu shot  -     Influenza - High Dose (65+) (PF) (IM)    Obesity (BMI 30.0-34.9)   Counseled patient on his ideal body weight, health consequences of being obese and current recommendations including weekly exercise and a heart healthy diet.  Current BMI is:Estimated body mass index is 32.42 kg/m² as calculated from the following:    Height as of this encounter: 5' (1.524 m).    Weight as of this encounter: 75.3 kg (166 lb 0.1 oz)..  Patient is aware that ideal BMI < 25 or Weight in (lb) to have BMI = 25: 127.7.    F/U in 6  months with Dr. Damian or renny         Patient readiness: acceptance and barriers:none    Patient readiness: acceptance and barriers:none    During the course of the visit the patient was educated and counseled about the following:     Hypertension:   Dietary sodium restriction.  Regular aerobic exercise.  Obesity:   Regular aerobic exercise program discussed.    Goals: Hypertension: Reduce Blood Pressure and Obesity: Reduce calorie intake and BMI    Did patient meet goals/outcomes: No    The following self management tools provided: blood pressure log  excercise log    Patient Instructions (the written plan) was given to the patient/family.     Time spent with patient: 30 minutes    Barriers to medications present (no )    Adverse reactions to current medications (no)    Over the counter medications reviewed (Yes)

## 2018-12-26 DIAGNOSIS — R31.9 HEMATURIA, UNSPECIFIED TYPE: Primary | ICD-10-CM

## 2018-12-28 ENCOUNTER — TELEPHONE (OUTPATIENT)
Dept: FAMILY MEDICINE | Facility: CLINIC | Age: 68
End: 2018-12-28

## 2018-12-28 NOTE — TELEPHONE ENCOUNTER
----- Message from Davida Montoya sent at 12/28/2018 10:42 AM CST -----  Contact: Patient  Type: Needs Medical Advice    Who Called: Patient  Best Call Back Number:   Additional Information: Calling to speak with the Nurse to find out why she is being referred to a Urologist. Please advise.

## 2018-12-28 NOTE — TELEPHONE ENCOUNTER
----- Message from Adry Damian MD sent at 12/28/2018 12:30 PM CST -----  I would want her to see urology in any event since she has had blood twice without evidence of infection on a culture.  Even if she has no blood now she is at risk for missed diagnosis if we don't send her for urology evaluation.  Please schedule appt.

## 2018-12-28 NOTE — TELEPHONE ENCOUNTER
Spoke with patient, informed that there was occult blood present in both ua's from 9/11 and 10/19. Dr. Damian sent in a referral to urology for a consult to further investigate why blood is present. Patient verbalized understanding but was questioning why we were just now informing her of this referral. Informed this information was just received by us as well. Patient verbalized understanding, stated she would wait for call from urology.

## 2019-01-07 DIAGNOSIS — Z86.010 HISTORY OF COLON POLYPS: Primary | ICD-10-CM

## 2019-01-14 ENCOUNTER — APPOINTMENT (OUTPATIENT)
Dept: LAB | Facility: HOSPITAL | Age: 69
End: 2019-01-14
Attending: NURSE PRACTITIONER
Payer: MEDICARE

## 2019-01-14 ENCOUNTER — OFFICE VISIT (OUTPATIENT)
Dept: UROLOGY | Facility: CLINIC | Age: 69
End: 2019-01-14
Payer: MEDICARE

## 2019-01-14 VITALS
TEMPERATURE: 99 F | HEART RATE: 86 BPM | DIASTOLIC BLOOD PRESSURE: 68 MMHG | WEIGHT: 162.94 LBS | HEIGHT: 60 IN | SYSTOLIC BLOOD PRESSURE: 128 MMHG | BODY MASS INDEX: 31.99 KG/M2 | RESPIRATION RATE: 18 BRPM

## 2019-01-14 DIAGNOSIS — N39.0 RECURRENT UTI (URINARY TRACT INFECTION): ICD-10-CM

## 2019-01-14 DIAGNOSIS — R39.15 URINARY URGENCY: ICD-10-CM

## 2019-01-14 DIAGNOSIS — N39.0 URINARY TRACT INFECTION WITHOUT HEMATURIA, SITE UNSPECIFIED: Primary | ICD-10-CM

## 2019-01-14 LAB
BACTERIA #/AREA URNS HPF: ABNORMAL /HPF
BILIRUB SERPL-MCNC: ABNORMAL MG/DL
BLOOD URINE, POC: ABNORMAL
COLOR, POC UA: YELLOW
GLUCOSE UR QL STRIP: ABNORMAL
KETONES UR QL STRIP: ABNORMAL
LEUKOCYTE ESTERASE URINE, POC: ABNORMAL
MICROSCOPIC COMMENT: ABNORMAL
NITRITE, POC UA: ABNORMAL
PH, POC UA: 5.5
PROTEIN, POC: ABNORMAL
SPECIFIC GRAVITY, POC UA: 1.03
SQUAMOUS #/AREA URNS HPF: 2 /HPF
UROBILINOGEN, POC UA: 0.2
WBC #/AREA URNS HPF: 2 /HPF (ref 0–5)

## 2019-01-14 PROCEDURE — 99214 OFFICE O/P EST MOD 30 MIN: CPT | Mod: S$PBB,ICN,, | Performed by: NURSE PRACTITIONER

## 2019-01-14 PROCEDURE — 99214 PR OFFICE/OUTPT VISIT, EST, LEVL IV, 30-39 MIN: ICD-10-PCS | Mod: S$PBB,ICN,, | Performed by: NURSE PRACTITIONER

## 2019-01-14 PROCEDURE — 99215 OFFICE O/P EST HI 40 MIN: CPT | Mod: PBBFAC,PN | Performed by: NURSE PRACTITIONER

## 2019-01-14 PROCEDURE — 87086 URINE CULTURE/COLONY COUNT: CPT

## 2019-01-14 PROCEDURE — 99999 PR PBB SHADOW E&M-EST. PATIENT-LVL V: ICD-10-PCS | Mod: PBBFAC,,, | Performed by: NURSE PRACTITIONER

## 2019-01-14 PROCEDURE — 99999 PR PBB SHADOW E&M-EST. PATIENT-LVL V: CPT | Mod: PBBFAC,,, | Performed by: NURSE PRACTITIONER

## 2019-01-14 PROCEDURE — 81000 URINALYSIS NONAUTO W/SCOPE: CPT

## 2019-01-14 PROCEDURE — 81002 URINALYSIS NONAUTO W/O SCOPE: CPT | Mod: PBBFAC,PN | Performed by: NURSE PRACTITIONER

## 2019-01-14 NOTE — PROGRESS NOTES
[facility] Note  Staff: NADINE Brock    Referring provider and please cc: Dr. Adry Damian  PCP: Dr. Adry Damian    Chief Complaint: Recurrent Urinary tract infections    Subjective:        HPI: Ella Canales is a 68 y.o. female NEW PATIENT presents today for further evaluations of recurrent urinary tract symptoms.  Pt states today when she gets UTIs her symptoms are increased Urinary frequency, dysuria, and bladder pain.  As of today's office visit, pt feels that she currently does not have a urinary tract infection.    Hx of Urine Cultures:  10/19/2018:  Multiple Organisms*  09/10/18:      Multiple Organisms*  05/24/17:      Klebsiella Pneu.    Questions asked pt during office visit today:  DTF: None, NTF: 1x night  Urgency:No, incontinence with urgency? Yes if she holds it too long  Incontinence with laughing or straining: No; bothersome: No  If yes, how many pads/day? N/A  Feel a bulge?No  G3, P 3, vaginal   Gross Hematuria: No  History of UTI: No  Sexually active?: No    Constipation issues?:  None    REVIEW OF SYSTEMS:  A comprehensive 10 system review was performed and is negative except as noted above in HPI    PMHx:  Past Medical History:   Diagnosis Date    Arthritis     bilateral knees    GERD (gastroesophageal reflux disease)     Hyperlipidemia     Hypertension     Osteopenia     S/P PICC central line placement     Ulcer      Kidney stones: No history    PSHx:  Past Surgical History:   Procedure Laterality Date    APPLICATION-WOUND DRESSING Left 3/9/2017    [facility]    COLONOSCOPY N/A 4/3/2012    [facility] at [facility]O    ECTOPIC PREGNANCY SURGERY      fess      FRACTURE SURGERY      ORIF right arm.    GRAFT Left 3/9/2017    [facility]    INCISION AND DRAINAGE (I & D) Left 3/9/2017    [facility] OR    INCISION AND DRAINAGE-KNEE Left  2/25/2017    [facility] 2ND [facility]    LYSIS-ADHESION Left 3/9/2017    [facility] OR    REPLACEMENT-KNEE-TOTAL Left 2/14/2017    [facility] at [facility] OR    REVISION-ARTHROPLASTY-KNEE-TOTAL; TIBIAL POLYETHYLENE EXCHANGE Left 3/9/2017    [facility] at [facility] OR    TOTAL KNEE ARTHROPLASTY Left     TUBAL LIGATION       Stents/Valves/Foreign Bodies: yes, knee replacements, miley in right arm  Cardiac Evaluation: No  Cardiologist: Dr. Fredy Ellis    Screening Studies  Colonoscopy: Scheduled for this Monday, hx of benign polyps    Fam Hx:   malignancies: No , gyn malignancies: No   kidney stones: Yes - 2 sons, and daughter     Allergies:  Ace inhibitors and Latex    Medications: reviewed   Anticoagulation: No    Objective:     Vitals:    01/14/19 1104   BP: 128/68   Pulse: 86   Resp: 18   Temp: 98.6 °F (37 °C)     Physical Exam   Vitals reviewed.  Constitutional: She is oriented to person, place, and time. She appears well-developed and well-nourished.   HENT:   Head: Normocephalic and atraumatic.   Eyes: Conjunctivae and EOM are normal. Pupils are equal, round, and reactive to light.   Neck: Normal range of motion. Neck supple.   Cardiovascular: Normal rate, regular rhythm, normal heart sounds and intact distal pulses.    Pulmonary/Chest: Effort normal and breath sounds normal.   Abdominal: Soft. Bowel sounds are normal.   Musculoskeletal: Normal range of motion.   Neurological: She is alert and oriented to person, place, and time. She has normal reflexes.   Skin: Skin is warm and dry.     Psychiatric: She has a normal mood and affect. Her behavior is normal. Judgment and thought content normal.     LABS REVIEW:  UA today:   Color:Clear, Yellow  Spec. Grav.  1.030  PH  5.5  Trace of Leukocytes    Cr:   Lab Results   Component Value Date    CREATININE 0.8 12/10/2018     Assessment:       1. Urinary tract infection without  hematuria, site unspecified    2. Recurrent UTI (urinary tract infection)    3. Urinary urgency          Plan:   Hx of Recurrent UTIs:    1.  UA Micro and urine culture to be performed.    F/u with me in one month to recheck symptoms.    For your recurrent UTIs:  Behavioral changes to help decrease UTI recurrences   -increase water intake (6 to 8 bottles water per day)   -don't hold urine, void every 2 to 3 hours   -prevent constipation (miralax capful daily if necessary) to have a bowel movement daily and keep stools soft  -cut down on caffeine,drink mainly water  -no douching   -void immediately after sexual intercourse  -wipe front to back     OTC meds to try (go to the pharmacist and ask where they are, they are over the counter)  -Use lactobacillus probiotic in capsule form in vagina once nightly for 10 days if you feel like you have an infection coming on   -cranberry pills 500mg tabs TID, can buy over the counter  -take a probiotic daily  -D-Mannose once daily over the counter    IMPORTANT: If you feel like you have a UTI coming on, call our office and talk to one of the nurses. We will have you drop off a urine here in clinic or at one of our [facility] facilities. I do not typically like to write for antibiotics unless we have a urine culture. Avoid going to urgent care. We need to start documenting your urine cultures here in our office to see if they are really recurrent UTIs or sx of UTIs.     If it is over the weekend and you cannot wait, call our on call doctor, however we still prefer urine cultures before giving antibiotics.     If you have fever and it doesn't improve with tylenol or ibuprofen or if you have flank pain associated with the uti symptoms go to the ER.     If you do continue to have positive urine cultures then we may proceed with doing a cystoscopy (to look in your bladder) and an imaging study.     [facility]: Active    Lisy Hennessy, NADINE

## 2019-01-14 NOTE — LETTER
January 14, 2019      Adry Damian MD  [address]  [place] [address]           [place] [facility]  [address] [place] [address] [address]  [place] [address]  Phone: [phone]  Fax: [phone]          Patient: Ella Canales   MR Number: [id number]   YOB: 1950   Date of Visit: 1/14/2019       Dear Dr. Adry Damian:    Thank you for referring Ella Canales to me for evaluation. Attached you will find relevant portions of my assessment and plan of care.    If you have questions, please do not hesitate to call me. I look forward to following Ella Canales along with you.    Sincerely,    Lisy Hennessy, [facility]    Enclosure  CC:  No Recipients    If you would like to receive this communication electronically, please contact [email or web address] or [phone] to request more information on [employer] access.    For providers and/or their staff who would like to refer a patient to [facility], please contact us through our one-stop-shop provider referral line, [facility], at [phone].    If you feel you have received this communication in error or would no longer like to receive these types of communications, please e-mail [email or web address]

## 2019-01-14 NOTE — PATIENT INSTRUCTIONS

## 2019-01-15 LAB
BACTERIA UR CULT: NORMAL
BACTERIA UR CULT: NORMAL

## 2019-01-22 ENCOUNTER — ANESTHESIA EVENT (OUTPATIENT)
Dept: ENDOSCOPY | Facility: HOSPITAL | Age: 69
End: 2019-01-22
Payer: MEDICARE

## 2019-01-22 ENCOUNTER — ANESTHESIA (OUTPATIENT)
Dept: ENDOSCOPY | Facility: HOSPITAL | Age: 69
End: 2019-01-22
Payer: MEDICARE

## 2019-01-22 ENCOUNTER — HOSPITAL ENCOUNTER (OUTPATIENT)
Facility: HOSPITAL | Age: 69
Discharge: HOME | End: 2019-01-22
Attending: INTERNAL MEDICINE | Admitting: INTERNAL MEDICINE
Payer: MEDICARE

## 2019-01-22 DIAGNOSIS — K64.8 INTERNAL HEMORRHOIDS: ICD-10-CM

## 2019-01-22 DIAGNOSIS — Z86.010 HX OF COLONIC POLYP: ICD-10-CM

## 2019-01-22 DIAGNOSIS — K57.30 DIVERTICULOSIS OF LARGE INTESTINE WITHOUT HEMORRHAGE: ICD-10-CM

## 2019-01-22 DIAGNOSIS — K63.5 POLYP OF COLON, UNSPECIFIED PART OF COLON, UNSPECIFIED TYPE: Primary | ICD-10-CM

## 2019-01-22 PROBLEM — Z86.0100 HX OF COLONIC POLYP: Status: ACTIVE | Noted: 2019-01-22

## 2019-01-22 PROCEDURE — 88305 TISSUE EXAM BY PATHOLOGIST: CPT | Mod: 26,,, | Performed by: PATHOLOGY

## 2019-01-22 PROCEDURE — D9220A PRA ANESTHESIA: Mod: ANES,,, | Performed by: ANESTHESIOLOGY

## 2019-01-22 PROCEDURE — D9220A PRA ANESTHESIA: ICD-10-PCS | Mod: CRNA,,, | Performed by: NURSE ANESTHETIST, CERTIFIED REGISTERED

## 2019-01-22 PROCEDURE — 25000003 PHARM REV CODE 250: Performed by: INTERNAL MEDICINE

## 2019-01-22 PROCEDURE — D9220A PRA ANESTHESIA: Mod: CRNA,,, | Performed by: NURSE ANESTHETIST, CERTIFIED REGISTERED

## 2019-01-22 PROCEDURE — 37000008 HC ANESTHESIA 1ST 15 MINUTES: Performed by: INTERNAL MEDICINE

## 2019-01-22 PROCEDURE — 27201012 HC FORCEPS, HOT/COLD, DISP: Performed by: INTERNAL MEDICINE

## 2019-01-22 PROCEDURE — 45385 COLONOSCOPY W/LESION REMOVAL: CPT | Mod: PT,,, | Performed by: INTERNAL MEDICINE

## 2019-01-22 PROCEDURE — 45380 COLONOSCOPY AND BIOPSY: CPT | Performed by: INTERNAL MEDICINE

## 2019-01-22 PROCEDURE — D9220A PRA ANESTHESIA: ICD-10-PCS | Mod: ANES,,, | Performed by: ANESTHESIOLOGY

## 2019-01-22 PROCEDURE — 63600175 PHARM REV CODE 636 W HCPCS: Performed by: NURSE ANESTHETIST, CERTIFIED REGISTERED

## 2019-01-22 PROCEDURE — 27201089 HC SNARE, DISP (ANY): Performed by: INTERNAL MEDICINE

## 2019-01-22 PROCEDURE — 45380 COLONOSCOPY AND BIOPSY: CPT | Mod: 59,,, | Performed by: INTERNAL MEDICINE

## 2019-01-22 PROCEDURE — 88305 TISSUE EXAM BY PATHOLOGIST: CPT | Mod: 59 | Performed by: PATHOLOGY

## 2019-01-22 PROCEDURE — 45385 PR COLONOSCOPY,REMV LESN,SNARE: ICD-10-PCS | Mod: PT,,, | Performed by: INTERNAL MEDICINE

## 2019-01-22 PROCEDURE — 45385 COLONOSCOPY W/LESION REMOVAL: CPT | Performed by: INTERNAL MEDICINE

## 2019-01-22 PROCEDURE — 37000009 HC ANESTHESIA EA ADD 15 MINS: Performed by: INTERNAL MEDICINE

## 2019-01-22 PROCEDURE — 88305 TISSUE SPECIMEN TO PATHOLOGY - SURGERY: ICD-10-PCS | Mod: 26,,, | Performed by: PATHOLOGY

## 2019-01-22 PROCEDURE — 45380 PR COLONOSCOPY,BIOPSY: ICD-10-PCS | Mod: 59,,, | Performed by: INTERNAL MEDICINE

## 2019-01-22 RX ORDER — SODIUM CHLORIDE 9 MG/ML
INJECTION, SOLUTION INTRAVENOUS CONTINUOUS
Status: DISCONTINUED | OUTPATIENT
Start: 2019-01-22 | End: 2019-01-22 | Stop reason: HOSPADM

## 2019-01-22 RX ORDER — PROPOFOL 10 MG/ML
VIAL (ML) INTRAVENOUS
Status: DISCONTINUED | OUTPATIENT
Start: 2019-01-22 | End: 2019-01-22

## 2019-01-22 RX ORDER — LIDOCAINE HYDROCHLORIDE 20 MG/ML
INJECTION, SOLUTION EPIDURAL; INFILTRATION; INTRACAUDAL; PERINEURAL
Status: DISCONTINUED
Start: 2019-01-22 | End: 2019-01-22 | Stop reason: HOSPADM

## 2019-01-22 RX ORDER — PROPOFOL 10 MG/ML
INJECTION, EMULSION INTRAVENOUS
Status: COMPLETED
Start: 2019-01-22 | End: 2019-01-22

## 2019-01-22 RX ORDER — LIDOCAINE HCL/PF 100 MG/5ML
SYRINGE (ML) INTRAVENOUS
Status: DISCONTINUED | OUTPATIENT
Start: 2019-01-22 | End: 2019-01-22

## 2019-01-22 RX ADMIN — PROPOFOL 30 MG: 10 INJECTION, EMULSION INTRAVENOUS at 09:01

## 2019-01-22 RX ADMIN — SODIUM CHLORIDE: 0.9 INJECTION, SOLUTION INTRAVENOUS at 08:01

## 2019-01-22 RX ADMIN — PROPOFOL 60 MG: 10 INJECTION, EMULSION INTRAVENOUS at 09:01

## 2019-01-22 RX ADMIN — LIDOCAINE HYDROCHLORIDE 75 MG: 20 INJECTION, SOLUTION INTRAVENOUS at 09:01

## 2019-01-22 RX ADMIN — PROPOFOL 80 MG: 10 INJECTION, EMULSION INTRAVENOUS at 09:01

## 2019-01-22 NOTE — H&P
CC: History of colon polyps - last scope 7 years ago    68 year old female with above. States that symptoms are absent, no alleviating/exacerbating factors. No family history of CA. Positive personal history of polyps. No bleeding or weight loss.     ROS:  No headache, no fever/chills, no chest pain/SOB, no nausea/vomiting/diarrhea/constipation/GI bleeding/abdominal pain, no dysuria/hematuria.    VSSAF   Exam:   Alert and oriented x 3; no apparent distress   PERRLA, sclera anicteric  CV: Regular rate/rhythm, normal PMI   Lungs: Clear bilaterally with no wheeze/rales   Abdomen: Soft, NT/ND, normal bowel sounds   Ext: No cyanosis, clubbing     Impression:   As above    Plan:   Proceed with endoscopy. Further recs to follow.

## 2019-01-22 NOTE — ANESTHESIA POSTPROCEDURE EVALUATION
Anesthesia Post Evaluation    Patient: Ella Canales    Procedure(s) Performed: Procedure(s) (LRB):  COLONOSCOPY (N/A)    Final Anesthesia Type: general  Patient location during evaluation: PACU  Patient participation: Yes- Able to Participate  Level of consciousness: awake and alert  Post-procedure vital signs: reviewed and stable  Pain management: adequate  Airway patency: patent  PONV status at discharge: No PONV  Anesthetic complications: no      Cardiovascular status: hemodynamically stable  Respiratory status: unassisted and room air  Hydration status: euvolemic  Follow-up not needed.        Visit Vitals  BP (!) 81/51   Pulse 63   Temp 37.2 °C (99 °F) (Temporal)   Resp 18   Ht 5' (1.524 m)   Wt 70.3 kg (155 lb)   LMP 04/03/2012   SpO2 99%   Breastfeeding? No   BMI 30.27 kg/m²       Pain/Oj Score: No Data Recorded

## 2019-01-22 NOTE — DISCHARGE INSTRUCTIONS
Understanding Colon and Rectal Polyps    The colon (also called the large intestine) is a muscular tube that forms the last part of the digestive tract. It absorbs water and stores food waste. The colon is about 4 to 6 feet long. The rectum is the last 6 inches of the colon. The colon and rectum have a smooth lining composed of millions of cells. Changes in these cells can lead to growths in the colon that can become cancerous and should be removed. Multiple tests are available to screen for colon cancer, but the colonoscopy is the most recommended test. During colonoscopy, these polyps can be removed. How often you need this test depends on many things including your condition, your family history, symptoms, and what the findings were at the previous colonoscopy.   When the colon lining changes  Changes that happen in the cells that line the colon or rectum can lead to growths called polyps. Over a period of years, polyps can turn cancerous. Removing polyps early may prevent cancer from ever forming.  Polyps  Polyps are fleshy clumps of tissue that form on the lining of the colon or rectum. Small polyps are usually benign (not cancerous). However, over time, cells in a polyp can change and become cancerous. Certain types of polyps known as adenomatous polyps are premalignant. The risk for invasive cancer increases with the size of the polyp and certain cell and gene features. This means that they can become cancerous if they're not removed. Hyperplastic polyps are benign. They can grow quite large and not turn cancerous.   Cancer  Almost all colorectal cancers start when polyp cells begin growing abnormally. As a cancerous tumor grows, it may involve more and more of the colon or rectum. In time, cancer can also grow beyond the colon or rectum and spread to nearby organs or to glands called lymph nodes. The cells can also travel to other parts of the body. This is known as metastasis. The earlier a cancerous  tumor is removed, the better the chance of preventing its spread.    Date Last Reviewed: 8/1/2016  © 1416-7609 [employer]. [address] [place] [address], [place] [address]. All rights reserved. This information is not intended as a substitute for professional medical care. Always follow your healthcare professional's instructions.      Discharge Instructions: Eating a High-Fiber Diet  Your health care provider has prescribed a high-fiber diet for you. Fiber is what gives strength and structure to plants. Most grains, beans, vegetables, and fruits contain fiber. Foods rich in fiber are often low in calories and fat, but they fill you up more. These foods may also reduce the risk of certain health problems.  There are two types of fiber:  · Insoluble fiber. This is found in whole grains, cereals, and certain fruits and vegetables (such as apple skins, corn, and beans). Insoluble fiber is made up mainly of plant cell walls. It may prevent constipation and reduce the risk of certain types of cancer.  · Soluble fiber. This type of fiber is found in oats, beans, nuts, and certain fruits and vegetables (such as strawberries and peas). Soluble fiber turns to gel in the digestive system, slowing the movement of the digestive tract. It helps control blood sugar levels and can reduce cholesterol, which may help lower the risk of heart disease. Soluble fiber can also help control appetite.     Home care  · Know how much fiber you need a day. The recommended daily amount of fiber is 25 grams for women and 38 grams for men. After age 50, daily fiber needs drop to 21 grams for women and 30 grams for men.  · Ask your doctor about a fiber supplement. (Always take fiber supplements with a large glass of water.)  · Keep track of how much fiber you eat.  · Eat a variety of foods high in fiber.  · Learn to read and understand food labels.  · Ask your healthcare provider how much water you should be drinking.  · Look for these  high-fiber foods:  ¨ Whole-grain breads and cereals  § 6 ounces a day give you about 18 grams of fiber (1 ounce is equal to 1 slice of bread, 1 cup of dry cereal, or 1/2 cup of cooked rice).  § Include wheat and oat bran cereals, whole-wheat muffins or toast, and corn tortillas in your meals.  ¨ Fruits   § 2 cups a day give you about 8 grams of fiber.  § Apples, oranges, strawberries, pears, and bananas are good sources.  § Fruit juice does not contain as much fiber as the fruit it was made from.  ¨ Vegetables  § 2½ cups a day give you about 11 grams of fiber. Add asparagus, carrots, broccoli, peas, and corn to your meals.  ¨ Legumes  § 1/4 cup a day (in place of meat) gives you about 4 grams of fiber. Try navy beans, lentils, chickpeas, and soybeans.  ¨ Seeds   § A small handful of seeds gives you about 3 grams of fiber. Try sunflower seeds.  Follow-up  Make a follow-up appointment with a nutritionist as directed by our staff.  Date Last Reviewed: 6/1/2015  © 8531-5499 [employer]. [address] [place] [address], [place] [address]. All rights reserved. This information is not intended as a substitute for professional medical care. Always follow your healthcare professional's instructions.      Discharge Instructions for Diverticulitis  You have been diagnosed with diverticulitis. This is a condition in which small pouches form in your colon (large intestine) and become inflamed or infected. Follow the guidelines below for home care.  As you recover  Tips for recovery include:  · Eat a low-fiber diet. Your healthcare provider may advise a liquid diet. This gives your bowel a chance to rest so that it can recover.  · Foods to include: flake cereal, mashed potatoes, pancakes, waffles, pasta, white bread, rice, applesauce, bananas, eggs, fish, poultry, tofu, and well-cooked vegetables  · Take your medicines as directed. Do not stop taking the medicines, even if you feel better.  · Monitor your temperature and  report any rising temperature to your healthcare provider.  · Take antibiotics exactly as directed. Do not miss any and keep taking them even if you feel better.   · Drink 6 to 8 glasses of water every day, unless directed otherwise.  · Use a heating pad or hot water bottle to reduce abdominal cramping or pain.      Preventing diverticulitis in the future  Tips for prevention include:  · Eat a high-fiber diet. Fiber adds bulk to the stool so that it passes through the large intestine more easily.  · Keep drinking 6 to 8 glasses of water every day, unless directed otherwise.  · Begin an exercise program. Ask your healthcare provider how to get started. You can benefit from simple activities such as walking or gardening.  · Treat diarrhea with a bland diet. Start with liquids only, then slowly add fiber over time.  · Watch for changes in your bowel movements (constipation to diarrhea).  · Avoid constipation with fiber and add a stool softener if needed.   · Get plenty of rest and sleep.  ·   Follow-up care  Make a follow-up appointment as directed by our staff.  When to call your healthcare provider  Call your healthcare provider immediately if you have any of the following:  · Fever of 100.4°F (38.0°C) or higher, or as directed by your healthcare provider  · Chills  · Severe cramps in the belly, most commonly the lower left side  · Tenderness in the belly, most commonly the lower left side  · Nausea and vomiting  · Bleeding from your rectum   Date Last Reviewed: 7/1/2016  © 1819-5067 [employer]. [address] [place] [address], [place] [address]. All rights reserved. This information is not intended as a substitute for professional medical care. Always follow your healthcare professional's instructions.      Hemorrhoids    Hemorrhoids are swollen and inflamed veins inside the rectum and near the anus. The rectum is the last several inches of the colon. The anus is the passage between the rectum and the outside  of the body.  Causes  The veins can become swollen due to increased pressure in them. This is most often caused by:  · Chronic constipation or diarrhea  · Straining when having a bowel movement  · Sitting too long on the toilet  · A low-fiber diet  · Pregnancy  Symptoms  · Bleeding from the rectum (this may be noticeable after bowel movements)  · Lump near the anus  · Itching around the anus  · Pain around the anus  There are different types of hemorrhoids. Depending on the type you have and the severity, you may be able to treat yourself at home. In some cases, a procedure may be the best treatment option. Your healthcare provider can tell you more about this, if needed.     Home care  General care  · To get relief from pain or itching, try:  ¨ Topical products. Your healthcare provider may prescribe or recommend creams, ointments, or pads that can be applied to the hemorrhoid. Use these exactly as directed.  ¨ Medicines. Your healthcare provider may recommend stool softeners, suppositories, or laxatives to help manage constipation. Use these exactly as directed.  ¨ Sitz baths. A sitz bath involves sitting in a few inches of warm bath water. Be careful not to make the water so hot that you burn yourself--test it before sitting in it. Soak for about 10 to 15 minutes a few times a day. This may help relieve pain.  Tips to help prevent hemorrhoids  · Eat more fiber. Fiber adds bulk to stool and absorbs water as it moves through your colon. This makes stool softer and easier to pass.  ¨ Increase the fiber in your diet with more fiber-rich foods. These include fresh fruit, vegetables, and whole grains.  ¨ Take a fiber supplement or bulking agent, if advised to by your provider. These include products such as psyllium or methylcellulose.  · Drink plenty of water, if directed to by your provider. This can help keep stool soft.  · Be more active. Frequent exercise aids digestion and helps prevent constipation. It may also  help make bowel movements more regular.  · Dont strain during bowel movements. This can make hemorrhoids more likely. Also, dont sit on the toilet for long periods of time.  ·   Follow-up care  Follow up with your healthcare provider, or as advised. If a culture or imaging tests were done, you will be notified of the results when they are ready. This may take a few days or longer.  When to seek medical advice  Call your healthcare provider right away if any of these occur:  · Increased bleeding from the rectum  · Increased pain around the rectum or anus  · Weakness or dizziness  Call 911  Call 911 or return to the emergency department right away if any of these occur:  · Trouble breathing or swallowing  · Fainting or loss of consciousness  · Unusually fast heart rate  · Vomiting blood  · Large amounts of blood in stool  Date Last Reviewed: 6/22/2015  © 2269-7526 [employer] [address] [place] [address]. All rights reserved. This information is not intended as a substitute for professional medical care. Always follow your healthcare professional's instructions.    Discharge Instructions: After Your Surgery/Procedure  Youve just had surgery. During surgery you were given medicine called anesthesia to keep you relaxed and free of pain. After surgery you may have some pain or nausea. This is common. Here are some tips for feeling better and getting well after surgery.     Stay on schedule with your medication.   Going home  Your doctor or nurse will show you how to take care of yourself when you go home. He or she will also answer your questions. Have an adult family member or friend drive you home.      For your safety we recommend these precaution for the first 24 hours after your procedure:  · Do not drive or use heavy equipment.  · Do not make important decisions or sign legal papers.  · Do not drink alcohol.  · Have someone stay with you, if needed. He or she can watch for problems  "and help keep you safe.  · Your concentration, balance, coordination, and judgement may be impaired for many hours after anesthesia.  Use caution when ambulating or standing up.     · You may feel weak and "washed out" after anesthesia and surgery.      Subtle residual effects of general anesthesia or sedation with regional / local anesthesia can last more than 24 hours.  Rest for the remainder of the day or longer if your Doctor/Surgeon has advised you to do so.  Although you may feel normal within the first 24 hours, your reflexes and mental ability may be impaired without you realizing it.  You may feel dizzy, lightheaded or sleepy for 24 hours or longer.      Be sure to go to all follow-up visits with your doctor. And rest after your surgery for as long as your doctor tells you to.  Coping with pain  If you have pain after surgery, pain medicine will help you feel better. Take it as told, before pain becomes severe. Also, ask your doctor or pharmacist about other ways to control pain. This might be with heat, ice, or relaxation. And follow any other instructions your surgeon or nurse gives you.  Tips for taking pain medicine  To get the best relief possible, remember these points:  · Pain medicines can upset your stomach. Taking them with a little food may help.  · Most pain relievers taken by mouth need at least 20 to 30 minutes to start to work.  · Taking medicine on a schedule can help you remember to take it. Try to time your medicine so that you can take it before starting an activity. This might be before you get dressed, go for a walk, or sit down for dinner.  · Constipation is a common side effect of pain medicines. Call your doctor before taking any medicines such as laxatives or stool softeners to help ease constipation. Also ask if you should skip any foods. Drinking lots of fluids and eating foods such as fruits and vegetables that are high in fiber can also help. Remember, do not take laxatives " unless your surgeon has prescribed them.  · Drinking alcohol and taking pain medicine can cause dizziness and slow your breathing. It can even be deadly. Do not drink alcohol while taking pain medicine.  · Pain medicine can make you react more slowly to things. Do not drive or run machinery while taking pain medicine.  Your health care provider may tell you to take acetaminophen to help ease your pain. Ask him or her how much you are supposed to take each day. Acetaminophen or other pain relievers may interact with your prescription medicines or other over-the-counter (OTC) drugs. Some prescription medicines have acetaminophen and other ingredients. Using both prescription and OTC acetaminophen for pain can cause you to overdose. Read the labels on your OTC medicines with care. This will help you to clearly know the list of ingredients, how much to take, and any warnings. It may also help you not take too much acetaminophen. If you have questions or do not understand the information, ask your pharmacist or health care provider to explain it to you before you take the OTC medicine.  Managing nausea  Some people have an upset stomach after surgery. This is often because of anesthesia, pain, or pain medicine, or the stress of surgery. These tips will help you handle nausea and eat healthy foods as you get better. If you were on a special food plan before surgery, ask your doctor if you should follow it while you get better. These tips may help:  · Do not push yourself to eat. Your body will tell you when to eat and how much.  · Start off with clear liquids and soup. They are easier to digest.  · Next try semi-solid foods, such as mashed potatoes, applesauce, and gelatin, as you feel ready.  · Slowly move to solid foods. Dont eat fatty, rich, or spicy foods at first.  · Do not force yourself to have 3 large meals a day. Instead eat smaller amounts more often.  · Take pain medicines with a small amount of solid food,  such as crackers or toast, to avoid nausea.     Call your surgeon if  · You still have pain an hour after taking medicine. The medicine may not be strong enough.  · You feel too sleepy, dizzy, or groggy. The medicine may be too strong.  · You have side effects like nausea, vomiting, or skin changes, such as rash, itching, or hives.       If you have obstructive sleep apnea  You were given anesthesia medicine during surgery to keep you comfortable and free of pain. After surgery, you may have more apnea spells because of this medicine and other medicines you were given. The spells may last longer than usual.   At home:  · Keep using the continuous positive airway pressure (CPAP) device when you sleep. Unless your health care provider tells you not to, use it when you sleep, day or night. CPAP is a common device used to treat obstructive sleep apnea.  · Talk with your provider before taking any pain medicine, muscle relaxants, or sedatives. Your provider will tell you about the possible dangers of taking these medicines.  © 0781-8496 [employer]. [address] [place] [address]. All rights reserved. This information is not intended as a substitute for professional medical care. Always follow your healthcare professional's instructions.    We hope your stay was comfortable as you heal now, mend and rest.    For we have enjoyed taking care of you by giving your our best.    And as you get better, by regaining your health and strength;   We count it as a privilege to have served you and hope your time at [facility] was well spent.      Thank  You!!!

## 2019-01-22 NOTE — TRANSFER OF CARE
Anesthesia Transfer of Care Note    Patient: Ella Canales    Procedure(s) Performed: Procedure(s) (LRB):  COLONOSCOPY (N/A)    Patient location: PACU    Anesthesia Type: general    Transport from OR: Transported from OR on room air with adequate spontaneous ventilation    Post pain: adequate analgesia    Post assessment: no apparent anesthetic complications    Post vital signs: stable    Level of consciousness: awake    Nausea/Vomiting: no nausea/vomiting    Complications: none    Transfer of care protocol was followed      Last vitals:   Visit Vitals  BP (!) 142/88 (BP Location: Left arm, Patient Position: Lying)   Pulse 65   Temp 37.2 °C (99 °F) (Temporal)   Resp (!) 21   Ht 5' (1.524 m)   Wt 70.3 kg (155 lb)   LMP 04/03/2012   SpO2 96%   Breastfeeding? No   BMI 30.27 kg/m²

## 2019-01-22 NOTE — ANESTHESIA PREPROCEDURE EVALUATION
01/22/2019  Ella Canales is a 68 y.o., female.    Anesthesia Evaluation    I have reviewed the Patient Summary Reports.    I have reviewed the Nursing Notes.   I have reviewed the Medications.     Review of Systems  Anesthesia Hx:  No problems with previous Anesthesia    Social:  Non-Smoker    Hematology/Oncology:         -- Anemia:   EENT/Dental:EENT/Dental Normal   Cardiovascular:   Hypertension hyperlipidemia    Pulmonary:  Pulmonary Normal    Hepatic/GI:   GERD    Musculoskeletal:   Arthritis     Endocrine:  Endocrine Normal    Dermatological:  Skin Normal    Psych:  Psychiatric Normal           Physical Exam  General:  Obesity    Airway/Jaw/Neck:  Airway Findings: Mouth Opening: Normal Tongue: Normal  General Airway Assessment: Adult  Mallampati: II  TM Distance: Normal, at least 6 cm        Eyes/Ears/Nose:  EYES/EARS/NOSE FINDINGS: Normal   Dental:  DENTAL FINDINGS: Normal   Chest/Lungs:  Chest/Lungs Findings: Clear to auscultation, Normal Respiratory Rate     Heart/Vascular:  Heart Findings: Rate: Normal  Rhythm: Regular Rhythm        Mental Status:  Mental Status Findings:  Cooperative, Alert and Oriented         Anesthesia Plan  Type of Anesthesia, risks & benefits discussed:  Anesthesia Type:  general  Patient's Preference:   Intra-op Monitoring Plan: standard ASA monitors  Intra-op Monitoring Plan Comments:   Post Op Pain Control Plan:   Post Op Pain Control Plan Comments:   Induction:    Beta Blocker:  Patient is on a Beta-Blocker and has received one dose within the past 24 hours (No further documentation required).       Informed Consent: Patient understands risks and agrees with Anesthesia plan.  Questions answered. Anesthesia consent signed with patient.  ASA Score: 2     Day of Surgery Review of History & Physical: I have interviewed and examined the patient. I have reviewed the  patient's H&P dated:  There are no significant changes.  H&P update referred to the provider.         Ready For Surgery From Anesthesia Perspective.

## 2019-01-22 NOTE — OR NURSING
Have you had a colonoscopy LESS THAN 3 years ago?   * If YES, answer these questions*:      NO  1. Did patient have a prior colonic polyp in a previous surveillance/diagnostic colonoscopy and is 18 years or older on date of encounter?      YES  2. Documentation of < 3 year interval since the patients last colonoscopy due to medical reasons (eg., last colonoscopy incomplete, last colonoscopy had inadequate prep, piecemeal removal of adenomas, or last colonoscopy found > 10 adenomas) ?   Last colonoscopy 2012

## 2019-01-23 VITALS
OXYGEN SATURATION: 98 % | BODY MASS INDEX: 30.43 KG/M2 | HEIGHT: 60 IN | TEMPERATURE: 99 F | DIASTOLIC BLOOD PRESSURE: 90 MMHG | WEIGHT: 155 LBS | RESPIRATION RATE: 17 BRPM | SYSTOLIC BLOOD PRESSURE: 160 MMHG | HEART RATE: 58 BPM

## 2019-01-25 NOTE — PROGRESS NOTES
Subjective:          Chief Complaint: Ella Canales is a 66 y.o. female who had no chief complaint listed for this encounter.    HPI Comments: Patient is here for a follow up for her left knee. She had a wound vac change yesterday but states that it is making a different noise and she is worried that it isn't working.     She was then taken to the OR by Dr. Patel Denise for an irrigation and debridement on 2/24/2017. She has see ID  Dr. Mckinley.        She is on a PICC line with vanocmycin and Cipro orally.        DATE OF PROCEDURE: 03/09/2017     ATTENDING SURGEON: Angie Mulligan M.D.     FIRST ASSISTANT: Paolo Abdi M.D. - Fellow.     SECOND ASSISTANT: SMA Prince - Assistant.     PREOPERATIVE DIAGNOSIS: Left knee sepsis.     POSTOPERATIVE DIAGNOSES:  1. Left knee hemarthrosis.  2. Left knee sepsis.  3. Left knee synovitis.  4. Left knee medial retinacular tear.  5. Left knee medial collateral ligament insufficiency.     PROCEDURES PERFORMED:  1. Left knee complex polyethylene liner exchange.  2. Left knee complex incision and drainage.  3. Left knee medial collateral ligament repair.  4. Left knee medial retinacular repair with application of a flexed HD graft.  5. Left knee complex open synovectomy with hemostasis control.  6. Wound VAC application.              DATE OF PROCEDURE: 02/24/2017     PREOPERATIVE DIAGNOSIS: Possible septic arthritis, left total knee replacement.     POSTOPERATIVE DIAGNOSIS: Possible septic arthritis, left total knee   replacement.      PROCEDURE: Arthrotomy, left knee, with irrigation and debridement (CPT #94111).     SURGEON: Patel Denise M.D.     ASSISTANTS: Andrey Ya M.D. (RES); Anant Issa M.D. (RES)    DATE OF PROCEDURE: 2/14/2017     ATTENDING SURGEON: Surgeon(s) and Role:  * Angie Mulligan MD - Primary      FIRST ASSISTANT:Paolo Abdi MD - Fellow  SECOND ASSISTANT: Ronel Urban PA-C - Assistant  THIRD ASSISTANT: SMA Prince -  Assistant     PREOPERATIVE DIAGNOSIS: Left  Arthritis, Traumatic M12.50, DJD knee M17.9 and Genu Varum (acquired) M21.169     POSTOPERATIVE DIAGNOSIS:  Left  Arthritis, Traumatic M12.50, DJD knee M17.9 and Genu Varum (acquired) M21.169     PROCEDURES PERFORMED:  Left  Replacement, Knee, Medial and Lateral compartment (Total Knee) 53375        Review of Systems   Constitution: Negative. Negative for chills, fever, night sweats, weight gain and weight loss.   HENT: Negative for congestion, headaches, hearing loss and sore throat.    Eyes: Negative for blurred vision, double vision and visual disturbance.   Cardiovascular: Negative for chest pain, leg swelling and palpitations.   Respiratory: Negative for cough and shortness of breath.    Endocrine: Negative for polyuria.   Hematologic/Lymphatic: Negative for bleeding problem. Does not bruise/bleed easily.   Skin: Negative for itching, poor wound healing and rash.   Musculoskeletal: Positive for joint pain, joint swelling and stiffness. Negative for back pain, muscle cramps, muscle weakness and myalgias.   Gastrointestinal: Negative for abdominal pain, constipation, diarrhea, melena, nausea and vomiting.   Genitourinary: Negative.  Negative for frequency and hematuria.   Neurological: Negative for dizziness, loss of balance, numbness, paresthesias and sensory change.   Psychiatric/Behavioral: Negative for altered mental status and depression. The patient is not nervous/anxious.    Allergic/Immunologic: Negative for hives.                   Objective:        General: Ella is well-developed, well-nourished, appears stated age, in no acute distress, alert and oriented to time, place and person.     General    Vitals reviewed.  Constitutional: She is oriented to person, place, and time. She appears well-developed and well-nourished. No distress.   HENT:   Mouth/Throat: No oropharyngeal exudate.   Eyes: Right eye exhibits no discharge. Left eye exhibits no discharge.    Neck: Normal range of motion.   Cardiovascular: Normal rate and regular rhythm.    Pulmonary/Chest: Effort normal and breath sounds normal. No respiratory distress.   Neurological: She is alert and oriented to person, place, and time. She has normal reflexes. No cranial nerve deficit. Coordination normal.   Psychiatric: She has a normal mood and affect. Her behavior is normal. Judgment and thought content normal.     General Musculoskeletal Exam   Gait: antalgic       Right Knee Exam     Inspection   Erythema: absent  Scars: absent  Swelling: absent  Effusion: effusion  Deformity: deformity  Bruising: absent    Tenderness   The patient is experiencing no tenderness.         Range of Motion   Extension: 0   Flexion: 140     Tests   Meniscus   David:  Medial - negative Lateral - negative  Ligament Examination Lachman: normal (-1 to 2mm) PCL-Posterior Drawer: normal (0 to 2mm)     MCL - Valgus: normal (0 to 2mm)  LCL - Varus: normalPivot Shift: normal (Equal)Reverse Pivot Shift: normal (Equal)Dial Test at 30 degrees: normal (< 5 degrees)Dial Test at 90 degrees: normal (< 5 degrees)  Posterior Sag Test: negative  Posterolateral Corner: unstable (>15 degrees difference)  Patella   Patellar Apprehension: negative  Passive Patellar Tilt: neutral  Patellar Tracking: normal  Patellar Glide (quadrants): Lateral - 1   Medial - 2  Q-Angle at 90 degrees: normal  Patellar Grind: negative  J-Sign: none    Other   Meniscal Cyst: absent  Popliteal (Baker's) Cyst: absent  Sensation: normal    Left Knee Exam     Inspection   Erythema: present  Scars: present  Swelling: present  Effusion: present  Deformity: deformity  Bruising: present    Tenderness   Left knee tenderness location: global tenderness.    Range of Motion   Extension:  10 abnormal   Flexion:  80 abnormal     Tests   Meniscus   David:  Medial - negative Lateral - negative  Stability Lachman: normal (-1 to 2mm) PCL-Posterior Drawer: normal (0 to 2mm)  MCL - Valgus:  normal (0 to 2mm)  LCL - Varus: normal (0 to 2mm)Pivot Shift: normal (Equal)Reverse Pivot Shift: normal (Equal)Dial Test at 30 degrees: normal (< 5 degrees)Dial Test at 90 degrees: normal (< 5 degrees)  Posterior Sag Test: negative  Posterolateral Corner: unstable (>15 degrees difference)  Patella   Patellar Apprehension: negative  Passive Patellar Tilt: neutral  Patellar Tracking: normal  Patellar Glide (Quadrants): Lateral - 1 Medial - 2  Q-Angle at 90 degrees: normal  Patellar Grind: negative  J-Sign: J sign absent    Other   Meniscal Cyst: absent  Popliteal (Baker's) Cyst: absent  Sensation: normal    Comments:  Vac dressing intact; mikel drain removed  New vac dressing placed     Right Hip Exam     Tests   Ivon: negative  Left Hip Exam     Tests   Ivon: negative          Muscle Strength   Right Lower Extremity   Hip Abduction: 5/5   Quadriceps:  5/5   Hamstrin/5   Left Lower Extremity   Hip Abduction: 5/5   Quadriceps:  4/5   Hamstrin/5     Reflexes     Left Side  Quadriceps:  2+  Achilles:  2+    Right Side   Quadriceps:  2+  Achilles:  2+    Vascular Exam     Right Pulses  Dorsalis Pedis:      2+  Posterior Tibial:      2+        Left Pulses  Dorsalis Pedis:      2+  Posterior Tibial:      2+        Edema  Right Lower Leg: absent  Left Lower Leg: present            Assessment:       Encounter Diagnoses   Name Primary?    Painful orthopaedic hardware Yes    Genu varum of right lower extremity     Internal derangement of left knee     Wound, open, leg, left, sequela           Plan:       1. IKDC, SF-12 and KOOS was not filled out today in clinic.     RTC in 1 weeks with Dr. Angie Mulligan MD for postoperative follow-up. Patient will not fill out IKDC, SF-12 and KOOS on return.    2. Continue ABX per ID    3.  Wound vac dressing changed today on 12 cm length incision                    Patient questionnaires may have been collected.   <<-----Click here for Discharge Medication Review

## 2019-02-04 ENCOUNTER — PATIENT MESSAGE (OUTPATIENT)
Dept: GASTROENTEROLOGY | Facility: CLINIC | Age: 69
End: 2019-02-04

## 2019-02-08 NOTE — PROGRESS NOTES
"[facility] Note  Staff: TIFFANY Brock-C    PCP: Dr. Adry Damian    Chief Complaint: Routine recheck    Subjective:        HPI: Ella Canales is a 68 y.o. female presents today for routine recheck.  Pt doing well with no complaints of UTI as of today's visit.  Pt was last seen by me on 01/14/19 as a new patient for recurrent urinary tract infections.    Hx of Urine Cultures:  01/16/19:      Multiple organisms  10/19/2018:  Multiple Organisms*  09/10/18:      Multiple Organisms*  05/24/17:      Klebsiella Pneu.    Questions asked pt during "1st" office visit:  DTF: None, NTF: 1x night  Urgency:No, incontinence with urgency? Yes if she holds it too long  Incontinence with laughing or straining: No; bothersome: No  If yes, how many pads/day? N/A  Feel a bulge?No  G3, P 3, vaginal   Gross Hematuria: No  History of UTI: No  Sexually active?: No  Constipation issues?:  None    REVIEW OF SYSTEMS:  A comprehensive 10 system review was performed and is negative except as noted above in HPI    PMHx:  Past Medical History:   Diagnosis Date    Arthritis     bilateral knees    GERD (gastroesophageal reflux disease)     Hyperlipidemia     Hypertension     Osteopenia     S/P PICC central line placement     Ulcer      Kidney stones: No    PSHx:  Past Surgical History:   Procedure Laterality Date    APPLICATION-WOUND DRESSING Left 3/9/2017    [facility]    COLONOSCOPY N/A 1/22/2019    [facility]O    COLONOSCOPY N/A 4/3/2012    Performed by Kingston [facility]man [facility] at [facility] ENDO    ECTOPIC PREGNANCY SURGERY      fess      FRACTURE SURGERY      ORIF right arm.    GRAFT Left 3/9/2017    [facility] OR    INCISION AND DRAINAGE (I & D) Left 3/9/2017    [facility] OR    INCISION AND DRAINAGE-KNEE Left 2/25/2017    [facility] 2ND FLR    " JOINT REPLACEMENT      left knee    LYSIS-ADHESION Left 3/9/2017    [facility] at [facility] OR    REPLACEMENT-KNEE-TOTAL Left 2/14/2017    [facility] at [facility] OR    REVISION-ARTHROPLASTY-KNEE-TOTAL; TIBIAL POLYETHYLENE EXCHANGE Left 3/9/2017    Per[facility] by [facility] at [facility] OR    TOTAL KNEE ARTHROPLASTY Left     TUBAL LIGATION       Stents/Valves/Foreign Bodies: yes, knee replacements, miley in right arm  Cardiac Evaluation: No  Cardiologist: Dr. Fredy Ellis     Screening Studies  Colonoscopy: Scheduled for this Monday, hx of benign polyps     Fam Hx:   malignancies: No , gyn malignancies: No   kidney stones: Yes - 2 sons, and daughter     Allergies:  Ace inhibitors and Latex    Medications: reviewed   Anticoagulation: No    Objective:     Vitals:    02/11/19 1048   BP: 137/77   Pulse: 82   Resp: 18   Temp: 98.1 °F (36.7 °C)     Physical Exam   Vitals reviewed.  Constitutional: She is oriented to person, place, and time. She appears well-developed and well-nourished.   HENT:   Head: Normocephalic and atraumatic.   Eyes: Conjunctivae and EOM are normal. Pupils are equal, round, and reactive to light.   Neck: Normal range of motion. Neck supple.   Cardiovascular: Normal rate, regular rhythm, normal heart sounds and intact distal pulses.    Pulmonary/Chest: Effort normal and breath sounds normal.   Abdominal: Soft. Bowel sounds are normal.   Musculoskeletal: Normal range of motion.   Neurological: She is alert and oriented to person, place, and time. She has normal reflexes.   Skin: Skin is warm and dry.     Psychiatric: She has a normal mood and affect. Her behavior is normal. Judgment and thought content normal.     LABS REVIEW:  UA today: Color:Clear, Yellow  Spec. Grav.  1.025  PH  6  Small amt leuks  Cr:   Lab Results   Component Value Date    CREATININE 0.8 12/10/2018     Assessment:       1. Recurrent UTI (urinary tract infection)          Plan:     Urine  culture to be done  F/u with me in 3 months for recheck.    [facility]: Active    Lisy Hennessy, LAUREP-C

## 2019-02-11 ENCOUNTER — OFFICE VISIT (OUTPATIENT)
Dept: UROLOGY | Facility: CLINIC | Age: 69
End: 2019-02-11
Payer: MEDICARE

## 2019-02-11 VITALS
RESPIRATION RATE: 18 BRPM | TEMPERATURE: 98 F | BODY MASS INDEX: 32.28 KG/M2 | WEIGHT: 164.44 LBS | SYSTOLIC BLOOD PRESSURE: 137 MMHG | HEART RATE: 82 BPM | DIASTOLIC BLOOD PRESSURE: 77 MMHG | HEIGHT: 60 IN

## 2019-02-11 DIAGNOSIS — N39.0 RECURRENT UTI (URINARY TRACT INFECTION): Primary | ICD-10-CM

## 2019-02-11 LAB
BILIRUB SERPL-MCNC: ABNORMAL MG/DL
BLOOD URINE, POC: ABNORMAL
COLOR, POC UA: YELLOW
GLUCOSE UR QL STRIP: ABNORMAL
KETONES UR QL STRIP: ABNORMAL
LEUKOCYTE ESTERASE URINE, POC: ABNORMAL
NITRITE, POC UA: ABNORMAL
PH, POC UA: 6
PROTEIN, POC: ABNORMAL
SPECIFIC GRAVITY, POC UA: 1.02
UROBILINOGEN, POC UA: 0.2

## 2019-02-11 PROCEDURE — 99213 PR OFFICE/OUTPT VISIT, EST, LEVL III, 20-29 MIN: ICD-10-PCS | Mod: S$PBB,,, | Performed by: NURSE PRACTITIONER

## 2019-02-11 PROCEDURE — 87086 URINE CULTURE/COLONY COUNT: CPT

## 2019-02-11 PROCEDURE — 99999 PR PBB SHADOW E&M-EST. PATIENT-LVL V: CPT | Mod: PBBFAC,,, | Performed by: NURSE PRACTITIONER

## 2019-02-11 PROCEDURE — 99213 OFFICE O/P EST LOW 20 MIN: CPT | Mod: S$PBB,,, | Performed by: NURSE PRACTITIONER

## 2019-02-11 PROCEDURE — 99999 PR PBB SHADOW E&M-EST. PATIENT-LVL V: ICD-10-PCS | Mod: PBBFAC,,, | Performed by: NURSE PRACTITIONER

## 2019-02-11 PROCEDURE — 99215 OFFICE O/P EST HI 40 MIN: CPT | Mod: PBBFAC,PN | Performed by: NURSE PRACTITIONER

## 2019-02-11 PROCEDURE — 81002 URINALYSIS NONAUTO W/O SCOPE: CPT | Mod: PBBFAC,PN | Performed by: NURSE PRACTITIONER

## 2019-02-13 LAB — BACTERIA UR CULT: NORMAL

## 2019-03-19 NOTE — TELEPHONE ENCOUNTER
Refill needs to be sent separately as pharmacies are on backorder for combination medications such as losartan-hctz. Pended losartan 100mg and hctz 25mg to you.

## 2019-03-19 NOTE — TELEPHONE ENCOUNTER
----- Message from Eddie Guerrero sent at 3/19/2019  9:58 AM CDT -----  Type: Needs Medical Advice    Who Called:  Self Symptoms (please be specific):  NA   How long has patient had these symptoms:  N A  Pharmacy name and phone #:  [facility] on [address]  Best Call Back Number: [phone]/cell [phone]  Additional Information: Patient received a letter from [facility] regarding rx losartan/hctz.  Rx losartan hctz  100/25 mg, the rx need to be replace per the pharmacy.

## 2019-03-20 RX ORDER — LOSARTAN POTASSIUM 100 MG/1
100 TABLET ORAL DAILY
Qty: 90 TABLET | Refills: 3 | Status: SHIPPED | OUTPATIENT
Start: 2019-03-20 | End: 2020-01-31 | Stop reason: SDUPTHER

## 2019-03-20 RX ORDER — HYDROCHLOROTHIAZIDE 25 MG/1
25 TABLET ORAL DAILY
Qty: 90 TABLET | Refills: 3 | Status: SHIPPED | OUTPATIENT
Start: 2019-03-20 | End: 2020-02-19

## 2019-04-23 ENCOUNTER — OFFICE VISIT (OUTPATIENT)
Dept: FAMILY MEDICINE | Facility: CLINIC | Age: 69
End: 2019-04-23
Payer: MEDICARE

## 2019-04-23 VITALS
OXYGEN SATURATION: 97 % | WEIGHT: 168.44 LBS | SYSTOLIC BLOOD PRESSURE: 125 MMHG | HEART RATE: 77 BPM | TEMPERATURE: 98 F | HEIGHT: 60 IN | RESPIRATION RATE: 19 BRPM | DIASTOLIC BLOOD PRESSURE: 76 MMHG | BODY MASS INDEX: 33.07 KG/M2

## 2019-04-23 DIAGNOSIS — J32.0 CHRONIC MAXILLARY SINUSITIS: Primary | ICD-10-CM

## 2019-04-23 DIAGNOSIS — E66.9 OBESITY (BMI 30.0-34.9): ICD-10-CM

## 2019-04-23 DIAGNOSIS — I10 ESSENTIAL HYPERTENSION: ICD-10-CM

## 2019-04-23 PROCEDURE — 99214 PR OFFICE/OUTPT VISIT, EST, LEVL IV, 30-39 MIN: ICD-10-PCS | Mod: S$PBB,25,, | Performed by: NURSE PRACTITIONER

## 2019-04-23 PROCEDURE — 99214 OFFICE O/P EST MOD 30 MIN: CPT | Mod: S$PBB,25,, | Performed by: NURSE PRACTITIONER

## 2019-04-23 PROCEDURE — 99214 OFFICE O/P EST MOD 30 MIN: CPT | Mod: PBBFAC,PO | Performed by: NURSE PRACTITIONER

## 2019-04-23 PROCEDURE — 99999 PR PBB SHADOW E&M-EST. PATIENT-LVL IV: CPT | Mod: PBBFAC,,, | Performed by: NURSE PRACTITIONER

## 2019-04-23 PROCEDURE — 96372 THER/PROPH/DIAG INJ SC/IM: CPT | Mod: PBBFAC,PO

## 2019-04-23 PROCEDURE — 99999 PR PBB SHADOW E&M-EST. PATIENT-LVL IV: ICD-10-PCS | Mod: PBBFAC,,, | Performed by: NURSE PRACTITIONER

## 2019-04-23 RX ORDER — AZITHROMYCIN 250 MG/1
TABLET, FILM COATED ORAL
Qty: 6 TABLET | Refills: 0 | Status: SHIPPED | OUTPATIENT
Start: 2019-04-23 | End: 2019-04-28

## 2019-04-23 RX ADMIN — METHYLPREDNISOLONE SODIUM SUCCINATE 125 MG: 125 INJECTION, POWDER, FOR SOLUTION INTRAMUSCULAR; INTRAVENOUS at 11:04

## 2019-04-23 NOTE — PROGRESS NOTES
Subjective:       Patient ID: Ella Canales is a 68 y.o. female.    Chief Complaint: Sinus Problem, facial pressure, cough (loss voice on saturday)    Sinus Problem   This is a chronic problem. The current episode started in the past 7 days. The problem has been gradually worsening since onset. There has been no fever. Her pain is at a severity of 7/10. Associated symptoms include coughing (green and thick), a hoarse voice, sinus pressure and sneezing. Pertinent negatives include no headaches, shortness of breath or sore throat. Past treatments include saline sprays. The treatment provided mild relief.       Past Medical History:   Diagnosis Date    Arthritis     bilateral knees    GERD (gastroesophageal reflux disease)     Hyperlipidemia     Hypertension     Osteopenia     S/P PICC central line placement     Ulcer        Review of patient's allergies indicates:   Allergen Reactions    Ace inhibitors      Other reaction(s): cough    Latex      denies         Current Outpatient Medications:     alendronate (FOSAMAX) 70 MG tablet, Take 1 tablet (70 mg total) by mouth every 7 days., Disp: 4 tablet, Rfl: 11    CALCIUM ORAL, Take by mouth., Disp: , Rfl:     fexofenadine (ALLEGRA) 60 MG tablet, Take 60 mg by mouth once daily., Disp: , Rfl:     glucosamine-chondroit-vit C-Mn (GLUCOSAMINE-CHONDROITIN MAX ST) 500-400 mg Cap, Twice a day, Disp: , Rfl:     hydroCHLOROthiazide (HYDRODIURIL) 25 MG tablet, Take 1 tablet (25 mg total) by mouth once daily., Disp: 90 tablet, Rfl: 3    LACTOBACILLUS COMBO NO.6 (PROBIOTIC COMPLEX ORAL), Take 1 tablet by mouth once daily., Disp: , Rfl:     losartan (COZAAR) 100 MG tablet, Take 1 tablet (100 mg total) by mouth once daily., Disp: 90 tablet, Rfl: 3    metoprolol succinate (TOPROL-XL) 50 MG 24 hr tablet, TAKE 1 TABLET(50 MG) BY MOUTH EVERY DAY, Disp: 90 tablet, Rfl: 3    multivitamin (ONE DAILY MULTIVITAMIN) per tablet, Take 1 tablet by mouth once daily., Disp: ,  Rfl:     omeprazole (PRILOSEC) 20 MG capsule, TAKE 1 CAPSULE(20 MG) BY MOUTH TWICE DAILY, Disp: 180 capsule, Rfl: 3    potassium chloride SA (K-DUR,KLOR-CON) 10 MEQ tablet, TAKE 1 TABLET(10 MEQ) BY MOUTH TWICE DAILY, Disp: 180 tablet, Rfl: 3    simvastatin (ZOCOR) 40 MG tablet, TAKE 1 TABLET(40 MG) BY MOUTH EVERY EVENING, Disp: 90 tablet, Rfl: 3    TURMERIC ROOT EXTRACT ORAL, Take by mouth., Disp: , Rfl:     azithromycin (Z-RONALD) 250 MG tablet, Take 2 tablets by mouth on day 1; Take 1 tablet by mouth on days 2-5, Disp: 6 tablet, Rfl: 0    Current Facility-Administered Medications:     INV  40 mg/5 mL injection 40 mg, 40 mg, Intra-articular, Once, Olman Bush III, PA-C    Review of Systems   Constitutional: Negative for fatigue.   HENT: Positive for hoarse voice, postnasal drip, rhinorrhea, sinus pressure, sinus pain (frontal) and sneezing. Negative for sore throat.    Eyes: Positive for discharge and redness.   Respiratory: Positive for cough (green and thick). Negative for shortness of breath and wheezing.    Cardiovascular: Negative for chest pain.   Neurological: Negative for headaches.       Objective:      /76 (BP Location: Right arm, Patient Position: Sitting, BP Method: Medium (Automatic))   Pulse 77   Temp 98.4 °F (36.9 °C) (Oral)   Resp 19   Ht 5' (1.524 m)   Wt 76.4 kg (168 lb 6.9 oz)   LMP 04/03/2012   SpO2 97%   BMI 32.89 kg/m²   Physical Exam   Constitutional: She is oriented to person, place, and time. She appears well-developed and well-nourished.   HENT:   Right Ear: A middle ear effusion is present.   Left Ear: A middle ear effusion is present.   Nose: Mucosal edema present. Right sinus exhibits maxillary sinus tenderness. Left sinus exhibits maxillary sinus tenderness.   Eyes: Pupils are equal, round, and reactive to light. EOM are normal.   Neck: Normal range of motion.   Cardiovascular: Normal rate, regular rhythm and normal heart sounds.   Pulmonary/Chest: Effort  normal and breath sounds normal.   Abdominal: Soft. Bowel sounds are normal.   Musculoskeletal: Normal range of motion.   Lymphadenopathy:        Head (right side): Submandibular adenopathy present.   Neurological: She is alert and oriented to person, place, and time.   Skin: Skin is warm and dry.   Psychiatric: She has a normal mood and affect. Her behavior is normal. Judgment and thought content normal.       Assessment:       1. Chronic maxillary sinusitis    2. Essential hypertension    3. Obesity (BMI 30.0-34.9)        Plan:       Chronic maxillary sinusitis  -     methylPREDNISolone sod suc(PF) injection 125 mg  -     azithromycin (Z-RONALD) 250 MG tablet; Take 2 tablets by mouth on day 1; Take 1 tablet by mouth on days 2-5  Dispense: 6 tablet; Refill: 0    Essential hypertension   Controlled continue medication  BP Readings from Last 3 Encounters:   04/23/19 125/76   02/11/19 137/77   01/22/19 (!) 160/90     Obesity (BMI 30.0-34.9)  Counseled patient on his ideal body weight, health consequences of being obese and current recommendations including weekly exercise and a heart healthy diet.  Current BMI is:Estimated body mass index is 32.89 kg/m² as calculated from the following:    Height as of this encounter: 5' (1.524 m).    Weight as of this encounter: 76.4 kg (168 lb 6.9 oz)..  Patient is aware that ideal BMI < 25 or Weight in (lb) to have BMI = 25: 127.7.            Patient readiness: acceptance and barriers:none    During the course of the visit the patient was educated and counseled about the following:     Hypertension:   Dietary sodium restriction.  Regular aerobic exercise.  Obesity:   General weight loss/lifestyle modification strategies discussed (elicit support from others; identify saboteurs; non-food rewards, etc).  Regular aerobic exercise program discussed.    Goals: Hypertension: Reduce Blood Pressure and Obesity: Reduce calorie intake and BMI    Did patient meet goals/outcomes: No    The  following self management tools provided: declined    Patient Instructions (the written plan) was given to the patient/family.     Time spent with patient: 30 minutes    Barriers to medications present (no )    Adverse reactions to current medications (no)    Over the counter medications reviewed (Yes)

## 2019-04-23 NOTE — PATIENT INSTRUCTIONS
Sinusitis (Antibiotic Treatment)    The sinuses are air-filled spaces within the bones of the face. They connect to the inside of the nose. Sinusitis is an inflammation of the tissue lining the sinus cavity. Sinus inflammation can occur during a cold. It can also be due to allergies to pollens and other particles in the air. Sinusitis can cause symptoms of sinus congestion and fullness. A sinus infection causes fever, headache and facial pain. There is often green or yellow drainage from the nose or into the back of the throat (post-nasal drip). You have been given antibiotics to treat this condition.  Home care:  · Take the full course of antibiotics as instructed. Do not stop taking them, even if you feel better.  · Drink plenty of water, hot tea, and other liquids. This may help thin mucus. It also may promote sinus drainage.  · Heat may help soothe painful areas of the face. Use a towel soaked in hot water. Or,  the shower and direct the hot spray onto your face. Using a vaporizer along with a menthol rub at night may also help.   · An expectorant containing guaifenesin may help thin the mucus and promote drainage from the sinuses.  · Over-the-counter decongestants may be used unless a similar medicine was prescribed. Nasal sprays work the fastest. Use one that contains phenylephrine or oxymetazoline. First blow the nose gently. Then use the spray. Do not use these medicines more often than directed on the label or symptoms may get worse. You may also use tablets containing pseudoephedrine. Avoid products that combine ingredients, because side effects may be increased. Read labels. You can also ask the pharmacist for help. (NOTE: Persons with high blood pressure should not use decongestants. They can raise blood pressure.)  · Over-the-counter antihistamines may help if allergies contributed to your sinusitis.    · Do not use nasal rinses or irrigation during an acute sinus infection, unless told to by  your health care provider. Rinsing may spread the infection to other sinuses.  · Use acetaminophen or ibuprofen to control pain, unless another pain medicine was prescribed. (If you have chronic liver or kidney disease or ever had a stomach ulcer, talk with your doctor before using these medicines. Aspirin should never be used in anyone under 18 years of age who is ill with a fever. It may cause severe liver damage.)  · Don't smoke. This can worsen symptoms.  Follow-up care  Follow up with your healthcare provider or our staff if you are not improving within the next week.  When to seek medical advice  Call your healthcare provider if any of these occur:  · Facial pain or headache becoming more severe  · Stiff neck  · Unusual drowsiness or confusion  · Swelling of the forehead or eyelids  · Vision problems, including blurred or double vision  · Fever of 100.4ºF (38ºC) or higher, or as directed by your healthcare provider  · Seizure  · Breathing problems  · Symptoms not resolving within 10 days  Date Last Reviewed: 4/13/2015  © 1390-9042 [employer]. [address] [place] [address], [place] [address]. All rights reserved. This information is not intended as a substitute for professional medical care. Always follow your healthcare professional's instructions.

## 2019-04-23 NOTE — PROGRESS NOTES
2 identifiers, name and , used to confirm patient identity.  Procedure was explained and patient verbalized understanding. Solu Medrol 125 mg was given IM in the .eru using sterile technique. Patient tolerated procedure well. No residual bleeding noted at the injection site.

## 2019-05-23 DIAGNOSIS — I10 ESSENTIAL HYPERTENSION: ICD-10-CM

## 2019-05-23 DIAGNOSIS — E78.5 HYPERLIPIDEMIA, UNSPECIFIED HYPERLIPIDEMIA TYPE: ICD-10-CM

## 2019-05-23 DIAGNOSIS — E87.6 HYPOKALEMIA: ICD-10-CM

## 2019-05-23 RX ORDER — POTASSIUM CHLORIDE 750 MG/1
TABLET, EXTENDED RELEASE ORAL
Qty: 180 TABLET | Refills: 0 | Status: SHIPPED | OUTPATIENT
Start: 2019-05-23 | End: 2019-08-23 | Stop reason: SDUPTHER

## 2019-05-23 RX ORDER — METOPROLOL SUCCINATE 50 MG/1
TABLET, EXTENDED RELEASE ORAL
Qty: 90 TABLET | Refills: 0 | Status: SHIPPED | OUTPATIENT
Start: 2019-05-23 | End: 2019-08-23 | Stop reason: SDUPTHER

## 2019-05-24 RX ORDER — LOSARTAN POTASSIUM AND HYDROCHLOROTHIAZIDE 25; 100 MG/1; MG/1
TABLET ORAL
Qty: 90 TABLET | Refills: 0 | OUTPATIENT
Start: 2019-05-24

## 2019-05-24 RX ORDER — SIMVASTATIN 40 MG/1
TABLET, FILM COATED ORAL
Qty: 90 TABLET | Refills: 0 | Status: SHIPPED | OUTPATIENT
Start: 2019-05-24 | End: 2019-08-23 | Stop reason: SDUPTHER

## 2019-05-29 DIAGNOSIS — M25.561 RIGHT KNEE PAIN, UNSPECIFIED CHRONICITY: Primary | ICD-10-CM

## 2019-05-30 ENCOUNTER — OFFICE VISIT (OUTPATIENT)
Dept: ORTHOPEDICS | Facility: CLINIC | Age: 69
End: 2019-05-30
Payer: MEDICARE

## 2019-05-30 ENCOUNTER — HOSPITAL ENCOUNTER (OUTPATIENT)
Dept: RADIOLOGY | Facility: HOSPITAL | Age: 69
Discharge: HOME | End: 2019-05-30
Attending: ORTHOPAEDIC SURGERY
Payer: MEDICARE

## 2019-05-30 VITALS
HEART RATE: 75 BPM | DIASTOLIC BLOOD PRESSURE: 68 MMHG | SYSTOLIC BLOOD PRESSURE: 143 MMHG | HEIGHT: 60 IN | WEIGHT: 168 LBS | BODY MASS INDEX: 32.98 KG/M2

## 2019-05-30 DIAGNOSIS — M25.561 RIGHT KNEE PAIN, UNSPECIFIED CHRONICITY: ICD-10-CM

## 2019-05-30 DIAGNOSIS — M17.11 PRIMARY OSTEOARTHRITIS OF RIGHT KNEE: Primary | ICD-10-CM

## 2019-05-30 PROCEDURE — 99999 PR PBB SHADOW E&M-EST. PATIENT-LVL III: CPT | Mod: PBBFAC,,, | Performed by: ORTHOPAEDIC SURGERY

## 2019-05-30 PROCEDURE — 99204 PR OFFICE/OUTPT VISIT, NEW, LEVL IV, 45-59 MIN: ICD-10-PCS | Mod: 57,S$PBB,, | Performed by: ORTHOPAEDIC SURGERY

## 2019-05-30 PROCEDURE — 73562 X-RAY EXAM OF KNEE 3: CPT | Mod: TC,PN,RT

## 2019-05-30 PROCEDURE — 99204 OFFICE O/P NEW MOD 45 MIN: CPT | Mod: 57,S$PBB,, | Performed by: ORTHOPAEDIC SURGERY

## 2019-05-30 PROCEDURE — 73562 XR KNEE ORTHO RIGHT WITH FLEXION: ICD-10-PCS | Mod: 26,59,RT, | Performed by: RADIOLOGY

## 2019-05-30 PROCEDURE — 73562 X-RAY EXAM OF KNEE 3: CPT | Mod: 26,59,RT, | Performed by: RADIOLOGY

## 2019-05-30 PROCEDURE — 99213 OFFICE O/P EST LOW 20 MIN: CPT | Mod: PBBFAC,25,PN | Performed by: ORTHOPAEDIC SURGERY

## 2019-05-30 PROCEDURE — 73564 X-RAY EXAM KNEE 4 OR MORE: CPT | Mod: 26,RT,, | Performed by: RADIOLOGY

## 2019-05-30 PROCEDURE — 99999 PR PBB SHADOW E&M-EST. PATIENT-LVL III: ICD-10-PCS | Mod: PBBFAC,,, | Performed by: ORTHOPAEDIC SURGERY

## 2019-05-30 PROCEDURE — 73564 XR KNEE ORTHO RIGHT WITH FLEXION: ICD-10-PCS | Mod: 26,RT,, | Performed by: RADIOLOGY

## 2019-05-30 NOTE — PROGRESS NOTES
Past Medical History:   Diagnosis Date    Arthritis     bilateral knees    GERD (gastroesophageal reflux disease)     Hyperlipidemia     Hypertension     Osteopenia     S/P PICC central line placement     Ulcer        Past Surgical History:   Procedure Laterality Date    APPLICATION-WOUND DRESSING Left 3/9/2017    Performed by [facility]ie Mulligan [facility] at [facility] OR    COLONOSCOPY N/A 1/22/2019    Performed by Milton Benitez MD at [facility] ENDO    COLONOSCOPY N/A 4/3/2012    Performed by Kingston Chapman MD at [facility] ENDO    ECTOPIC PREGNANCY SURGERY      fess      FRACTURE SURGERY      ORIF right arm.    GRAFT Left 3/9/2017    [facility] by [facility] at [facility] OR    INCISION AND DRAINAGE (I & D) Left 3/9/2017    [facility] by [facility] at [facility] OR    INCISION AND DRAINAGE-KNEE Left 2/25/2017    [facility] at [facility] OR 2ND FLR    JOINT REPLACEMENT      left knee    LYSIS-ADHESION Left 3/9/2017    [facility] at [facility] OR    REPLACEMENT-KNEE-TOTAL Left 2/14/2017    [facility] by [facility] at [facility] OR    REVISION-ARTHROPLASTY-KNEE-TOTAL; TIBIAL POLYETHYLENE EXCHANGE Left 3/9/2017    Performed by [facility] at [facility] OR    TOTAL KNEE ARTHROPLASTY Left     TUBAL LIGATION         Current Outpatient Medications   Medication Sig    alendronate (FOSAMAX) 70 MG tablet Take 1 tablet (70 mg total) by mouth every 7 days.    CALCIUM ORAL Take by mouth.    fexofenadine (ALLEGRA) 60 MG tablet Take 60 mg by mouth once daily.    glucosamine-chondroit-vit C-Mn (GLUCOSAMINE-CHONDROITIN MAX ST) 500-400 mg Cap Twice a day    hydroCHLOROthiazide (HYDRODIURIL) 25 MG tablet Take 1 tablet (25 mg total) by mouth once daily.    LACTOBACILLUS COMBO NO.6 (PROBIOTIC COMPLEX ORAL) Take 1 tablet by mouth once daily.    losartan (COZAAR) 100 MG tablet Take 1 tablet (100 mg total) by mouth once daily.    metoprolol succinate (TOPROL-XL) 50 MG 24 hr tablet TAKE 1  TABLET(50 MG) BY MOUTH EVERY DAY    multivitamin (ONE DAILY MULTIVITAMIN) per tablet Take 1 tablet by mouth once daily.    omeprazole (PRILOSEC) 20 MG capsule TAKE 1 CAPSULE(20 MG) BY MOUTH TWICE DAILY    potassium chloride SA (K-DUR,KLOR-CON) 10 MEQ tablet TAKE 1 TABLET(10 MEQ) BY MOUTH TWICE DAILY    simvastatin (ZOCOR) 40 MG tablet TAKE 1 TABLET(40 MG) BY MOUTH EVERY EVENING    TURMERIC ROOT EXTRACT ORAL Take by mouth.     Current Facility-Administered Medications   Medication    INV  40 mg/5 mL injection 40 mg       Review of patient's allergies indicates:   Allergen Reactions    Ace inhibitors      Other reaction(s): cough    Latex      denies       Family History   Problem Relation Age of Onset    Heart disease Mother     Heart disease Father     Heart disease Brother 47        cabg       Social History     Socioeconomic History    Marital status:      Spouse name: Not on file    Number of children: Not on file    Years of education: Not on file    Highest education level: Not on file   Occupational History    Not on file   Social Needs    Financial resource strain: Not on file    Food insecurity:     Worry: Not on file     Inability: Not on file    Transportation needs:     Medical: Not on file     Non-medical: Not on file   Tobacco Use    Smoking status: Never Smoker    Smokeless tobacco: Never Used   Substance and Sexual Activity    Alcohol use: Yes     Comment: occasional    Drug use: No    Sexual activity: Never   Lifestyle    Physical activity:     Days per week: Not on file     Minutes per session: Not on file    Stress: Not on file   Relationships    Social connections:     Talks on phone: Not on file     Gets together: Not on file     Attends Nondenominational service: Not on file     Active member of club or organization: Not on file     Attends meetings of clubs or organizations: Not on file     Relationship status: Not on file   Other Topics Concern    Not on file    Social History Narrative    Not on file       Chief Complaint:   Chief Complaint   Patient presents with    Right Knee - Pain       History of present illness:  This is a 68-year-old female seen for chronic right knee pain.  Patient has had problems for decades.  She has a history of knee arthritis has been treated with various injections including cortisone and viscosupplementation.  She has tried physical therapy previously.  Patient had a left knee replacement that unfortunately had to be redone and has had a total of 3 surgeries on it.  Pain is a 2/10.  Pain is worsening.      Review of Systems:    Constitution: Negative for chills, fever, and sweats.  Negative for unexplained weight loss.    HENT:  Negative for headaches and blurry vision.    Cardiovascular:Negative for chest pain or irregular heart beat. Negative for hypertension.    Respiratory:  Negative for cough and shortness of breath.    Gastrointestinal: Negative for abdominal pain, heartburn, melena, nausea, and vomitting.    Genitourinary:  Negative bladder incontinence and dysuria.    Musculoskeletal:  See HPI    Neurological: Negative for numbness.    Psychiatric/Behavioral: Negative for depression.  The patient is not nervous/anxious.      Endocrine: Negative for polyuria    Hematologic/Lymphatic: Negative for bleeding problem.  Does not bruise/bleed easily.    Skin: Negative for poor would healing and rash      Physical Examination:    Vital Signs:    Vitals:    05/30/19 1024   BP: (!) 143/68   Pulse: 75       Body mass index is 32.81 kg/m².    This a well-developed, well nourished patient in no acute distress.  They are alert and oriented and cooperative to examination.  Pt. walks without an antalgic gait.      Examination of the right knee shows no rashes or erythema. There are no masses ecchymosis or effusion. Patient has full range of motion from 3-130°. Patient is nontender to palpation over lateral joint line and moderately tender to  palpation over the medial joint line. Patient has a - Lachman exam, - anterior drawer exam, and - posterior drawer exam. - David's exam. Knee is stable to varus and valgus stress. 5 out of 5 motor strength. Palpable distal pulses. Intact light touch sensation. Negative Patellofemoral crepitus    Examination of the left knee shows well-healed surgical incision.  Patient has range of motion from 0-95 degrees. Stable to varus and valgus stress. Negative drawer exam.  5/5 motor strength.  Palpable distal pulses and intact light touch sensation.    Heart is regular rate without obvious murmurs   Normal respiratory effort without audible wheezing  Abdomen is soft and nontender       X-rays:  X-rays of the right knee are ordered and reviewed which show well-aligned left total knee arthroplasty without complication. Patient has severe medial compartment narrowing of the right knee.     Assessment::  Severe right varus knee arthritis    Plan:  I had a long discussion with her today about proceeding with knee replacement versus continuing with her non operative care.  She would like to go ahead and just definitively have this fixed.  She has a little nervous just because of the problem she had with the other side.  I understand this.  Plan is for right total knee arthroplasty. Risks, benefits, and alternatives to the procedure were explained to the patient including but not limited to damage to nerves, arteries, blood vessels, bones, tendons, ligaments, stiffness, instability, infection, DVT, PE, as well as general anesthetic complications including seizure, stroke, heart attack and even death. The patient understood these risks and wished to proceed and signed the informed consent.       This note was created using Zauber voice recognition software that occasionally misinterpreted phrases or words.    Consult note is delivered via Epic messaging service.

## 2019-05-31 RX ORDER — MUPIROCIN 20 MG/G
OINTMENT TOPICAL
Status: CANCELLED | OUTPATIENT
Start: 2019-05-31

## 2019-06-18 DIAGNOSIS — M81.0 OSTEOPOROSIS, UNSPECIFIED OSTEOPOROSIS TYPE, UNSPECIFIED PATHOLOGICAL FRACTURE PRESENCE: ICD-10-CM

## 2019-06-19 RX ORDER — ALENDRONATE SODIUM 70 MG/1
TABLET ORAL
Qty: 4 TABLET | Refills: 0 | Status: SHIPPED | OUTPATIENT
Start: 2019-06-19 | End: 2019-07-15 | Stop reason: SDUPTHER

## 2019-07-08 ENCOUNTER — LAB VISIT (OUTPATIENT)
Dept: LAB | Facility: HOSPITAL | Age: 69
End: 2019-07-08
Attending: NURSE PRACTITIONER
Payer: MEDICARE

## 2019-07-08 DIAGNOSIS — M81.0 OSTEOPOROSIS, UNSPECIFIED OSTEOPOROSIS TYPE, UNSPECIFIED PATHOLOGICAL FRACTURE PRESENCE: ICD-10-CM

## 2019-07-08 DIAGNOSIS — I10 ESSENTIAL HYPERTENSION: ICD-10-CM

## 2019-07-08 DIAGNOSIS — D50.9 IRON DEFICIENCY ANEMIA, UNSPECIFIED IRON DEFICIENCY ANEMIA TYPE: ICD-10-CM

## 2019-07-08 DIAGNOSIS — E87.6 HYPOKALEMIA: ICD-10-CM

## 2019-07-08 LAB
25(OH)D3+25(OH)D2 SERPL-MCNC: 53 NG/ML (ref 30–96)
ALBUMIN SERPL BCP-MCNC: 4.1 G/DL (ref 3.5–5.2)
ALP SERPL-CCNC: 67 U/L (ref 55–135)
ALT SERPL W/O P-5'-P-CCNC: 27 U/L (ref 10–44)
ANION GAP SERPL CALC-SCNC: 9 MMOL/L (ref 8–16)
AST SERPL-CCNC: 23 U/L (ref 10–40)
BASOPHILS # BLD AUTO: 0.05 K/UL (ref 0–0.2)
BASOPHILS NFR BLD: 1.1 % (ref 0–1.9)
BILIRUB SERPL-MCNC: 0.5 MG/DL (ref 0.1–1)
BUN SERPL-MCNC: 17 MG/DL (ref 8–23)
CALCIUM SERPL-MCNC: 10.1 MG/DL (ref 8.7–10.5)
CHLORIDE SERPL-SCNC: 102 MMOL/L (ref 95–110)
CHOLEST SERPL-MCNC: 189 MG/DL (ref 120–199)
CHOLEST/HDLC SERPL: 3.6 {RATIO} (ref 2–5)
CO2 SERPL-SCNC: 29 MMOL/L (ref 23–29)
CREAT SERPL-MCNC: 0.8 MG/DL (ref 0.5–1.4)
DIFFERENTIAL METHOD: ABNORMAL
EOSINOPHIL # BLD AUTO: 0.1 K/UL (ref 0–0.5)
EOSINOPHIL NFR BLD: 1.7 % (ref 0–8)
ERYTHROCYTE [DISTWIDTH] IN BLOOD BY AUTOMATED COUNT: 13.9 % (ref 11.5–14.5)
EST. GFR  (AFRICAN AMERICAN): >60 ML/MIN/1.73 M^2
EST. GFR  (NON AFRICAN AMERICAN): >60 ML/MIN/1.73 M^2
GLUCOSE SERPL-MCNC: 99 MG/DL (ref 70–110)
HCT VFR BLD AUTO: 40.2 % (ref 37–48.5)
HDLC SERPL-MCNC: 53 MG/DL (ref 40–75)
HDLC SERPL: 28 % (ref 20–50)
HGB BLD-MCNC: 12.3 G/DL (ref 12–16)
IMM GRANULOCYTES # BLD AUTO: 0.02 K/UL (ref 0–0.04)
IMM GRANULOCYTES NFR BLD AUTO: 0.4 % (ref 0–0.5)
LDLC SERPL CALC-MCNC: 98.4 MG/DL (ref 63–159)
LYMPHOCYTES # BLD AUTO: 1.5 K/UL (ref 1–4.8)
LYMPHOCYTES NFR BLD: 31.8 % (ref 18–48)
MCH RBC QN AUTO: 28.7 PG (ref 27–31)
MCHC RBC AUTO-ENTMCNC: 30.6 G/DL (ref 32–36)
MCV RBC AUTO: 94 FL (ref 82–98)
MONOCYTES # BLD AUTO: 0.5 K/UL (ref 0.3–1)
MONOCYTES NFR BLD: 10.5 % (ref 4–15)
NEUTROPHILS # BLD AUTO: 2.6 K/UL (ref 1.8–7.7)
NEUTROPHILS NFR BLD: 54.5 % (ref 38–73)
NONHDLC SERPL-MCNC: 136 MG/DL
NRBC BLD-RTO: 0 /100 WBC
PLATELET # BLD AUTO: 221 K/UL (ref 150–350)
PMV BLD AUTO: 9.7 FL (ref 9.2–12.9)
POTASSIUM SERPL-SCNC: 4.3 MMOL/L (ref 3.5–5.1)
PROT SERPL-MCNC: 7.4 G/DL (ref 6–8.4)
RBC # BLD AUTO: 4.28 M/UL (ref 4–5.4)
SODIUM SERPL-SCNC: 140 MMOL/L (ref 136–145)
TRIGL SERPL-MCNC: 188 MG/DL (ref 30–150)
WBC # BLD AUTO: 4.68 K/UL (ref 3.9–12.7)

## 2019-07-08 PROCEDURE — 82306 VITAMIN D 25 HYDROXY: CPT

## 2019-07-08 PROCEDURE — 36415 COLL VENOUS BLD VENIPUNCTURE: CPT | Mod: PO

## 2019-07-08 PROCEDURE — 85025 COMPLETE CBC W/AUTO DIFF WBC: CPT

## 2019-07-08 PROCEDURE — 80061 LIPID PANEL: CPT

## 2019-07-08 PROCEDURE — 80053 COMPREHEN METABOLIC PANEL: CPT

## 2019-07-09 ENCOUNTER — TELEPHONE (OUTPATIENT)
Dept: FAMILY MEDICINE | Facility: CLINIC | Age: 69
End: 2019-07-09

## 2019-07-09 ENCOUNTER — OFFICE VISIT (OUTPATIENT)
Dept: FAMILY MEDICINE | Facility: CLINIC | Age: 69
End: 2019-07-09
Payer: MEDICARE

## 2019-07-09 VITALS
SYSTOLIC BLOOD PRESSURE: 110 MMHG | HEART RATE: 73 BPM | WEIGHT: 168.88 LBS | HEIGHT: 60 IN | RESPIRATION RATE: 12 BRPM | DIASTOLIC BLOOD PRESSURE: 70 MMHG | TEMPERATURE: 98 F | BODY MASS INDEX: 33.15 KG/M2 | OXYGEN SATURATION: 96 %

## 2019-07-09 DIAGNOSIS — Z12.31 SCREENING MAMMOGRAM, ENCOUNTER FOR: Primary | ICD-10-CM

## 2019-07-09 DIAGNOSIS — E87.6 HYPOKALEMIA: ICD-10-CM

## 2019-07-09 DIAGNOSIS — Z01.818 PREOPERATIVE CLEARANCE: Primary | ICD-10-CM

## 2019-07-09 DIAGNOSIS — D50.9 IRON DEFICIENCY ANEMIA, UNSPECIFIED IRON DEFICIENCY ANEMIA TYPE: ICD-10-CM

## 2019-07-09 DIAGNOSIS — K21.9 GASTROESOPHAGEAL REFLUX DISEASE WITHOUT ESOPHAGITIS: ICD-10-CM

## 2019-07-09 DIAGNOSIS — E66.9 OBESITY (BMI 30.0-34.9): ICD-10-CM

## 2019-07-09 DIAGNOSIS — M17.11 PRIMARY OSTEOARTHRITIS OF RIGHT KNEE: ICD-10-CM

## 2019-07-09 DIAGNOSIS — Z86.010 HX OF COLONIC POLYP: ICD-10-CM

## 2019-07-09 DIAGNOSIS — I10 ESSENTIAL HYPERTENSION: ICD-10-CM

## 2019-07-09 PROCEDURE — 99214 OFFICE O/P EST MOD 30 MIN: CPT | Mod: PBBFAC,25,PO | Performed by: FAMILY MEDICINE

## 2019-07-09 PROCEDURE — 99999 PR PBB SHADOW E&M-EST. PATIENT-LVL IV: ICD-10-PCS | Mod: PBBFAC,,, | Performed by: FAMILY MEDICINE

## 2019-07-09 PROCEDURE — 99214 PR OFFICE/OUTPT VISIT, EST, LEVL IV, 30-39 MIN: ICD-10-PCS | Mod: S$PBB,,, | Performed by: FAMILY MEDICINE

## 2019-07-09 PROCEDURE — 99999 PR PBB SHADOW E&M-EST. PATIENT-LVL IV: CPT | Mod: PBBFAC,,, | Performed by: FAMILY MEDICINE

## 2019-07-09 PROCEDURE — 93010 EKG 12-LEAD: ICD-10-PCS | Mod: S$PBB,,, | Performed by: INTERNAL MEDICINE

## 2019-07-09 PROCEDURE — 99214 OFFICE O/P EST MOD 30 MIN: CPT | Mod: S$PBB,,, | Performed by: FAMILY MEDICINE

## 2019-07-09 PROCEDURE — 93005 ELECTROCARDIOGRAM TRACING: CPT | Mod: PBBFAC,PO | Performed by: INTERNAL MEDICINE

## 2019-07-09 PROCEDURE — 93010 ELECTROCARDIOGRAM REPORT: CPT | Mod: S$PBB,,, | Performed by: INTERNAL MEDICINE

## 2019-07-09 RX ORDER — OMEPRAZOLE 20 MG/1
CAPSULE, DELAYED RELEASE ORAL
Qty: 180 CAPSULE | Refills: 3 | Status: SHIPPED | OUTPATIENT
Start: 2019-07-09 | End: 2020-08-11 | Stop reason: SDUPTHER

## 2019-07-09 NOTE — TELEPHONE ENCOUNTER
----- Message from Arminda Cuevas sent at 7/9/2019  9:51 AM CDT -----  Pt was seen today and forgot to mention she needs a mammogram  Please call her @ [phone] or cell [phone]  Thanks !

## 2019-07-09 NOTE — Clinical Note
Patient may proceed with surgery with low risk of perioperative cardiovascular complications.  See note for details.

## 2019-07-09 NOTE — PROGRESS NOTES
Subjective:       Patient ID: Ella Canales is a 68 y.o. female.    Chief Complaint: Pre-op Exam (Knee Replacement)    HPI  Review of Systems   Constitutional: Negative for activity change and unexpected weight change.   HENT: Negative for hearing loss, rhinorrhea and trouble swallowing.    Eyes: Negative for discharge and visual disturbance.   Respiratory: Negative for chest tightness and wheezing.    Cardiovascular: Negative for chest pain and palpitations.   Gastrointestinal: Negative for blood in stool, constipation, diarrhea and vomiting.   Endocrine: Negative for polydipsia and polyuria.   Genitourinary: Negative for difficulty urinating, dysuria, hematuria and menstrual problem.   Musculoskeletal: Negative for arthralgias, joint swelling and neck pain.   Neurological: Negative for weakness and headaches.   Psychiatric/Behavioral: Negative for confusion and dysphoric mood.       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  NO H/O CAD, PALPITATIONS, SYNCOPE, CHF OR OTHER CARDIAC PROBLEMS.  PATIENT IS ABLE TO WALK A FLIGHT OF STAIRS WITHOUT STOPPING TO REST    RESP ROS: NO H/O LUNG PROBLEMS SUCH AS COPD OR ASTHMA.  PATIENT IS A NON-SMOKER    CARDIAC RF: htn, sedentary lifestyle and obesity, HPL     Reviewed 6/19-cbc, cmp , vit d , chol at goal. TG  borderline high.  ASA 2  Objective:      Physical Exam   Constitutional: She is oriented to person, place, and time. She appears well-developed and well-nourished.   Cardiovascular: Normal rate, regular rhythm and normal heart sounds.   Pulmonary/Chest: Effort normal and breath sounds normal.   Musculoskeletal: She exhibits no edema.   Neurological: She is alert and oriented to person, place, and time.   Skin: Skin is warm and dry.   Psychiatric: She has a normal mood and affect.   Nursing note and vitals reviewed.      Assessment:       1. Preoperative clearance    2. Essential hypertension    3. Hypokalemia    4. Iron deficiency anemia, unspecified iron deficiency anemia type    5. Obesity (BMI 30.0-34.9)    6. Hx of colonic polyp    7. Primary osteoarthritis of right knee        Plan:       1. Preoperative clearance  Patient may proceed with surgery with low risk of perioperative cardiovascular complications.  Pate perioperative risk score: .17%  Patients whose estimated risk of death is less than 1 percent are labeled as being low risk and require no additional cardiovascular testing.    Higher-risk patients -- Patients whose risk of death is 1 percent or higher may require additional cardiovascular evaluation.    1. Preoperative workup as follows  ECG, hemoglobin, hematocrit, electrolytes, creatinine, glucose.  2. Change in medication regimen before surgery: AVOID NSAIDS FOR 5-7 DAYS PRIOR TO PROCEDURE.  Continue medication regimen including morning of surgery, with sip of water.  3. Prophylaxis for cardiac events with perioperative beta-blockers: not indicated.  4. Invasive hemodynamic monitoring perioperatively: at the discretion of anesthesiologist.  5. Deep vein thrombosis prophylaxis postoperatively:regimen to be chosen by surgical team.  6. Surveillance for postoperative MI with ECG immediately postoperatively and on postoperative days 1 and 2 AND troponin levels 24 hours postoperatively and on day 4 or  hospital discharge (whichever comes first): at the discretion of anesthesiologist.      - IN OFFICE EKG 12-LEAD (to Muse)    2. Essential hypertension  Controlled on current medications.  Continue current medications.      3. Hypokalemia  Screen and treat as indicated:      4. Iron deficiency anemia, unspecified iron deficiency anemia type  Screen and treat as indicated:      5. Obesity (BMI 30.0-34.9)  Counseled patient on his ideal body weight, health consequences of being obese and current recommendations including weekly exercise and a heart healthy diet.  Current BMI is:Estimated body mass index is 32.98 kg/m² as calculated from the following:    Height as of this encounter: 5' (1.524 m).    Weight as of this encounter: 76.6 kg (168 lb 14 oz)..  Patient is aware that ideal BMI < 25 or Weight in (lb) to have BMI = 25: 127.7.        6. Hx of colonic polyp  Cont routine surveillance    7. Primary osteoarthritis of right knee  Proceed with surgery as planned        Time spent with patient: 20 minutes    Patient with be reevaluated in 6 months or sooner prn    Greater than 50% of this visit was spent counseling as described in above documentation:Yes

## 2019-07-15 DIAGNOSIS — M81.0 OSTEOPOROSIS, UNSPECIFIED OSTEOPOROSIS TYPE, UNSPECIFIED PATHOLOGICAL FRACTURE PRESENCE: ICD-10-CM

## 2019-07-15 RX ORDER — ALENDRONATE SODIUM 70 MG/1
TABLET ORAL
Qty: 4 TABLET | Refills: 5 | Status: SHIPPED | OUTPATIENT
Start: 2019-07-15 | End: 2020-01-06

## 2019-07-20 DIAGNOSIS — Z12.39 SCREENING BREAST EXAMINATION: Primary | ICD-10-CM

## 2019-08-01 ENCOUNTER — HOSPITAL ENCOUNTER (OUTPATIENT)
Dept: PREADMISSION TESTING | Facility: HOSPITAL | Age: 69
Discharge: HOME | End: 2019-08-01
Attending: ORTHOPAEDIC SURGERY
Payer: MEDICARE

## 2019-08-01 ENCOUNTER — HOSPITAL ENCOUNTER (OUTPATIENT)
Dept: RADIOLOGY | Facility: HOSPITAL | Age: 69
Discharge: HOME | End: 2019-08-01
Attending: ORTHOPAEDIC SURGERY
Payer: MEDICARE

## 2019-08-01 DIAGNOSIS — M17.11 PRIMARY OSTEOARTHRITIS OF RIGHT KNEE: Primary | ICD-10-CM

## 2019-08-01 LAB
ABO + RH BLD: NORMAL
BACTERIA #/AREA URNS HPF: ABNORMAL /HPF
BILIRUB UR QL STRIP: NEGATIVE
BLD GP AB SCN CELLS X3 SERPL QL: NORMAL
CLARITY UR: CLEAR
COLOR UR: YELLOW
GLUCOSE UR QL STRIP: NEGATIVE
HGB UR QL STRIP: NEGATIVE
KETONES UR QL STRIP: NEGATIVE
LEUKOCYTE ESTERASE UR QL STRIP: ABNORMAL
MICROSCOPIC COMMENT: ABNORMAL
NITRITE UR QL STRIP: NEGATIVE
PH UR STRIP: 5 [PH] (ref 5–8)
PROT UR QL STRIP: NEGATIVE
SP GR UR STRIP: 1.01 (ref 1–1.03)
URN SPEC COLLECT METH UR: ABNORMAL
UROBILINOGEN UR STRIP-ACNC: NEGATIVE EU/DL
WBC #/AREA URNS HPF: 2 /HPF (ref 0–5)

## 2019-08-01 PROCEDURE — 81000 URINALYSIS NONAUTO W/SCOPE: CPT

## 2019-08-01 PROCEDURE — 86901 BLOOD TYPING SEROLOGIC RH(D): CPT

## 2019-08-01 PROCEDURE — 36415 COLL VENOUS BLD VENIPUNCTURE: CPT

## 2019-08-01 PROCEDURE — 87081 CULTURE SCREEN ONLY: CPT

## 2019-08-01 PROCEDURE — 71046 X-RAY EXAM CHEST 2 VIEWS: CPT | Mod: 26,,, | Performed by: RADIOLOGY

## 2019-08-01 PROCEDURE — 71046 XR CHEST PA AND LATERAL: ICD-10-PCS | Mod: 26,,, | Performed by: RADIOLOGY

## 2019-08-01 PROCEDURE — 71046 X-RAY EXAM CHEST 2 VIEWS: CPT | Mod: TC,FY

## 2019-08-01 PROCEDURE — 99900104 DSU ONLY-NO CHARGE-EA ADD'L HR (STAT)

## 2019-08-01 PROCEDURE — 99900103 DSU ONLY-NO CHARGE-INITIAL HR (STAT)

## 2019-08-01 NOTE — PRE ADMISSION SCREENING
Patient Name: Ella Canales  YOB: 1950   MRN: [id number]     [facility]   Basic Mobility Inpatient Short Form 6 Clicks         How much difficulty does the patient currently have  Unable  A Lot  A Little  None      1. Turning over in bed (including adjusting bedclothes, sheets and blankets)?     1 []    2 []    3 [x]    4 []        2. Sitting down on and standing up from a chair with arms (e.g., wheelchair, bedside commode, etc.)     1 []  2 []  3 []     4 [x]      3. Moving from lying on back to sitting on the side of the bed?     1 []  2 []  3 []    4 [x]    How much help from another person does the patient currently need  Total  A Lot  A Little  None      4. Moving to and from a bed to a chair (including a wheelchair)?    1 []  2 []  3 []    4 [x]      5. Need to walk in hospital room?    1 []  2 []  3 []    4 [x]      6. Climbing 3-5 steps with a railing?    1 []  2 []  3 []    4 [x]       Raw Score:     23             CMS 0-100% Score:    11.20        %   Standardized Score:    56.93           CMS Modifier:      CI                                    [facility]   Basic Mobility Inpatient Short Form 6 Clicks Score Conversion Table*         *Use this form to convert -PAC Basic Mobility Inpatient Raw Scores.   [facility] Inpatient Basic Mobility Short Form Scoring Example   1. Add the number values associated with the response to each item. For example, items totals yield a Raw Score of 21.   2. Match the raw score to the t-Scale scores (t-Scale score = 50.25, SE = 4.69).   3. Find the associated CMS % (CMS % = 28.97%).   4. Locate the correct CMS Functional Modifier Code, or G Code (G code = CJ)     NOTE: Each -PAC Short Form has a separate conversion table. Make sure that you use the correct conversion table.       Instruction Manual - page 45 contains conversion table

## 2019-08-01 NOTE — PRE ADMISSION SCREENING
[facility] Risk Assessment Scale    Patient Name: Ella Canales  YOB: 1950  MRN: [id number]            RIsk Factor Measure Recommendation Patient Data Scale/Score   BMI >40 Reconsider surgery, weight loss   Estimated body mass index is 32.98 kg/m² as calculated from the following:    Height as of 7/9/19: 5' (1.524 m).    Weight as of 7/9/19: 76.6 kg (168 lb 14 oz).   [] 0 = 1 - 24.9  [] 1 = 25-29.9  [x] 2 = 30-34.9  [] 3 = 35-39.9  [] 4 = 40-44.9  [] 5 = 45-99.9   Hemoglobin AIC (if applicable) >9 Delay surgery until DM under control  Refer for:  · Nutrition Therapy  · Exercise   · Medication    No results found for: LABA1C, HGBA1C    Lab Results   Component Value Date    GLU 99 07/08/2019      [] 0 = 4.0-5.6  [] 1 = 5.7-6.4  [] 2 = 6.5-6.9  [] 3 = 7.0-7.9  [] 4 = 8.0-8.9  [] 5 = 9.0-12   Hemoglobin (Anemia) <9 Delay surgery   Correct anemia Lab Results   Component Value Date    HGB 12.3 07/08/2019    [] 20 - <9.0                    Albumin <3 Delay surgery &Workup Lab Results   Component Value Date    ALBUMIN 4.1 07/08/2019    [] 20 - <3.0   Smoking Cessation >4 Weeks Delay Surgery  Refer to OP Cessation Class    Never Smoker [] 20 - current smoker                                _____ PPD                    Hx of MI, PE, Arrhythmia, CVA, DVT <30 Days Delay Surgery    N/A [] 20      Infection Variable Delay surgery and re-evaluate   N/A [] 20 - recent/current infection     Depression (PHQ) >10 out of 27 Delay Surgery and re-evaluate  Medication  Counseling              [x] 0     []1     []2     []3      []4      [] 5                    (1-4)      (5-9)  (10-14)  (15-19)   (20-27)     Memory Impairment & Memory loss (Mini-Cog Screening Tool) Advanced dementia and/or Parkinson's Reconsider surgery     [x] 0     []1     []2     []3     []4     [] 5     Physical Conditioning (Modified AM-PAC Per Physical Therapy at [facility]) Unable to ambulate on day of surgery Delay surgery and  re-evaluate  Pre-Rehabilitation   (PT evaluation)       [x]  0   []4       []8     []12        []16     []20       (<20%)   (<40%)   (<60%)   (<80% )    (>80%)     Home Environment/Caregiver support  (Per /Navigator Interview)    Availability of basic services and/or approprate assistance during post-operative period Delay surgery and re-evaluate  Safe home environment  Health   1 week post-surgery  Transportation  availability  Ability to obtain DME/Medications post-op    [x] 0     []1     []2     []3     []4     [] 5  [] 0     []1     [x]2     []3     []4     [] 5  [x] 0     []1     []2     []3     []4     [] 5  [x] 0     []1     []2     []3     []4     [] 5         MD Contact: Dr. Singh Comments:  Total Score:  4

## 2019-08-01 NOTE — OR NURSING
Results for RAN OSMAN (MRN [id number]) as of 8/1/2019 13:04   Ref. Range 8/1/2019 09:20   Specimen UA Unknown Urine, Clean CatchUrine, Clean Catch   Color, UA Latest Ref Range: Yellow, Straw, Beatriz  Yellow   Appearance, UA Latest Ref Range: Clear  Clear   Specific [place], UA Latest Ref Range: 1.005 - 1.030  1.010   pH, UA Latest Ref Range: 5.0 - 8.0  5.0   Protein, UA Latest Ref Range: Negative  Negative   Glucose, UA Latest Ref Range: Negative  Negative   Ketones, UA Latest Ref Range: Negative  Negative   Occult Blood UA Latest Ref Range: Negative  Negative   NITRITE UA Latest Ref Range: Negative  Negative   UROBILINOGEN UA Latest Ref Range: <2.0 EU/dL Negative   Bilirubin (UA) Latest Ref Range: Negative  Negative   Leukocytes, UA Latest Ref Range: Negative  Trace (A)   WBC, UA Latest Ref Range: 0 - 5 /hpf 2   Bacteria, UA Latest Ref Range: None-Occ /hpf Few (A)   Microscopic Comment Unknown SEE COMMENT     INFORMED DAKSHA GUERRA WITH DR. FLORENCE TO LET HIM KNOW

## 2019-08-01 NOTE — DISCHARGE INSTRUCTIONS
To confirm, Your doctor has instructed you that surgery is scheduled for: 8/13/2019    Please report to [facility] the morning of surgery. You must check-in and receive a wristband before going to your procedure.    Pre-Op will call the afternoon prior to surgery between 1:00 and 6:00 PM with the final arrival time.  Phone number: [phone]    PLEASE NOTE:  The surgery schedule has many variables which may affect the time of your surgery case.  Family members should be available if your surgery time changes.  Plan to be here the day of your procedure between 4-6 hours.    MEDICATIONS:  TAKE ONLY THESE MEDICATIONS WITH A SMALL SIP OF WATER THE MORNING OF YOUR PROCEDURE:  OMEPRAZOLE      DO NOT TAKE THESE MEDICATIONS 5-7 DAYS PRIOR to your procedure or per your surgeon's request: ASPIRIN, ALEVE, ADVIL, IBUPROFEN, FISH OIL VITAMIN E, HERBALS, GLUCOSAMINE, TURMERIC  (May take Tylenol)    ONLY if you are prescribed any types of blood thinners such as:  Aspirin, Coumadin, Plavix, Pradaxa, Xarelto, Aggrenox, Effient, Eliquis, Savasya, Brilinta, or any other, ask your surgeon whether you should stop taking them and how long before surgery you should stop.  You may also need to verify with the prescribing physician if it is ok to stop your medication.      INSTRUCTIONS IMPORTANT!!  · Do not eat or drink anything between midnight and the time of your procedure- this includes gum, mints, and candy.  · Do not smoke or drink alcoholic beverages 24 hours prior to your procedure.  · Shower the night before AND the morning of your procedure with a Chlorhexidine wash such as Hibiclens or Dial antibacterial soap from the neck down.  Do not get it on your face or in your eyes.  You may use your own shampoo and face wash. This helps your skin to be as bacteria free as possible.    · If you wear contact lenses, dentures, hearing aids or glasses, bring a container to put them in during surgery and  give to a family member for safe keeping.  Please leave all jewelry, piercing's and valuables at home.   · DO NOT remove hair from the surgery site.  Do not shave the incision site unless you are given specific instructions to do so.    · ONLY if you have been diagnosed with sleep apnea please bring your C-PAP machine.  · ONLY if you wear home oxygen please bring your portable oxygen tank the day of your procedure.  · ONLY if you have a history of OPEN HEART SURGERY you will need a clearance from your Cardiologist per Anesthesia.      · ONLY for patients requiring bowel prep, written instructions will be given by your doctor's office.  · ONLY if you have a neuro stimulator, please bring the controller with you the morning of surgery  · ONLY if a type and screen test is needed before surgery, please return: DONE TODAY  · If your doctor has scheduled you for an overnight stay, bring a small overnight bag with any personal items you need.  · Make arrangements in advance for transportation home by a responsible adult.  It is not safe to drive a vehicle during the 24 hours after anesthesia.      · Visiting hours are 10:00AM to 8:30PM.  For the safety of all patients, visitors under the age of 12 are not allowed above the first floor of the hospital.    · All [facility] facilities and properties are tobacco free.  Smoking is NOT allowed.       If you have any questions about these instructions, call Pre-Op Admit  Nursing at [phone] or the Pre-Op Day Surgery Unit at [phone].

## 2019-08-01 NOTE — PRE ADMISSION SCREENING
JOINT CAMP ASSESSMENT    Name Ella Canales   MRN [id number]    Age/Sex 68 y.o. female    Surgeon Dr. Paolo Singh   Joint Camp Date 8/1/2019   Surgery Date 8/13/2019   Procedure Right Knee Arthroplasty   Insurance Payor: MEDICARE / Plan: MEDICARE PART A & B / Product Type: Government /    Care Team Patient Care Team:  Adry Damian MD as PCP - General (Family Medicine)  Adry Damian MD as PCP - MSSP Attributed  Lisy Webster LPN as Care Coordinator  Paolo Singh MD as Consulting Physician (Sports Medicine)    Pharmacy   [id number] - [place], [place] - [address] & [facility] [address]  [address]  [place] [address]  Phone: [phone] Fax: [phone]     AM-PAC Score   23   Risk Assessment Score 4     Past Medical History:   Diagnosis Date    Arthritis     bilateral knees    GERD (gastroesophageal reflux disease)     Hyperlipidemia     Hypertension     Osteopenia     S/P PICC central line placement     Ulcer        Past Surgical History:   Procedure Laterality Date    APPLICATION-WOUND DRESSING Left 3/9/2017    [facility] at [facility]    COLONOSCOPY N/A 1/22/2019    Performed by [facility]z MD at [facility] ENDO    COLONOSCOPY N/A 4/3/2012    Performed by Kingston Chapman MD at [facility] ENDO    ECTOPIC PREGNANCY SURGERY      fess      FRACTURE SURGERY      ORIF right arm.    GRAFT Left 3/9/2017    [facility] at [facility]    INCISION AND DRAINAGE (I & D) Left 3/9/2017    [facility] at [facility] OR    INCISION AND DRAINAGE-KNEE Left 2/25/2017    Performed by [facility]el [facility]ise [facility] at [facility] OR 2ND FLR    JOINT REPLACEMENT      left knee    LYSIS-ADHESION Left 3/9/2017    [facility] at [facility]    REPLACEMENT-KNEE-TOTAL Left 2/14/2017    [facility] at [facility]    REVISION-ARTHROPLASTY-KNEE-TOTAL; TIBIAL POLYETHYLENE EXCHANGE Left 3/9/2017     [facility]    TOTAL KNEE ARTHROPLASTY Left     TUBAL LIGATION           Home Enviroment     Living Arrangement: Lives alone  Home Environment: 2-story house, number of outside stairs: 2, bedroom on 1st floor, bathroom on 1st floor, walk-in shower  Home Safety Concerns: unremarkable    DISCHARGE CAREGIVER/SUPPORT SYSTEM     Identified post-op caregiver: Patient has children / family / friends to help, patient hired a private sitter..  Patient's caregiver(s) will be able to provide physical assistance. Patient will have someone to assist overnight.      Caregiver present at pre-op interview:  No      PRE-OPERATIVE FUNCTIONAL STATUS     Employment: Retired    Pre-op Functional Status: Patient is independent with mobility/ambulation, transfers, ADL's, IADL's.    Use of assistive device for ambulation: none  ADL: self care  ADL Limitations: difficulty with walking  Medical Restrictions: Decreased range of motions in extremities    POTENTIAL BARRIERS TO DISCHARGE/POTENTIAL POST-OP COMPLICATIONS     Patient with hx of stomach ulcer. She states that she cannot take Aspirin. Consider alternative anticoagulation options. Patient has decreased caregiver support. Patient hired a private sitter for the daytime hours and will have her sister-in-law in town for the weekend following surgery. Patient states that she will try to get her grandson's to sleep at her house after the sister-in-law leaves town. Patient had left knee arthroplasty by Dr. Mulligan and needed 3 surgeries to repair. Patient with hx of osteopenia, HTN.    DISCHARGE PLAN     Expected LOS of 2 days or less for joint replacement discussed with patient. We also discussed a discharge path of HH for approximately two weeks with a transition to outpatient PT on the third week given no post-op complications.      Patient in agreement with discharge plan: Yes    Discharge to: Discharge home with home health (PT/OT) x2 weeks with  transition to outpatient PT     HH:  [facility]     OP PT: [facility]     Home DME: rolling walker and bedside commode    Needed DME at D/C: none     Rx: Per Dr. Singh at discharge     Meds to Beds: N/A  Patient expected to discharge on recommendation of MD.Patient has an ulcer and cannot take Aspirin for DVT prophylaxis.

## 2019-08-02 ENCOUNTER — HOSPITAL ENCOUNTER (OUTPATIENT)
Dept: RADIOLOGY | Facility: HOSPITAL | Age: 69
Discharge: HOME | End: 2019-08-02
Attending: FAMILY MEDICINE
Payer: MEDICARE

## 2019-08-02 DIAGNOSIS — Z12.39 SCREENING BREAST EXAMINATION: ICD-10-CM

## 2019-08-02 PROCEDURE — 77067 SCR MAMMO BI INCL CAD: CPT | Mod: TC

## 2019-08-03 LAB — MRSA SPEC QL CULT: NORMAL

## 2019-08-12 ENCOUNTER — ANESTHESIA EVENT (OUTPATIENT)
Dept: SURGERY | Facility: HOSPITAL | Age: 69
End: 2019-08-12
Payer: MEDICARE

## 2019-08-13 ENCOUNTER — ANESTHESIA (OUTPATIENT)
Dept: SURGERY | Facility: HOSPITAL | Age: 69
End: 2019-08-13
Payer: MEDICARE

## 2019-08-13 ENCOUNTER — HOSPITAL ENCOUNTER (OUTPATIENT)
Facility: HOSPITAL | Age: 69
Discharge: HOME HEALTH | End: 2019-08-14
Attending: ORTHOPAEDIC SURGERY | Admitting: ORTHOPAEDIC SURGERY
Payer: MEDICARE

## 2019-08-13 DIAGNOSIS — M17.11 PRIMARY OSTEOARTHRITIS OF RIGHT KNEE: ICD-10-CM

## 2019-08-13 DIAGNOSIS — Z96.652 STATUS POST TOTAL LEFT KNEE REPLACEMENT: Primary | ICD-10-CM

## 2019-08-13 DIAGNOSIS — Z00.6 CLINICAL TRIAL PARTICIPANT: ICD-10-CM

## 2019-08-13 PROCEDURE — 25000003 PHARM REV CODE 250: Performed by: NURSE ANESTHETIST, CERTIFIED REGISTERED

## 2019-08-13 PROCEDURE — 25000003 PHARM REV CODE 250: Performed by: HOSPITALIST

## 2019-08-13 PROCEDURE — 25000003 PHARM REV CODE 250: Performed by: ANESTHESIOLOGY

## 2019-08-13 PROCEDURE — 27201423 OPTIME MED/SURG SUP & DEVICES STERILE SUPPLY: Performed by: ORTHOPAEDIC SURGERY

## 2019-08-13 PROCEDURE — 71000033 HC RECOVERY, INTIAL HOUR: Performed by: ORTHOPAEDIC SURGERY

## 2019-08-13 PROCEDURE — 25000003 PHARM REV CODE 250: Performed by: ORTHOPAEDIC SURGERY

## 2019-08-13 PROCEDURE — 63600175 PHARM REV CODE 636 W HCPCS: Performed by: ANESTHESIOLOGY

## 2019-08-13 PROCEDURE — D9220A PRA ANESTHESIA: ICD-10-PCS | Mod: CRNA,,, | Performed by: NURSE ANESTHETIST, CERTIFIED REGISTERED

## 2019-08-13 PROCEDURE — 99900104 DSU ONLY-NO CHARGE-EA ADD'L HR (STAT): Performed by: ORTHOPAEDIC SURGERY

## 2019-08-13 PROCEDURE — 63600175 PHARM REV CODE 636 W HCPCS: Performed by: NURSE PRACTITIONER

## 2019-08-13 PROCEDURE — 64447 PR NERVE BLOCK INJ, ANES/STEROID, FEMORAL, INCL IMAG GUIDANCE: ICD-10-PCS | Mod: 59,RT,, | Performed by: ANESTHESIOLOGY

## 2019-08-13 PROCEDURE — C1713 ANCHOR/SCREW BN/BN,TIS/BN: HCPCS | Performed by: ORTHOPAEDIC SURGERY

## 2019-08-13 PROCEDURE — 27447 PR TOTAL KNEE ARTHROPLASTY: ICD-10-PCS | Mod: RT,,, | Performed by: ORTHOPAEDIC SURGERY

## 2019-08-13 PROCEDURE — 97530 THERAPEUTIC ACTIVITIES: CPT

## 2019-08-13 PROCEDURE — 63600175 PHARM REV CODE 636 W HCPCS: Performed by: ORTHOPAEDIC SURGERY

## 2019-08-13 PROCEDURE — D9220A PRA ANESTHESIA: ICD-10-PCS | Mod: ANES,,, | Performed by: ANESTHESIOLOGY

## 2019-08-13 PROCEDURE — D9220A PRA ANESTHESIA: Mod: CRNA,,, | Performed by: NURSE ANESTHETIST, CERTIFIED REGISTERED

## 2019-08-13 PROCEDURE — 64447 NJX AA&/STRD FEMORAL NRV IMG: CPT | Mod: 59,RT,, | Performed by: ANESTHESIOLOGY

## 2019-08-13 PROCEDURE — 94761 N-INVAS EAR/PLS OXIMETRY MLT: CPT

## 2019-08-13 PROCEDURE — 37000009 HC ANESTHESIA EA ADD 15 MINS: Performed by: ORTHOPAEDIC SURGERY

## 2019-08-13 PROCEDURE — 63600175 PHARM REV CODE 636 W HCPCS

## 2019-08-13 PROCEDURE — S0020 INJECTION, BUPIVICAINE HYDRO: HCPCS | Performed by: ANESTHESIOLOGY

## 2019-08-13 PROCEDURE — C9290 INJ, BUPIVACAINE LIPOSOME: HCPCS | Performed by: ANESTHESIOLOGY

## 2019-08-13 PROCEDURE — 27200750 HC INSULATED NEEDLE/ STIMUPLEX: Performed by: ANESTHESIOLOGY

## 2019-08-13 PROCEDURE — 76942 PR U/S GUIDANCE FOR NEEDLE GUIDANCE: ICD-10-PCS | Mod: 26,,, | Performed by: ANESTHESIOLOGY

## 2019-08-13 PROCEDURE — 71000039 HC RECOVERY, EACH ADD'L HOUR: Performed by: ORTHOPAEDIC SURGERY

## 2019-08-13 PROCEDURE — D9220A PRA ANESTHESIA: Mod: ANES,,, | Performed by: ANESTHESIOLOGY

## 2019-08-13 PROCEDURE — 97162 PT EVAL MOD COMPLEX 30 MIN: CPT

## 2019-08-13 PROCEDURE — 64447 NJX AA&/STRD FEMORAL NRV IMG: CPT | Performed by: ANESTHESIOLOGY

## 2019-08-13 PROCEDURE — 76942 ECHO GUIDE FOR BIOPSY: CPT | Mod: 26,,, | Performed by: ANESTHESIOLOGY

## 2019-08-13 PROCEDURE — 25000003 PHARM REV CODE 250

## 2019-08-13 PROCEDURE — 37000008 HC ANESTHESIA 1ST 15 MINUTES: Performed by: ORTHOPAEDIC SURGERY

## 2019-08-13 PROCEDURE — G8979 MOBILITY GOAL STATUS: HCPCS | Mod: CJ

## 2019-08-13 PROCEDURE — G8978 MOBILITY CURRENT STATUS: HCPCS | Mod: CK

## 2019-08-13 PROCEDURE — C1776 JOINT DEVICE (IMPLANTABLE): HCPCS | Performed by: ORTHOPAEDIC SURGERY

## 2019-08-13 PROCEDURE — 27447 TOTAL KNEE ARTHROPLASTY: CPT | Mod: RT,,, | Performed by: ORTHOPAEDIC SURGERY

## 2019-08-13 PROCEDURE — 36000711: Performed by: ORTHOPAEDIC SURGERY

## 2019-08-13 PROCEDURE — 27200688 HC TRAY, SPINAL-HYPER/ ISOBARIC: Performed by: NURSE ANESTHETIST, CERTIFIED REGISTERED

## 2019-08-13 PROCEDURE — 99900103 DSU ONLY-NO CHARGE-INITIAL HR (STAT): Performed by: ORTHOPAEDIC SURGERY

## 2019-08-13 PROCEDURE — 36000710: Performed by: ORTHOPAEDIC SURGERY

## 2019-08-13 PROCEDURE — 63600175 PHARM REV CODE 636 W HCPCS: Performed by: NURSE ANESTHETIST, CERTIFIED REGISTERED

## 2019-08-13 PROCEDURE — 97110 THERAPEUTIC EXERCISES: CPT

## 2019-08-13 DEVICE — CEMENT BONE SIMPLEX HV RADPQ: Type: IMPLANTABLE DEVICE | Site: KNEE | Status: FUNCTIONAL

## 2019-08-13 DEVICE — INSERT TIBIAL CS TRI SZ3 11MM: Type: IMPLANTABLE DEVICE | Site: KNEE | Status: FUNCTIONAL

## 2019-08-13 DEVICE — BASEPLATE SZ 3 TRI TS IMPLANT: Type: IMPLANTABLE DEVICE | Site: KNEE | Status: FUNCTIONAL

## 2019-08-13 DEVICE — FEMORAL CRUC RTN CEM SZ 3 RT: Type: IMPLANTABLE DEVICE | Site: KNEE | Status: FUNCTIONAL

## 2019-08-13 DEVICE — PATELLA TRI 29X8 X3 POLYETHYLE: Type: IMPLANTABLE DEVICE | Site: KNEE | Status: FUNCTIONAL

## 2019-08-13 RX ORDER — RAMELTEON 8 MG/1
8 TABLET ORAL NIGHTLY PRN
Status: DISCONTINUED | OUTPATIENT
Start: 2019-08-13 | End: 2019-08-14 | Stop reason: HOSPADM

## 2019-08-13 RX ORDER — HYDROCHLOROTHIAZIDE 25 MG/1
25 TABLET ORAL DAILY
Status: DISCONTINUED | OUTPATIENT
Start: 2019-08-13 | End: 2019-08-14 | Stop reason: HOSPADM

## 2019-08-13 RX ORDER — FENTANYL CITRATE 50 UG/ML
INJECTION, SOLUTION INTRAMUSCULAR; INTRAVENOUS
Status: DISCONTINUED | OUTPATIENT
Start: 2019-08-13 | End: 2019-08-13

## 2019-08-13 RX ORDER — CETIRIZINE HYDROCHLORIDE 10 MG/1
10 TABLET ORAL DAILY
Status: DISCONTINUED | OUTPATIENT
Start: 2019-08-13 | End: 2019-08-14 | Stop reason: HOSPADM

## 2019-08-13 RX ORDER — BUPIVACAINE HYDROCHLORIDE 5 MG/ML
INJECTION, SOLUTION EPIDURAL; INTRACAUDAL
Status: DISCONTINUED | OUTPATIENT
Start: 2019-08-13 | End: 2019-08-13

## 2019-08-13 RX ORDER — CALCIUM CARBONATE 500(1250)
500 TABLET ORAL 2 TIMES DAILY
Status: DISCONTINUED | OUTPATIENT
Start: 2019-08-13 | End: 2019-08-14 | Stop reason: HOSPADM

## 2019-08-13 RX ORDER — CELECOXIB 100 MG/1
100 CAPSULE ORAL 2 TIMES DAILY
Status: DISCONTINUED | OUTPATIENT
Start: 2019-08-13 | End: 2019-08-14 | Stop reason: HOSPADM

## 2019-08-13 RX ORDER — CEFAZOLIN SODIUM 2 G/50ML
2 SOLUTION INTRAVENOUS
Status: COMPLETED | OUTPATIENT
Start: 2019-08-13 | End: 2019-08-14

## 2019-08-13 RX ORDER — ACETAMINOPHEN 10 MG/ML
1000 INJECTION, SOLUTION INTRAVENOUS ONCE
Status: COMPLETED | OUTPATIENT
Start: 2019-08-13 | End: 2019-08-13

## 2019-08-13 RX ORDER — BUPIVACAINE HYDROCHLORIDE 7.5 MG/ML
INJECTION, SOLUTION EPIDURAL; RETROBULBAR
Status: DISCONTINUED | OUTPATIENT
Start: 2019-08-13 | End: 2019-08-13

## 2019-08-13 RX ORDER — POTASSIUM CHLORIDE 750 MG/1
10 TABLET, EXTENDED RELEASE ORAL 2 TIMES DAILY
Status: DISCONTINUED | OUTPATIENT
Start: 2019-08-13 | End: 2019-08-14 | Stop reason: HOSPADM

## 2019-08-13 RX ORDER — NAPROXEN SODIUM 220 MG/1
81 TABLET, FILM COATED ORAL DAILY
Status: DISCONTINUED | OUTPATIENT
Start: 2019-08-13 | End: 2019-08-13

## 2019-08-13 RX ORDER — ONDANSETRON HYDROCHLORIDE 2 MG/ML
INJECTION, SOLUTION INTRAMUSCULAR; INTRAVENOUS
Status: DISCONTINUED | OUTPATIENT
Start: 2019-08-13 | End: 2019-08-13

## 2019-08-13 RX ORDER — LIDOCAINE HYDROCHLORIDE 10 MG/ML
INJECTION, SOLUTION EPIDURAL; INFILTRATION; INTRACAUDAL; PERINEURAL
Status: COMPLETED
Start: 2019-08-13 | End: 2019-08-13

## 2019-08-13 RX ORDER — OXYCODONE HCL 10 MG/1
10 TABLET, FILM COATED, EXTENDED RELEASE ORAL EVERY 12 HOURS
Status: DISCONTINUED | OUTPATIENT
Start: 2019-08-13 | End: 2019-08-14 | Stop reason: HOSPADM

## 2019-08-13 RX ORDER — SODIUM CHLORIDE, SODIUM LACTATE, POTASSIUM CHLORIDE, CALCIUM CHLORIDE 600; 310; 30; 20 MG/100ML; MG/100ML; MG/100ML; MG/100ML
INJECTION, SOLUTION INTRAVENOUS CONTINUOUS PRN
Status: DISCONTINUED | OUTPATIENT
Start: 2019-08-13 | End: 2019-08-13

## 2019-08-13 RX ORDER — GLYCOPYRROLATE 0.2 MG/ML
INJECTION INTRAMUSCULAR; INTRAVENOUS
Status: DISCONTINUED | OUTPATIENT
Start: 2019-08-13 | End: 2019-08-13

## 2019-08-13 RX ORDER — PANTOPRAZOLE SODIUM 40 MG/1
40 TABLET, DELAYED RELEASE ORAL DAILY
Status: DISCONTINUED | OUTPATIENT
Start: 2019-08-13 | End: 2019-08-14 | Stop reason: HOSPADM

## 2019-08-13 RX ORDER — ONDANSETRON 2 MG/ML
8 INJECTION INTRAMUSCULAR; INTRAVENOUS EVERY 8 HOURS PRN
Status: DISCONTINUED | OUTPATIENT
Start: 2019-08-13 | End: 2019-08-14 | Stop reason: HOSPADM

## 2019-08-13 RX ORDER — DOCUSATE SODIUM 100 MG/1
100 CAPSULE, LIQUID FILLED ORAL EVERY 12 HOURS
Status: DISCONTINUED | OUTPATIENT
Start: 2019-08-13 | End: 2019-08-14 | Stop reason: HOSPADM

## 2019-08-13 RX ORDER — PROPOFOL 10 MG/ML
VIAL (ML) INTRAVENOUS CONTINUOUS PRN
Status: DISCONTINUED | OUTPATIENT
Start: 2019-08-13 | End: 2019-08-13

## 2019-08-13 RX ORDER — CELECOXIB 100 MG/1
200 CAPSULE ORAL ONCE
Status: COMPLETED | OUTPATIENT
Start: 2019-08-13 | End: 2019-08-13

## 2019-08-13 RX ORDER — LOSARTAN POTASSIUM 25 MG/1
100 TABLET ORAL DAILY
Status: DISCONTINUED | OUTPATIENT
Start: 2019-08-13 | End: 2019-08-14

## 2019-08-13 RX ORDER — ASPIRIN 325 MG
325 TABLET ORAL 2 TIMES DAILY
Status: DISCONTINUED | OUTPATIENT
Start: 2019-08-13 | End: 2019-08-13

## 2019-08-13 RX ORDER — ONDANSETRON 2 MG/ML
4 INJECTION INTRAMUSCULAR; INTRAVENOUS ONCE AS NEEDED
Status: DISCONTINUED | OUTPATIENT
Start: 2019-08-13 | End: 2019-08-13 | Stop reason: HOSPADM

## 2019-08-13 RX ORDER — MUPIROCIN 20 MG/G
1 OINTMENT TOPICAL 2 TIMES DAILY
Status: DISCONTINUED | OUTPATIENT
Start: 2019-08-13 | End: 2019-08-14 | Stop reason: HOSPADM

## 2019-08-13 RX ORDER — ENOXAPARIN SODIUM 100 MG/ML
40 INJECTION SUBCUTANEOUS EVERY 24 HOURS
Status: DISCONTINUED | OUTPATIENT
Start: 2019-08-13 | End: 2019-08-14 | Stop reason: HOSPADM

## 2019-08-13 RX ORDER — FENTANYL CITRATE 50 UG/ML
25 INJECTION, SOLUTION INTRAMUSCULAR; INTRAVENOUS EVERY 5 MIN PRN
Status: DISCONTINUED | OUTPATIENT
Start: 2019-08-13 | End: 2019-08-13 | Stop reason: HOSPADM

## 2019-08-13 RX ORDER — OXYCODONE HYDROCHLORIDE 5 MG/1
5 TABLET ORAL
Status: DISCONTINUED | OUTPATIENT
Start: 2019-08-13 | End: 2019-08-14 | Stop reason: HOSPADM

## 2019-08-13 RX ORDER — MUPIROCIN 20 MG/G
OINTMENT TOPICAL
Status: DISCONTINUED | OUTPATIENT
Start: 2019-08-13 | End: 2019-08-13 | Stop reason: HOSPADM

## 2019-08-13 RX ORDER — OXYCODONE HYDROCHLORIDE 10 MG/1
10 TABLET ORAL EVERY 6 HOURS PRN
Status: DISCONTINUED | OUTPATIENT
Start: 2019-08-13 | End: 2019-08-14 | Stop reason: HOSPADM

## 2019-08-13 RX ORDER — SODIUM CHLORIDE 9 MG/ML
INJECTION, SOLUTION INTRAVENOUS CONTINUOUS
Status: DISCONTINUED | OUTPATIENT
Start: 2019-08-13 | End: 2019-08-13

## 2019-08-13 RX ORDER — SODIUM CHLORIDE, SODIUM LACTATE, POTASSIUM CHLORIDE, CALCIUM CHLORIDE 600; 310; 30; 20 MG/100ML; MG/100ML; MG/100ML; MG/100ML
125 INJECTION, SOLUTION INTRAVENOUS CONTINUOUS
Status: DISCONTINUED | OUTPATIENT
Start: 2019-08-13 | End: 2019-08-13

## 2019-08-13 RX ORDER — MIDAZOLAM HYDROCHLORIDE 1 MG/ML
INJECTION, SOLUTION INTRAMUSCULAR; INTRAVENOUS
Status: DISCONTINUED | OUTPATIENT
Start: 2019-08-13 | End: 2019-08-13

## 2019-08-13 RX ORDER — FAMOTIDINE 20 MG/1
20 TABLET, FILM COATED ORAL 2 TIMES DAILY
Status: DISCONTINUED | OUTPATIENT
Start: 2019-08-13 | End: 2019-08-14 | Stop reason: HOSPADM

## 2019-08-13 RX ORDER — SIMVASTATIN 40 MG/1
40 TABLET, FILM COATED ORAL NIGHTLY
Status: DISCONTINUED | OUTPATIENT
Start: 2019-08-13 | End: 2019-08-14 | Stop reason: HOSPADM

## 2019-08-13 RX ORDER — TRANEXAMIC ACID 100 MG/ML
INJECTION, SOLUTION INTRAVENOUS
Status: DISCONTINUED | OUTPATIENT
Start: 2019-08-13 | End: 2019-08-13

## 2019-08-13 RX ORDER — OXYCODONE HCL 10 MG/1
10 TABLET, FILM COATED, EXTENDED RELEASE ORAL ONCE
Status: COMPLETED | OUTPATIENT
Start: 2019-08-13 | End: 2019-08-13

## 2019-08-13 RX ORDER — METOPROLOL SUCCINATE 50 MG/1
50 TABLET, EXTENDED RELEASE ORAL NIGHTLY
Status: DISCONTINUED | OUTPATIENT
Start: 2019-08-13 | End: 2019-08-14 | Stop reason: HOSPADM

## 2019-08-13 RX ORDER — PREGABALIN 75 MG/1
75 CAPSULE ORAL ONCE
Status: COMPLETED | OUTPATIENT
Start: 2019-08-13 | End: 2019-08-13

## 2019-08-13 RX ORDER — LOPERAMIDE HYDROCHLORIDE 2 MG/1
2 CAPSULE ORAL CONTINUOUS PRN
Status: DISCONTINUED | OUTPATIENT
Start: 2019-08-13 | End: 2019-08-14 | Stop reason: HOSPADM

## 2019-08-13 RX ORDER — PREGABALIN 75 MG/1
75 CAPSULE ORAL 2 TIMES DAILY
Status: DISCONTINUED | OUTPATIENT
Start: 2019-08-13 | End: 2019-08-14 | Stop reason: HOSPADM

## 2019-08-13 RX ORDER — CEFAZOLIN SODIUM 2 G/50ML
2 SOLUTION INTRAVENOUS
Status: COMPLETED | OUTPATIENT
Start: 2019-08-13 | End: 2019-08-13

## 2019-08-13 RX ORDER — OXYCODONE HYDROCHLORIDE 5 MG/1
5 TABLET ORAL ONCE AS NEEDED
Status: DISCONTINUED | OUTPATIENT
Start: 2019-08-13 | End: 2019-08-13 | Stop reason: HOSPADM

## 2019-08-13 RX ORDER — ACETAMINOPHEN 10 MG/ML
1000 INJECTION, SOLUTION INTRAVENOUS EVERY 8 HOURS
Status: COMPLETED | OUTPATIENT
Start: 2019-08-13 | End: 2019-08-13

## 2019-08-13 RX ORDER — SODIUM CHLORIDE 0.9 % (FLUSH) 0.9 %
10 SYRINGE (ML) INJECTION
Status: DISCONTINUED | OUTPATIENT
Start: 2019-08-13 | End: 2019-08-14 | Stop reason: HOSPADM

## 2019-08-13 RX ORDER — SODIUM CHLORIDE, SODIUM LACTATE, POTASSIUM CHLORIDE, CALCIUM CHLORIDE 600; 310; 30; 20 MG/100ML; MG/100ML; MG/100ML; MG/100ML
1000 INJECTION, SOLUTION INTRAVENOUS ONCE
Status: COMPLETED | OUTPATIENT
Start: 2019-08-13 | End: 2019-08-13

## 2019-08-13 RX ORDER — METOPROLOL SUCCINATE 50 MG/1
50 TABLET, EXTENDED RELEASE ORAL DAILY
Status: DISCONTINUED | OUTPATIENT
Start: 2019-08-13 | End: 2019-08-13

## 2019-08-13 RX ADMIN — POTASSIUM CHLORIDE 10 MEQ: 750 TABLET, EXTENDED RELEASE ORAL at 09:08

## 2019-08-13 RX ADMIN — ACETAMINOPHEN 1000 MG: 10 INJECTION, SOLUTION INTRAVENOUS at 07:08

## 2019-08-13 RX ADMIN — FAMOTIDINE 20 MG: 20 TABLET, FILM COATED ORAL at 09:08

## 2019-08-13 RX ADMIN — Medication 1 EACH: at 01:08

## 2019-08-13 RX ADMIN — FAMOTIDINE 20 MG: 20 TABLET, FILM COATED ORAL at 01:08

## 2019-08-13 RX ADMIN — CALCIUM 500 MG: 500 TABLET ORAL at 09:08

## 2019-08-13 RX ADMIN — BUPIVACAINE HYDROCHLORIDE 2 ML: 7.5 INJECTION, SOLUTION EPIDURAL; RETROBULBAR at 03:08

## 2019-08-13 RX ADMIN — LIDOCAINE HYDROCHLORIDE 20 MG: 10 INJECTION, SOLUTION EPIDURAL; INFILTRATION; INTRACAUDAL; PERINEURAL at 07:08

## 2019-08-13 RX ADMIN — PANTOPRAZOLE SODIUM 40 MG: 40 TABLET, DELAYED RELEASE ORAL at 01:08

## 2019-08-13 RX ADMIN — BUPIVACAINE 20 ML: 13.3 INJECTION, SUSPENSION, LIPOSOMAL INFILTRATION at 07:08

## 2019-08-13 RX ADMIN — CEFAZOLIN 2 G: 1 INJECTION, POWDER, FOR SOLUTION INTRAVENOUS at 04:08

## 2019-08-13 RX ADMIN — MUPIROCIN 1 G: 20 OINTMENT TOPICAL at 09:08

## 2019-08-13 RX ADMIN — MUPIROCIN 1 G: 20 OINTMENT TOPICAL at 01:08

## 2019-08-13 RX ADMIN — CETIRIZINE HYDROCHLORIDE 10 MG: 10 TABLET, FILM COATED ORAL at 01:08

## 2019-08-13 RX ADMIN — MUPIROCIN: 20 OINTMENT TOPICAL at 06:08

## 2019-08-13 RX ADMIN — GLYCOPYRROLATE 0.2 MG: 0.2 INJECTION, SOLUTION INTRAMUSCULAR; INTRAVENOUS at 08:08

## 2019-08-13 RX ADMIN — TRANEXAMIC ACID 760 MG: 100 INJECTION, SOLUTION INTRAVENOUS at 08:08

## 2019-08-13 RX ADMIN — OXYCODONE HYDROCHLORIDE 10 MG: 10 TABLET, FILM COATED, EXTENDED RELEASE ORAL at 06:08

## 2019-08-13 RX ADMIN — SODIUM CHLORIDE, SODIUM LACTATE, POTASSIUM CHLORIDE, AND CALCIUM CHLORIDE: .6; .31; .03; .02 INJECTION, SOLUTION INTRAVENOUS at 09:08

## 2019-08-13 RX ADMIN — BUPIVACAINE HYDROCHLORIDE 10 ML: 5 INJECTION, SOLUTION EPIDURAL; INTRACAUDAL; PERINEURAL at 03:08

## 2019-08-13 RX ADMIN — CALCIUM 500 MG: 500 TABLET ORAL at 01:08

## 2019-08-13 RX ADMIN — SODIUM CHLORIDE, SODIUM LACTATE, POTASSIUM CHLORIDE, AND CALCIUM CHLORIDE 1000 ML: .6; .31; .03; .02 INJECTION, SOLUTION INTRAVENOUS at 07:08

## 2019-08-13 RX ADMIN — ONDANSETRON 4 MG: 2 INJECTION, SOLUTION INTRAMUSCULAR; INTRAVENOUS at 08:08

## 2019-08-13 RX ADMIN — OXYCODONE HYDROCHLORIDE 10 MG: 10 TABLET, FILM COATED, EXTENDED RELEASE ORAL at 09:08

## 2019-08-13 RX ADMIN — THERA TABS 1 TABLET: TAB at 01:08

## 2019-08-13 RX ADMIN — TRANEXAMIC ACID 760 MG: 100 INJECTION, SOLUTION INTRAVENOUS at 07:08

## 2019-08-13 RX ADMIN — CELECOXIB 100 MG: 100 CAPSULE ORAL at 09:08

## 2019-08-13 RX ADMIN — ACETAMINOPHEN 1000 MG: 10 INJECTION, SOLUTION INTRAVENOUS at 04:08

## 2019-08-13 RX ADMIN — SODIUM CHLORIDE: 0.9 INJECTION, SOLUTION INTRAVENOUS at 11:08

## 2019-08-13 RX ADMIN — METOPROLOL SUCCINATE 50 MG: 50 TABLET, EXTENDED RELEASE ORAL at 09:08

## 2019-08-13 RX ADMIN — SODIUM CHLORIDE, SODIUM LACTATE, POTASSIUM CHLORIDE, AND CALCIUM CHLORIDE: .6; .31; .03; .02 INJECTION, SOLUTION INTRAVENOUS at 08:08

## 2019-08-13 RX ADMIN — PREGABALIN 75 MG: 75 CAPSULE ORAL at 06:08

## 2019-08-13 RX ADMIN — PROPOFOL 100 MCG/KG/MIN: 10 INJECTION, EMULSION INTRAVENOUS at 07:08

## 2019-08-13 RX ADMIN — FENTANYL CITRATE 100 MCG: 50 INJECTION, SOLUTION INTRAMUSCULAR; INTRAVENOUS at 07:08

## 2019-08-13 RX ADMIN — ENOXAPARIN SODIUM 40 MG: 100 INJECTION SUBCUTANEOUS at 11:08

## 2019-08-13 RX ADMIN — PREGABALIN 75 MG: 75 CAPSULE ORAL at 09:08

## 2019-08-13 RX ADMIN — SIMVASTATIN 40 MG: 40 TABLET, FILM COATED ORAL at 09:08

## 2019-08-13 RX ADMIN — SODIUM CHLORIDE, SODIUM LACTATE, POTASSIUM CHLORIDE, AND CALCIUM CHLORIDE: .6; .31; .03; .02 INJECTION, SOLUTION INTRAVENOUS at 06:08

## 2019-08-13 RX ADMIN — OXYCODONE HYDROCHLORIDE 5 MG: 5 TABLET ORAL at 01:08

## 2019-08-13 RX ADMIN — MIDAZOLAM 2 MG: 1 INJECTION INTRAMUSCULAR; INTRAVENOUS at 07:08

## 2019-08-13 RX ADMIN — CELECOXIB 200 MG: 100 CAPSULE ORAL at 06:08

## 2019-08-13 RX ADMIN — CEFAZOLIN SODIUM 2 G: 2 SOLUTION INTRAVENOUS at 07:08

## 2019-08-13 NOTE — PLAN OF CARE
Problem: Physical Therapy Goal  Goal: Physical Therapy Goal  Goals to be met by: 2019     Patient will increase functional independence with mobility by performin. Supine to sit with Modified [place]  2. Sit to stand transfer with Contact Guard Assistance  3. Bed to chair transfer with Contact Guard Assistance using Rolling Walker  4. Gait  x 250 feet with Contact Guard Assistance using Rolling Walker.   5. Lower extremity exercise program x20 reps   Outcome: Ongoing (interventions implemented as appropriate)  PT eval and treat completed. Pt with numbness/weakness RLE- used commode to void and few steps back to bed with RW min assist 2. Completed LE's thera ex

## 2019-08-13 NOTE — H&P
Past Medical History:   Diagnosis Date    Arthritis       bilateral knees    GERD (gastroesophageal reflux disease)      Hyperlipidemia      Hypertension      Osteopenia      S/P PICC central line placement      Ulcer                 Past Surgical History:   Procedure Laterality Date    APPLICATION-WOUND DRESSING Left 3/9/2017     [facility] at [facility]    COLONOSCOPY N/A 1/22/2019     [facility]    COLONOSCOPY N/A 4/3/2012     [facility] by Kingston [facility] at [facility]    ECTOPIC PREGNANCY SURGERY        fess        FRACTURE SURGERY         ORIF right arm.    GRAFT Left 3/9/2017     [facility] at [facility]    INCISION AND DRAINAGE (I & D) Left 3/9/2017     [facility] by [facility] at [facility] OR    INCISION AND DRAINAGE-KNEE Left 2/25/2017     Performed by [facility]el [facility]ise MD at [facility] OR 2ND FLR    JOINT REPLACEMENT         left knee    LYSIS-ADHESION Left 3/9/2017     [facility]formed by [facility] at [facility] OR    REPLACEMENT-KNEE-TOTAL Left 2/14/2017     [facility] by [facility] at [facility]    REVISION-ARTHROPLASTY-KNEE-TOTAL; TIBIAL POLYETHYLENE EXCHANGE Left 3/9/2017     [facility]formed by [facility] at [facility] OR    TOTAL KNEE ARTHROPLASTY Left      TUBAL LIGATION                  Current Outpatient Medications   Medication Sig    alendronate (FOSAMAX) 70 MG tablet Take 1 tablet (70 mg total) by mouth every 7 days.    CALCIUM ORAL Take by mouth.    fexofenadine (ALLEGRA) 60 MG tablet Take 60 mg by mouth once daily.    glucosamine-chondroit-vit C-Mn (GLUCOSAMINE-CHONDROITIN MAX ST) 500-400 mg Cap Twice a day    hydroCHLOROthiazide (HYDRODIURIL) 25 MG tablet Take 1 tablet (25 mg total) by mouth once daily.    LACTOBACILLUS COMBO NO.6 (PROBIOTIC COMPLEX ORAL) Take 1 tablet by mouth once daily.    losartan (COZAAR) 100 MG tablet Take 1 tablet (100 mg total) by mouth once daily.    metoprolol succinate  (TOPROL-XL) 50 MG 24 hr tablet TAKE 1 TABLET(50 MG) BY MOUTH EVERY DAY    multivitamin (ONE DAILY MULTIVITAMIN) per tablet Take 1 tablet by mouth once daily.    omeprazole (PRILOSEC) 20 MG capsule TAKE 1 CAPSULE(20 MG) BY MOUTH TWICE DAILY    potassium chloride SA (K-DUR,KLOR-CON) 10 MEQ tablet TAKE 1 TABLET(10 MEQ) BY MOUTH TWICE DAILY    simvastatin (ZOCOR) 40 MG tablet TAKE 1 TABLET(40 MG) BY MOUTH EVERY EVENING    TURMERIC ROOT EXTRACT ORAL Take by mouth.          Current Facility-Administered Medications   Medication    INV  40 mg/5 mL injection 40 mg               Review of patient's allergies indicates:   Allergen Reactions    Ace inhibitors         Other reaction(s): cough    Latex         denies               Family History   Problem Relation Age of Onset    Heart disease Mother      Heart disease Father      Heart disease Brother 47         cabg         Social History               Socioeconomic History    Marital status:        Spouse name: Not on file    Number of children: Not on file    Years of education: Not on file    Highest education level: Not on file   Occupational History    Not on file   Social Needs    Financial resource strain: Not on file    Food insecurity:       Worry: Not on file       Inability: Not on file    Transportation needs:       Medical: Not on file       Non-medical: Not on file   Tobacco Use    Smoking status: Never Smoker    Smokeless tobacco: Never Used   Substance and Sexual Activity    Alcohol use: Yes       Comment: occasional    Drug use: No    Sexual activity: Never   Lifestyle    Physical activity:       Days per week: Not on file       Minutes per session: Not on file    Stress: Not on file   Relationships    Social connections:       Talks on phone: Not on file       Gets together: Not on file       Attends Yazdanism service: Not on file       Active member of club or organization: Not on file       Attends meetings of clubs  or organizations: Not on file       Relationship status: Not on file   Other Topics Concern    Not on file   Social History Narrative    Not on file            Chief Complaint:       Chief Complaint   Patient presents with    Right Knee - Pain         History of present illness:  This is a 68-year-old female seen for chronic right knee pain.  Patient has had problems for decades.  She has a history of knee arthritis has been treated with various injections including cortisone and viscosupplementation.  She has tried physical therapy previously.  Patient had a left knee replacement that unfortunately had to be redone and has had a total of 3 surgeries on it.  Pain is a 2/10.  Pain is worsening.        Review of Systems:     Constitution: Negative for chills, fever, and sweats.  Negative for unexplained weight loss.     HENT:  Negative for headaches and blurry vision.     Cardiovascular:Negative for chest pain or irregular heart beat. Negative for hypertension.     Respiratory:  Negative for cough and shortness of breath.     Gastrointestinal: Negative for abdominal pain, heartburn, melena, nausea, and vomitting.     Genitourinary:  Negative bladder incontinence and dysuria.     Musculoskeletal:  See HPI     Neurological: Negative for numbness.     Psychiatric/Behavioral: Negative for depression.  The patient is not nervous/anxious.       Endocrine: Negative for polyuria     Hematologic/Lymphatic: Negative for bleeding problem.  Does not bruise/bleed easily.     Skin: Negative for poor would healing and rash        Physical Examination:     Vital Signs:        Vitals:     05/30/19 1024   BP: (!) 143/68   Pulse: 75         Body mass index is 32.81 kg/m².     This a well-developed, well nourished patient in no acute distress.  They are alert and oriented and cooperative to examination.  Pt. walks without an antalgic gait.       Examination of the right knee shows no rashes or erythema. There are no masses ecchymosis  or effusion. Patient has full range of motion from 3-130°. Patient is nontender to palpation over lateral joint line and moderately tender to palpation over the medial joint line. Patient has a - Lachman exam, - anterior drawer exam, and - posterior drawer exam. - David's exam. Knee is stable to varus and valgus stress. 5 out of 5 motor strength. Palpable distal pulses. Intact light touch sensation. Negative Patellofemoral crepitus     Examination of the left knee shows well-healed surgical incision.  Patient has range of motion from 0-95 degrees. Stable to varus and valgus stress. Negative drawer exam.  5/5 motor strength.  Palpable distal pulses and intact light touch sensation.     Heart is regular rate without obvious murmurs   Normal respiratory effort without audible wheezing  Abdomen is soft and nontender         X-rays:  X-rays of the right knee are ordered and reviewed which show well-aligned left total knee arthroplasty without complication. Patient has severe medial compartment narrowing of the right knee.     Assessment::  Severe right varus knee arthritis     Plan:  I had a long discussion with her today about proceeding with knee replacement versus continuing with her non operative care.  She would like to go ahead and just definitively have this fixed.  She has a little nervous just because of the problem she had with the other side.  I understand this.  Plan is for right total knee arthroplasty. Risks, benefits, and alternatives to the procedure were explained to the patient including but not limited to damage to nerves, arteries, blood vessels, bones, tendons, ligaments, stiffness, instability, infection, DVT, PE, as well as general anesthetic complications including seizure, stroke, heart attack and even death. The patient understood these risks and wished to proceed and signed the informed consent.         This note was created using [employer] voice recognition software that occasionally  misinterpreted phrases or words.     Consult note is delivered via Epic messaging service.

## 2019-08-13 NOTE — PT/OT/SLP EVAL
"Physical Therapy Evaluation    Patient Name:  Ella Canales   MRN:  [id number]    Recommendations:     Discharge Recommendations:  home health PT   Discharge Equipment Recommendations: none(has RW)   Barriers to discharge: None    Assessment:     Ella Canales is a 68 y.o. female admitted with a medical diagnosis of Primary osteoarthritis of right knee.  She presents with the following impairments/functional limitations:  weakness, impaired endurance, impaired sensation, impaired self care skills, impaired functional mobilty, gait instability, impaired balance, decreased lower extremity function, orthopedic precautions . Presents with weakness/numbness RLE with 'limited ambulation. Pt will benefit from continued therapies.    Rehab Prognosis: Good; patient would benefit from acute skilled PT services to address these deficits and reach maximum level of function.    Recent Surgery: Procedure(s) (LRB):  ARTHROPLASTY, KNEE (Right) Day of Surgery    Plan:     During this hospitalization, patient to be seen BID to address the identified rehab impairments via gait training, therapeutic activities, therapeutic exercises and progress toward the following goals:    · Plan of Care Expires:  08/30/19    Subjective   Pt stated had several LK surgery with infection  "can't believe that I feel good after today's sx"  Chief Complaint: giveaway sensation RLE  Dry mouth  Patient/Family Comments/goals: get well  Pain/Comfort:  · Pain Rating 1: 4/10  · Location - Side 1: Right  · Location 1: knee  · Pain Addressed 1: Pre-medicate for activity, Reposition    Patients cultural, spiritual, Denominational conflicts given the current situation:      Living Environment:  Home alone but will have family and sitter to stay at discharge  2 steps to enter  Prior to admission, patients level of function was ambulatory/independent/drives.  Equipment used at home: none.  DME owned (not currently used): rolling walker.  Upon discharge, patient " will have assistance from family.    Objective:     Communicated with nurse Abebe prior to session.  Patient found HOB elevated with peripheral IV, SCD, cryotherapy  upon PT entry to room.    General Precautions: Standard, fall   Orthopedic Precautions:RLE weight bearing as tolerated   Braces: N/A     Exams:  · Postural Exam:  Patient presented with the following abnormalities:    · -       Rounded shoulders  · -       Forward head  · RLE ROM: RK flexion 80 degrees  · RLE Strength: 3-/5  · LLE ROM: WFL  · LLE Strength: WFL    Functional Mobility:  · Bed Mobility:     · Rolling Left:  minimum assistance  · Scooting: minimum assistance  · Supine to Sit: minimum assistance  · Sit to Supine: minimum assistance  · Transfers:     · Sit to Stand:  minimum assistance with rolling walker  · Toilet Transfer: minimum assistance and of 2 persons with  rolling walker  using  Stand Pivot  · Gait: few steps with RW min assist2      Therapeutic Activities and Exercises:   thera ex with AP,QS,GS,assisted SLR and HS  EOB sitting, standing with RW with RLE giveaway  Commode use to void assist for hygiene care  Back to bed post PT and repositioned with cryo and SCD    AM-PAC 6 CLICK MOBILITY  Total Score:16     Patient left HOB elevated with all lines intact and call button in reach.    GOALS:   Multidisciplinary Problems     Physical Therapy Goals        Problem: Physical Therapy Goal    Goal Priority Disciplines Outcome Goal Variances Interventions   Physical Therapy Goal     PT, PT/OT Ongoing (interventions implemented as appropriate)     Description:  Goals to be met by: 2019     Patient will increase functional independence with mobility by performin. Supine to sit with Modified [place]  2. Sit to stand transfer with Contact Guard Assistance  3. Bed to chair transfer with Contact Guard Assistance using Rolling Walker  4. Gait  x 250 feet with Contact Guard Assistance using Rolling Walker.   5. Lower extremity  exercise program x20 reps                     History:     Past Medical History:   Diagnosis Date    Arthritis     bilateral knees    Cancer     skin    GERD (gastroesophageal reflux disease)     Hyperlipidemia     Hypertension     Osteopenia     S/P PICC central line placement     Ulcer        Past Surgical History:   Procedure Laterality Date    APPLICATION-WOUND DRESSING Left 3/9/2017    [facility]    COLONOSCOPY N/A 1/22/2019    [facility] ENDO    COLONOSCOPY N/A 4/3/2012    Performed by Kingston Chapman [facility] at [facility] ENDO    ECTOPIC PREGNANCY SURGERY      fess      FRACTURE SURGERY      ORIF right arm.    GRAFT Left 3/9/2017    [facility] at [facility]    INCISION AND DRAINAGE (I & D) Left 3/9/2017    [facility] at [facility] OR    INCISION AND DRAINAGE-KNEE Left 2/25/2017    [facility]formed by [facility] at [facility] OR 2ND FLR    JOINT REPLACEMENT      left knee    LYSIS-ADHESION Left 3/9/2017    [facility] at [facility] OR    REPLACEMENT-KNEE-TOTAL Left 2/14/2017    [facility] by [facility] at [facility] OR    REVISION-ARTHROPLASTY-KNEE-TOTAL; TIBIAL POLYETHYLENE EXCHANGE Left 3/9/2017    Performed by [facility] at [facility] OR    TOTAL KNEE ARTHROPLASTY Left     TUBAL LIGATION         Time Tracking:     PT Received On: 08/13/19  PT Start Time: 1444     PT Stop Time: 1530  PT Total Time (min): 46 min     Billable Minutes: Evaluation 10, Therapeutic Activity 20 and Therapeutic Exercise 16      Leatha Issa, PT  08/13/2019

## 2019-08-13 NOTE — PLAN OF CARE
Problem: Adult Inpatient Plan of Care  Goal: Plan of Care Review  Outcome: Ongoing (interventions implemented as appropriate)   Pt remains free from injury. POD 0. AAOx4. Up with 1 person assist to BSC. TEDs and SCDs on. Post op IV abx maintained. C/o 1/10 pain.cryo to R knee. Bandage C,D,I. PT/OT today. Bed locked and low. Siderails raised x2. Non-skid socks on. Safety precautions maintained. Call light in reach. Instructed to call for assistance when needed. Verbalized understanding.

## 2019-08-13 NOTE — NURSING
Pt arrived to room 419 via bed with JEFF Francois. Pt AAOx4. Pt oriented to room and unit. Vital signs stable. Denies pain at this time.  TEDs and SCDs in place. Cryo in place. IVF infusing. Will continue to monitor.

## 2019-08-13 NOTE — PLAN OF CARE
Transferred patient for saud Motley to room 419.  Report to saud Purvis.  Released from Anesthesia.  Son at [facility].  Denies pain.  NAD noted.

## 2019-08-13 NOTE — NURSING
Spoke with HYACINTH Schumacher in regards to pts scheduled BP meds. Per Winsome, hold HCTZ, K, and Losartan. Ok to give Toprol-xl). WIll carry out.     1325-Per pt, she takes toprol-xl at night and the HCTZ and Losartan in the morning. Notified hyacinth Schumacher and she stated ok to change toprol to pm and continue to hold losartan and hctz for now.

## 2019-08-13 NOTE — PLAN OF CARE
08/13/19 1300   PRE-TX-O2   O2 Device (Oxygen Therapy) room air   SpO2 99 %   Pulse Oximetry Type Intermittent   $ Pulse Oximetry - Multiple Charge Pulse Oximetry - Multiple

## 2019-08-13 NOTE — OP NOTE
[facility]  Orthopedic Surgery  Operative Note    SUMMARY     Date of Procedure: 8/13/2019     Procedure: Procedure(s) (LRB):  ARTHROPLASTY, KNEE (Right)       Surgeon(s) and Role:     * Paolo Singh MD - Primary    Assistant: Osvaldo King    Pre-Operative Diagnosis: Primary osteoarthritis of right knee [M17.11]    Post-Operative Diagnosis: Post-Op Diagnosis Codes:     * Primary osteoarthritis of right knee [M17.11]    Anesthesia: Spinal    Complications: No    Estimated Blood Loss (EBL): 11 mL           Implants:   Implant Name Type Inv. Item Serial No.  Lot No. LRB No. Used   CEMENT BONE ANTIBIO SIMPLEX HV - [id number]  CEMENT BONE ANTIBIO SIMPLEX HV  [employer]. [id number] Right 2   FEMORAL CRUC RTN GERARDO SZ 3 RT - [id number]  FEMORAL CRUC RTN GERARDO SZ 3 RT  [employer]. [id number] Right 1   PATELLA TRI 29X8 X3 POLYETHYLE - [id number]  PATELLA TRI 29X8 X3 POLYETHYLE  [employer]. [id number] Right 1   BASEPLATE SZ 3 TRI TS IMPLANT - [id number]  BASEPLATE SZ 3 TRI TS IMPLANT  [employer]. [id number] Right 1   INSERT TIBIAL CS TRI SZ3 11MM - [id number]  INSERT TIBIAL CS TRI SZ3 11MM  [employer]. [id number] Right 1       Tourniquet time: 47min at 300mmHg    Specimens:   Specimen (12h ago, onward)    None                  Condition: Good    Disposition: PACU - hemodynamically stable.    Attestation: I was present and scrubbed for the entire procedure.    INDICATIONS FOR THE PROCEDURE: A 68 female with a history of chronic   Right knee arthritis, had failed all conservative measures including cortisone   injections, PT, viscosupplementation and activity modification. After a long   discussion, the patient wished to proceed with the procedure above.     PROCEDURE IN DETAIL: Risks, benefits and alternatives of the procedure were   explained to the patient including, but not limited to damage to nerves,   arteries or blood vessels. Also explained risk of infection,  stiffness, DVT, PE,   polyethylene wear as well as anesthetic complications including seizure, stroke,   heart attack and death, understood this and signed informed consent. The   patient's Right knee was marked prior to coming to the Operating Room. The patient was brought to the operating room, placed on the operating table in a supine position.A  formal timeout was done in which correct patient, procedure and op site were all   correctly identified and confirmed by the entire operating team.  2gm Ancef was given prior to surgical incision. Spinal anesthesia was induced. The patient'sRight  lower extremity was prepped and   draped in normal sterile fashion. TheRight  leg was exsanguinated with an   Esmarch. Tourniquet was inflated up 300 mmHg. Standard anterior approach to   the knee was made. Medial parapatellar arthrotomy was made, leaving about 1/8   inch of tissue for later repair. Proximal medial tibial release was performed,   making sure not to release any of the MCL. We then   everted the patella and reflexed the knee. Fat pad was excised as well as some   of the ACL and PCL. Soft tissue off the distal anterior femur was removed for   visualization, so as to help prevent notching.  The intramedullary canal was then drilled and suctioned of fat.  The intramedullary guide was then inserted with 5° of valgus set.  It was then pinned into place.  I then made a distal femoral cut of 8 millimeters.  After   this was done, we turned our attention to the tibia. Ankle clamp was then inserted.  We then used the stylus to measure 2 millimeters off the most affected side. The ankle clamp was used to replicate the tibial slope and went perfectly centered down the tibial crest. A tibial cut   was then made. We then checked our extension gap, a 9-spacer was able to be placed.  We then turned our attention back to the femur.  We then sized the femur using 3° off the posterior condylar axis. This was also parallel to the  epicondylar axis.  It measured a size 3.  We then drilled our holes.  Four in 1 block for the size 3 block was then placed and the 4 in 1 cuts were made. We then   checked posteriorly and removed posterior femoral osteophytes.  We then used the gap  to make sure that a 9 spacer good fit both in flexion and extension. Varus and valgus balancing was then checked as well. We   decided to trial. Trial components were placed with a 9 meniscal bearing. The   patient had good balancing again in varus valgus in both flexion and extension.   We turned our attention to patella, caliper was used on the patella where it   measured 21. We set guide at 13 and made our 8-mm cut for polyethylene component, 29 was selected. Then drill holes were placed and the patellar button was placed.   This had good tracking. No need for a lateral release and with no hand tests,   it stayed centered the whole time. We then marked our tibial rotation. We then drilled our femoral lugs.  We then   removed everything except the size 3 tibial tray. We then checked our tibial tray   with a drop miley again and then marked our rotation and pinned it into place then   drilled, and then punched for our keel. We then started preparing final   components on the back table. Anterior, medial and lateral structures were injected with our local   Cocktail. Bony surfaces   copiously irrigated with Pulsavac and then thoroughly dried. Once the cement   was the appropriate consistency, first the tibia and then the femur were   cemented into place, removing excess cement. A 9 trial was placed. The knee   was held in compression and then the patella was placed. Cement was allowed to   cure. Once the cement was cured, we then removed excess cement. Again trialed   one final time, again seeing a 9. We then tapped into place the   final 9 meniscal bearing into place. After this was done, we then did our   diluted iodine soak for 3 minutes and then proceeded with  closing.  Arthrotomy was closed using our StrataFix.   Subcutaneous tissue was closed using 2-0 Vicryl and skin was using a running 3-0   StrataFix and Dermabond.Instrument, sponge, and needle counts were correct prior to wound closure and at the conclusion of the case.  Sterile dressing was applied including a Cryo/Cuff.   They were extubated, awakened and transferred from the Operating Room to the   Recovery Room in stable condition.

## 2019-08-13 NOTE — ANESTHESIA PROCEDURE NOTES
Spinal    Diagnosis: osteoarthritis   Patient location during procedure: OR  Start time: 8/13/2019 7:35 AM  Timeout: 8/13/2019 7:35 AM  End time: 8/13/2019 7:41 AM    Staffing  Authorizing Provider: Kory Stokes MD  Performing Provider: Kory Stokes MD    Preanesthetic Checklist  Completed: patient identified, site marked, surgical consent, pre-op evaluation, timeout performed, IV checked, risks and benefits discussed and monitors and equipment checked  Spinal Block  Patient position: sitting  Prep: ChloraPrep  Patient monitoring: heart rate, cardiac monitor, continuous pulse ox, continuous capnometry and frequent blood pressure checks  Approach: midline  Location: L4-5  Injection technique: single shot  CSF Fluid: clear free-flowing CSF  Needle  Needle type: pencil-tip   Needle gauge: 25 G  Needle length: 3.5 in  Additional Documentation: incremental injection, negative aspiration for heme and no paresthesia on injection  Needle localization: anatomical landmarks  Assessment  Sensory level: T10   Dermatomal levels determined by alcohol wipe  Ease of block: easy  Patient's tolerance of the procedure: comfortable throughout block and no complaints  Additional Notes  VSS throughout

## 2019-08-13 NOTE — ANESTHESIA POSTPROCEDURE EVALUATION
Anesthesia Post Evaluation    Patient: Ella Canales    Procedure(s) Performed: Procedure(s) (LRB):  ARTHROPLASTY, KNEE (Right)    Final Anesthesia Type: general  Patient location during evaluation: PACU  Patient participation: Yes- Able to Participate  Level of consciousness: awake and alert  Post-procedure vital signs: reviewed and stable  Pain management: adequate  Airway patency: patent  PONV status at discharge: No PONV  Anesthetic complications: no      Cardiovascular status: hemodynamically stable  Respiratory status: unassisted and room air  Hydration status: euvolemic  Follow-up not needed.          Vitals Value Taken Time   /60 8/13/2019 11:36 AM   Temp 36.6 °C (97.9 °F) 8/13/2019 11:36 AM   Pulse 73 8/13/2019 11:36 AM   Resp 16 8/13/2019 11:36 AM   SpO2 99 % 8/13/2019  1:00 PM         Event Time     Out of Recovery 11:50:00          Pain/Oj Score: Pain Rating Prior to Med Admin: 4 (8/13/2019  1:52 PM)

## 2019-08-13 NOTE — PLAN OF CARE
Patient lives alone at the address on facesheet.  Patient states that she has multiple people that will be taking turns staying with her, which includes her daughter, a friend, her daughter in law, and a private pay sitter.  Denies HH.  Patient is in agreement with HH on discharge, and signed disclosure form; [facility].  Patient has a walker and commode.  Discharge plan is home with HH.       08/13/19 6789   Discharge Assessment   Assessment Type Discharge Planning Assessment   Confirmed/corrected address and phone number on facesheet? Yes   Assessment information obtained from? Patient;Medical Record   Prior to hospitilization cognitive status: Alert/Oriented   Prior to hospitalization functional status: Independent   Current cognitive status: Alert/Oriented   Current Functional Status: Independent;Assistive Equipment   Lives With alone   Able to Return to Prior Arrangements yes   Is patient able to care for self after discharge? Yes   Patient's perception of discharge disposition home health   Readmission Within the Last 30 Days no previous admission in last 30 days   Patient currently being followed by outpatient case management? No   Patient currently receives any other outside agency services? No   Equipment Currently Used at Home bedside commode;walker, rolling   Do you have any problems affording any of your prescribed medications? No   Is the patient taking medications as prescribed? yes   Does the patient have transportation home? Yes   Transportation Anticipated family or friend will provide   Dialysis Name and Scheduled days none   Does the patient receive services at the Coumadin Clinic? No   Discharge Plan A Home Health   DME Needed Upon Discharge  none   Patient/Family in Agreement with Plan yes

## 2019-08-13 NOTE — TRANSFER OF CARE
Anesthesia Transfer of Care Note    Patient: Ella Canales    Procedure(s) Performed: Procedure(s) (LRB):  ARTHROPLASTY, KNEE (Right)    Patient location: PACU    Anesthesia Type: spinal    Transport from OR: Transported from OR on 2-3 L/min O2 by NC with adequate spontaneous ventilation    Post pain: adequate analgesia    Post assessment: no apparent anesthetic complications    Post vital signs: stable    Level of consciousness: awake, alert and oriented    Nausea/Vomiting: no nausea/vomiting    Complications: none    Transfer of care protocol was followed      Last vitals:   Visit Vitals  BP (!) 143/73 (BP Location: Right arm, Patient Position: Lying)   Pulse 66   Temp 36.9 °C (98.4 °F) (Skin)   Resp 16   Ht 5' (1.524 m)   Wt 76.2 kg (168 lb)   LMP 04/03/2012   SpO2 98%   Breastfeeding? No   BMI 32.81 kg/m²

## 2019-08-13 NOTE — ANESTHESIA PREPROCEDURE EVALUATION
08/13/2019  Ella Canales is a 68 y.o., female.    Pre-op Assessment    I have reviewed the Patient Summary Reports.     I have reviewed the Nursing Notes.   I have reviewed the Medications.     Review of Systems  Anesthesia Hx:  No problems with previous Anesthesia    Social:  Non-Smoker    Hematology/Oncology:         -- Anemia:   EENT/Dental:EENT/Dental Normal   Cardiovascular:   Hypertension hyperlipidemia    Pulmonary:  Pulmonary Normal    Hepatic/GI:   GERD    Musculoskeletal:   Arthritis     Endocrine:  Endocrine Normal    Dermatological:  Skin Normal    Psych:  Psychiatric Normal           Physical Exam  General:  Obesity    Airway/Jaw/Neck:  Airway Findings: Mouth Opening: Normal Tongue: Normal  General Airway Assessment: Adult  Mallampati: II  TM Distance: Normal, at least 6 cm        Eyes/Ears/Nose:  EYES/EARS/NOSE FINDINGS: Normal   Dental:  DENTAL FINDINGS: Normal   Chest/Lungs:  Chest/Lungs Findings: Clear to auscultation, Normal Respiratory Rate     Heart/Vascular:  Heart Findings: Rate: Normal  Rhythm: Regular Rhythm        Mental Status:  Mental Status Findings:  Cooperative, Alert and Oriented         Anesthesia Plan  Type of Anesthesia, risks & benefits discussed:  Anesthesia Type:  spinal  Patient's Preference:   Intra-op Monitoring Plan: standard ASA monitors  Intra-op Monitoring Plan Comments:   Post Op Pain Control Plan: IV/PO Opioids PRN, multimodal analgesia and peripheral nerve block  Post Op Pain Control Plan Comments:   Induction:    Beta Blocker:  Patient is on a Beta-Blocker and has received one dose within the past 24 hours (No further documentation required).       Informed Consent: Patient understands risks and agrees with Anesthesia plan.  Questions answered. Anesthesia consent signed with patient.  ASA Score: 2     Day of Surgery Review of History & Physical: I have  interviewed and examined the patient. I have reviewed the patient's H&P dated:  There are no significant changes.  H&P update referred to the provider.         Ready For Surgery From Anesthesia Perspective.

## 2019-08-13 NOTE — ANESTHESIA PROCEDURE NOTES
Peripheral Block    Patient location during procedure: pre-op   Block not for primary anesthetic.  Reason for block: at surgeon's request and post-op pain management   Post-op Pain Location: right knee   Start time: 8/13/2019 7:05 AM  Timeout: 8/13/2019 7:05 AM   End time: 8/13/2019 7:10 AM    Staffing  Authorizing Provider: Kory Stokes MD  Performing Provider: Kory Stokes MD    Preanesthetic Checklist  Completed: patient identified, site marked, surgical consent, pre-op evaluation, timeout performed, IV checked, risks and benefits discussed and monitors and equipment checked  Peripheral Block  Patient position: supine  Prep: ChloraPrep  Patient monitoring: heart rate, cardiac monitor, continuous pulse ox, continuous capnometry and frequent blood pressure checks  Block type: adductor canal  Laterality: right  Injection technique: single shot  Needle  Needle type: Stimuplex   Needle gauge: 21 G  Needle length: 4 in  Needle localization: anatomical landmarks and ultrasound guidance   -ultrasound image captured on disc.  Assessment  Injection assessment: negative aspiration, negative parasthesia and local visualized surrounding nerve  Paresthesia pain: none  Heart rate change: no  Slow fractionated injection: yes  Additional Notes  VSS. +Exparel 1.3% 20cc. DOSC RN monitoring vitals throughout procedure.  Patient tolerated procedure well.

## 2019-08-14 ENCOUNTER — NURSE TRIAGE (OUTPATIENT)
Dept: ADMINISTRATIVE | Facility: CLINIC | Age: 69
End: 2019-08-14

## 2019-08-14 LAB
ANION GAP SERPL CALC-SCNC: 9 MMOL/L (ref 8–16)
BASOPHILS # BLD AUTO: 0.04 K/UL (ref 0–0.2)
BASOPHILS NFR BLD: 0.7 % (ref 0–1.9)
BUN SERPL-MCNC: 12 MG/DL (ref 8–23)
CALCIUM SERPL-MCNC: 8.7 MG/DL (ref 8.7–10.5)
CHLORIDE SERPL-SCNC: 103 MMOL/L (ref 95–110)
CO2 SERPL-SCNC: 26 MMOL/L (ref 23–29)
CREAT SERPL-MCNC: 1 MG/DL (ref 0.5–1.4)
DIFFERENTIAL METHOD: ABNORMAL
EOSINOPHIL # BLD AUTO: 0.1 K/UL (ref 0–0.5)
EOSINOPHIL NFR BLD: 2.4 % (ref 0–8)
ERYTHROCYTE [DISTWIDTH] IN BLOOD BY AUTOMATED COUNT: 13.3 % (ref 11.5–14.5)
EST. GFR  (AFRICAN AMERICAN): >60 ML/MIN/1.73 M^2
EST. GFR  (NON AFRICAN AMERICAN): 57 ML/MIN/1.73 M^2
GLUCOSE SERPL-MCNC: 100 MG/DL (ref 70–110)
HCT VFR BLD AUTO: 31 % (ref 37–48.5)
HGB BLD-MCNC: 10 G/DL (ref 12–16)
IMM GRANULOCYTES # BLD AUTO: 0.02 K/UL (ref 0–0.04)
LYMPHOCYTES # BLD AUTO: 1.7 K/UL (ref 1–4.8)
LYMPHOCYTES NFR BLD: 28.8 % (ref 18–48)
MAGNESIUM SERPL-MCNC: 1.6 MG/DL (ref 1.6–2.6)
MCH RBC QN AUTO: 29.9 PG (ref 27–31)
MCHC RBC AUTO-ENTMCNC: 32.3 G/DL (ref 32–36)
MCV RBC AUTO: 93 FL (ref 82–98)
MONOCYTES # BLD AUTO: 0.6 K/UL (ref 0.3–1)
MONOCYTES NFR BLD: 10 % (ref 4–15)
NEUTROPHILS # BLD AUTO: 3.4 K/UL (ref 1.8–7.7)
NEUTROPHILS NFR BLD: 57.8 % (ref 38–73)
NRBC BLD-RTO: 0 /100 WBC
PLATELET # BLD AUTO: 178 K/UL (ref 150–350)
PMV BLD AUTO: 9.6 FL (ref 9.2–12.9)
POTASSIUM SERPL-SCNC: 4.4 MMOL/L (ref 3.5–5.1)
RBC # BLD AUTO: 3.34 M/UL (ref 4–5.4)
SODIUM SERPL-SCNC: 138 MMOL/L (ref 136–145)
WBC # BLD AUTO: 5.8 K/UL (ref 3.9–12.7)

## 2019-08-14 PROCEDURE — 83735 ASSAY OF MAGNESIUM: CPT

## 2019-08-14 PROCEDURE — 25000003 PHARM REV CODE 250: Performed by: NURSE PRACTITIONER

## 2019-08-14 PROCEDURE — 94761 N-INVAS EAR/PLS OXIMETRY MLT: CPT

## 2019-08-14 PROCEDURE — 97110 THERAPEUTIC EXERCISES: CPT

## 2019-08-14 PROCEDURE — 97116 GAIT TRAINING THERAPY: CPT

## 2019-08-14 PROCEDURE — 25000003 PHARM REV CODE 250: Performed by: ORTHOPAEDIC SURGERY

## 2019-08-14 PROCEDURE — G8987 SELF CARE CURRENT STATUS: HCPCS | Mod: CI

## 2019-08-14 PROCEDURE — 80048 BASIC METABOLIC PNL TOTAL CA: CPT

## 2019-08-14 PROCEDURE — 97165 OT EVAL LOW COMPLEX 30 MIN: CPT

## 2019-08-14 PROCEDURE — 97535 SELF CARE MNGMENT TRAINING: CPT

## 2019-08-14 PROCEDURE — G8988 SELF CARE GOAL STATUS: HCPCS | Mod: CI

## 2019-08-14 PROCEDURE — 94799 UNLISTED PULMONARY SVC/PX: CPT

## 2019-08-14 PROCEDURE — 85025 COMPLETE CBC W/AUTO DIFF WBC: CPT

## 2019-08-14 PROCEDURE — 25000003 PHARM REV CODE 250: Performed by: ANESTHESIOLOGY

## 2019-08-14 PROCEDURE — 36415 COLL VENOUS BLD VENIPUNCTURE: CPT

## 2019-08-14 PROCEDURE — 63600175 PHARM REV CODE 636 W HCPCS: Performed by: ORTHOPAEDIC SURGERY

## 2019-08-14 RX ORDER — ASCORBIC ACID 500 MG
500 TABLET ORAL NIGHTLY
Status: DISCONTINUED | OUTPATIENT
Start: 2019-08-14 | End: 2019-08-14 | Stop reason: HOSPADM

## 2019-08-14 RX ORDER — ENOXAPARIN SODIUM 100 MG/ML
40 INJECTION SUBCUTANEOUS DAILY
Qty: 4 ML | Refills: 0 | Status: SHIPPED | OUTPATIENT
Start: 2019-08-14 | End: 2019-08-24

## 2019-08-14 RX ORDER — LOSARTAN POTASSIUM 25 MG/1
50 TABLET ORAL DAILY
Status: DISCONTINUED | OUTPATIENT
Start: 2019-08-15 | End: 2019-08-14 | Stop reason: HOSPADM

## 2019-08-14 RX ORDER — SODIUM CHLORIDE 0.9 % (FLUSH) 0.9 %
10 SYRINGE (ML) INJECTION
Status: DISCONTINUED | OUTPATIENT
Start: 2019-08-14 | End: 2019-08-14 | Stop reason: HOSPADM

## 2019-08-14 RX ORDER — CELECOXIB 200 MG/1
200 CAPSULE ORAL DAILY
Qty: 30 CAPSULE | Refills: 0 | Status: SHIPPED | OUTPATIENT
Start: 2019-08-14 | End: 2019-08-14 | Stop reason: SDUPTHER

## 2019-08-14 RX ORDER — OXYCODONE AND ACETAMINOPHEN 5; 325 MG/1; MG/1
1 TABLET ORAL EVERY 4 HOURS PRN
Qty: 30 TABLET | Refills: 0 | Status: SHIPPED | OUTPATIENT
Start: 2019-08-14 | End: 2019-08-21

## 2019-08-14 RX ORDER — ASPIRIN 81 MG/1
81 TABLET ORAL 2 TIMES DAILY WITH MEALS
Refills: 0 | COMMUNITY
Start: 2019-08-14 | End: 2019-08-14 | Stop reason: HOSPADM

## 2019-08-14 RX ORDER — CELECOXIB 200 MG/1
CAPSULE ORAL
Qty: 90 CAPSULE | Refills: 0 | Status: SHIPPED | OUTPATIENT
Start: 2019-08-14 | End: 2020-01-31 | Stop reason: ALTCHOICE

## 2019-08-14 RX ORDER — FERROUS SULFATE 325(65) MG
325 TABLET, DELAYED RELEASE (ENTERIC COATED) ORAL 2 TIMES DAILY WITH MEALS
Status: DISCONTINUED | OUTPATIENT
Start: 2019-08-14 | End: 2019-08-14 | Stop reason: HOSPADM

## 2019-08-14 RX ADMIN — FERROUS SULFATE TAB EC 325 MG (65 MG FE EQUIVALENT) 325 MG: 325 (65 FE) TABLET DELAYED RESPONSE at 11:08

## 2019-08-14 RX ADMIN — MUPIROCIN 1 G: 20 OINTMENT TOPICAL at 09:08

## 2019-08-14 RX ADMIN — CETIRIZINE HYDROCHLORIDE 10 MG: 10 TABLET, FILM COATED ORAL at 09:08

## 2019-08-14 RX ADMIN — OXYCODONE HYDROCHLORIDE 10 MG: 10 TABLET, FILM COATED, EXTENDED RELEASE ORAL at 08:08

## 2019-08-14 RX ADMIN — CALCIUM 500 MG: 500 TABLET ORAL at 08:08

## 2019-08-14 RX ADMIN — OXYCODONE HYDROCHLORIDE 5 MG: 5 TABLET ORAL at 12:08

## 2019-08-14 RX ADMIN — OXYCODONE HYDROCHLORIDE 5 MG: 5 TABLET ORAL at 10:08

## 2019-08-14 RX ADMIN — Medication 1 EACH: at 09:08

## 2019-08-14 RX ADMIN — PREGABALIN 75 MG: 75 CAPSULE ORAL at 08:08

## 2019-08-14 RX ADMIN — POTASSIUM CHLORIDE 10 MEQ: 750 TABLET, EXTENDED RELEASE ORAL at 08:08

## 2019-08-14 RX ADMIN — FAMOTIDINE 20 MG: 20 TABLET, FILM COATED ORAL at 09:08

## 2019-08-14 RX ADMIN — CEFAZOLIN 2 G: 1 INJECTION, POWDER, FOR SOLUTION INTRAVENOUS at 12:08

## 2019-08-14 RX ADMIN — THERA TABS 1 TABLET: TAB at 08:08

## 2019-08-14 RX ADMIN — CELECOXIB 100 MG: 100 CAPSULE ORAL at 09:08

## 2019-08-14 RX ADMIN — HYDROCHLOROTHIAZIDE 25 MG: 25 TABLET ORAL at 08:08

## 2019-08-14 RX ADMIN — DOCUSATE SODIUM 100 MG: 100 CAPSULE, LIQUID FILLED ORAL at 08:08

## 2019-08-14 RX ADMIN — PANTOPRAZOLE SODIUM 40 MG: 40 TABLET, DELAYED RELEASE ORAL at 08:08

## 2019-08-14 NOTE — SUBJECTIVE & OBJECTIVE
Interval History:  Patient is stable and doing well.  Continue physical therapy Occupational therapy.  Continue home multivitamin and add b.i.d. ferrous sulfate and p.m. vitamin C for postop anemia.    Review of Systems   Constitutional: Negative for appetite change, chills, diaphoresis, fatigue and fever.   HENT: Negative for ear discharge, ear pain, facial swelling and hearing loss.    Eyes: Negative for pain and redness.   Respiratory: Negative for cough and shortness of breath.    Cardiovascular: Negative for chest pain, palpitations and leg swelling.   Gastrointestinal: Negative for abdominal distention, abdominal pain, blood in stool, constipation, diarrhea, nausea and vomiting.   Endocrine: Negative for polydipsia and polyphagia.   Genitourinary: Negative for difficulty urinating, dysuria, flank pain and hematuria.   Musculoskeletal: Negative for neck pain and neck stiffness.   Skin: Positive for wound. Negative for color change.        Surgical wound   Allergic/Immunologic: Negative for food allergies.   Neurological: Negative for facial asymmetry, speech difficulty and weakness.   Hematological: Does not bruise/bleed easily.   Psychiatric/Behavioral: Negative for agitation, behavioral problems, confusion, hallucinations and suicidal ideas. The patient is not nervous/anxious.      Objective:     Vital Signs (Most Recent):  Temp: 98.7 °F (37.1 °C) (08/14/19 1153)  Pulse: 84 (08/14/19 1153)  Resp: 18 (08/14/19 1153)  BP: 122/63 (08/14/19 1153)  SpO2: (!) 92 % (08/14/19 1153) Vital Signs (24h Range):  Temp:  [96.6 °F (35.9 °C)-98.7 °F (37.1 °C)] 98.7 °F (37.1 °C)  Pulse:  [65-86] 84  Resp:  [16-93] 18  SpO2:  [92 %-98 %] 92 %  BP: (102-123)/(54-71) 122/63     Weight: 76.2 kg (168 lb)  Body mass index is 32.81 kg/m².    Intake/Output Summary (Last 24 hours) at 8/14/2019 1451  Last data filed at 8/14/2019 0600  Gross per 24 hour   Intake 1873.75 ml   Output --   Net 1873.75 ml      Physical Exam   Constitutional:  She is oriented to person, place, and time. She appears well-developed and well-nourished. No distress.   HENT:   Head: Normocephalic and atraumatic.   Eyes: Pupils are equal, round, and reactive to light. Conjunctivae and EOM are normal. Right eye exhibits no discharge. Left eye exhibits no discharge.   Neck: Normal range of motion. Neck supple. No JVD present.   Cardiovascular: Normal rate, regular rhythm, normal heart sounds and intact distal pulses.   Pulmonary/Chest: Effort normal and breath sounds normal. No respiratory distress.   Abdominal: Soft. Bowel sounds are normal. She exhibits no distension. There is no tenderness. There is no guarding.   Genitourinary:   Genitourinary Comments: Not examined   Musculoskeletal: Normal range of motion. She exhibits no edema.   Right lower extremity range of motion not tested   Neurological: She is alert and oriented to person, place, and time. No cranial nerve deficit.   Skin: Skin is warm and dry. Capillary refill takes less than 2 seconds. She is not diaphoretic.   Right knee surgical dressing intact   Psychiatric: She has a normal mood and affect. Her behavior is normal. Judgment and thought content normal.       Labs reviewed

## 2019-08-14 NOTE — PLAN OF CARE
Problem: Physical Therapy Goal  Goal: Physical Therapy Goal  Goals to be met by: 2019     Patient will increase functional independence with mobility by performin. Supine to sit with Modified [place]  2. Sit to stand transfer with Contact Guard Assistance  3. Bed to chair transfer with Contact Guard Assistance using Rolling Walker  4. Gait  x 250 feet with Contact Guard Assistance using Rolling Walker.   5. Lower extremity exercise program x20 reps    Outcome: Ongoing (interventions implemented as appropriate)  Ambulated 180' with rw and CG/ Min A for safety, WBAT RLE.

## 2019-08-14 NOTE — HPI
This is a 68-year-old female seen for chronic right knee pain.  Patient has had problems for decades.  She has a history of knee arthritis has been treated with various injections including cortisone and viscosupplementation.  She has tried physical therapy previously.  Patient had a left knee replacement that unfortunately had to be redone and has had a total of 3 surgeries on it.  Pain is a 2/10.  Pain is worsening.

## 2019-08-14 NOTE — PLAN OF CARE
Problem: Occupational Therapy Goal  Goal: Occupational Therapy Goal  Goals to be met by: 8/21/2019     Patient will increase functional independence with ADLs by performing:    LE Dressing with Supervision and Assistive Devices as needed.  Grooming while standing at sink with Supervision.  Toileting from toilet with Modified [place] for hygiene and clothing management.   Supine to sit with Supervision.  Toilet transfer to toilet with Supervision.    Outcome: Ongoing (interventions implemented as appropriate)  OT evaluation completed today. Goals & care plan established.    Eduin Mariano OT  8/14/2019

## 2019-08-14 NOTE — PLAN OF CARE
08/14/19 1623   Final Note   Assessment Type Final Discharge Note   Anticipated Discharge Disposition Home-Health  ([place])

## 2019-08-14 NOTE — ASSESSMENT & PLAN NOTE
Patient is chronically on statin. Will continue for now. Monitor clinically. Last LDL was   Lab Results   Component Value Date    LDLCALC 98.4 07/08/2019

## 2019-08-14 NOTE — ASSESSMENT & PLAN NOTE
Patient's anemia is currently controlled. S/p  0 units of PRBCs. Etiology likely d/t  Iron deficiency  Current CBC reviewed-   Lab Results   Component Value Date    HGB 12.3 07/08/2019    HCT 40.2 07/08/2019     Monitor serial CBC and transfuse if patient becomes hemodynamically unstable, symptomatic or H/H drops below 7/21.

## 2019-08-14 NOTE — PLAN OF CARE
08/14/19 1447   Post-Acute Status   Post-Acute Authorization Home Health/Hospice   Home Health/Hospice Status Referrals Sent   Referral sent to [facility] via [facility] for acceptance.CM will follow for acceptance.

## 2019-08-14 NOTE — ASSESSMENT & PLAN NOTE
Chronic, controlled.  Latest blood pressure and vitals reviewed-   Temp:  [96.6 °F (35.9 °C)-98.4 °F (36.9 °C)]   Pulse:  [65-90]   Resp:  [10-19]   BP: ()/(50-73)   SpO2:  [95 %-100 %] .   Current home meds for hypertension include   Hydrochlorothiazide, losartan, metoprolol.  Will Continue home meds unchanged .  Will utilize p.r.n. blood pressure medication only if patient's blood pressure greater than  180/110 and she develops symptoms such as worsening chest pain or shortness of breath.

## 2019-08-14 NOTE — ASSESSMENT & PLAN NOTE
Body mass index is 32.81 kg/m². Morbid obesity complicates all aspects of disease management from diagnostic modalities to treatment. Weight loss encouraged and health benefits explained to patient.

## 2019-08-14 NOTE — CARE UPDATE
08/14/19 0650   PRE-TX-O2   O2 Device (Oxygen Therapy) room air   SpO2 96 %   Pulse Oximetry Type Intermittent   $ Pulse Oximetry - Multiple Charge Pulse Oximetry - Multiple   Pulse 84   Resp 18   Incentive Spirometer   $ Incentive Spirometer Charges done with encouragement   Incentive Spirometer Predicted Level (mL) 2500   Administration (IS) instruction provided, follow-up   Number of Repetitions (IS) 150   Level Incentive Spirometer (mL) 1250   Patient Tolerance (IS) good

## 2019-08-14 NOTE — PT/OT/SLP PROGRESS
Physical Therapy Treatment    Patient Name:  Ella Canales   MRN:  [id number]    Recommendations:     Discharge Recommendations:  home health PT   Discharge Equipment Recommendations: none(has RW)   Barriers to discharge: None    Assessment:     Ella Canales is a 68 y.o. female admitted with a medical diagnosis of Primary osteoarthritis of right knee.  She presents with the following impairments/functional limitations:  weakness, impaired functional mobilty, gait instability, decreased lower extremity function, pain, orthopedic precautions .Increased ambulation distance with rw and CGA. 2 R knee buckling instances with patient able to recover. Verbal cues given to lock R knee with stepping and to slow pace.    Rehab Prognosis: Good; patient would benefit from acute skilled PT services to address these deficits and reach maximum level of function.    Recent Surgery: Procedure(s) (LRB):  ARTHROPLASTY, KNEE (Right) 1 Day Post-Op    Plan:     During this hospitalization, patient to be seen BID to address the identified rehab impairments via gait training, therapeutic activities, therapeutic exercises and progress toward the following goals:    · Plan of Care Expires:  08/30/19    Subjective     Chief Complaint: wailing to see the Dr.  Patient/Family Comments/goals: to return home.  Pain/Comfort:  · Pain Rating 1: 4/10  · Location - Side 1: Right  · Location - Orientation 1: generalized  · Location 1: knee  · Pain Addressed 1: Pre-medicate for activity, Reposition, Nurse notified      Objective:     Communicated with nurse Purvis prior to session.  Patient found up in chair with cryotherapy upon PT entry to room.     General Precautions: Standard, fall   Orthopedic Precautions:RLE weight bearing as tolerated   Braces: N/A     Functional Mobility:  · Bed Mobility:     · Rolling Right: contact guard assistance  · Sit to Supine: contact guard assistance  · Transfers:     · Sit to Stand:  contact guard assistance  with rolling walker  · Gait: 250' with rw and CGA, WBAT RLE      AM-PAC 6 CLICK MOBILITY          Therapeutic Activities and Exercises:   Stood with rw and CGA.   Ambulated 250' with rw and CGA, WBAT RLE.    Returned to bed. Sit to supine with CGA.    BLE exercises ; AP's, QS.    Patient left supine with all lines intact, call button in reach and nurse Yaniv notified..    GOALS:   Multidisciplinary Problems     Physical Therapy Goals        Problem: Physical Therapy Goal    Goal Priority Disciplines Outcome Goal Variances Interventions   Physical Therapy Goal     PT, PT/OT Ongoing (interventions implemented as appropriate)     Description:  Goals to be met by: 2019     Patient will increase functional independence with mobility by performin. Supine to sit with Modified [place]  2. Sit to stand transfer with Contact Guard Assistance  3. Bed to chair transfer with Contact Guard Assistance using Rolling Walker  4. Gait  x 250 feet with Contact Guard Assistance using Rolling Walker.   5. Lower extremity exercise program x20 reps                     Time Tracking:     PT Received On: 19  PT Start Time: 1303     PT Stop Time: 1320  PT Total Time (min): 17 min     Billable Minutes: Gait Training 9min and Therapeutic Exercise 8min    Treatment Type: Treatment  PT/PTA: PTA     PTA Visit Number: 1     Lyndsey Veloz PTA  2019

## 2019-08-14 NOTE — PLAN OF CARE
08/14/19 1059   Discharge Assessment   Assessment Type Discharge Planning Reassessment   CM added post op appointment to avs.

## 2019-08-14 NOTE — PT/OT/SLP PROGRESS
Physical Therapy Treatment    Patient Name:  Ella Canales   MRN:  [id number]    Recommendations:     Discharge Recommendations:  home health PT   Discharge Equipment Recommendations: none(has RW)   Barriers to discharge: None    Assessment:     Ella Canales is a 68 y.o. female admitted with a medical diagnosis of Primary osteoarthritis of right knee.  She presents with the following impairments/functional limitations:  weakness, impaired endurance, impaired functional mobilty, pain, orthopedic precautions, impaired sensation . Tolerated activity. Ambulated 180' with rw  and CGA, returned to sit due to RLE buckling with last few steps due to weakness.    Rehab Prognosis: Good; patient would benefit from acute skilled PT services to address these deficits and reach maximum level of function.    Recent Surgery: Procedure(s) (LRB):  ARTHROPLASTY, KNEE (Right) 1 Day Post-Op    Plan:     During this hospitalization, patient to be seen BID to address the identified rehab impairments via gait training, therapeutic activities, therapeutic exercises and progress toward the following goals:    · Plan of Care Expires:  08/30/19    Subjective     Chief Complaint: soreness R knee  Patient/Family Comments/goals: to return home.  Pain/Comfort:  · Pain Rating 1: 4/10  · Location - Side 1: Right  · Location - Orientation 1: generalized  · Location 1: knee  · Pain Addressed 1: Pre-medicate for activity, Reposition, Nurse notified      Objective:     Communicated with nurse Purvis prior to session.  Patient found supine with SCD, cryotherapy upon PT entry to room.     General Precautions: Standard, fall   Orthopedic Precautions:RLE weight bearing as tolerated   Braces: N/A     Functional Mobility:  · Bed Mobility:     · Rolling Left:  contact guard assistance  · Supine to Sit: contact guard assistance  · Transfers:     · Sit to Stand:  contact guard assistance with rolling walker  · Gait: 180' with rw and CG / Min A , WBAT  RLE      AM-PAC 6 CLICK MOBILITY          Therapeutic Activities and Exercises:   Transferred to sitting EOB . Stood with rw and CGA.    Ambulated 180' with rw and CG / Min A due to RLE buckling last few feet. Returned to sit.    BLE exercises at 10 reps each ; TKE's, AP's, SLR's, HS, QS.    Patient left up in chair with all lines intact, call button in reach, nurse Yaniv notified and daughter present..    GOALS:   Multidisciplinary Problems     Physical Therapy Goals        Problem: Physical Therapy Goal    Goal Priority Disciplines Outcome Goal Variances Interventions   Physical Therapy Goal     PT, PT/OT Ongoing (interventions implemented as appropriate)     Description:  Goals to be met by: 2019     Patient will increase functional independence with mobility by performin. Supine to sit with Modified [place]  2. Sit to stand transfer with Contact Guard Assistance  3. Bed to chair transfer with Contact Guard Assistance using Rolling Walker  4. Gait  x 250 feet with Contact Guard Assistance using Rolling Walker.   5. Lower extremity exercise program x20 reps                     Time Tracking:     PT Received On: 19  PT Start Time: 924     PT Stop Time: 941  PT Total Time (min): 17 min     Billable Minutes: Gait Training 9min and Therapeutic Exercise 8min    Treatment Type: Treatment  PT/PTA: PTA     PTA Visit Number: 1     Lyndsey Veloz PTA  2019

## 2019-08-14 NOTE — H&P
[facility] Hospital Medicine  History & Physical    Patient Name: Ella Canales  MRN: [id number]  Admission Date: 8/13/2019  Attending Physician: Regan Kent MD  Primary Care Provider: Adry Damian MD         Patient information was obtained from patient and past medical records.     Subjective:     Principal Problem:Primary osteoarthritis of right knee    Chief Complaint: No chief complaint on file.       HPI:   Patient is a 68-year-old  female with a past medical history significant for essential hypertension, hyperlipidemia, gastroesophageal reflux, and peptic ulcer disease who presents the hospital status post total knee arthroplasty.  She was doing well postoperatively with minimal pain and other symptoms.    Past Medical History:   Diagnosis Date    Arthritis     bilateral knees    Cancer     skin    GERD (gastroesophageal reflux disease)     Hyperlipidemia     Hypertension     Osteopenia     S/P PICC central line placement     Ulcer        Past Surgical History:   Procedure Laterality Date    APPLICATION-WOUND DRESSING Left 3/9/2017    [facility]    COLONOSCOPY N/A 1/22/2019    [facility]    COLONOSCOPY N/A 4/3/2012    [facility]formed by Kingston Chapman [facility] at [facility]    ECTOPIC PREGNANCY SURGERY      fess      FRACTURE SURGERY      ORIF right arm.    GRAFT Left 3/9/2017    [facility] OR    INCISION AND DRAINAGE (I & D) Left 3/9/2017    [facility] OR    INCISION AND DRAINAGE-KNEE Left 2/25/2017    [facility] 2ND F[facility]    JOINT REPLACEMENT      left knee    LYSIS-ADHESION Left 3/9/2017    [facility] OR    REPLACEMENT-KNEE-TOTAL Left 2/14/2017    [facility] at [facility] OR    REVISION-ARTHROPLASTY-KNEE-TOTAL; TIBIAL POLYETHYLENE EXCHANGE Left 3/9/2017     [facility]    TOTAL KNEE ARTHROPLASTY Left     TUBAL LIGATION         Review of patient's allergies indicates:   Allergen Reactions    Ace inhibitors      Other reaction(s): cough    Latex      denies       No current facility-administered medications on file prior to encounter.      Current Outpatient Medications on File Prior to Encounter   Medication Sig    CALCIUM ORAL Take by mouth.    fexofenadine (ALLEGRA) 60 MG tablet Take 60 mg by mouth once daily.    glucosamine-chondroit-vit C-Mn (GLUCOSAMINE-CHONDROITIN MAX ST) 500-400 mg Cap Take by mouth 2 (two) times daily. Twice a day    hydroCHLOROthiazide (HYDRODIURIL) 25 MG tablet Take 1 tablet (25 mg total) by mouth once daily.    LACTOBACILLUS COMBO NO.6 (PROBIOTIC COMPLEX ORAL) Take 1 tablet by mouth once daily.    losartan (COZAAR) 100 MG tablet Take 1 tablet (100 mg total) by mouth once daily.    metoprolol succinate (TOPROL-XL) 50 MG 24 hr tablet TAKE 1 TABLET(50 MG) BY MOUTH EVERY DAY    multivitamin (ONE DAILY MULTIVITAMIN) per tablet Take 1 tablet by mouth once daily.    potassium chloride SA (K-DUR,KLOR-CON) 10 MEQ tablet TAKE 1 TABLET(10 MEQ) BY MOUTH TWICE DAILY    simvastatin (ZOCOR) 40 MG tablet TAKE 1 TABLET(40 MG) BY MOUTH EVERY EVENING    TURMERIC ROOT EXTRACT ORAL Take by mouth once daily.      Family History     Problem Relation (Age of Onset)    Heart disease Mother, Father, Brother (47)        Tobacco Use    Smoking status: Never Smoker    Smokeless tobacco: Never Used   Substance and Sexual Activity    Alcohol use: Yes     Frequency: 2-3 times a week     Drinks per session: 1 or 2     Binge frequency: Never     Comment: occasional    Drug use: No    Sexual activity: Never     Review of Systems   Constitutional: Negative for activity change and fever.   HENT: Negative for ear discharge, facial swelling and sore throat.    Eyes: Negative for photophobia, pain and visual disturbance.    Respiratory: Negative for apnea, cough and shortness of breath.    Cardiovascular: Negative for chest pain and leg swelling.   Gastrointestinal: Negative for abdominal pain and blood in stool.   Endocrine: Negative for cold intolerance and heat intolerance.   Musculoskeletal: Positive for arthralgias, gait problem and joint swelling. Negative for back pain.   Skin: Negative for pallor and rash.   Neurological: Negative for speech difficulty and headaches.   Psychiatric/Behavioral: Negative for confusion, hallucinations and suicidal ideas.   All other systems reviewed and are negative.    Objective:     Vital Signs (Most Recent):  Temp: 96.6 °F (35.9 °C) (08/13/19 1947)  Pulse: 71 (08/13/19 2207)  Resp: 18 (08/13/19 1947)  BP: 102/71 (08/13/19 2207)  SpO2: 95 % (08/13/19 1947) Vital Signs (24h Range):  Temp:  [96.6 °F (35.9 °C)-98.4 °F (36.9 °C)] 96.6 °F (35.9 °C)  Pulse:  [65-90] 71  Resp:  [10-19] 18  SpO2:  [95 %-100 %] 95 %  BP: ()/(50-73) 102/71     Weight: 76.2 kg (168 lb)  Body mass index is 32.81 kg/m².    Physical Exam   Constitutional: She is oriented to person, place, and time. She appears well-developed and well-nourished.   HENT:   Head: Normocephalic and atraumatic.   Eyes: Right eye exhibits no discharge. Left eye exhibits no discharge. No scleral icterus.   Neck: Neck supple. No JVD present.   Cardiovascular: Normal rate and regular rhythm. Exam reveals no gallop and no friction rub.   No murmur heard.  Pulmonary/Chest: Effort normal and breath sounds normal. She has no wheezes. She has no rales.   Abdominal: Soft. Bowel sounds are normal. She exhibits no distension. There is no tenderness.   Musculoskeletal: She exhibits no edema or tenderness.   Left knee with brace, dressing, and cryo pack in place.  Dressing was not taken down.  Neurovascularly intact distally.   Lymphadenopathy:     She has no cervical adenopathy.   Neurological: She is alert and oriented to person, place, and time. She  has normal reflexes. No cranial nerve deficit.   Skin: No rash noted. No erythema.   Psychiatric: She has a normal mood and affect.   Nursing note and vitals reviewed.          Significant Labs: All pertinent labs within the past 24 hours have been reviewed.    Significant Imaging: I have reviewed all pertinent imaging results/findings within the past 24 hours.    Assessment/Plan:     * Primary osteoarthritis of right knee    Patient is status post right knee arthroplasty on 08/13.  Will control pain and other symptoms.  Physical therapy and occupational therapy consulted for functional ability.  Will defer to Orthopedic surgery for wound management.      Obesity (BMI 30.0-34.9)  Body mass index is 32.81 kg/m². Morbid obesity complicates all aspects of disease management from diagnostic modalities to treatment. Weight loss encouraged and health benefits explained to patient.        Anemia  Patient's anemia is currently controlled. S/p  0 units of PRBCs. Etiology likely d/t  Iron deficiency  Current CBC reviewed-   Lab Results   Component Value Date    HGB 12.3 07/08/2019    HCT 40.2 07/08/2019     Monitor serial CBC and transfuse if patient becomes hemodynamically unstable, symptomatic or H/H drops below 7/21.         Hyperlipidemia   Patient is chronically on statin. Will continue for now. Monitor clinically. Last LDL was   Lab Results   Component Value Date    LDLCALC 98.4 07/08/2019            HTN (hypertension)  Chronic, controlled.  Latest blood pressure and vitals reviewed-   Temp:  [96.6 °F (35.9 °C)-98.4 °F (36.9 °C)]   Pulse:  [65-90]   Resp:  [10-19]   BP: ()/(50-73)   SpO2:  [95 %-100 %] .   Current home meds for hypertension include   Hydrochlorothiazide, losartan, metoprolol.  Will Continue home meds unchanged .  Will utilize p.r.n. blood pressure medication only if patient's blood pressure greater than  180/110 and she develops symptoms such as worsening chest pain or shortness of  breath.            VTE Risk Mitigation (From admission, onward)        Ordered     enoxaparin injection 40 mg  Daily      08/13/19 4122             Regan Kent MD  Department of Hospital Medicine   [facility]

## 2019-08-14 NOTE — DISCHARGE SUMMARY
[facility]  Orthopedics  Discharge Summary      Patient Name: Ella Canales  MRN: [id number]  Admission Date: 8/13/2019  Hospital Length of Stay: 0 days  Discharge Date and Time:  08/14/2019 1:47 PM  Attending Physician: Regan Kent MD   Discharging Provider: Paolo Florence MD  Primary Care Provider: Adry Damian MD    HPI:   This is a 68-year-old female seen for chronic right knee pain.  Patient has had problems for decades.  She has a history of knee arthritis has been treated with various injections including cortisone and viscosupplementation.  She has tried physical therapy previously.  Patient had a left knee replacement that unfortunately had to be redone and has had a total of 3 surgeries on it.  Pain is a 2/10.  Pain is worsening.    Procedure(s) (LRB):  ARTHROPLASTY, KNEE (Right)      Hospital Course:  Status post right total knee arthroplasty on August 13, 2019 without incident    Consults (From admission, onward)        Status Ordering Provider     Inpatient consult to Hospitalist  Once     Provider:  Danilo Chaudhari MD    Acknowledged PAOLO FLORENCE          Significant Diagnostic Studies: No pertinent studies.    Pending Diagnostic Studies:     None        Final Active Diagnoses:    Diagnosis Date Noted POA    PRINCIPAL PROBLEM:  Primary osteoarthritis of right knee [M17.11] 07/05/2017 Yes    Obesity (BMI 30.0-34.9) [E66.9] 12/12/2018 Yes    Acute blood loss as cause of postoperative anemia [D62] 03/01/2016 Yes    HTN (hypertension) [I10] 09/10/2014 Yes    Hyperlipidemia [E78.5] 09/10/2014 Yes      Problems Resolved During this Admission:      Discharged Condition: good    Disposition: Home or Self Care    Follow Up:  Follow-up Information     Paolo Florence MD On 8/29/2019.    Specialties:  Sports Medicine, Orthopedic Surgery  Why:  post op followup 8/29 @ 845 am  Contact information:  [address] [place] [address] [address]ia [address]  [id number]  [phone]             [facility].    Specialty:  Home Health Services  Contact information:  [address]  [facility] [id number]  [phone]                 Patient Instructions:      Referral to Home health   Referral Priority: Routine Referral Type: Home Health Care   Referral Reason: Specialty Services Required   Requested Specialty: Home Health Services   Number of Visits Requested: 1     Diet general     Ice to affected area     No driving, operating heavy equipment or signing legal documents while taking pain medication     Call MD for:  temperature >100.4     Call MD for:  persistent nausea and vomiting     Call MD for:  severe uncontrolled pain     Call MD for:  difficulty breathing, headache or visual disturbances     Call MD for:  redness, tenderness, or signs of infection (pain, swelling, redness, odor or green/yellow discharge around incision site)     Call MD for:  hives     Call MD for:  persistent dizziness or light-headedness     Call MD for:  extreme fatigue     Leave dressing on - Keep it clean, dry, and intact until clinic visit     Change dressing (specify)   Order Comments: Dressing change: If dressing loses its seal, change daily.     Medications:  Reconciled Home Medications:      Medication List      START taking these medications    aspirin 81 MG EC tablet  Commonly known as:  ECOTRIN  Take 1 tablet (81 mg total) by mouth 2 (two) times daily with meals.     celecoxib 200 MG capsule  Commonly known as:  CeleBREX  Take 1 capsule (200 mg total) by mouth once daily.     oxyCODONE-acetaminophen 5-325 mg per tablet  Commonly known as:  PERCOCET  Take 1 tablet by mouth every 4 (four) hours as needed for Pain. Medically necessary after TKA        CHANGE how you take these medications    omeprazole 20 MG capsule  Commonly known as:  PRILOSEC  TAKE 1 CAPSULE(20 MG) BY MOUTH TWICE DAILY  What changed:    · how much to take  · how to take this  · when to take  this  · additional instructions        CONTINUE taking these medications    alendronate 70 MG tablet  Commonly known as:  FOSAMAX  TAKE 1 TABLET(70 MG) BY MOUTH EVERY 7 DAYS     CALCIUM ORAL  Take by mouth.     fexofenadine 60 MG tablet  Commonly known as:  ALLEGRA  Take 60 mg by mouth once daily.     GLUCOSAMINE-CHONDROITIN MAX -400 mg Cap  Generic drug:  glucosamine-chondroit-vit C-Mn  Take by mouth 2 (two) times daily. Twice a day     hydroCHLOROthiazide 25 MG tablet  Commonly known as:  HYDRODIURIL  Take 1 tablet (25 mg total) by mouth once daily.     losartan 100 MG tablet  Commonly known as:  COZAAR  Take 1 tablet (100 mg total) by mouth once daily.     metoprolol succinate 50 MG 24 hr tablet  Commonly known as:  TOPROL-XL  TAKE 1 TABLET(50 MG) BY MOUTH EVERY DAY     ONE DAILY MULTIVITAMIN per tablet  Generic drug:  multivitamin  Take 1 tablet by mouth once daily.     potassium chloride SA 10 MEQ tablet  Commonly known as:  K-DUR,KLOR-CON  TAKE 1 TABLET(10 MEQ) BY MOUTH TWICE DAILY     PROBIOTIC COMPLEX ORAL  Take 1 tablet by mouth once daily.     simvastatin 40 MG tablet  Commonly known as:  ZOCOR  TAKE 1 TABLET(40 MG) BY MOUTH EVERY EVENING     TURMERIC ROOT EXTRACT ORAL  Take by mouth once daily.            Paolo Singh MD  Orthopedics  [facility]

## 2019-08-14 NOTE — PLAN OF CARE
08/14/19 1603   Post-Acute Status   Post-Acute Authorization Home Health/Hospice   Home Health/Hospice Status Set-up Complete   · 8/14/2019 2:48:13 PM Note: Thank you for the referral. Will admit day after discharge.  [email or web address] [email or web address]  CM notified via [facility] that patient is discharged today.

## 2019-08-14 NOTE — NURSING
Discharge instructions given to Pt. And family.Instructions given on lovenox injection. Verbalized understanding of instructions. Prescription sent to pharmacy. Ace wrap removed prior to discharge. Surgical dressing intact. PIV removed. Pt. Transported off floor via wheelchair.

## 2019-08-14 NOTE — PLAN OF CARE
Problem: Adult Inpatient Plan of Care  Goal: Plan of Care Review  Plan of care reviewed with pt, pt verbalized understanding. IV site clean, dry, intact, no redness or swelling noted. Dressing to right knee clean,dry and intact, cryotherapy on. Pt ambulates to bedside commode with walker, one person assist, remains free of fall/trauma. Pain is controlled with current regimen. VSS, pt remained afebrile this shift. Neuro checks done, purposeful hourly rounding performed, safety maintained throughout shift, call light in reach, bed locked and in lowest position, side rails up x2. Will continue to monitor, observe and report any changes.

## 2019-08-14 NOTE — SUBJECTIVE & OBJECTIVE
Past Medical History:   Diagnosis Date    Arthritis     bilateral knees    Cancer     skin    GERD (gastroesophageal reflux disease)     Hyperlipidemia     Hypertension     Osteopenia     S/P PICC central line placement     Ulcer        Past Surgical History:   Procedure Laterality Date    APPLICATION-WOUND DRESSING Left 3/9/2017    Performed by [facility]igan [facility] at [facility] OR    COLONOSCOPY N/A 1/22/2019    Performed by [facility]z [facility] at [facility]O    COLONOSCOPY N/A 4/3/2012    Performed by Kingston Chapman MD at [facility] ENDO    ECTOPIC PREGNANCY SURGERY      fess      FRACTURE SURGERY      ORIF right arm.    GRAFT Left 3/9/2017    [facility] at [facility] OR    INCISION AND DRAINAGE (I & D) Left 3/9/2017    [facility] at [facility] OR    INCISION AND DRAINAGE-KNEE Left 2/25/2017    [facility] 2ND F[facility]    JOINT REPLACEMENT      left knee    LYSIS-ADHESION Left 3/9/2017    [facility] at [facility] OR    REPLACEMENT-KNEE-TOTAL Left 2/14/2017    [facility] at [facility] OR    REVISION-ARTHROPLASTY-KNEE-TOTAL; TIBIAL POLYETHYLENE EXCHANGE Left 3/9/2017    [facility] at [facility] OR    TOTAL KNEE ARTHROPLASTY Left     TUBAL LIGATION         Review of patient's allergies indicates:   Allergen Reactions    Ace inhibitors      Other reaction(s): cough    Latex      denies       No current facility-administered medications on file prior to encounter.      Current Outpatient Medications on File Prior to Encounter   Medication Sig    CALCIUM ORAL Take by mouth.    fexofenadine (ALLEGRA) 60 MG tablet Take 60 mg by mouth once daily.    glucosamine-chondroit-vit C-Mn (GLUCOSAMINE-CHONDROITIN MAX ST) 500-400 mg Cap Take by mouth 2 (two) times daily. Twice a day    hydroCHLOROthiazide (HYDRODIURIL) 25 MG tablet Take 1 tablet (25 mg total) by mouth once daily.    LACTOBACILLUS COMBO NO.6  (PROBIOTIC COMPLEX ORAL) Take 1 tablet by mouth once daily.    losartan (COZAAR) 100 MG tablet Take 1 tablet (100 mg total) by mouth once daily.    metoprolol succinate (TOPROL-XL) 50 MG 24 hr tablet TAKE 1 TABLET(50 MG) BY MOUTH EVERY DAY    multivitamin (ONE DAILY MULTIVITAMIN) per tablet Take 1 tablet by mouth once daily.    potassium chloride SA (K-DUR,KLOR-CON) 10 MEQ tablet TAKE 1 TABLET(10 MEQ) BY MOUTH TWICE DAILY    simvastatin (ZOCOR) 40 MG tablet TAKE 1 TABLET(40 MG) BY MOUTH EVERY EVENING    TURMERIC ROOT EXTRACT ORAL Take by mouth once daily.      Family History     Problem Relation (Age of Onset)    Heart disease Mother, Father, Brother (47)        Tobacco Use    Smoking status: Never Smoker    Smokeless tobacco: Never Used   Substance and Sexual Activity    Alcohol use: Yes     Frequency: 2-3 times a week     Drinks per session: 1 or 2     Binge frequency: Never     Comment: occasional    Drug use: No    Sexual activity: Never     Review of Systems   Constitutional: Negative for activity change and fever.   HENT: Negative for ear discharge, facial swelling and sore throat.    Eyes: Negative for photophobia, pain and visual disturbance.   Respiratory: Negative for apnea, cough and shortness of breath.    Cardiovascular: Negative for chest pain and leg swelling.   Gastrointestinal: Negative for abdominal pain and blood in stool.   Endocrine: Negative for cold intolerance and heat intolerance.   Musculoskeletal: Positive for arthralgias, gait problem and joint swelling. Negative for back pain.   Skin: Negative for pallor and rash.   Neurological: Negative for speech difficulty and headaches.   Psychiatric/Behavioral: Negative for confusion, hallucinations and suicidal ideas.   All other systems reviewed and are negative.    Objective:     Vital Signs (Most Recent):  Temp: 96.6 °F (35.9 °C) (08/13/19 1947)  Pulse: 71 (08/13/19 2207)  Resp: 18 (08/13/19 1947)  BP: 102/71 (08/13/19 2207)  SpO2:  95 % (08/13/19 1947) Vital Signs (24h Range):  Temp:  [96.6 °F (35.9 °C)-98.4 °F (36.9 °C)] 96.6 °F (35.9 °C)  Pulse:  [65-90] 71  Resp:  [10-19] 18  SpO2:  [95 %-100 %] 95 %  BP: ()/(50-73) 102/71     Weight: 76.2 kg (168 lb)  Body mass index is 32.81 kg/m².    Physical Exam   Constitutional: She is oriented to person, place, and time. She appears well-developed and well-nourished.   HENT:   Head: Normocephalic and atraumatic.   Eyes: Right eye exhibits no discharge. Left eye exhibits no discharge. No scleral icterus.   Neck: Neck supple. No JVD present.   Cardiovascular: Normal rate and regular rhythm. Exam reveals no gallop and no friction rub.   No murmur heard.  Pulmonary/Chest: Effort normal and breath sounds normal. She has no wheezes. She has no rales.   Abdominal: Soft. Bowel sounds are normal. She exhibits no distension. There is no tenderness.   Musculoskeletal: She exhibits no edema or tenderness.   Left knee with brace, dressing, and cryo pack in place.  Dressing was not taken down.  Neurovascularly intact distally.   Lymphadenopathy:     She has no cervical adenopathy.   Neurological: She is alert and oriented to person, place, and time. She has normal reflexes. No cranial nerve deficit.   Skin: No rash noted. No erythema.   Psychiatric: She has a normal mood and affect.   Nursing note and vitals reviewed.          Significant Labs: All pertinent labs within the past 24 hours have been reviewed.    Significant Imaging: I have reviewed all pertinent imaging results/findings within the past 24 hours.

## 2019-08-14 NOTE — ASSESSMENT & PLAN NOTE
Patient is status post right knee arthroplasty on 08/13.  Will control pain and other symptoms.  Physical therapy and occupational therapy consulted for functional ability.  Will defer to Orthopedic surgery for wound management.

## 2019-08-14 NOTE — PT/OT/SLP EVAL
Occupational Therapy   Evaluation    Name: Ella Canales  MRN: [id number]  Admitting Diagnosis:  Primary osteoarthritis of right knee 1 Day Post-Op    Recommendations:     Discharge Recommendations: home health PT  Discharge Equipment Recommendations:  none  Barriers to discharge:  None    Assessment:     Ella Canales is a 68 y.o. female with a medical diagnosis of Primary osteoarthritis of right knee.  Performance deficits affecting function: weakness, impaired endurance, impaired self care skills, decreased lower extremity function, orthopedic precautions, impaired functional mobilty, pain.      Rehab Prognosis: Good; patient would benefit from acute skilled OT services to address these deficits and reach maximum level of function.       Plan:     Patient to be seen 3 x/week to address the above listed problems via self-care/home management, therapeutic activities, therapeutic exercises  · Plan of Care Expires: 08/21/19  · Plan of Care Reviewed with: patient    Subjective     Chief Complaint: R knee pain  Patient/Family Comments/goals: none stated    Occupational Profile:  Living Environment:Pt lives alone in a 2SH.   Previous level of function: Pt was independent with ADLs and mobility.   Equipment Used at Home:  bedside commode, walker, rolling  Assistance upon Discharge: Pt will receive assistance from family and sitter.     Pain/Comfort:  · Pain Rating 1: 4/10  · Location - Side 1: Right  · Location - Orientation 1: generalized  · Location 1: knee  · Pain Addressed 1: Distraction, Reposition  · Pain Rating Post-Intervention 1: 4/10    Patients cultural, spiritual, Protestant conflicts given the current situation:      Objective:     Communicated with: nurse Purvis/Sherrie prior to session.  Patient found up in chair with cryotherapy upon OT entry to room.    General Precautions: Standard, fall   Orthopedic Precautions:RLE weight bearing as tolerated   Braces: N/A     Occupational Performance:    Bed  Mobility:    · Not assessed; pt seated in chair upon arrival. See PT notes.     Functional Mobility/Transfers:  · Patient completed Sit <> Stand Transfer with stand by assistance  with  rolling walker   · Patient completed Toilet Transfer Stand Pivot technique with stand by assistance with  rolling walker    Activities of Daily Living:  · Grooming: stand by assistance with hair grooming while standing at sink.  · Lower Body Dressing: Moderate Assistance to don/doff sock seated in chair     Cognitive/Visual Perceptual:  Cognitive/Psychosocial Skills:     -       Oriented to: x4   -       Follows Commands/attention:Follows multistep  commands  -       Communication: clear/fluent  -       Safety awareness/insight to disability: intact   -       Mood/Affect/Coping skills/emotional control: Appropriate to situation and Cooperative  Visual/Perceptual:      -Intact     Physical Exam:  Postural examination/scapula alignment:    -       Rounded shoulders  -       Forward head  Upper Extremity Range of Motion:     -       Right Upper Extremity: WFL  -       Left Upper Extremity: WFL  Upper Extremity Strength:    -       Right Upper Extremity: WFL  -       Left Upper Extremity: WFL   Strength:    -       Right Upper Extremity: WFL  -       Left Upper Extremity: WFL  Fine Motor Coordination:    -       Intact  Gross motor coordination:   WFL in B UE    AMPAC 6 Click ADL:  AMPAC Total Score: 22    Treatment & Education:  OT ed patient on safety with walker use for functional mobility with cues for hand placement & sequencing.   OT ed pt on use of adaptive equipment for LB dressing & safe item retrieval with reacher with demonstration provided.    Education:    Patient left up in chair with all lines intact and call button in reach    GOALS:   Multidisciplinary Problems     Occupational Therapy Goals        Problem: Occupational Therapy Goal    Goal Priority Disciplines Outcome Interventions   Occupational Therapy Goal      OT, PT/OT Ongoing (interventions implemented as appropriate)    Description:  Goals to be met by: 8/21/2019     Patient will increase functional independence with ADLs by performing:    LE Dressing with Supervision and Assistive Devices as needed.  Grooming while standing at sink with Supervision.  Toileting from toilet with Modified [place] for hygiene and clothing management.   Supine to sit with Supervision.  Toilet transfer to toilet with Supervision.                      History:     Past Medical History:   Diagnosis Date    Arthritis     bilateral knees    Cancer     skin    GERD (gastroesophageal reflux disease)     Hyperlipidemia     Hypertension     Osteopenia     S/P PICC central line placement     Ulcer        Past Surgical History:   Procedure Laterality Date    APPLICATION-WOUND DRESSING Left 3/9/2017    [facility]    COLONOSCOPY N/A 1/22/2019    [facility] ENDO    COLONOSCOPY N/A 4/3/2012    [facility]formed by Kingston [facility] at [facility] ENDO    ECTOPIC PREGNANCY SURGERY      fess      FRACTURE SURGERY      ORIF right arm.    GRAFT Left 3/9/2017    [facility]    INCISION AND DRAINAGE (I & D) Left 3/9/2017    [facility] at [facility]    INCISION AND DRAINAGE-KNEE Left 2/25/2017    [facility] 2ND FLR    JOINT REPLACEMENT      left knee    LYSIS-ADHESION Left 3/9/2017    [facility] at [facility]    REPLACEMENT-KNEE-TOTAL Left 2/14/2017    [facility] at [facility]    REVISION-ARTHROPLASTY-KNEE-TOTAL; TIBIAL POLYETHYLENE EXCHANGE Left 3/9/2017    [facility] at [facility]    TOTAL KNEE ARTHROPLASTY Left     TUBAL LIGATION         Time Tracking:     OT Date of Treatment: 08/14/19  OT Start Time: 1047  OT Stop Time: 1103  OT Total Time (min): 16 min    Billable Minutes:Evaluation 8  Self  Care/Home Management 8    Eduin Mariano, OT  8/14/2019

## 2019-08-14 NOTE — HPI
This is a 68-year-old  female with a past medical history significant for essential hypertension, hyperlipidemia, gastroesophageal reflux, and peptic ulcer disease who presents the hospital status post total knee arthroplasty.  She was doing well postoperatively with minimal pain and other symptoms.

## 2019-08-14 NOTE — PROGRESS NOTES
[facility] Hospital Medicine  Progress Note    Patient Name: Ella Canales  MRN: [id number]  Patient Class: OP- Outpatient Recovery   Admission Date: 8/13/2019  Length of Stay: 0 days  Attending Physician: Regan Kent MD  Primary Care Provider: Adry Damian MD      Subjective:     Principal Problem:Primary osteoarthritis of right knee, status post right knee arthroplasty      HPI:  This is a 68-year-old  female with a past medical history significant for essential hypertension, hyperlipidemia, gastroesophageal reflux, and peptic ulcer disease who presents the hospital status post total knee arthroplasty.  She was doing well postoperatively with minimal pain and other symptoms.    Overview/Hospital Course:  The patient was monitored closely during her stay.  She received physical therapy and occupational therapy for her debility.  She was noted to have postop anemia and her home multivitamin was continued.  She was also placed on some ferrous sulfate and p.m. vitamin-C.    Interval History:  Patient is stable and doing well.  Continue physical therapy Occupational therapy.  Continue home multivitamin and add b.i.d. ferrous sulfate and p.m. vitamin C for postop anemia.    Review of Systems   Constitutional: Negative for appetite change, chills, diaphoresis, fatigue and fever.   HENT: Negative for ear discharge, ear pain, facial swelling and hearing loss.    Eyes: Negative for pain and redness.   Respiratory: Negative for cough and shortness of breath.    Cardiovascular: Negative for chest pain, palpitations and leg swelling.   Gastrointestinal: Negative for abdominal distention, abdominal pain, blood in stool, constipation, diarrhea, nausea and vomiting.   Endocrine: Negative for polydipsia and polyphagia.   Genitourinary: Negative for difficulty urinating, dysuria, flank pain and hematuria.   Musculoskeletal: Negative for neck pain and neck stiffness.   Skin: Positive for wound.  Negative for color change.        Surgical wound   Allergic/Immunologic: Negative for food allergies.   Neurological: Negative for facial asymmetry, speech difficulty and weakness.   Hematological: Does not bruise/bleed easily.   Psychiatric/Behavioral: Negative for agitation, behavioral problems, confusion, hallucinations and suicidal ideas. The patient is not nervous/anxious.      Objective:     Vital Signs (Most Recent):  Temp: 98.7 °F (37.1 °C) (08/14/19 1153)  Pulse: 84 (08/14/19 1153)  Resp: 18 (08/14/19 1153)  BP: 122/63 (08/14/19 1153)  SpO2: (!) 92 % (08/14/19 1153) Vital Signs (24h Range):  Temp:  [96.6 °F (35.9 °C)-98.7 °F (37.1 °C)] 98.7 °F (37.1 °C)  Pulse:  [65-86] 84  Resp:  [16-93] 18  SpO2:  [92 %-98 %] 92 %  BP: (102-123)/(54-71) 122/63     Weight: 76.2 kg (168 lb)  Body mass index is 32.81 kg/m².    Intake/Output Summary (Last 24 hours) at 8/14/2019 1451  Last data filed at 8/14/2019 0600  Gross per 24 hour   Intake 1873.75 ml   Output --   Net 1873.75 ml      Physical Exam   Constitutional: She is oriented to person, place, and time. She appears well-developed and well-nourished. No distress.   HENT:   Head: Normocephalic and atraumatic.   Eyes: Pupils are equal, round, and reactive to light. Conjunctivae and EOM are normal. Right eye exhibits no discharge. Left eye exhibits no discharge.   Neck: Normal range of motion. Neck supple. No JVD present.   Cardiovascular: Normal rate, regular rhythm, normal heart sounds and intact distal pulses.   Pulmonary/Chest: Effort normal and breath sounds normal. No respiratory distress.   Abdominal: Soft. Bowel sounds are normal. She exhibits no distension. There is no tenderness. There is no guarding.   Genitourinary:   Genitourinary Comments: Not examined   Musculoskeletal: Normal range of motion. She exhibits no edema.   Right lower extremity range of motion not tested   Neurological: She is alert and oriented to person, place, and time. No cranial nerve  deficit.   Skin: Skin is warm and dry. Capillary refill takes less than 2 seconds. She is not diaphoretic.   Right knee surgical dressing intact   Psychiatric: She has a normal mood and affect. Her behavior is normal. Judgment and thought content normal.       Labs reviewed      Assessment/Plan:      * Primary osteoarthritis of right knee    Patient is status post right knee arthroplasty on 08/13.  Will control pain and other symptoms.  Physical therapy and occupational therapy consulted for functional ability.  Will defer to Orthopedic surgery for wound management.      Obesity (BMI 30.0-34.9)  Body mass index is 32.81 kg/m². Morbid obesity complicates all aspects of disease management from diagnostic modalities to treatment. Weight loss encouraged and health benefits explained to patient.        Acute blood loss as cause of postoperative anemia  Continue home multivitamin  Add b.i.d. ferrous sulfate and p.m. vitamin-C      Hyperlipidemia   Patient is chronically on statin. Will continue for now. Monitor clinically. Last LDL was   Lab Results   Component Value Date    LDLCALC 98.4 07/08/2019            HTN (hypertension)  Chronic, controlled.  Latest blood pressure and vitals reviewed-   Temp:  [96.6 °F (35.9 °C)-98.4 °F (36.9 °C)]   Pulse:  [65-90]   Resp:  [10-19]   BP: ()/(50-73)   SpO2:  [95 %-100 %] .   Current home meds for hypertension include   Hydrochlorothiazide, losartan, metoprolol.  Will Continue home meds unchanged .  Will utilize p.r.n. blood pressure medication only if patient's blood pressure greater than  180/110 and she develops symptoms such as worsening chest pain or shortness of breath.            VTE Risk Mitigation (From admission, onward)        Ordered     enoxaparin injection 40 mg  Daily      08/13/19 6413            Time spent seeing patient( greater than 1/2 spent in direct contact) : 32 minutes    TIFFANY Sousa  Department of Hospital Medicine   [facility]

## 2019-08-14 NOTE — HOSPITAL COURSE
The patient was monitored closely during her stay.  She received physical therapy and occupational therapy for her debility.  She was noted to have postop anemia and her home multivitamin was continued.  She was also placed on some ferrous sulfate and p.m. vitamin-C.

## 2019-08-15 VITALS
BODY MASS INDEX: 32.98 KG/M2 | TEMPERATURE: 99 F | OXYGEN SATURATION: 92 % | HEIGHT: 60 IN | WEIGHT: 168 LBS | SYSTOLIC BLOOD PRESSURE: 122 MMHG | DIASTOLIC BLOOD PRESSURE: 63 MMHG | HEART RATE: 84 BPM | RESPIRATION RATE: 18 BRPM

## 2019-08-15 PROCEDURE — G0180 PR HOME HEALTH MD CERTIFICATION: ICD-10-PCS | Mod: ,,, | Performed by: ORTHOPAEDIC SURGERY

## 2019-08-15 PROCEDURE — G0180 MD CERTIFICATION HHA PATIENT: HCPCS | Mod: ,,, | Performed by: ORTHOPAEDIC SURGERY

## 2019-08-15 NOTE — TELEPHONE ENCOUNTER
Reason for Disposition   Message left on identified voice mail    Additional Information   Negative: Caller is angry or rude (e.g., hangs up, verbally abusive, yelling)   Negative: Caller hangs up   Negative: Caller has already spoken with the PCP and has no further questions.   Negative: Caller has already spoken with another triager and has no further questions.   Negative: Caller has already spoken with another triager or PCP AND has further questions AND triager able to answer questions.   Negative: No answer.  First attempt to contact caller.  Follow-up call scheduled within 15 minutes.   Negative: Busy signal.  First attempt to contact caller.  Follow-up call scheduled within 15 minutes.    Protocols used: NO CONTACT OR DUPLICATE CONTACT CALL-TURNER-  DEEDEE x2 at 751pm at  [phone]

## 2019-08-16 ENCOUNTER — TELEPHONE (OUTPATIENT)
Dept: ORTHOPEDICS | Facility: CLINIC | Age: 69
End: 2019-08-16

## 2019-08-16 NOTE — TELEPHONE ENCOUNTER
----- Message from Luzmaria March MA sent at 8/16/2019 10:20 AM CDT -----  Contact: [facility]   Wants to talk to nurse regarding pt   Wound care, Right knee, fall   Update looks better today   Call back

## 2019-08-16 NOTE — TELEPHONE ENCOUNTER
Returned call to  nurse and advised ok to continue with pressure bandage changes as long as bleeding does not get worse. Advised to return call if incision or bleeding worsens. Nurse verbalized understanding.

## 2019-08-20 ENCOUNTER — EXTERNAL HOME HEALTH (OUTPATIENT)
Dept: HOME HEALTH SERVICES | Facility: HOSPITAL | Age: 69
End: 2019-08-20
Payer: MEDICARE

## 2019-08-20 NOTE — TELEPHONE ENCOUNTER
----- Message from Luzmaria March MA sent at 8/20/2019  1:25 PM CDT -----  Contact: allie [facility]   Pt wants refill on oxy   Pharmacy is in system   [facility]   Call back

## 2019-08-21 ENCOUNTER — TELEPHONE (OUTPATIENT)
Dept: FAMILY MEDICINE | Facility: CLINIC | Age: 69
End: 2019-08-21

## 2019-08-21 DIAGNOSIS — R30.9 PAIN WITH URINATION: Primary | ICD-10-CM

## 2019-08-21 RX ORDER — HYDROCODONE BITARTRATE AND ACETAMINOPHEN 5; 325 MG/1; MG/1
1 TABLET ORAL EVERY 6 HOURS PRN
Qty: 40 TABLET | Refills: 0 | Status: SHIPPED | OUTPATIENT
Start: 2019-08-21 | End: 2020-01-31 | Stop reason: ALTCHOICE

## 2019-08-21 NOTE — TELEPHONE ENCOUNTER
----- Message from Sybil Virgen sent at 8/21/2019 12:13 PM CDT -----  Contact: Marylu  Type: Needs Medical Advice    Who Called:  Marylu with [facility]  Symptoms (please be specific):    How long has patient had these symptoms:    Pharmacy name and phone #:    Best Call Back Number: [phone]  Additional Information: requesting a call back,stated patient just got out of the hospital with knee surgery,patient possible have a uti,wanted to know if  would like to request U/A for patient?

## 2019-08-21 NOTE — TELEPHONE ENCOUNTER
----- Message from Luzmaria March MA sent at 8/21/2019  9:11 AM CDT -----  Contact: allie  [facility]  Pain medication refill   Oxy     Possible bladder infection, took AZO and cranberry juice       Call back

## 2019-08-22 ENCOUNTER — LAB VISIT (OUTPATIENT)
Dept: LAB | Facility: HOSPITAL | Age: 69
End: 2019-08-22
Attending: FAMILY MEDICINE
Payer: MEDICARE

## 2019-08-22 DIAGNOSIS — R30.9 PAIN WITH URINATION: ICD-10-CM

## 2019-08-22 LAB
BACTERIA #/AREA URNS AUTO: ABNORMAL /HPF
BILIRUB UR QL STRIP: NEGATIVE
CLARITY UR REFRACT.AUTO: ABNORMAL
COLOR UR AUTO: YELLOW
GLUCOSE UR QL STRIP: NEGATIVE
HGB UR QL STRIP: NEGATIVE
KETONES UR QL STRIP: NEGATIVE
LEUKOCYTE ESTERASE UR QL STRIP: ABNORMAL
MICROSCOPIC COMMENT: ABNORMAL
NITRITE UR QL STRIP: POSITIVE
NON-SQ EPI CELLS #/AREA URNS AUTO: 8 /HPF
PH UR STRIP: 6 [PH] (ref 5–8)
PROT UR QL STRIP: NEGATIVE
RBC #/AREA URNS AUTO: 21 /HPF (ref 0–4)
SP GR UR STRIP: 1.01 (ref 1–1.03)
SQUAMOUS #/AREA URNS AUTO: 2 /HPF
URN SPEC COLLECT METH UR: ABNORMAL
WBC #/AREA URNS AUTO: 72 /HPF (ref 0–5)
WBC CLUMPS UR QL AUTO: ABNORMAL

## 2019-08-22 PROCEDURE — 87186 SC STD MICRODIL/AGAR DIL: CPT

## 2019-08-22 PROCEDURE — 87086 URINE CULTURE/COLONY COUNT: CPT

## 2019-08-22 PROCEDURE — 81001 URINALYSIS AUTO W/SCOPE: CPT

## 2019-08-22 PROCEDURE — 87077 CULTURE AEROBIC IDENTIFY: CPT

## 2019-08-22 PROCEDURE — 87088 URINE BACTERIA CULTURE: CPT

## 2019-08-23 ENCOUNTER — TELEPHONE (OUTPATIENT)
Dept: FAMILY MEDICINE | Facility: CLINIC | Age: 69
End: 2019-08-23

## 2019-08-23 DIAGNOSIS — I10 ESSENTIAL HYPERTENSION: ICD-10-CM

## 2019-08-23 DIAGNOSIS — E78.5 HYPERLIPIDEMIA, UNSPECIFIED HYPERLIPIDEMIA TYPE: ICD-10-CM

## 2019-08-23 DIAGNOSIS — E87.6 HYPOKALEMIA: ICD-10-CM

## 2019-08-23 DIAGNOSIS — N30.00 ACUTE CYSTITIS WITHOUT HEMATURIA: Primary | ICD-10-CM

## 2019-08-23 RX ORDER — POTASSIUM CHLORIDE 750 MG/1
TABLET, EXTENDED RELEASE ORAL
Qty: 180 TABLET | Refills: 0 | Status: SHIPPED | OUTPATIENT
Start: 2019-08-23 | End: 2019-11-22 | Stop reason: SDUPTHER

## 2019-08-23 RX ORDER — SIMVASTATIN 40 MG/1
TABLET, FILM COATED ORAL
Qty: 90 TABLET | Refills: 0 | Status: SHIPPED | OUTPATIENT
Start: 2019-08-23 | End: 2019-11-22 | Stop reason: SDUPTHER

## 2019-08-23 RX ORDER — CIPROFLOXACIN 500 MG/1
500 TABLET ORAL 2 TIMES DAILY
Qty: 10 TABLET | Refills: 0 | Status: SHIPPED | OUTPATIENT
Start: 2019-08-23 | End: 2019-10-02 | Stop reason: SDUPTHER

## 2019-08-23 RX ORDER — METOPROLOL SUCCINATE 50 MG/1
TABLET, EXTENDED RELEASE ORAL
Qty: 90 TABLET | Refills: 0 | Status: SHIPPED | OUTPATIENT
Start: 2019-08-23 | End: 2019-11-22 | Stop reason: SDUPTHER

## 2019-08-23 NOTE — TELEPHONE ENCOUNTER
Patient has UTI seen on UA and culture. Cipro sent to pharmacy. Patient advised to hold simvastatin while taking antibiotic. .    Take antibiotics with food.  Increase fluid intake.  Call the clinic if symptoms worsen, new symptoms develop or if you are not any better after completion of your antibiotics.

## 2019-08-24 LAB — BACTERIA UR CULT: ABNORMAL

## 2019-08-27 DIAGNOSIS — M25.561 RIGHT KNEE PAIN, UNSPECIFIED CHRONICITY: Primary | ICD-10-CM

## 2019-08-29 ENCOUNTER — OFFICE VISIT (OUTPATIENT)
Dept: ORTHOPEDICS | Facility: CLINIC | Age: 69
End: 2019-08-29
Payer: MEDICARE

## 2019-08-29 ENCOUNTER — HOSPITAL ENCOUNTER (OUTPATIENT)
Dept: RADIOLOGY | Facility: HOSPITAL | Age: 69
Discharge: HOME | End: 2019-08-29
Attending: ORTHOPAEDIC SURGERY
Payer: MEDICARE

## 2019-08-29 VITALS
BODY MASS INDEX: 32.98 KG/M2 | HEIGHT: 60 IN | HEART RATE: 75 BPM | WEIGHT: 168 LBS | SYSTOLIC BLOOD PRESSURE: 136 MMHG | DIASTOLIC BLOOD PRESSURE: 61 MMHG

## 2019-08-29 DIAGNOSIS — Z96.651 HISTORY OF TOTAL KNEE ARTHROPLASTY, RIGHT: Primary | ICD-10-CM

## 2019-08-29 DIAGNOSIS — M25.561 RIGHT KNEE PAIN, UNSPECIFIED CHRONICITY: ICD-10-CM

## 2019-08-29 PROCEDURE — 99999 PR PBB SHADOW E&M-EST. PATIENT-LVL III: CPT | Mod: PBBFAC,,, | Performed by: ORTHOPAEDIC SURGERY

## 2019-08-29 PROCEDURE — 73560 X-RAY EXAM OF KNEE 1 OR 2: CPT | Mod: 26,RT,, | Performed by: RADIOLOGY

## 2019-08-29 PROCEDURE — 99213 OFFICE O/P EST LOW 20 MIN: CPT | Mod: PBBFAC,25,PN | Performed by: ORTHOPAEDIC SURGERY

## 2019-08-29 PROCEDURE — 73560 XR KNEE 1 OR 2 VIEW RIGHT: ICD-10-PCS | Mod: 26,RT,, | Performed by: RADIOLOGY

## 2019-08-29 PROCEDURE — 99024 PR POST-OP FOLLOW-UP VISIT: ICD-10-PCS | Mod: POP,,, | Performed by: ORTHOPAEDIC SURGERY

## 2019-08-29 PROCEDURE — 99999 PR PBB SHADOW E&M-EST. PATIENT-LVL III: ICD-10-PCS | Mod: PBBFAC,,, | Performed by: ORTHOPAEDIC SURGERY

## 2019-08-29 PROCEDURE — 73560 X-RAY EXAM OF KNEE 1 OR 2: CPT | Mod: TC,PN,RT

## 2019-08-29 PROCEDURE — 99024 POSTOP FOLLOW-UP VISIT: CPT | Mod: POP,,, | Performed by: ORTHOPAEDIC SURGERY

## 2019-08-29 NOTE — PROGRESS NOTES
Past Medical History:   Diagnosis Date    Arthritis     bilateral knees    Cancer     skin    GERD (gastroesophageal reflux disease)     Hyperlipidemia     Hypertension     Osteopenia     S/P PICC central line placement     Ulcer        Past Surgical History:   Procedure Laterality Date    APPLICATION-WOUND DRESSING Left 3/9/2017    Performed by Angie Mulligan MD at [facility] OR    ARTHROPLASTY, KNEE Right 8/13/2019    Performed by Paolo Singh [facility] at [facility] OR    COLONOSCOPY N/A 1/22/2019    Performed by Milton Benitez MD at [facility] ENDO    COLONOSCOPY N/A 4/3/2012    Performed by Kingston Chapman MD at [facility] ENDO    ECTOPIC PREGNANCY SURGERY      fess      FRACTURE SURGERY      ORIF right arm.    GRAFT Left 3/9/2017    [facility] by [facility]igan [facility] at [facility] OR    INCISION AND DRAINAGE (I & D) Left 3/9/2017    Performed by [facility] Mullgian [facility] at [facility] OR    INCISION AND DRAINAGE-KNEE Left 2/25/2017    Performed by [facility]el [facility]ise [facility] at [facility] OR 2ND FLR    JOINT REPLACEMENT      left knee    LYSIS-ADHESION Left 3/9/2017    Performed by [facility]igan [facility] at [facility] OR    REPLACEMENT-KNEE-TOTAL Left 2/14/2017    [facility] at [facility] OR    REVISION-ARTHROPLASTY-KNEE-TOTAL; TIBIAL POLYETHYLENE EXCHANGE Left 3/9/2017    Performed by [facility]igan [facility] at [facility] OR    TOTAL KNEE ARTHROPLASTY Left     TUBAL LIGATION         Current Outpatient Medications   Medication Sig    alendronate (FOSAMAX) 70 MG tablet TAKE 1 TABLET(70 MG) BY MOUTH EVERY 7 DAYS    CALCIUM ORAL Take by mouth.    celecoxib (CELEBREX) 200 MG capsule TAKE 1 CAPSULE BY MOUTH ONCE DAILY.    ciprofloxacin HCl (CIPRO) 500 MG tablet Take 1 tablet (500 mg total) by mouth 2 (two) times daily.    fexofenadine (ALLEGRA) 60 MG tablet Take 60 mg by mouth once daily.    glucosamine-chondroit-vit C-Mn (GLUCOSAMINE-CHONDROITIN MAX ST) 500-400 mg Cap Take by mouth 2 (two) times daily. Twice a day    hydroCHLOROthiazide  (HYDRODIURIL) 25 MG tablet Take 1 tablet (25 mg total) by mouth once daily.    HYDROcodone-acetaminophen (NORCO) 5-325 mg per tablet Take 1 tablet by mouth every 6 (six) hours as needed for Pain (Medication medically necessary for chronic pain > 7 days. Dx: s/p right TKA).    LACTOBACILLUS COMBO NO.6 (PROBIOTIC COMPLEX ORAL) Take 1 tablet by mouth once daily.    losartan (COZAAR) 100 MG tablet Take 1 tablet (100 mg total) by mouth once daily.    metoprolol succinate (TOPROL-XL) 50 MG 24 hr tablet TAKE 1 TABLET(50 MG) BY MOUTH EVERY DAY    multivitamin (ONE DAILY MULTIVITAMIN) per tablet Take 1 tablet by mouth once daily.    omeprazole (PRILOSEC) 20 MG capsule TAKE 1 CAPSULE(20 MG) BY MOUTH TWICE DAILY (Patient taking differently: Take 20 mg by mouth once daily. TAKE 1 CAPSULE(20 MG) BY MOUTH TWICE DAILY)    potassium chloride SA (K-DUR,KLOR-CON) 10 MEQ tablet TAKE 1 TABLET(10 MEQ) BY MOUTH TWICE DAILY    simvastatin (ZOCOR) 40 MG tablet TAKE 1 TABLET(40 MG) BY MOUTH EVERY EVENING    TURMERIC ROOT EXTRACT ORAL Take by mouth once daily.      Current Facility-Administered Medications   Medication    INV  40 mg/5 mL injection 40 mg       Review of patient's allergies indicates:   Allergen Reactions    Ace inhibitors      Other reaction(s): cough    Latex      denies       Family History   Problem Relation Age of Onset    Heart disease Mother     Heart disease Father     Heart disease Brother 47        cabg       Social History     Socioeconomic History    Marital status:      Spouse name: Not on file    Number of children: Not on file    Years of education: Not on file    Highest education level: Not on file   Occupational History    Not on file   Social Needs    Financial resource strain: Not on file    Food insecurity:     Worry: Not on file     Inability: Not on file    Transportation needs:     Medical: Not on file     Non-medical: Not on file   Tobacco Use    Smoking status:  Never Smoker    Smokeless tobacco: Never Used   Substance and Sexual Activity    Alcohol use: Yes     Frequency: 2-3 times a week     Drinks per session: 1 or 2     Binge frequency: Never     Comment: occasional    Drug use: No    Sexual activity: Never   Lifestyle    Physical activity:     Days per week: 0 days     Minutes per session: Not on file    Stress: Not at all   Relationships    Social connections:     Talks on phone: More than three times a week     Gets together: More than three times a week     Attends Religion service: Not on file     Active member of club or organization: No     Attends meetings of clubs or organizations: Not on file     Relationship status:    Other Topics Concern    Not on file   Social History Narrative    Not on file       Chief Complaint:   Chief Complaint   Patient presents with    Knee Pain     R knee s/p TKA 8/13/19        Date of surgery:  August 13, 2019 right total knee arthroplasty    History of present illness:  68-year-old female who is 2 weeks out from right total knee arthroplasty. She is doing great.  She has done much better with this right knee than she ever did with the left knee.  Pain is a 3/10.  No wound issues.  She feels like she I has better range of motion with the right knee than the left.      Review of Systems:    Musculoskeletal:  See HPI        Physical Examination:    Vital Signs:    Vitals:    08/29/19 0834   BP: 136/61   Pulse: 75       Body mass index is 32.81 kg/m².    This a well-developed, well nourished patient in no acute distress.  They are alert and oriented and cooperative to examination.  Pt. walks without an antalgic gait.      Examination of the right knee shows well-healing surgical incision. No erythema or drainage. Range of motion is 0-110 already.  No calf pain. Negative Homans sign.    X-rays:  X-rays of the right knee are ordered and reviewed which show well-aligned total knee arthroplasty components without  complication     Assessment::  Status post right [place] total knee arthroplasty    Plan:  I reviewed the x-rays with her today.  Patient may get the incision wet.  Continue the aspirin.  We will start outpatient physical therapy.  Follow up in 4 weeks.    This note was created using [employer] voice recognition software that occasionally misinterpreted phrases or words.

## 2019-09-26 ENCOUNTER — OFFICE VISIT (OUTPATIENT)
Dept: ORTHOPEDICS | Facility: CLINIC | Age: 69
End: 2019-09-26
Payer: MEDICARE

## 2019-09-26 VITALS — HEIGHT: 60 IN | WEIGHT: 168 LBS | BODY MASS INDEX: 32.98 KG/M2

## 2019-09-26 DIAGNOSIS — Z96.651 HISTORY OF TOTAL KNEE ARTHROPLASTY, RIGHT: Primary | ICD-10-CM

## 2019-09-26 PROBLEM — M17.11 PRIMARY OSTEOARTHRITIS OF RIGHT KNEE: Status: RESOLVED | Noted: 2017-07-05 | Resolved: 2019-09-26

## 2019-09-26 PROBLEM — M17.10 PRIMARY OSTEOARTHRITIS OF ONE KNEE: Status: RESOLVED | Noted: 2017-05-10 | Resolved: 2019-09-26

## 2019-09-26 PROCEDURE — 99212 OFFICE O/P EST SF 10 MIN: CPT | Mod: PBBFAC,PN | Performed by: ORTHOPAEDIC SURGERY

## 2019-09-26 PROCEDURE — 99024 PR POST-OP FOLLOW-UP VISIT: ICD-10-PCS | Mod: POP,,, | Performed by: ORTHOPAEDIC SURGERY

## 2019-09-26 PROCEDURE — 99024 POSTOP FOLLOW-UP VISIT: CPT | Mod: POP,,, | Performed by: ORTHOPAEDIC SURGERY

## 2019-09-26 PROCEDURE — 99999 PR PBB SHADOW E&M-EST. PATIENT-LVL II: CPT | Mod: PBBFAC,,, | Performed by: ORTHOPAEDIC SURGERY

## 2019-09-26 PROCEDURE — 99999 PR PBB SHADOW E&M-EST. PATIENT-LVL II: ICD-10-PCS | Mod: PBBFAC,,, | Performed by: ORTHOPAEDIC SURGERY

## 2019-09-26 NOTE — PROGRESS NOTES
Past Medical History:   Diagnosis Date    Arthritis     bilateral knees    Cancer     skin    GERD (gastroesophageal reflux disease)     Hyperlipidemia     Hypertension     Osteopenia     S/P PICC central line placement     Ulcer        Past Surgical History:   Procedure Laterality Date    COLONOSCOPY N/A 1/22/2019    Procedure: COLONOSCOPY;  Surgeon: Milton Benitez MD;  Location: [facility];  Service: Endoscopy;  Laterality: N/A;    ECTOPIC PREGNANCY SURGERY      fess      FRACTURE SURGERY      ORIF right arm.    JOINT REPLACEMENT      left knee    KNEE ARTHROPLASTY Right 8/13/2019    Procedure: ARTHROPLASTY, KNEE;  Surgeon: Paolo Singh MD;  Location[facility] OR;  Service: Orthopedics;  Laterality: Right;    TOTAL KNEE ARTHROPLASTY Left     TUBAL LIGATION         Current Outpatient Medications   Medication Sig    alendronate (FOSAMAX) 70 MG tablet TAKE 1 TABLET(70 MG) BY MOUTH EVERY 7 DAYS    CALCIUM ORAL Take by mouth.    celecoxib (CELEBREX) 200 MG capsule TAKE 1 CAPSULE BY MOUTH ONCE DAILY.    ciprofloxacin HCl (CIPRO) 500 MG tablet Take 1 tablet (500 mg total) by mouth 2 (two) times daily.    fexofenadine (ALLEGRA) 60 MG tablet Take 60 mg by mouth once daily.    glucosamine-chondroit-vit C-Mn (GLUCOSAMINE-CHONDROITIN MAX ST) 500-400 mg Cap Take by mouth 2 (two) times daily. Twice a day    hydroCHLOROthiazide (HYDRODIURIL) 25 MG tablet Take 1 tablet (25 mg total) by mouth once daily.    HYDROcodone-acetaminophen (NORCO) 5-325 mg per tablet Take 1 tablet by mouth every 6 (six) hours as needed for Pain (Medication medically necessary for chronic pain > 7 days. Dx: s/p right TKA).    LACTOBACILLUS COMBO NO.6 (PROBIOTIC COMPLEX ORAL) Take 1 tablet by mouth once daily.    losartan (COZAAR) 100 MG tablet Take 1 tablet (100 mg total) by mouth once daily.    metoprolol succinate (TOPROL-XL) 50 MG 24 hr tablet TAKE 1 TABLET(50 MG) BY MOUTH EVERY DAY    multivitamin (ONE DAILY  MULTIVITAMIN) per tablet Take 1 tablet by mouth once daily.    omeprazole (PRILOSEC) 20 MG capsule TAKE 1 CAPSULE(20 MG) BY MOUTH TWICE DAILY (Patient taking differently: Take 20 mg by mouth once daily. TAKE 1 CAPSULE(20 MG) BY MOUTH TWICE DAILY)    potassium chloride SA (K-DUR,KLOR-CON) 10 MEQ tablet TAKE 1 TABLET(10 MEQ) BY MOUTH TWICE DAILY    simvastatin (ZOCOR) 40 MG tablet TAKE 1 TABLET(40 MG) BY MOUTH EVERY EVENING    TURMERIC ROOT EXTRACT ORAL Take by mouth once daily.      Current Facility-Administered Medications   Medication    INV  40 mg/5 mL injection 40 mg       Review of patient's allergies indicates:   Allergen Reactions    Ace inhibitors      Other reaction(s): cough    Latex      denies       Family History   Problem Relation Age of Onset    Heart disease Mother     Heart disease Father     Heart disease Brother 47        cabg       Social History     Socioeconomic History    Marital status:      Spouse name: Not on file    Number of children: Not on file    Years of education: Not on file    Highest education level: Not on file   Occupational History    Not on file   Social Needs    Financial resource strain: Not on file    Food insecurity:     Worry: Not on file     Inability: Not on file    Transportation needs:     Medical: Not on file     Non-medical: Not on file   Tobacco Use    Smoking status: Never Smoker    Smokeless tobacco: Never Used   Substance and Sexual Activity    Alcohol use: Yes     Frequency: 2-3 times a week     Drinks per session: 1 or 2     Binge frequency: Never     Comment: occasional    Drug use: No    Sexual activity: Never   Lifestyle    Physical activity:     Days per week: 0 days     Minutes per session: Not on file    Stress: Not at all   Relationships    Social connections:     Talks on phone: More than three times a week     Gets together: More than three times a week     Attends Sabianism service: Not on file     Active member  of club or organization: No     Attends meetings of clubs or organizations: Not on file     Relationship status:    Other Topics Concern    Not on file   Social History Narrative    Not on file       Chief Complaint:   Chief Complaint   Patient presents with    Post-op Evaluation     s/p right knee TKA 8/13/19        Date of surgery:  August 13, 2019 right total knee arthroplasty    History of present illness:  68-year-old female who is 6 weeks out from right total knee arthroplasty. She is doing great.   Pain is a 1/10.  No wound issues.  She feels like she has better range of motion with the right knee than the left.      Review of Systems:    Musculoskeletal:  See HPI        Physical Examination:    Vital Signs:    There were no vitals filed for this visit.    Body mass index is 32.81 kg/m².    This a well-developed, well nourished patient in no acute distress.  They are alert and oriented and cooperative to examination.  Pt. walks without an antalgic gait.      Examination of the right knee shows healed surgical incision. No erythema or drainage. Range of motion is 3-100.  No calf pain. Negative Homans sign.  Stable to varus and valgus stress. Negative drawer exam.    X-rays:  X-rays of the right knee are  reviewed which show well-aligned total knee arthroplasty components without complication     Assessment::  Status post right [place] total knee arthroplasty    Plan:  Continue physical therapy.  I think she would likely benefit from an anti-inflammatory.  I will see her back in 6 weeks with four views of both knees.    This note was created using [employer] voice recognition software that occasionally misinterpreted phrases or words.

## 2019-10-02 ENCOUNTER — TELEPHONE (OUTPATIENT)
Dept: UROLOGY | Facility: CLINIC | Age: 69
End: 2019-10-02

## 2019-10-02 ENCOUNTER — CLINICAL SUPPORT (OUTPATIENT)
Dept: UROLOGY | Facility: CLINIC | Age: 69
End: 2019-10-02
Payer: MEDICARE

## 2019-10-02 DIAGNOSIS — N39.0 RECURRENT UTI (URINARY TRACT INFECTION): Primary | ICD-10-CM

## 2019-10-02 DIAGNOSIS — R35.0 URINARY FREQUENCY: ICD-10-CM

## 2019-10-02 DIAGNOSIS — N30.00 ACUTE CYSTITIS WITHOUT HEMATURIA: ICD-10-CM

## 2019-10-02 LAB
BILIRUB SERPL-MCNC: ABNORMAL MG/DL
BLOOD URINE, POC: ABNORMAL
COLOR, POC UA: ABNORMAL
GLUCOSE UR QL STRIP: ABNORMAL
KETONES UR QL STRIP: ABNORMAL
LEUKOCYTE ESTERASE URINE, POC: ABNORMAL
NITRITE, POC UA: ABNORMAL
PH, POC UA: 7
PROTEIN, POC: ABNORMAL
SPECIFIC GRAVITY, POC UA: 1.01
UROBILINOGEN, POC UA: 0.2

## 2019-10-02 PROCEDURE — 99213 OFFICE O/P EST LOW 20 MIN: CPT | Mod: PBBFAC,PN

## 2019-10-02 PROCEDURE — 99999 PR PBB SHADOW E&M-EST. PATIENT-LVL III: ICD-10-PCS | Mod: PBBFAC,,,

## 2019-10-02 PROCEDURE — 87086 URINE CULTURE/COLONY COUNT: CPT

## 2019-10-02 PROCEDURE — 81002 URINALYSIS NONAUTO W/O SCOPE: CPT | Mod: PBBFAC,PN

## 2019-10-02 PROCEDURE — 99999 PR PBB SHADOW E&M-EST. PATIENT-LVL III: CPT | Mod: PBBFAC,,,

## 2019-10-02 RX ORDER — CIPROFLOXACIN 500 MG/1
500 TABLET ORAL 2 TIMES DAILY
Qty: 10 TABLET | Refills: 0 | Status: SHIPPED | OUTPATIENT
Start: 2019-10-02 | End: 2020-01-31 | Stop reason: ALTCHOICE

## 2019-10-02 NOTE — PROGRESS NOTES
Pt came into clinic to drop off urine, c/o urinary frequency, urgency, chills and dysuria.  U/A Dip ABNORMAL today.  Urine culture to be sent off today, in meantime, Cipro 500 mg BID x 5 days prescribed to pt.    Will contact this pt once we receive and review her lab results.

## 2019-10-02 NOTE — TELEPHONE ENCOUNTER
C/o chills, frequency, urgency since this morning- denies burning and fever. Seems to be same s&s as usual. Did not come in previously due to knee surgery.   Coming in to leave sample.  Spoke with Lisy Anderson NP - POCT and culture.

## 2019-10-02 NOTE — TELEPHONE ENCOUNTER
----- Message from Jenny Noble sent at 10/2/2019  1:24 PM CDT -----  Contact: Patient  Type: Needs Medical Advice    Who Called: Patient  Best Call Back Number:[phone]  Additional Information:Patient is needing to speak with a nurse in regards to her having another Urinary tract infection.Please call back and advise.

## 2019-10-04 LAB
BACTERIA UR CULT: NORMAL
BACTERIA UR CULT: NORMAL

## 2019-11-04 DIAGNOSIS — M25.562 PAIN IN BOTH KNEES, UNSPECIFIED CHRONICITY: Primary | ICD-10-CM

## 2019-11-04 DIAGNOSIS — M25.561 PAIN IN BOTH KNEES, UNSPECIFIED CHRONICITY: Primary | ICD-10-CM

## 2019-11-07 ENCOUNTER — OFFICE VISIT (OUTPATIENT)
Dept: ORTHOPEDICS | Facility: CLINIC | Age: 69
End: 2019-11-07
Payer: MEDICARE

## 2019-11-07 ENCOUNTER — HOSPITAL ENCOUNTER (OUTPATIENT)
Dept: RADIOLOGY | Facility: HOSPITAL | Age: 69
Discharge: HOME | End: 2019-11-07
Attending: ORTHOPAEDIC SURGERY
Payer: MEDICARE

## 2019-11-07 VITALS
HEIGHT: 60 IN | BODY MASS INDEX: 32.98 KG/M2 | WEIGHT: 168 LBS | SYSTOLIC BLOOD PRESSURE: 138 MMHG | DIASTOLIC BLOOD PRESSURE: 67 MMHG | HEART RATE: 75 BPM

## 2019-11-07 DIAGNOSIS — M25.562 PAIN IN BOTH KNEES, UNSPECIFIED CHRONICITY: ICD-10-CM

## 2019-11-07 DIAGNOSIS — M25.561 PAIN IN BOTH KNEES, UNSPECIFIED CHRONICITY: ICD-10-CM

## 2019-11-07 DIAGNOSIS — Z96.651 HISTORY OF TOTAL KNEE ARTHROPLASTY, RIGHT: Primary | ICD-10-CM

## 2019-11-07 PROCEDURE — 99213 OFFICE O/P EST LOW 20 MIN: CPT | Mod: PBBFAC,25,PN | Performed by: ORTHOPAEDIC SURGERY

## 2019-11-07 PROCEDURE — 73564 X-RAY EXAM KNEE 4 OR MORE: CPT | Mod: TC,50,PN

## 2019-11-07 PROCEDURE — 99024 PR POST-OP FOLLOW-UP VISIT: ICD-10-PCS | Mod: POP,,, | Performed by: ORTHOPAEDIC SURGERY

## 2019-11-07 PROCEDURE — 99024 POSTOP FOLLOW-UP VISIT: CPT | Mod: POP,,, | Performed by: ORTHOPAEDIC SURGERY

## 2019-11-07 PROCEDURE — 99999 PR PBB SHADOW E&M-EST. PATIENT-LVL III: CPT | Mod: PBBFAC,,, | Performed by: ORTHOPAEDIC SURGERY

## 2019-11-07 PROCEDURE — 73564 XR KNEE ORTHO BILAT WITH FLEXION: ICD-10-PCS | Mod: 26,50,, | Performed by: RADIOLOGY

## 2019-11-07 PROCEDURE — 99999 PR PBB SHADOW E&M-EST. PATIENT-LVL III: ICD-10-PCS | Mod: PBBFAC,,, | Performed by: ORTHOPAEDIC SURGERY

## 2019-11-07 PROCEDURE — 73564 X-RAY EXAM KNEE 4 OR MORE: CPT | Mod: 26,50,, | Performed by: RADIOLOGY

## 2019-11-07 NOTE — PROGRESS NOTES
Past Medical History:   Diagnosis Date    Arthritis     bilateral knees    Cancer     skin    GERD (gastroesophageal reflux disease)     Hyperlipidemia     Hypertension     Osteopenia     S/P PICC central line placement     Ulcer        Past Surgical History:   Procedure Laterality Date    COLONOSCOPY N/A 1/22/2019    Procedure: COLONOSCOPY;  Surgeon: Milton Benitez MD;  Location: [facility];  Service: Endoscopy;  Laterality: N/A;    ECTOPIC PREGNANCY SURGERY      fess      FRACTURE SURGERY      ORIF right arm.    JOINT REPLACEMENT      left knee    KNEE ARTHROPLASTY Right 8/13/2019    Procedure: ARTHROPLASTY, KNEE;  Surgeon: Paolo Singh MD;  Location: [facility] OR;  Service: Orthopedics;  Laterality: Right;    TOTAL KNEE ARTHROPLASTY Left     TUBAL LIGATION         Current Outpatient Medications   Medication Sig    alendronate (FOSAMAX) 70 MG tablet TAKE 1 TABLET(70 MG) BY MOUTH EVERY 7 DAYS    CALCIUM ORAL Take by mouth.    celecoxib (CELEBREX) 200 MG capsule TAKE 1 CAPSULE BY MOUTH ONCE DAILY.    ciprofloxacin HCl (CIPRO) 500 MG tablet Take 1 tablet (500 mg total) by mouth 2 (two) times daily.    fexofenadine (ALLEGRA) 60 MG tablet Take 60 mg by mouth once daily.    glucosamine-chondroit-vit C-Mn (GLUCOSAMINE-CHONDROITIN MAX ST) 500-400 mg Cap Take by mouth 2 (two) times daily. Twice a day    hydroCHLOROthiazide (HYDRODIURIL) 25 MG tablet Take 1 tablet (25 mg total) by mouth once daily.    HYDROcodone-acetaminophen (NORCO) 5-325 mg per tablet Take 1 tablet by mouth every 6 (six) hours as needed for Pain (Medication medically necessary for chronic pain > 7 days. Dx: s/p right TKA).    LACTOBACILLUS COMBO NO.6 (PROBIOTIC COMPLEX ORAL) Take 1 tablet by mouth once daily.    losartan (COZAAR) 100 MG tablet Take 1 tablet (100 mg total) by mouth once daily.    metoprolol succinate (TOPROL-XL) 50 MG 24 hr tablet TAKE 1 TABLET(50 MG) BY MOUTH EVERY DAY    multivitamin (ONE DAILY  MULTIVITAMIN) per tablet Take 1 tablet by mouth once daily.    omeprazole (PRILOSEC) 20 MG capsule TAKE 1 CAPSULE(20 MG) BY MOUTH TWICE DAILY (Patient taking differently: Take 20 mg by mouth once daily. TAKE 1 CAPSULE(20 MG) BY MOUTH TWICE DAILY)    potassium chloride SA (K-DUR,KLOR-CON) 10 MEQ tablet TAKE 1 TABLET(10 MEQ) BY MOUTH TWICE DAILY    simvastatin (ZOCOR) 40 MG tablet TAKE 1 TABLET(40 MG) BY MOUTH EVERY EVENING    TURMERIC ROOT EXTRACT ORAL Take by mouth once daily.      Current Facility-Administered Medications   Medication    INV  40 mg/5 mL injection 40 mg       Review of patient's allergies indicates:   Allergen Reactions    Ace inhibitors      Other reaction(s): cough    Latex      denies       Family History   Problem Relation Age of Onset    Heart disease Mother     Heart disease Father     Heart disease Brother 47        cabg       Social History     Socioeconomic History    Marital status:      Spouse name: Not on file    Number of children: Not on file    Years of education: Not on file    Highest education level: Not on file   Occupational History    Not on file   Social Needs    Financial resource strain: Not on file    Food insecurity:     Worry: Not on file     Inability: Not on file    Transportation needs:     Medical: Not on file     Non-medical: Not on file   Tobacco Use    Smoking status: Never Smoker    Smokeless tobacco: Never Used   Substance and Sexual Activity    Alcohol use: Yes     Frequency: 2-3 times a week     Drinks per session: 1 or 2     Binge frequency: Never     Comment: occasional    Drug use: No    Sexual activity: Never   Lifestyle    Physical activity:     Days per week: 0 days     Minutes per session: Not on file    Stress: Not at all   Relationships    Social connections:     Talks on phone: More than three times a week     Gets together: More than three times a week     Attends Holiness service: Not on file     Active member  of club or organization: No     Attends meetings of clubs or organizations: Not on file     Relationship status:    Other Topics Concern    Not on file   Social History Narrative    Not on file       Chief Complaint:   Chief Complaint   Patient presents with    Post-op Evaluation     s/p right knee TKA 8/13/19        Date of surgery:  August 13, 2019 right total knee arthroplasty    History of present illness:  69-year-old female who is 12 weeks out from right total knee arthroplasty. She is doing great.   Pain is a 0/10.  No wound issues.  She feels like she has better range of motion with the right knee than the left.  She is done with physical therapy and doing exercises on her own.  She is ecstatic that she can ride a bike already.      Review of Systems:    Musculoskeletal:  See HPI        Physical Examination:    Vital Signs:    Vitals:    11/07/19 0904   BP: 138/67   Pulse: 75       Body mass index is 32.81 kg/m².    This a well-developed, well nourished patient in no acute distress.  They are alert and oriented and cooperative to examination.  Pt. walks without an antalgic gait.      Examination of the right knee shows healed surgical incision. No erythema or drainage. Range of motion is 3-120.  No calf pain. Negative Homans sign.  Stable to varus and valgus stress. Negative drawer exam.    X-rays:  X-rays of the right knee are ordered and reviewed which show well-aligned total knee arthroplasty components without complication     Assessment::  Status post right [place] total knee arthroplasty    Plan:  Continue physical therapy exercises on her own.  Recommended oral antibiotics for prophylaxis for any invasive procedures.  Follow up in August for annual exam of both knees.    This note was created using [email or web address] voice recognition software that occasionally misinterpreted phrases or words.

## 2019-11-21 ENCOUNTER — TELEPHONE (OUTPATIENT)
Dept: FAMILY MEDICINE | Facility: CLINIC | Age: 69
End: 2019-11-21

## 2019-11-21 NOTE — TELEPHONE ENCOUNTER
----- Message from Ron Ruff sent at 11/21/2019  8:49 AM CST -----  Type: Needs Medical Advice    Who Called:  Patient    Pharmacy name and phone #:   [id number] - [place] - [address] AT [facility]  [address]  [place] [address]  Phone: [phone] Fax: [phone]      Best Call Back Number: [phone]  Additional Information: Patient states that she would like a callback regarding the recall of losartan (COZAAR) 100 MG tablet

## 2019-11-21 NOTE — TELEPHONE ENCOUNTER
Explained to patient that we have no way of knowing if the losartan provided to her was part of the recent recall. Advised her to call her pharmacy regarding this. Patient verbalized understanding.

## 2019-11-22 DIAGNOSIS — E87.6 HYPOKALEMIA: ICD-10-CM

## 2019-11-22 DIAGNOSIS — E78.5 HYPERLIPIDEMIA, UNSPECIFIED HYPERLIPIDEMIA TYPE: ICD-10-CM

## 2019-11-22 DIAGNOSIS — I10 ESSENTIAL HYPERTENSION: ICD-10-CM

## 2019-11-22 RX ORDER — METOPROLOL SUCCINATE 50 MG/1
TABLET, EXTENDED RELEASE ORAL
Qty: 90 TABLET | Refills: 3 | Status: SHIPPED | OUTPATIENT
Start: 2019-11-22 | End: 2020-11-13 | Stop reason: SDUPTHER

## 2019-11-22 RX ORDER — SIMVASTATIN 40 MG/1
TABLET, FILM COATED ORAL
Qty: 90 TABLET | Refills: 3 | Status: SHIPPED | OUTPATIENT
Start: 2019-11-22 | End: 2020-11-13 | Stop reason: SDUPTHER

## 2019-11-22 RX ORDER — POTASSIUM CHLORIDE 750 MG/1
TABLET, EXTENDED RELEASE ORAL
Qty: 180 TABLET | Refills: 3 | Status: SHIPPED | OUTPATIENT
Start: 2019-11-22 | End: 2020-11-13 | Stop reason: SDUPTHER

## 2020-01-06 DIAGNOSIS — M81.0 OSTEOPOROSIS, UNSPECIFIED OSTEOPOROSIS TYPE, UNSPECIFIED PATHOLOGICAL FRACTURE PRESENCE: ICD-10-CM

## 2020-01-06 RX ORDER — ALENDRONATE SODIUM 70 MG/1
TABLET ORAL
Qty: 4 TABLET | Refills: 5 | Status: SHIPPED | OUTPATIENT
Start: 2020-01-06 | End: 2020-06-29 | Stop reason: SDUPTHER

## 2020-01-31 ENCOUNTER — OFFICE VISIT (OUTPATIENT)
Dept: FAMILY MEDICINE | Facility: CLINIC | Age: 70
End: 2020-01-31
Payer: MEDICARE

## 2020-01-31 VITALS
HEART RATE: 87 BPM | WEIGHT: 164 LBS | DIASTOLIC BLOOD PRESSURE: 60 MMHG | TEMPERATURE: 98 F | BODY MASS INDEX: 32.2 KG/M2 | OXYGEN SATURATION: 95 % | SYSTOLIC BLOOD PRESSURE: 110 MMHG | RESPIRATION RATE: 12 BRPM | HEIGHT: 60 IN

## 2020-01-31 DIAGNOSIS — Z96.652 STATUS POST TOTAL LEFT KNEE REPLACEMENT: ICD-10-CM

## 2020-01-31 DIAGNOSIS — M81.0 OSTEOPOROSIS, UNSPECIFIED OSTEOPOROSIS TYPE, UNSPECIFIED PATHOLOGICAL FRACTURE PRESENCE: ICD-10-CM

## 2020-01-31 DIAGNOSIS — E78.00 PURE HYPERCHOLESTEROLEMIA: ICD-10-CM

## 2020-01-31 DIAGNOSIS — D62 ACUTE BLOOD LOSS AS CAUSE OF POSTOPERATIVE ANEMIA: ICD-10-CM

## 2020-01-31 DIAGNOSIS — E87.6 HYPOKALEMIA: ICD-10-CM

## 2020-01-31 DIAGNOSIS — M19.049 ARTHRITIS OF HAND: ICD-10-CM

## 2020-01-31 DIAGNOSIS — I10 ESSENTIAL HYPERTENSION: Primary | ICD-10-CM

## 2020-01-31 DIAGNOSIS — E66.9 OBESITY (BMI 30.0-34.9): ICD-10-CM

## 2020-01-31 DIAGNOSIS — N95.9 MENOPAUSAL AND PERIMENOPAUSAL DISORDER: ICD-10-CM

## 2020-01-31 PROCEDURE — 99999 PR PBB SHADOW E&M-EST. PATIENT-LVL IV: CPT | Mod: PBBFAC,,, | Performed by: FAMILY MEDICINE

## 2020-01-31 PROCEDURE — 99214 PR OFFICE/OUTPT VISIT, EST, LEVL IV, 30-39 MIN: ICD-10-PCS | Mod: S$PBB,,, | Performed by: FAMILY MEDICINE

## 2020-01-31 PROCEDURE — 99214 OFFICE O/P EST MOD 30 MIN: CPT | Mod: PBBFAC,PO | Performed by: FAMILY MEDICINE

## 2020-01-31 PROCEDURE — 99999 PR PBB SHADOW E&M-EST. PATIENT-LVL IV: ICD-10-PCS | Mod: PBBFAC,,, | Performed by: FAMILY MEDICINE

## 2020-01-31 PROCEDURE — 99214 OFFICE O/P EST MOD 30 MIN: CPT | Mod: S$PBB,,, | Performed by: FAMILY MEDICINE

## 2020-01-31 RX ORDER — DICLOFENAC SODIUM 10 MG/G
2 GEL TOPICAL DAILY
Qty: 100 G | Status: SHIPPED | OUTPATIENT
Start: 2020-01-31

## 2020-01-31 RX ORDER — LOSARTAN POTASSIUM 100 MG/1
100 TABLET ORAL DAILY
Qty: 90 TABLET | Refills: 3 | Status: SHIPPED | OUTPATIENT
Start: 2020-01-31 | End: 2021-01-27 | Stop reason: SDUPTHER

## 2020-01-31 NOTE — PROGRESS NOTES
Subjective:       Patient ID: Ella Canales is a 69 y.o. female.    Chief Complaint: Follow-up (6mth f/u hypertension)    HPI  Review of Systems   Constitutional: Negative for fatigue and unexpected weight change.   Respiratory: Negative for chest tightness and shortness of breath.    Cardiovascular: Negative for chest pain, palpitations and leg swelling.   Gastrointestinal: Negative for abdominal pain.   Musculoskeletal: Negative for arthralgias.   Neurological: Negative for dizziness, syncope, light-headedness and headaches.       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    Ortho Dr. Coley right tkr-has healed very well  Eye Dr. Matthews cataract  CArd Dr. Ellis  Urology NP O Dimitrios-recurrent uti    On fosomax on 1 year. Due for dexa    C/o right hand pain dejuan 3rd dip and 1st mcp and dip-stiffness, ache and loss of ability to   Objective:      Physical Exam   Constitutional: She is oriented to person, place, and time. She appears well-developed and well-nourished.   Cardiovascular: Normal rate, regular rhythm and normal heart sounds.   Pulmonary/Chest: Effort normal and breath sounds normal.   Musculoskeletal: She exhibits no edema.        Hands:  Neurological: She is alert and oriented to person, place, and time.   Skin: Skin is warm and dry.   Psychiatric: She has a normal mood and affect.   Nursing note and vitals reviewed.      Assessment:       1. Essential hypertension    2. Pure hypercholesterolemia    3.  Hypokalemia    4. Obesity (BMI 30.0-34.9)    5. Acute blood loss as cause of postoperative anemia    6. 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 Osteoporosis, unspecified osteoporosis type, unspecified pathological fracture presence    8. Arthritis of hand    9. Menopausal and perimenopausal disorder        Plan:         1. Essential hypertension  Controlled on current medications.  Continue current medications.      2. Pure hypercholesterolemia  Chol at goal. Your triglycerides are borderline high. I recommend a heart healthy diet rich in fiber, fresh vegetables and fruit and low in saturated fats (fried foods, red meat, etc.).  I also recommend regular exercise including a minimum of 150 minutes of cardio exercise per week and 2-30 minute workouts of strength training like light weights, yoga, pilates, etc.  You should work toward a body mass index of < 25.      - CBC auto differential; Future  - Comprehensive metabolic panel; Future  - Lipid panel; Future    3. Hypokalemia  Screen and treat as indicated:    - Magnesium; Future    4. Obesity (BMI 30.0-34.9)  Counseled patient on his ideal body weight, health consequences of being obese and current recommendations including weekly exercise and a heart healthy diet.  Current BMI is:Estimated body mass index is 32.03 kg/m² as calculated from the following:    Height as of this encounter: 5' (1.524 m).    Weight as of this encounter: 74.4 kg (164 lb 0.4 oz)..  Patient is aware that ideal BMI < 25 or Weight in (lb) to have BMI = 25: 127.7.        5. Acute blood loss as cause of postoperative anemia  Screen and treat as indicated:      6. 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 Osteoporosis, unspecified osteoporosis type, unspecified pathological fracture presence  Cont fosomax.  Repeat dexa    8. Arthritis of hand  Refer for consult and eval and treat  - diclofenac  sodium (VOLTAREN) 1 % Gel; Apply 2 g topically once daily.  Dispense: 100 g; Refill: prn  - Ambulatory referral/consult to Orthopedics; Future    9. Menopausal and perimenopausal disorder  Screen and treat as indicated:    - DXA Bone Density Spine And Hip; Future        Time spent with patient: 20 minutes    Patient with be reevaluated in 6 months or sooner prn    Greater than 50% of this visit was spent counseling as described in above documentation:Yes

## 2020-02-19 RX ORDER — HYDROCHLOROTHIAZIDE 25 MG/1
TABLET ORAL
Qty: 90 TABLET | Refills: 3 | Status: SHIPPED | OUTPATIENT
Start: 2020-02-19 | End: 2021-01-27 | Stop reason: SDUPTHER

## 2020-02-24 ENCOUNTER — HOSPITAL ENCOUNTER (OUTPATIENT)
Dept: RADIOLOGY | Facility: CLINIC | Age: 70
Discharge: HOME | End: 2020-02-24
Attending: FAMILY MEDICINE
Payer: MEDICARE

## 2020-02-24 DIAGNOSIS — N95.9 MENOPAUSAL AND PERIMENOPAUSAL DISORDER: ICD-10-CM

## 2020-02-24 PROCEDURE — 77080 DXA BONE DENSITY AXIAL: CPT | Mod: TC,PO

## 2020-02-24 PROCEDURE — 77080 DXA BONE DENSITY AXIAL: CPT | Mod: 26,,, | Performed by: RADIOLOGY

## 2020-02-24 PROCEDURE — 77080 DEXA BONE DENSITY SPINE HIP: ICD-10-PCS | Mod: 26,,, | Performed by: RADIOLOGY

## 2020-03-11 ENCOUNTER — OFFICE VISIT (OUTPATIENT)
Dept: ORTHOPEDICS | Facility: CLINIC | Age: 70
End: 2020-03-11
Payer: MEDICARE

## 2020-03-11 VITALS
HEART RATE: 74 BPM | DIASTOLIC BLOOD PRESSURE: 79 MMHG | BODY MASS INDEX: 32.2 KG/M2 | WEIGHT: 164 LBS | SYSTOLIC BLOOD PRESSURE: 147 MMHG | HEIGHT: 60 IN

## 2020-03-11 DIAGNOSIS — M19.049 ARTHRITIS OF HAND: ICD-10-CM

## 2020-03-11 PROCEDURE — 99999 PR PBB SHADOW E&M-EST. PATIENT-LVL III: ICD-10-PCS | Mod: PBBFAC,,, | Performed by: ORTHOPAEDIC SURGERY

## 2020-03-11 PROCEDURE — 20550 NJX 1 TENDON SHEATH/LIGAMENT: CPT | Mod: PBBFAC,PN | Performed by: ORTHOPAEDIC SURGERY

## 2020-03-11 PROCEDURE — 99213 OFFICE O/P EST LOW 20 MIN: CPT | Mod: PBBFAC,PN | Performed by: ORTHOPAEDIC SURGERY

## 2020-03-11 PROCEDURE — 99999 PR PBB SHADOW E&M-EST. PATIENT-LVL III: CPT | Mod: PBBFAC,,, | Performed by: ORTHOPAEDIC SURGERY

## 2020-03-11 PROCEDURE — 20550 TENDON SHEATH: ICD-10-PCS | Mod: S$PBB,F7,, | Performed by: ORTHOPAEDIC SURGERY

## 2020-03-11 PROCEDURE — 99203 PR OFFICE/OUTPT VISIT, NEW, LEVL III, 30-44 MIN: ICD-10-PCS | Mod: S$PBB,25,, | Performed by: ORTHOPAEDIC SURGERY

## 2020-03-11 PROCEDURE — 99203 OFFICE O/P NEW LOW 30 MIN: CPT | Mod: S$PBB,25,, | Performed by: ORTHOPAEDIC SURGERY

## 2020-03-11 RX ADMIN — TRIAMCINOLONE ACETONIDE 40 MG: 40 INJECTION, SUSPENSION INTRA-ARTICULAR; INTRAMUSCULAR at 09:03

## 2020-03-11 NOTE — PROGRESS NOTES
3/11/2020    Chief Complaint:  Chief Complaint   Patient presents with    Right Hand - Pain       HPI:  Ella Canales is a 69 y.o. female, who presents to clinic today she has a history of right hand pain.  She was told by her primary care doctor that this may be arthritis.  She states that it has been going on for years.  She states that over the last couple of weeks to months she has had worsening of pain in the middle finger with a decreased ability to flex it.  She is here today for further evaluation and treatment options.    PMHX:  Past Medical History:   Diagnosis Date    Arthritis     bilateral knees    Cancer     skin    GERD (gastroesophageal reflux disease)     Hyperlipidemia     Hypertension     Osteopenia     S/P PICC central line placement     Ulcer        PSHX:  Past Surgical History:   Procedure Laterality Date    COLONOSCOPY N/A 1/22/2019    Procedure: COLONOSCOPY;  Surgeon: Milton Benitez MD;  Location: [facility];  Service: Endoscopy;  Laterality: N/A;    ECTOPIC PREGNANCY SURGERY      fess      FRACTURE SURGERY      ORIF right arm.    JOINT REPLACEMENT      left knee    KNEE ARTHROPLASTY Right 8/13/2019    Procedure: ARTHROPLASTY, KNEE;  Surgeon: Paolo Singh MD;  Location: [facility] OR;  Service: Orthopedics;  Laterality: Right;    TOTAL KNEE ARTHROPLASTY Left     TUBAL LIGATION         FMHX:  Family History   Problem Relation Age of Onset    Heart disease Mother     Heart disease Father     Heart disease Brother 47        cabg       SOCHX:  Social History     Tobacco Use    Smoking status: Never Smoker    Smokeless tobacco: Never Used   Substance Use Topics    Alcohol use: Yes     Frequency: 2-3 times a week     Drinks per session: 1 or 2     Binge frequency: Never     Comment: occasional       ALLERGIES:  Ace inhibitors and Latex    CURRENT MEDICATIONS:  Current Outpatient Medications on File Prior to Visit   Medication Sig Dispense Refill    alendronate  (FOSAMAX) 70 MG tablet TAKE 1 TABLET BY MOUTH EVERY WEEK 4 tablet 5    CALCIUM ORAL Take by mouth.      diclofenac sodium (VOLTAREN) 1 % Gel Apply 2 g topically once daily. 100 g prn    fexofenadine (ALLEGRA) 60 MG tablet Take 60 mg by mouth once daily.      glucosamine-chondroit-vit C-Mn (GLUCOSAMINE-CHONDROITIN MAX ST) 500-400 mg Cap Take by mouth 2 (two) times daily. Twice a day      hydroCHLOROthiazide (HYDRODIURIL) 25 MG tablet TAKE 1 TABLET(25 MG) BY MOUTH EVERY DAY 90 tablet 3    LACTOBACILLUS COMBO NO.6 (PROBIOTIC COMPLEX ORAL) Take 1 tablet by mouth once daily.      losartan (COZAAR) 100 MG tablet Take 1 tablet (100 mg total) by mouth once daily. 90 tablet 3    metoprolol succinate (TOPROL-XL) 50 MG 24 hr tablet TAKE 1 TABLET(50 MG) BY MOUTH EVERY DAY 90 tablet 3    multivitamin (ONE DAILY MULTIVITAMIN) per tablet Take 1 tablet by mouth once daily.      omeprazole (PRILOSEC) 20 MG capsule TAKE 1 CAPSULE(20 MG) BY MOUTH TWICE DAILY (Patient taking differently: Take 20 mg by mouth once daily. TAKE 1 CAPSULE(20 MG) BY MOUTH TWICE DAILY) 180 capsule 3    potassium chloride SA (K-DUR,KLOR-CON) 10 MEQ tablet TAKE 1 TABLET(10 MEQ) BY MOUTH TWICE DAILY 180 tablet 3    simvastatin (ZOCOR) 40 MG tablet TAKE 1 TABLET(40 MG) BY MOUTH EVERY EVENING 90 tablet 3    TURMERIC ROOT EXTRACT ORAL Take by mouth once daily.        Current Facility-Administered Medications on File Prior to Visit   Medication Dose Route Frequency Provider Last Rate Last Dose    INV  40 mg/5 mL injection 40 mg  40 mg Intra-articular Once Olman Bush III, NICK           REVIEW OF SYSTEMS:  Review of Systems   Constitutional: Negative for chills and fever.   HENT: Negative for ear pain, nosebleeds and sore throat.    Eyes: Negative for pain and discharge.   Respiratory: Negative for shortness of breath and wheezing.    Cardiovascular: Negative for chest pain and palpitations.   Gastrointestinal: Negative for heartburn, nausea  and vomiting.   Genitourinary: Negative for dysuria and urgency.   Skin: Negative for rash.   Neurological: Negative for seizures and headaches.   Endo/Heme/Allergies: Bruises/bleeds easily.       GENERAL PHYSICAL EXAM:   Ht 5' (1.524 m)   Wt 74.4 kg (164 lb)   LMP 04/03/2012   BMI 32.03 kg/m²    GEN: well developed, well nourished, no acute distress   HENT: Normocephalic, atraumatic   EYES: No discharge, conjunctiva normal   NECK: Supple, non-tender   PULM: No wheezing, no respiratory distress   CV: RRR   ABD: Soft, non-tender    ORTHO EXAM:   Examination the right hand reveals that there are changes consistent with osteoarthritis.  There are enlargement of multiple joints. Palpation about the hand produces mild global tenderness but she does have significant tenderness over the A1 pulley of the middle finger.  Attempts at flexion and extension of the finger reveals that she has limited ability to flex the middle finger due to of an obvious enlargement nodule at the A1 pulley and over the flexor tendon. She does report intact sensation in the median radial ulnar distribution and capillary refill is less than 2 sec in the digits.    RADIOLOGY:   None    ASSESSMENT:   Right hand arthritis with middle finger triggering    PLAN:  1.  After informed consent was obtained and injection was placed to the right middle finger flexor tendon sheath    2.  Patient will follow up with me in 6 weeks for repeat evaluation with an x-ray of the right hand which point I will discuss any further treatment options

## 2020-03-11 NOTE — LETTER
March 12, 2020      Adry Damian MD  [address] [id number]           [facility]  [address] [place] [address]  Phone: [phone]          Patient: Ella Canales   MR Number: [id number]   YOB: 1950   Date of Visit: 3/11/2020       Dear Dr. Adry Damian:    Thank you for referring Ella Canales to me for evaluation. Attached you will find relevant portions of my assessment and plan of care.    If you have questions, please do not hesitate to call me. I look forward to following Ella Canales along with you.    Sincerely,    Milton Malik MD    Enclosure  CC:  No Recipients    If you would like to receive this communication electronically, please contact [email or web address] or [phone] to request more information on [employer] access.    For providers and/or their staff who would like to refer a patient to [facility], please contact us through our one-stop-shop provider referral line, [facility] , at [phone].    If you feel you have received this communication in error or would no longer like to receive these types of communications, please e-mail [email or web address]

## 2020-03-12 RX ORDER — TRIAMCINOLONE ACETONIDE 40 MG/ML
40 INJECTION, SUSPENSION INTRA-ARTICULAR; INTRAMUSCULAR
Status: DISCONTINUED | OUTPATIENT
Start: 2020-03-11 | End: 2020-03-12 | Stop reason: HOSPADM

## 2020-03-12 NOTE — PROCEDURES
Tendon Sheath  Date/Time: 3/11/2020 9:40 AM  Performed by: Milton Malik MD  Authorized by: Milton Malik MD     Consent Done?: Yes (Verbal)  Timeout: prior to procedure the correct patient, procedure, and site was verified   Indications:  Pain  Site marked: the procedure site was marked    Timeout: prior to procedure the correct patient, procedure, and site was verified    Location:  Long finger  Long finger joint: Right middle finger flexor tendon sheath.  Prep: patient was prepped and draped in usual sterile fashion    Needle size:  25 G  Medications:  40 mg triamcinolone acetonide 40 mg/mL (20mg injected)  Patient tolerance:  Patient tolerated the procedure well with no immediate complications

## 2020-05-05 ENCOUNTER — PATIENT MESSAGE (OUTPATIENT)
Dept: ADMINISTRATIVE | Facility: HOSPITAL | Age: 70
End: 2020-05-05

## 2020-06-29 DIAGNOSIS — M81.0 OSTEOPOROSIS, UNSPECIFIED OSTEOPOROSIS TYPE, UNSPECIFIED PATHOLOGICAL FRACTURE PRESENCE: ICD-10-CM

## 2020-06-29 NOTE — TELEPHONE ENCOUNTER
----- Message from Jenny Noble sent at 6/29/2020 10:12 AM CDT -----  Contact: pt at [phone]  Type:  RX Refill Request    Who Called: Pt  Refill or New Rx:  refill  RX Name and Strength: alendronate (FOSAMAX) 70 MG tablet  How is the patient currently taking it? (ex. 1XDay):  once a week  Is this a 30 day or 90 day RX:  90day  Preferred Pharmacy with phone number:    [id number] - [place] - [address] [facility]  [address]  [place] [address]  Phone: [phone] Fax: [phone]      Local or Mail Order: Local  Ordering Provider: Nati Maldonado Call Back Number: [phone]

## 2020-06-30 RX ORDER — ALENDRONATE SODIUM 70 MG/1
70 TABLET ORAL WEEKLY
Qty: 4 TABLET | Refills: 5 | Status: SHIPPED | OUTPATIENT
Start: 2020-06-30 | End: 2020-12-16 | Stop reason: SDUPTHER

## 2020-07-20 NOTE — [employer]OVATION PATIENT INSTRUCTIONS
Discharge Summary/Instructions after an Endoscopic Procedure  Patient Name: Ella Canales  Patient MRN: [id number]  Patient YOB: 1950  Tuesday, January 22, 2019  Milton Moreau MD  RESTRICTIONS:  During your procedure today, you received medications for sedation.  These   medications may affect your judgment, balance and coordination.  Therefore,   for 24 hours, you have the following restrictions:   - DO NOT drive a car, operate machinery, make legal/financial decisions,   sign important papers or drink alcohol.    ACTIVITY:  Today: no heavy lifting, straining or running due to procedural   sedation/anesthesia.  The following day: return to full activity including work.  DIET:  Eat and drink normally unless instructed otherwise.     TREATMENT FOR COMMON SIDE EFFECTS:  - Mild abdominal pain, nausea, belching, bloating or excessive gas:  rest,   eat lightly and use a heating pad.  - Sore Throat: treat with throat lozenges and/or gargle with warm salt   water.  - Because air was used during the procedure, expelling large amounts of air   from your rectum or belching is normal.  - If a bowel prep was taken, you may not have a bowel movement for 1-3 days.    This is normal.  SYMPTOMS TO WATCH FOR AND REPORT TO YOUR PHYSICIAN:  1. Abdominal pain or bloating, other than gas cramps.  2. Chest pain.  3. Back pain.  4. Signs of infection such as: chills or fever occurring within 24 hours   after the procedure.  5. Rectal bleeding, which would show as bright red, maroon, or black stools.   (A tablespoon of blood from the rectum is not serious, especially if   hemorrhoids are present.)  6. Vomiting.  7. Weakness or dizziness.  GO DIRECTLY TO THE NEAREST EMERGENCY ROOM IF YOU HAVE ANY OF THE FOLLOWING:      Difficulty breathing              Chills and/or fever over 101 F   Persistent vomiting and/or vomiting blood   Severe abdominal pain   Severe chest pain   Black, tarry stools   Bleeding- more than one  tablespoon   Any other symptom or condition that you feel may need urgent attention  Your doctor recommends these additional instructions:  If any biopsies were taken, your doctors clinic will contact you in 1 to 2   weeks with any results.  - Patient has a contact number available for emergencies.  The signs and   symptoms of potential delayed complications were discussed with the   patient.  Return to normal activities tomorrow.  Written discharge   instructions were provided to the patient.   - High fiber diet.   - Continue present medications.   - Await pathology results.   - Repeat colonoscopy in 3 years for surveillance.   - Discharge patient to home (ambulatory).   - Return to my office PRN.  For questions, problems or results please call your physician - Milton Moreau MD at Work:  [phone].  [facility], EMERGENCY ROOM PHONE NUMBER: [phone]  IF A COMPLICATION OR EMERGENCY SITUATION ARISES AND YOU ARE UNABLE TO REACH   YOUR PHYSICIAN - GO DIRECTLY TO THE EMERGENCY ROOM.  Milton Moreau MD  1/22/2019 9:48:51 AM  This report has been verified and signed electronically.  [employer]OVATION   PROBLEM DIAGNOSES  Problem: Laceration of intrinsic muscle, fascia, or tendon of right index finger at wrist or hand level  Assessment and Plan: Repair of right long finger flexor digitorum profundus, possible trepeal of nerve     Problem: Need for prophylactic measure  Assessment and Plan: moderate risk surgical team to determine prophylactic intervention

## 2020-07-27 ENCOUNTER — OFFICE VISIT (OUTPATIENT)
Dept: FAMILY MEDICINE | Facility: CLINIC | Age: 70
End: 2020-07-27
Payer: MEDICARE

## 2020-07-27 VITALS
HEIGHT: 60 IN | SYSTOLIC BLOOD PRESSURE: 118 MMHG | DIASTOLIC BLOOD PRESSURE: 64 MMHG | WEIGHT: 166.69 LBS | HEART RATE: 88 BPM | OXYGEN SATURATION: 95 % | TEMPERATURE: 98 F | BODY MASS INDEX: 32.73 KG/M2

## 2020-07-27 DIAGNOSIS — R35.0 URINARY FREQUENCY: Primary | ICD-10-CM

## 2020-07-27 DIAGNOSIS — I10 ESSENTIAL HYPERTENSION: ICD-10-CM

## 2020-07-27 DIAGNOSIS — R39.9 UTI SYMPTOMS: ICD-10-CM

## 2020-07-27 LAB
BILIRUB SERPL-MCNC: ABNORMAL MG/DL
BLOOD URINE, POC: ABNORMAL
CLARITY, POC UA: ABNORMAL
COLOR, POC UA: YELLOW
GLUCOSE UR QL STRIP: ABNORMAL
KETONES UR QL STRIP: ABNORMAL
LEUKOCYTE ESTERASE URINE, POC: ABNORMAL
NITRITE, POC UA: ABNORMAL
PH, POC UA: 5
PROTEIN, POC: ABNORMAL
SPECIFIC GRAVITY, POC UA: 1.01
UROBILINOGEN, POC UA: ABNORMAL

## 2020-07-27 PROCEDURE — 99999 PR PBB SHADOW E&M-EST. PATIENT-LVL IV: CPT | Mod: PBBFAC,,, | Performed by: NURSE PRACTITIONER

## 2020-07-27 PROCEDURE — 99214 OFFICE O/P EST MOD 30 MIN: CPT | Mod: PBBFAC,PO | Performed by: NURSE PRACTITIONER

## 2020-07-27 PROCEDURE — 87086 URINE CULTURE/COLONY COUNT: CPT

## 2020-07-27 PROCEDURE — 81002 URINALYSIS NONAUTO W/O SCOPE: CPT | Mod: PBBFAC,PO | Performed by: NURSE PRACTITIONER

## 2020-07-27 PROCEDURE — 87077 CULTURE AEROBIC IDENTIFY: CPT

## 2020-07-27 PROCEDURE — 99214 OFFICE O/P EST MOD 30 MIN: CPT | Mod: S$PBB,,, | Performed by: NURSE PRACTITIONER

## 2020-07-27 PROCEDURE — 99999 PR PBB SHADOW E&M-EST. PATIENT-LVL IV: ICD-10-PCS | Mod: PBBFAC,,, | Performed by: NURSE PRACTITIONER

## 2020-07-27 PROCEDURE — 99214 PR OFFICE/OUTPT VISIT, EST, LEVL IV, 30-39 MIN: ICD-10-PCS | Mod: S$PBB,,, | Performed by: NURSE PRACTITIONER

## 2020-07-27 PROCEDURE — 87088 URINE BACTERIA CULTURE: CPT

## 2020-07-27 PROCEDURE — 87186 SC STD MICRODIL/AGAR DIL: CPT

## 2020-07-27 RX ORDER — AMOXICILLIN AND CLAVULANATE POTASSIUM 500; 125 MG/1; MG/1
1 TABLET, FILM COATED ORAL 2 TIMES DAILY
Qty: 10 TABLET | Refills: 0 | Status: SHIPPED | OUTPATIENT
Start: 2020-07-27 | End: 2020-08-01

## 2020-07-27 NOTE — PROGRESS NOTES
Subjective:       Patient ID: Ella Canales is a 69 y.o. female.    Chief Complaint: Urinary Frequency    Urinary Frequency   This is a new problem. The current episode started in the past 7 days. The problem occurs every urination. The problem has been gradually improving. Quality: pressure. The pain is mild. Maximum temperature: subjective temp. The fever has been present for 1 - 2 days. She is not sexually active. There is no history of pyelonephritis. Associated symptoms include chills and frequency. Pertinent negatives include no behavior changes, hematuria, nausea, vomiting, weight loss or constipation. She has tried increased fluids (AZO, cranberry) for the symptoms. The treatment provided moderate relief. Her past medical history is significant for hypertension.     Vitals:    07/27/20 1401   BP: 118/64   Pulse: 88   Temp: 97.6 °F (36.4 °C)     Past Medical History:   Diagnosis Date    Arthritis     bilateral knees    Cancer     skin    GERD (gastroesophageal reflux disease)     Hyperlipidemia     Hypertension     Osteopenia     S/P PICC central line placement     Ulcer      Review of Systems   Constitutional: Positive for chills. Negative for fatigue, fever, unexpected weight change and weight loss.   HENT: Negative for congestion, hearing loss, nosebleeds, postnasal drip, sinus pressure, sinus pain and sore throat.    Eyes: Negative for pain, discharge, redness and visual disturbance.   Respiratory: Negative for cough, chest tightness and shortness of breath.    Cardiovascular: Negative for chest pain and palpitations.   Gastrointestinal: Positive for diarrhea (occasional). Negative for abdominal pain, constipation, nausea and vomiting.   Endocrine: Negative for cold intolerance and heat intolerance.   Genitourinary: Positive for frequency and pelvic pain (pressure). Negative for dysuria and hematuria.   Musculoskeletal: Negative for back pain.   Neurological: Negative for dizziness, syncope,  light-headedness and headaches.   Psychiatric/Behavioral: Negative for agitation and dysphoric mood. The patient is not nervous/anxious.        Objective:      Physical Exam  Constitutional:       Appearance: She is well-developed.   HENT:      Head: Normocephalic and atraumatic.      Nose: Nose normal.   Eyes:      General: Lids are normal.      Conjunctiva/sclera: Conjunctivae normal.      Pupils: Pupils are equal, round, and reactive to light.   Neck:      Musculoskeletal: Full passive range of motion without pain.   Cardiovascular:      Rate and Rhythm: Normal rate.   Pulmonary:      Effort: Pulmonary effort is normal.   Skin:     General: Skin is warm and dry.   Neurological:      Mental Status: She is alert and oriented to person, place, and time.   Psychiatric:         Speech: Speech normal.         Behavior: Behavior normal.         Assessment & Plan:       Urinary frequency  -     POCT urine dipstick without microscope  -     Urine culture        -    Will follow up with results of above when received and plan accordingly   UTI symptoms  -     amoxicillin-clavulanate 500-125mg (AUGMENTIN) 500-125 mg Tab; Take 1 tablet (500 mg total) by mouth 2 (two) times daily. for 5 days  Dispense: 10 tablet; Refill: 0        -Patient was counseled to increase fluid intake.  Avoid carbonated beverages.  Empty your bladder frequently, before and after intercourse, and wipe front to back when cleaning after using the bathroom.  Cranberry juice intake may help.  Notify MD if you have fever > 100.4, severe abdominal pain, blood in urine or if you see no improvement in 3 days.    Essential hypertension  -Controlled, continue current medication regimen  -Low salt diet         Follow up if symptoms worsen or fail to improve.

## 2020-07-27 NOTE — PATIENT INSTRUCTIONS
increase fluid intake.  Avoid carbonated beverages.  Empty your bladder frequently, before and after intercourse, and wipe front to back when cleaning after using the bathroom.  Cranberry juice intake may help.  Notify MD if you have fever > 100.4, severe abdominal pain, blood in urine or if you see no improvement in 3 days.      Antibiotics as directed

## 2020-07-29 LAB — BACTERIA UR CULT: ABNORMAL

## 2020-08-03 ENCOUNTER — TELEPHONE (OUTPATIENT)
Dept: FAMILY MEDICINE | Facility: CLINIC | Age: 70
End: 2020-08-03

## 2020-08-03 ENCOUNTER — LAB VISIT (OUTPATIENT)
Dept: LAB | Facility: HOSPITAL | Age: 70
End: 2020-08-03
Attending: NURSE PRACTITIONER
Payer: MEDICARE

## 2020-08-03 DIAGNOSIS — N39.0 URINARY TRACT INFECTION WITHOUT HEMATURIA, SITE UNSPECIFIED: ICD-10-CM

## 2020-08-03 DIAGNOSIS — N39.0 URINARY TRACT INFECTION WITHOUT HEMATURIA, SITE UNSPECIFIED: Primary | ICD-10-CM

## 2020-08-03 PROCEDURE — 87086 URINE CULTURE/COLONY COUNT: CPT

## 2020-08-03 PROCEDURE — 81001 URINALYSIS AUTO W/SCOPE: CPT

## 2020-08-03 NOTE — TELEPHONE ENCOUNTER
Patient was recently seen in office and diagnosed with a UTI. She completed medication as prescribed two days ago. States she is still suffering with urinary symptoms. Please advise.

## 2020-08-04 ENCOUNTER — TELEPHONE (OUTPATIENT)
Dept: FAMILY MEDICINE | Facility: CLINIC | Age: 70
End: 2020-08-04

## 2020-08-04 LAB
BILIRUB UR QL STRIP: NEGATIVE
CLARITY UR REFRACT.AUTO: ABNORMAL
COLOR UR AUTO: YELLOW
GLUCOSE UR QL STRIP: NEGATIVE
HGB UR QL STRIP: ABNORMAL
KETONES UR QL STRIP: NEGATIVE
LEUKOCYTE ESTERASE UR QL STRIP: ABNORMAL
MICROSCOPIC COMMENT: ABNORMAL
NITRITE UR QL STRIP: NEGATIVE
PH UR STRIP: 7 [PH] (ref 5–8)
PROT UR QL STRIP: NEGATIVE
RBC #/AREA URNS AUTO: 4 /HPF (ref 0–4)
SP GR UR STRIP: 1.01 (ref 1–1.03)
SQUAMOUS #/AREA URNS AUTO: 0 /HPF
URN SPEC COLLECT METH UR: ABNORMAL
WBC #/AREA URNS AUTO: >100 /HPF (ref 0–5)

## 2020-08-04 NOTE — TELEPHONE ENCOUNTER
----- Message from [facility] sent at 8/4/2020  2:17 PM CDT -----  Pt called to get the results of her urine test please reach out to pt on this matter at [phone]

## 2020-08-05 LAB — BACTERIA UR CULT: NORMAL

## 2020-08-06 ENCOUNTER — LAB VISIT (OUTPATIENT)
Dept: LAB | Facility: HOSPITAL | Age: 70
End: 2020-08-06
Attending: FAMILY MEDICINE
Payer: MEDICARE

## 2020-08-06 DIAGNOSIS — E78.00 PURE HYPERCHOLESTEROLEMIA: ICD-10-CM

## 2020-08-06 DIAGNOSIS — E87.6 HYPOKALEMIA: ICD-10-CM

## 2020-08-06 LAB
ALBUMIN SERPL BCP-MCNC: 4.2 G/DL (ref 3.5–5.2)
ALP SERPL-CCNC: 68 U/L (ref 55–135)
ALT SERPL W/O P-5'-P-CCNC: 24 U/L (ref 10–44)
ANION GAP SERPL CALC-SCNC: 8 MMOL/L (ref 8–16)
AST SERPL-CCNC: 21 U/L (ref 10–40)
BASOPHILS # BLD AUTO: 0.03 K/UL (ref 0–0.2)
BASOPHILS NFR BLD: 0.6 % (ref 0–1.9)
BILIRUB SERPL-MCNC: 0.4 MG/DL (ref 0.1–1)
BUN SERPL-MCNC: 12 MG/DL (ref 8–23)
CALCIUM SERPL-MCNC: 9.9 MG/DL (ref 8.7–10.5)
CHLORIDE SERPL-SCNC: 104 MMOL/L (ref 95–110)
CHOLEST SERPL-MCNC: 181 MG/DL (ref 120–199)
CHOLEST/HDLC SERPL: 5.5 {RATIO} (ref 2–5)
CO2 SERPL-SCNC: 29 MMOL/L (ref 23–29)
CREAT SERPL-MCNC: 0.8 MG/DL (ref 0.5–1.4)
DIFFERENTIAL METHOD: ABNORMAL
EOSINOPHIL # BLD AUTO: 0.1 K/UL (ref 0–0.5)
EOSINOPHIL NFR BLD: 1.5 % (ref 0–8)
ERYTHROCYTE [DISTWIDTH] IN BLOOD BY AUTOMATED COUNT: 12.4 % (ref 11.5–14.5)
EST. GFR  (AFRICAN AMERICAN): >60 ML/MIN/1.73 M^2
EST. GFR  (NON AFRICAN AMERICAN): >60 ML/MIN/1.73 M^2
GLUCOSE SERPL-MCNC: 104 MG/DL (ref 70–110)
HCT VFR BLD AUTO: 39.4 % (ref 37–48.5)
HDLC SERPL-MCNC: 33 MG/DL (ref 40–75)
HDLC SERPL: 18.2 % (ref 20–50)
HGB BLD-MCNC: 12.5 G/DL (ref 12–16)
IMM GRANULOCYTES # BLD AUTO: 0.03 K/UL (ref 0–0.04)
IMM GRANULOCYTES NFR BLD AUTO: 0.6 % (ref 0–0.5)
LDLC SERPL CALC-MCNC: 104.4 MG/DL (ref 63–159)
LYMPHOCYTES # BLD AUTO: 1.5 K/UL (ref 1–4.8)
LYMPHOCYTES NFR BLD: 31.5 % (ref 18–48)
MAGNESIUM SERPL-MCNC: 1.9 MG/DL (ref 1.6–2.6)
MCH RBC QN AUTO: 29.7 PG (ref 27–31)
MCHC RBC AUTO-ENTMCNC: 31.7 G/DL (ref 32–36)
MCV RBC AUTO: 94 FL (ref 82–98)
MONOCYTES # BLD AUTO: 0.5 K/UL (ref 0.3–1)
MONOCYTES NFR BLD: 11.3 % (ref 4–15)
NEUTROPHILS # BLD AUTO: 2.5 K/UL (ref 1.8–7.7)
NEUTROPHILS NFR BLD: 54.5 % (ref 38–73)
NONHDLC SERPL-MCNC: 148 MG/DL
NRBC BLD-RTO: 0 /100 WBC
PLATELET # BLD AUTO: 314 K/UL (ref 150–350)
PMV BLD AUTO: 9.3 FL (ref 9.2–12.9)
POTASSIUM SERPL-SCNC: 4.3 MMOL/L (ref 3.5–5.1)
PROT SERPL-MCNC: 7.6 G/DL (ref 6–8.4)
RBC # BLD AUTO: 4.21 M/UL (ref 4–5.4)
SODIUM SERPL-SCNC: 141 MMOL/L (ref 136–145)
TRIGL SERPL-MCNC: 218 MG/DL (ref 30–150)
WBC # BLD AUTO: 4.67 K/UL (ref 3.9–12.7)

## 2020-08-06 PROCEDURE — 85025 COMPLETE CBC W/AUTO DIFF WBC: CPT

## 2020-08-06 PROCEDURE — 83735 ASSAY OF MAGNESIUM: CPT

## 2020-08-06 PROCEDURE — 80053 COMPREHEN METABOLIC PANEL: CPT

## 2020-08-06 PROCEDURE — 36415 COLL VENOUS BLD VENIPUNCTURE: CPT | Mod: PO

## 2020-08-06 PROCEDURE — 80061 LIPID PANEL: CPT

## 2020-08-07 DIAGNOSIS — M25.561 RIGHT KNEE PAIN, UNSPECIFIED CHRONICITY: Primary | ICD-10-CM

## 2020-08-10 ENCOUNTER — HOSPITAL ENCOUNTER (OUTPATIENT)
Dept: RADIOLOGY | Facility: HOSPITAL | Age: 70
Discharge: HOME | End: 2020-08-10
Attending: ORTHOPAEDIC SURGERY
Payer: MEDICARE

## 2020-08-10 ENCOUNTER — OFFICE VISIT (OUTPATIENT)
Dept: ORTHOPEDICS | Facility: CLINIC | Age: 70
End: 2020-08-10
Payer: MEDICARE

## 2020-08-10 VITALS — RESPIRATION RATE: 16 BRPM | HEIGHT: 60 IN | BODY MASS INDEX: 32.59 KG/M2 | WEIGHT: 166 LBS

## 2020-08-10 DIAGNOSIS — M25.561 RIGHT KNEE PAIN, UNSPECIFIED CHRONICITY: ICD-10-CM

## 2020-08-10 DIAGNOSIS — Z96.651 HISTORY OF TOTAL KNEE ARTHROPLASTY, RIGHT: Primary | ICD-10-CM

## 2020-08-10 PROCEDURE — 73562 X-RAY EXAM OF KNEE 3: CPT | Mod: 26,59,LT, | Performed by: RADIOLOGY

## 2020-08-10 PROCEDURE — 99213 OFFICE O/P EST LOW 20 MIN: CPT | Mod: PBBFAC,25,PN | Performed by: ORTHOPAEDIC SURGERY

## 2020-08-10 PROCEDURE — 99999 PR PBB SHADOW E&M-EST. PATIENT-LVL III: CPT | Mod: PBBFAC,,, | Performed by: ORTHOPAEDIC SURGERY

## 2020-08-10 PROCEDURE — 73562 X-RAY EXAM OF KNEE 3: CPT | Mod: TC,PN,LT

## 2020-08-10 PROCEDURE — 73562 XR KNEE ORTHO RIGHT WITH FLEXION: ICD-10-PCS | Mod: 26,59,LT, | Performed by: RADIOLOGY

## 2020-08-10 PROCEDURE — 99213 OFFICE O/P EST LOW 20 MIN: CPT | Mod: S$PBB,,, | Performed by: ORTHOPAEDIC SURGERY

## 2020-08-10 PROCEDURE — 99213 PR OFFICE/OUTPT VISIT, EST, LEVL III, 20-29 MIN: ICD-10-PCS | Mod: S$PBB,,, | Performed by: ORTHOPAEDIC SURGERY

## 2020-08-10 PROCEDURE — 73564 XR KNEE ORTHO RIGHT WITH FLEXION: ICD-10-PCS | Mod: 26,RT,, | Performed by: RADIOLOGY

## 2020-08-10 PROCEDURE — 73564 X-RAY EXAM KNEE 4 OR MORE: CPT | Mod: 26,RT,, | Performed by: RADIOLOGY

## 2020-08-10 PROCEDURE — 99999 PR PBB SHADOW E&M-EST. PATIENT-LVL III: ICD-10-PCS | Mod: PBBFAC,,, | Performed by: ORTHOPAEDIC SURGERY

## 2020-08-10 NOTE — PROGRESS NOTES
Past Medical History:   Diagnosis Date    Arthritis     bilateral knees    Cancer     skin    GERD (gastroesophageal reflux disease)     Hyperlipidemia     Hypertension     Osteopenia     S/P PICC central line placement     Ulcer        Past Surgical History:   Procedure Laterality Date    COLONOSCOPY N/A 1/22/2019    Procedure: COLONOSCOPY;  Surgeon: Milton Benitez MD;  Location: [facility];  Service: Endoscopy;  Laterality: N/A;    ECTOPIC PREGNANCY SURGERY      fess      FRACTURE SURGERY      ORIF right arm.    JOINT REPLACEMENT      left knee    KNEE ARTHROPLASTY Right 8/13/2019    Procedure: ARTHROPLASTY, KNEE;  Surgeon: Paolo Singh MD;  Location: [facility] OR;  Service: Orthopedics;  Laterality: Right;    TOTAL KNEE ARTHROPLASTY Left     TUBAL LIGATION         Current Outpatient Medications   Medication Sig    alendronate (FOSAMAX) 70 MG tablet Take 1 tablet (70 mg total) by mouth once a week.    CALCIUM ORAL Take by mouth.    diclofenac sodium (VOLTAREN) 1 % Gel Apply 2 g topically once daily.    fexofenadine (ALLEGRA) 60 MG tablet Take 60 mg by mouth once daily.    glucosamine-chondroit-vit C-Mn (GLUCOSAMINE-CHONDROITIN MAX ST) 500-400 mg Cap Take by mouth 2 (two) times daily. Twice a day    hydroCHLOROthiazide (HYDRODIURIL) 25 MG tablet TAKE 1 TABLET(25 MG) BY MOUTH EVERY DAY    LACTOBACILLUS COMBO NO.6 (PROBIOTIC COMPLEX ORAL) Take 1 tablet by mouth once daily.    losartan (COZAAR) 100 MG tablet Take 1 tablet (100 mg total) by mouth once daily.    metoprolol succinate (TOPROL-XL) 50 MG 24 hr tablet TAKE 1 TABLET(50 MG) BY MOUTH EVERY DAY    multivitamin (ONE DAILY MULTIVITAMIN) per tablet Take 1 tablet by mouth once daily.    omeprazole (PRILOSEC) 20 MG capsule TAKE 1 CAPSULE(20 MG) BY MOUTH TWICE DAILY (Patient taking differently: Take 20 mg by mouth once daily. TAKE 1 CAPSULE(20 MG) BY MOUTH TWICE DAILY)    potassium chloride SA (K-DUR,KLOR-CON) 10 MEQ tablet TAKE 1  TABLET(10 MEQ) BY MOUTH TWICE DAILY    simvastatin (ZOCOR) 40 MG tablet TAKE 1 TABLET(40 MG) BY MOUTH EVERY EVENING    TURMERIC ROOT EXTRACT ORAL Take by mouth once daily.      Current Facility-Administered Medications   Medication    INV  40 mg/5 mL injection 40 mg       Review of patient's allergies indicates:   Allergen Reactions    Ace inhibitors      Other reaction(s): cough    Latex      denies       Family History   Problem Relation Age of Onset    Heart disease Mother     Heart disease Father     Heart disease Brother 47        cabg       Social History     Socioeconomic History    Marital status:      Spouse name: Not on file    Number of children: Not on file    Years of education: Not on file    Highest education level: Not on file   Occupational History    Not on file   Social Needs    Financial resource strain: Not on file    Food insecurity     Worry: Not on file     Inability: Not on file    Transportation needs     Medical: Not on file     Non-medical: Not on file   Tobacco Use    Smoking status: Never Smoker    Smokeless tobacco: Never Used   Substance and Sexual Activity    Alcohol use: Yes     Frequency: 2-3 times a week     Drinks per session: 1 or 2     Binge frequency: Never     Comment: occasional    Drug use: No    Sexual activity: Never   Lifestyle    Physical activity     Days per week: 0 days     Minutes per session: Not on file    Stress: Not at all   Relationships    Social connections     Talks on phone: More than three times a week     Gets together: More than three times a week     Attends Mosque service: Not on file     Active member of club or organization: No     Attends meetings of clubs or organizations: Not on file     Relationship status:    Other Topics Concern    Not on file   Social History Narrative    Not on file       Chief Complaint:   Chief Complaint   Patient presents with    Knee Pain     right knee-tka yearly follow  up DOS 8/13/19       Date of surgery:  August 13, 2019 right total knee arthroplasty    History of present illness:  69-year-old female who is 1 year out from right total knee arthroplasty. She is doing great.   Pain is a 0/10.  No wound issues.  She feels like she has better range of motion with the right knee than the left.         Review of Systems:    Musculoskeletal:  See HPI        Physical Examination:    Vital Signs:    Vitals:    08/10/20 0758   Resp: 16       Body mass index is 32.42 kg/m².    This a well-developed, well nourished patient in no acute distress.  They are alert and oriented and cooperative to examination.  Pt. walks without an antalgic gait.      Examination of the right knee shows healed surgical incision. No erythema or drainage. Range of motion is 3-120.  No calf pain. Negative Homans sign.  Stable to varus and valgus stress. Negative drawer exam.    X-rays:  X-rays of the right knee are ordered and reviewed which show well-aligned total knee arthroplasty components without complication     Assessment::  Status post right Vasiliy total knee arthroplasty    Plan:  She is doing great.  We talked about follow-up every couple of years for x-rays on both knees.  Follow up sooner if needed.    This note was created using M [employer] voice recognition software that occasionally misinterpreted phrases or words.

## 2020-08-11 ENCOUNTER — OFFICE VISIT (OUTPATIENT)
Dept: FAMILY MEDICINE | Facility: CLINIC | Age: 70
End: 2020-08-11
Payer: MEDICARE

## 2020-08-11 ENCOUNTER — TELEPHONE (OUTPATIENT)
Dept: FAMILY MEDICINE | Facility: CLINIC | Age: 70
End: 2020-08-11

## 2020-08-11 VITALS
WEIGHT: 166.69 LBS | BODY MASS INDEX: 32.73 KG/M2 | HEART RATE: 64 BPM | OXYGEN SATURATION: 98 % | TEMPERATURE: 98 F | HEIGHT: 60 IN | SYSTOLIC BLOOD PRESSURE: 124 MMHG | DIASTOLIC BLOOD PRESSURE: 70 MMHG

## 2020-08-11 DIAGNOSIS — E78.00 PURE HYPERCHOLESTEROLEMIA: ICD-10-CM

## 2020-08-11 DIAGNOSIS — Z23 NEED FOR VACCINATION: ICD-10-CM

## 2020-08-11 DIAGNOSIS — Z12.39 SCREENING FOR MALIGNANT NEOPLASM OF BREAST: Primary | ICD-10-CM

## 2020-08-11 DIAGNOSIS — K21.9 GASTROESOPHAGEAL REFLUX DISEASE WITHOUT ESOPHAGITIS: ICD-10-CM

## 2020-08-11 DIAGNOSIS — E66.9 OBESITY (BMI 30.0-34.9): ICD-10-CM

## 2020-08-11 DIAGNOSIS — E87.6 HYPOKALEMIA: ICD-10-CM

## 2020-08-11 DIAGNOSIS — Z96.652 STATUS POST TOTAL LEFT KNEE REPLACEMENT: ICD-10-CM

## 2020-08-11 DIAGNOSIS — Z12.31 ENCOUNTER FOR SCREENING MAMMOGRAM FOR MALIGNANT NEOPLASM OF BREAST: ICD-10-CM

## 2020-08-11 DIAGNOSIS — M81.0 OSTEOPOROSIS, UNSPECIFIED OSTEOPOROSIS TYPE, UNSPECIFIED PATHOLOGICAL FRACTURE PRESENCE: ICD-10-CM

## 2020-08-11 DIAGNOSIS — Z23 NEED FOR VACCINATION: Primary | ICD-10-CM

## 2020-08-11 DIAGNOSIS — I10 ESSENTIAL HYPERTENSION: ICD-10-CM

## 2020-08-11 PROCEDURE — 99999 PR PBB SHADOW E&M-EST. PATIENT-LVL V: ICD-10-PCS | Mod: PBBFAC,,, | Performed by: NURSE PRACTITIONER

## 2020-08-11 PROCEDURE — 99215 OFFICE O/P EST HI 40 MIN: CPT | Mod: PBBFAC,PO | Performed by: NURSE PRACTITIONER

## 2020-08-11 PROCEDURE — 99214 OFFICE O/P EST MOD 30 MIN: CPT | Mod: S$PBB,,, | Performed by: NURSE PRACTITIONER

## 2020-08-11 PROCEDURE — 99214 PR OFFICE/OUTPT VISIT, EST, LEVL IV, 30-39 MIN: ICD-10-PCS | Mod: S$PBB,,, | Performed by: NURSE PRACTITIONER

## 2020-08-11 PROCEDURE — 99999 PR PBB SHADOW E&M-EST. PATIENT-LVL V: CPT | Mod: PBBFAC,,, | Performed by: NURSE PRACTITIONER

## 2020-08-11 RX ORDER — OMEPRAZOLE 20 MG/1
20 CAPSULE, DELAYED RELEASE ORAL DAILY
Qty: 90 CAPSULE | Refills: 3 | Status: SHIPPED | OUTPATIENT
Start: 2020-08-11 | End: 2021-01-27 | Stop reason: SDUPTHER

## 2020-08-11 NOTE — PROGRESS NOTES
Subjective:       Patient ID: Ella Canales is a 69 y.o. female.    Chief Complaint: Hypertension    HPI   Ms. Canales is a 70 yo female who presents today for chronic conditions follow up.  She is doing well overall and is without acute complaint.  She is regularly treated for htn, hld, osteopenia, and GERD.  She had recent labs completed, which are reviewed in detail. She is due for mammogram and would like to schedule.  She would also like to receive the shingles vaccine and flu vaccine when available.      Lab Results   Component Value Date    WBC 4.67 08/06/2020    HGB 12.5 08/06/2020    HCT 39.4 08/06/2020     08/06/2020    CHOL 181 08/06/2020    TRIG 218 (H) 08/06/2020    HDL 33 (L) 08/06/2020    ALT 24 08/06/2020    AST 21 08/06/2020     08/06/2020    K 4.3 08/06/2020     08/06/2020    CREATININE 0.8 08/06/2020    BUN 12 08/06/2020    CO2 29 08/06/2020    TSH 2.387 12/10/2018    INR 1.0 02/24/2017       Vitals:    08/11/20 0842   BP: 124/70   Pulse: 64   Temp: 97.6 °F (36.4 °C)     Past Medical History:   Diagnosis Date    Arthritis     bilateral knees    Cancer     skin    GERD (gastroesophageal reflux disease)     Hyperlipidemia     Hypertension     Osteopenia     S/P PICC central line placement     Ulcer      Review of Systems   Constitutional: Negative for chills, fatigue, fever and unexpected weight change.   HENT: Negative for congestion, hearing loss, nosebleeds, postnasal drip, sinus pressure, sinus pain and sore throat.    Eyes: Negative for pain, discharge, redness and visual disturbance.   Respiratory: Negative for cough, chest tightness and shortness of breath.    Cardiovascular: Negative for chest pain and palpitations.   Gastrointestinal: Negative for abdominal pain, constipation, diarrhea, nausea and vomiting.   Endocrine: Negative for cold intolerance and heat intolerance.   Musculoskeletal: Negative for back pain.   Neurological: Negative for dizziness,  syncope, light-headedness and headaches.   Psychiatric/Behavioral: Negative for agitation and dysphoric mood. The patient is not nervous/anxious.        Objective:      Physical Exam  Constitutional:       Appearance: She is well-developed.   HENT:      Head: Normocephalic and atraumatic.   Eyes:      General:         Right eye: No discharge.         Left eye: No discharge.      Conjunctiva/sclera: Conjunctivae normal.      Pupils: Pupils are equal, round, and reactive to light.   Neck:      Musculoskeletal: Normal range of motion and neck supple.      Thyroid: No thyromegaly.   Cardiovascular:      Rate and Rhythm: Normal rate and regular rhythm.      Heart sounds: Normal heart sounds.   Pulmonary:      Effort: No respiratory distress.      Breath sounds: Normal breath sounds. No wheezing.   Abdominal:      General: Bowel sounds are normal. There is no distension.      Palpations: Abdomen is soft.      Tenderness: There is no abdominal tenderness. There is no rebound.   Musculoskeletal: Normal range of motion.         General: No deformity.   Lymphadenopathy:      Cervical: No cervical adenopathy.   Skin:     General: Skin is warm and dry.      Findings: No rash.   Neurological:      Mental Status: She is alert and oriented to person, place, and time.      Cranial Nerves: No cranial nerve deficit.      Sensory: No sensory deficit.         Assessment & Plan:       Screening for malignant neoplasm of breast  -     Mammo Digital Screening Bilateral With CAD; Future; Expected date: 08/11/2020        -     Will follow up with results of above when received   Encounter for screening mammogram for malignant neoplasm of breast   -     Mammo Digital Screening Bilateral With CAD; Future; Expected date: 08/11/2020    Gastroesophageal reflux disease without esophagitis  -     omeprazole (PRILOSEC) 20 MG capsule; Take 1 capsule (20 mg total) by mouth once daily. TAKE 1 CAPSULE(20 MG) BY MOUTH TWICE DAILY  Dispense: 90 capsule;  Refill: 3        - Stable, continue current medication regimen   Need for vaccination  -     Influenza - Quadrivalent (6 months+) (PF)  -     (In Office Administered) Zoster Recombinant Vaccine  -     (In Office Administered) Zoster Recombinant Vaccine        -    Office will call when vaccines are available   Essential hypertension  -Controlled, continue current medication regimen  -Low salt diet  -Increase physical activity   Pure hypercholesterolemia  -Stable, continue current medication regimen   -Triglycerides elevated, discussed dietary modifications and exercise recommendations   Hypokalemia  -Stable,continue current medication regimen   Osteoporosis, unspecified osteoporosis type, unspecified pathological fracture presence  -Stable, continue current medication regimen   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  Current BMI is:Estimated body mass index is 32.55 kg/m² as calculated from the following:    Height as of this encounter: 5' (1.524 m).    Weight as of this encounter: 75.6 kg (166 lb 10.7 oz)..  Patient is aware that ideal BMI < 25 or Weight in (lb) to have BMI = 25: 127.7.            No follow-ups on file.

## 2020-08-11 NOTE — TELEPHONE ENCOUNTER
----- Message from Ramandeep Tim sent at 8/11/2020  9:15 AM CDT -----  Regarding: pharmacy  Contact: german with stephanie porter with [facility] requesting clarity on omeprazole directions.  A prescription was sent over with two sets of directions.      [id number] - [place] - [address] [facility]  [address]  [place] [address]  Phone: [phone] Fax: [phone]

## 2020-08-12 ENCOUNTER — HOSPITAL ENCOUNTER (OUTPATIENT)
Dept: RADIOLOGY | Facility: HOSPITAL | Age: 70
Discharge: HOME | End: 2020-08-12
Attending: NURSE PRACTITIONER
Payer: MEDICARE

## 2020-08-12 DIAGNOSIS — Z12.31 ENCOUNTER FOR SCREENING MAMMOGRAM FOR MALIGNANT NEOPLASM OF BREAST: ICD-10-CM

## 2020-08-12 DIAGNOSIS — Z12.39 SCREENING FOR MALIGNANT NEOPLASM OF BREAST: ICD-10-CM

## 2020-08-12 PROCEDURE — 77067 SCR MAMMO BI INCL CAD: CPT | Mod: TC,PO

## 2020-09-29 ENCOUNTER — PATIENT MESSAGE (OUTPATIENT)
Dept: OTHER | Facility: OTHER | Age: 70
End: 2020-09-29

## 2020-10-12 ENCOUNTER — TELEPHONE (OUTPATIENT)
Dept: FAMILY MEDICINE | Facility: CLINIC | Age: 70
End: 2020-10-12

## 2020-10-12 NOTE — TELEPHONE ENCOUNTER
Patient requesting to reschedule appointment scheduled for the date of 10-. States she is usually seen every 6 months. State she saw Rafaela Layne NP on 8-; requesting to reschedule 10- appointment to February 2021. Appointment rescheduled as per patient request. Patient agreed to new appointment date and time.         ----- Message from Blanka Jessica sent at 10/12/2020  9:57 AM CDT -----  Contact: self  Patient saw the doctor in August and had all of her labs done, everything is going great, so does she need to be seen again on 10/14?  Call back at [phone] and thanks

## 2020-11-13 DIAGNOSIS — I10 ESSENTIAL HYPERTENSION: ICD-10-CM

## 2020-11-13 DIAGNOSIS — E78.5 HYPERLIPIDEMIA, UNSPECIFIED HYPERLIPIDEMIA TYPE: ICD-10-CM

## 2020-11-13 DIAGNOSIS — E87.6 HYPOKALEMIA: ICD-10-CM

## 2020-11-13 RX ORDER — SIMVASTATIN 40 MG/1
40 TABLET, FILM COATED ORAL NIGHTLY
Qty: 90 TABLET | Refills: 3 | Status: SHIPPED | OUTPATIENT
Start: 2020-11-13 | End: 2021-01-27 | Stop reason: SDUPTHER

## 2020-11-13 RX ORDER — POTASSIUM CHLORIDE 750 MG/1
20 TABLET, EXTENDED RELEASE ORAL DAILY
Qty: 180 TABLET | Refills: 3 | Status: SHIPPED | OUTPATIENT
Start: 2020-11-13 | End: 2021-01-27 | Stop reason: SDUPTHER

## 2020-11-13 RX ORDER — METOPROLOL SUCCINATE 50 MG/1
50 TABLET, EXTENDED RELEASE ORAL DAILY
Qty: 90 TABLET | Refills: 3 | Status: SHIPPED | OUTPATIENT
Start: 2020-11-13 | End: 2021-01-27 | Stop reason: SDUPTHER

## 2020-12-11 ENCOUNTER — PATIENT MESSAGE (OUTPATIENT)
Dept: OTHER | Facility: OTHER | Age: 70
End: 2020-12-11

## 2020-12-15 NOTE — PROGRESS NOTES
Name: Ella Santana [facility] Number: [id number]  Date of Treatment: 04/21/2017   Diagnosis:   Encounter Diagnosis   Name Primary?    Internal derangement of left knee        Time in: 0803  Time Out: 0855  Total Treatment Time: 52        Subjective:    Ella reports she can't wait to get her wound vac off Monday.  Patient reports their pain to be 0/10 on a 0-10 scale with 0 being no pain and 10 being the worst pain imaginable.    Objective    Patient received individual therapy to increase strength, endurance, ROM and flexibility with 0 patients with activities as follows:     Ella received therapeutic exercises to develop strength, endurance, ROM and flexibility for 52 minutes including:       Hamstring stretch 3 x 30 sec B LE  gastroc stretch 3 x 30 sec B LE  HR/TR x 30 reps  Bike x 10 min 1/2 revolutions  Hamstring curls 3 x 30 sec B LE  SAQ 3 x 10 reps B LE  SAQ 3 x 10 reps B LE  SLR L LE 3 x 10 reps heel slides with sheet    Pt demo good understanding of the education provided. Ella demonstrated good return demonstration of activities.     Assessment:       Pt will continue to benefit from skilled PT intervention. Medical Necessity is demonstrated by:  Unable to participate fully in daily activities, Requires skilled supervision to complete and progress HEP, Weakness and Edema.    Patient is making good progress towards established goals.          Plan:  Continue with established Plan of Care towards PT goals.    independent

## 2020-12-16 DIAGNOSIS — M81.0 OSTEOPOROSIS, UNSPECIFIED OSTEOPOROSIS TYPE, UNSPECIFIED PATHOLOGICAL FRACTURE PRESENCE: ICD-10-CM

## 2020-12-16 RX ORDER — ALENDRONATE SODIUM 70 MG/1
70 TABLET ORAL WEEKLY
Qty: 4 TABLET | Refills: 5 | Status: SHIPPED | OUTPATIENT
Start: 2020-12-16 | End: 2021-05-29

## 2021-01-12 ENCOUNTER — IMMUNIZATION (OUTPATIENT)
Dept: PRIMARY CARE CLINIC | Facility: CLINIC | Age: 71
End: 2021-01-12
Payer: MEDICARE

## 2021-01-12 DIAGNOSIS — Z23 NEED FOR VACCINATION: ICD-10-CM

## 2021-01-12 PROCEDURE — 0001A COVID-19, MRNA, LNP-S, PF, 30 MCG/0.3 ML DOSE VACCINE: CPT | Mod: S$GLB,,, | Performed by: FAMILY MEDICINE

## 2021-01-12 PROCEDURE — 91300 COVID-19, MRNA, LNP-S, PF, 30 MCG/0.3 ML DOSE VACCINE: CPT | Mod: S$GLB,,, | Performed by: FAMILY MEDICINE

## 2021-01-12 PROCEDURE — 91300 COVID-19, MRNA, LNP-S, PF, 30 MCG/0.3 ML DOSE VACCINE: ICD-10-PCS | Mod: S$GLB,,, | Performed by: FAMILY MEDICINE

## 2021-01-12 PROCEDURE — 0001A COVID-19, MRNA, LNP-S, PF, 30 MCG/0.3 ML DOSE VACCINE: ICD-10-PCS | Mod: S$GLB,,, | Performed by: FAMILY MEDICINE

## 2021-01-27 ENCOUNTER — OFFICE VISIT (OUTPATIENT)
Dept: FAMILY MEDICINE | Facility: CLINIC | Age: 71
End: 2021-01-27
Payer: MEDICARE

## 2021-01-27 VITALS
SYSTOLIC BLOOD PRESSURE: 130 MMHG | TEMPERATURE: 98 F | OXYGEN SATURATION: 95 % | HEIGHT: 60 IN | HEART RATE: 70 BPM | RESPIRATION RATE: 16 BRPM | WEIGHT: 170.88 LBS | BODY MASS INDEX: 33.55 KG/M2 | DIASTOLIC BLOOD PRESSURE: 80 MMHG

## 2021-01-27 DIAGNOSIS — E87.6 HYPOKALEMIA: ICD-10-CM

## 2021-01-27 DIAGNOSIS — D50.9 IRON DEFICIENCY ANEMIA, UNSPECIFIED IRON DEFICIENCY ANEMIA TYPE: ICD-10-CM

## 2021-01-27 DIAGNOSIS — K21.9 GASTROESOPHAGEAL REFLUX DISEASE WITHOUT ESOPHAGITIS: ICD-10-CM

## 2021-01-27 DIAGNOSIS — E78.5 HYPERLIPIDEMIA, UNSPECIFIED HYPERLIPIDEMIA TYPE: ICD-10-CM

## 2021-01-27 DIAGNOSIS — E66.9 OBESITY (BMI 30.0-34.9): ICD-10-CM

## 2021-01-27 DIAGNOSIS — I10 ESSENTIAL HYPERTENSION: Primary | ICD-10-CM

## 2021-01-27 PROCEDURE — 99214 OFFICE O/P EST MOD 30 MIN: CPT | Mod: S$PBB,,, | Performed by: NURSE PRACTITIONER

## 2021-01-27 PROCEDURE — 99214 PR OFFICE/OUTPT VISIT, EST, LEVL IV, 30-39 MIN: ICD-10-PCS | Mod: S$PBB,,, | Performed by: NURSE PRACTITIONER

## 2021-01-27 PROCEDURE — 99999 PR PBB SHADOW E&M-EST. PATIENT-LVL IV: CPT | Mod: PBBFAC,,, | Performed by: NURSE PRACTITIONER

## 2021-01-27 PROCEDURE — 99214 OFFICE O/P EST MOD 30 MIN: CPT | Mod: PBBFAC,PO | Performed by: NURSE PRACTITIONER

## 2021-01-27 PROCEDURE — 99999 PR PBB SHADOW E&M-EST. PATIENT-LVL IV: ICD-10-PCS | Mod: PBBFAC,,, | Performed by: NURSE PRACTITIONER

## 2021-01-27 RX ORDER — OMEPRAZOLE 20 MG/1
20 CAPSULE, DELAYED RELEASE ORAL DAILY
Qty: 90 CAPSULE | Refills: 3 | Status: SHIPPED | OUTPATIENT
Start: 2021-01-27 | End: 2022-04-04 | Stop reason: SDUPTHER

## 2021-01-27 RX ORDER — HYDROCHLOROTHIAZIDE 25 MG/1
25 TABLET ORAL DAILY
Qty: 90 TABLET | Refills: 3 | Status: SHIPPED | OUTPATIENT
Start: 2021-01-27 | End: 2021-12-30 | Stop reason: SDUPTHER

## 2021-01-27 RX ORDER — LOSARTAN POTASSIUM 100 MG/1
100 TABLET ORAL DAILY
Qty: 90 TABLET | Refills: 3 | Status: SHIPPED | OUTPATIENT
Start: 2021-01-27 | End: 2021-12-30 | Stop reason: SDUPTHER

## 2021-01-27 RX ORDER — METOPROLOL SUCCINATE 50 MG/1
50 TABLET, EXTENDED RELEASE ORAL DAILY
Qty: 90 TABLET | Refills: 3 | Status: SHIPPED | OUTPATIENT
Start: 2021-01-27 | End: 2021-12-27

## 2021-01-27 RX ORDER — SIMVASTATIN 40 MG/1
40 TABLET, FILM COATED ORAL NIGHTLY
Qty: 90 TABLET | Refills: 3 | Status: SHIPPED | OUTPATIENT
Start: 2021-01-27 | End: 2021-12-27

## 2021-01-27 RX ORDER — POTASSIUM CHLORIDE 750 MG/1
20 TABLET, EXTENDED RELEASE ORAL DAILY
Qty: 180 TABLET | Refills: 3 | Status: SHIPPED | OUTPATIENT
Start: 2021-01-27 | End: 2021-12-30 | Stop reason: SDUPTHER

## 2021-02-02 ENCOUNTER — IMMUNIZATION (OUTPATIENT)
Dept: PRIMARY CARE CLINIC | Facility: CLINIC | Age: 71
End: 2021-02-02
Payer: MEDICARE

## 2021-02-02 DIAGNOSIS — Z23 NEED FOR VACCINATION: Primary | ICD-10-CM

## 2021-02-02 PROCEDURE — 0002A COVID-19, MRNA, LNP-S, PF, 30 MCG/0.3 ML DOSE VACCINE: CPT | Mod: S$GLB,,, | Performed by: FAMILY MEDICINE

## 2021-02-02 PROCEDURE — 91300 COVID-19, MRNA, LNP-S, PF, 30 MCG/0.3 ML DOSE VACCINE: CPT | Mod: S$GLB,,, | Performed by: FAMILY MEDICINE

## 2021-02-02 PROCEDURE — 91300 COVID-19, MRNA, LNP-S, PF, 30 MCG/0.3 ML DOSE VACCINE: ICD-10-PCS | Mod: S$GLB,,, | Performed by: FAMILY MEDICINE

## 2021-02-02 PROCEDURE — 0002A COVID-19, MRNA, LNP-S, PF, 30 MCG/0.3 ML DOSE VACCINE: ICD-10-PCS | Mod: S$GLB,,, | Performed by: FAMILY MEDICINE

## 2021-03-14 ENCOUNTER — OFFICE VISIT (OUTPATIENT)
Dept: URGENT CARE | Facility: CLINIC | Age: 71
End: 2021-03-14
Payer: MEDICARE

## 2021-03-14 VITALS
HEART RATE: 94 BPM | SYSTOLIC BLOOD PRESSURE: 128 MMHG | OXYGEN SATURATION: 96 % | DIASTOLIC BLOOD PRESSURE: 76 MMHG | RESPIRATION RATE: 20 BRPM | TEMPERATURE: 98 F

## 2021-03-14 DIAGNOSIS — R53.83 FATIGUE, UNSPECIFIED TYPE: Primary | ICD-10-CM

## 2021-03-14 DIAGNOSIS — R68.83 CHILLS: ICD-10-CM

## 2021-03-14 DIAGNOSIS — Z20.822 COVID-19 VIRUS NOT DETECTED: ICD-10-CM

## 2021-03-14 DIAGNOSIS — R51.9 GENERALIZED HEADACHE: ICD-10-CM

## 2021-03-14 LAB
CTP QC/QA: NORMAL
CTP QC/QA: YES
CTP QC/QA: YES
FLUAV AG NPH QL: NEGATIVE
FLUBV AG NPH QL: NEGATIVE
SARS-COV-2 RDRP RESP QL NAA+PROBE: NEGATIVE
SARS-COV-2 RDRP RESP QL NAA+PROBE: NORMAL

## 2021-03-14 PROCEDURE — 87804 POCT INFLUENZA A/B: ICD-10-PCS | Mod: 59,QW,, | Performed by: NURSE PRACTITIONER

## 2021-03-14 PROCEDURE — 99213 PR OFFICE/OUTPT VISIT, EST, LEVL III, 20-29 MIN: ICD-10-PCS | Mod: 25,S$GLB,CS,ICN | Performed by: NURSE PRACTITIONER

## 2021-03-14 PROCEDURE — U0002 COVID-19 LAB TEST NON-CDC: HCPCS | Mod: QW,CR,S$GLB,ICN | Performed by: NURSE PRACTITIONER

## 2021-03-14 PROCEDURE — 87804 INFLUENZA ASSAY W/OPTIC: CPT | Mod: QW,,, | Performed by: NURSE PRACTITIONER

## 2021-03-14 PROCEDURE — U0002: ICD-10-PCS | Mod: QW,CR,S$GLB,ICN | Performed by: NURSE PRACTITIONER

## 2021-03-14 PROCEDURE — 99213 OFFICE O/P EST LOW 20 MIN: CPT | Mod: 25,S$GLB,CS,ICN | Performed by: NURSE PRACTITIONER

## 2021-03-16 ENCOUNTER — TELEPHONE (OUTPATIENT)
Dept: FAMILY MEDICINE | Facility: CLINIC | Age: 71
End: 2021-03-16

## 2021-03-17 ENCOUNTER — OFFICE VISIT (OUTPATIENT)
Dept: FAMILY MEDICINE | Facility: CLINIC | Age: 71
End: 2021-03-17
Payer: MEDICARE

## 2021-03-17 VITALS
OXYGEN SATURATION: 93 % | HEIGHT: 60 IN | HEART RATE: 94 BPM | DIASTOLIC BLOOD PRESSURE: 62 MMHG | TEMPERATURE: 97 F | WEIGHT: 167.31 LBS | SYSTOLIC BLOOD PRESSURE: 102 MMHG | BODY MASS INDEX: 32.85 KG/M2

## 2021-03-17 DIAGNOSIS — E78.5 HYPERLIPIDEMIA, UNSPECIFIED HYPERLIPIDEMIA TYPE: ICD-10-CM

## 2021-03-17 DIAGNOSIS — R50.9 FEVER AND CHILLS: Primary | ICD-10-CM

## 2021-03-17 DIAGNOSIS — J01.10 ACUTE FRONTAL SINUSITIS, RECURRENCE NOT SPECIFIED: ICD-10-CM

## 2021-03-17 DIAGNOSIS — K21.9 GASTROESOPHAGEAL REFLUX DISEASE WITHOUT ESOPHAGITIS: ICD-10-CM

## 2021-03-17 DIAGNOSIS — I10 ESSENTIAL HYPERTENSION: ICD-10-CM

## 2021-03-17 DIAGNOSIS — E66.9 OBESITY (BMI 30.0-34.9): ICD-10-CM

## 2021-03-17 LAB
BILIRUB SERPL-MCNC: ABNORMAL MG/DL
BLOOD URINE, POC: ABNORMAL
CLARITY, POC UA: CLEAR
COLOR, POC UA: YELLOW
GLUCOSE UR QL STRIP: ABNORMAL
KETONES UR QL STRIP: ABNORMAL
LEUKOCYTE ESTERASE URINE, POC: ABNORMAL
NITRITE, POC UA: ABNORMAL
PH, POC UA: 5
PROTEIN, POC: ABNORMAL
SPECIFIC GRAVITY, POC UA: 1.02
UROBILINOGEN, POC UA: ABNORMAL

## 2021-03-17 PROCEDURE — 99214 PR OFFICE/OUTPT VISIT, EST, LEVL IV, 30-39 MIN: ICD-10-PCS | Mod: S$PBB,,, | Performed by: NURSE PRACTITIONER

## 2021-03-17 PROCEDURE — 99215 OFFICE O/P EST HI 40 MIN: CPT | Mod: PBBFAC,PO | Performed by: NURSE PRACTITIONER

## 2021-03-17 PROCEDURE — 99214 OFFICE O/P EST MOD 30 MIN: CPT | Mod: S$PBB,,, | Performed by: NURSE PRACTITIONER

## 2021-03-17 PROCEDURE — 99999 PR PBB SHADOW E&M-EST. PATIENT-LVL V: ICD-10-PCS | Mod: PBBFAC,,, | Performed by: NURSE PRACTITIONER

## 2021-03-17 PROCEDURE — 87086 URINE CULTURE/COLONY COUNT: CPT | Performed by: NURSE PRACTITIONER

## 2021-03-17 PROCEDURE — 96372 THER/PROPH/DIAG INJ SC/IM: CPT | Mod: PBBFAC,PO

## 2021-03-17 PROCEDURE — 99999 PR PBB SHADOW E&M-EST. PATIENT-LVL V: CPT | Mod: PBBFAC,,, | Performed by: NURSE PRACTITIONER

## 2021-03-17 PROCEDURE — 81002 URINALYSIS NONAUTO W/O SCOPE: CPT | Mod: PBBFAC,PO | Performed by: NURSE PRACTITIONER

## 2021-03-17 RX ORDER — PREDNISONE 20 MG/1
20 TABLET ORAL 2 TIMES DAILY
Qty: 6 TABLET | Refills: 0 | Status: SHIPPED | OUTPATIENT
Start: 2021-03-17 | End: 2021-03-20

## 2021-03-17 RX ORDER — CEFTRIAXONE 500 MG/1
500 INJECTION, POWDER, FOR SOLUTION INTRAMUSCULAR; INTRAVENOUS
Status: COMPLETED | OUTPATIENT
Start: 2021-03-17 | End: 2021-03-17

## 2021-03-17 RX ADMIN — CEFTRIAXONE SODIUM 500 MG: 500 INJECTION, POWDER, FOR SOLUTION INTRAMUSCULAR; INTRAVENOUS at 03:03

## 2021-03-19 ENCOUNTER — TELEPHONE (OUTPATIENT)
Dept: FAMILY MEDICINE | Facility: CLINIC | Age: 71
End: 2021-03-19

## 2021-03-19 DIAGNOSIS — J01.10 ACUTE FRONTAL SINUSITIS, RECURRENCE NOT SPECIFIED: Primary | ICD-10-CM

## 2021-03-19 LAB
BACTERIA UR CULT: NORMAL
BACTERIA UR CULT: NORMAL

## 2021-03-19 RX ORDER — FLUTICASONE PROPIONATE 50 MCG
1 SPRAY, SUSPENSION (ML) NASAL DAILY
Qty: 9.9 ML | Refills: 2 | Status: SHIPPED | OUTPATIENT
Start: 2021-03-19 | End: 2021-04-18

## 2021-04-30 ENCOUNTER — PES CALL (OUTPATIENT)
Dept: ADMINISTRATIVE | Facility: CLINIC | Age: 71
End: 2021-04-30

## 2021-05-24 ENCOUNTER — OFFICE VISIT (OUTPATIENT)
Dept: FAMILY MEDICINE | Facility: CLINIC | Age: 71
End: 2021-05-24
Payer: MEDICARE

## 2021-05-24 VITALS
OXYGEN SATURATION: 95 % | WEIGHT: 170.19 LBS | DIASTOLIC BLOOD PRESSURE: 82 MMHG | BODY MASS INDEX: 33.41 KG/M2 | SYSTOLIC BLOOD PRESSURE: 124 MMHG | HEIGHT: 60 IN | HEART RATE: 66 BPM | RESPIRATION RATE: 18 BRPM

## 2021-05-24 DIAGNOSIS — N30.90 CYSTITIS: Primary | ICD-10-CM

## 2021-05-24 LAB
BILIRUB SERPL-MCNC: NEGATIVE MG/DL
BLOOD URINE, POC: ABNORMAL
CLARITY, POC UA: CLEAR
COLOR, POC UA: YELLOW
GLUCOSE UR QL STRIP: NORMAL
KETONES UR QL STRIP: NEGATIVE
LEUKOCYTE ESTERASE URINE, POC: ABNORMAL
NITRITE, POC UA: NEGATIVE
PH, POC UA: 5
PROTEIN, POC: ABNORMAL
SPECIFIC GRAVITY, POC UA: 1.01
UROBILINOGEN, POC UA: NORMAL

## 2021-05-24 PROCEDURE — 87088 URINE BACTERIA CULTURE: CPT | Performed by: FAMILY MEDICINE

## 2021-05-24 PROCEDURE — 99215 OFFICE O/P EST HI 40 MIN: CPT | Mod: PBBFAC,PO | Performed by: FAMILY MEDICINE

## 2021-05-24 PROCEDURE — 87186 SC STD MICRODIL/AGAR DIL: CPT | Performed by: FAMILY MEDICINE

## 2021-05-24 PROCEDURE — 87086 URINE CULTURE/COLONY COUNT: CPT | Performed by: FAMILY MEDICINE

## 2021-05-24 PROCEDURE — 99999 PR PBB SHADOW E&M-EST. PATIENT-LVL V: ICD-10-PCS | Mod: PBBFAC,,, | Performed by: FAMILY MEDICINE

## 2021-05-24 PROCEDURE — 99213 PR OFFICE/OUTPT VISIT, EST, LEVL III, 20-29 MIN: ICD-10-PCS | Mod: S$PBB,,, | Performed by: FAMILY MEDICINE

## 2021-05-24 PROCEDURE — 99999 PR PBB SHADOW E&M-EST. PATIENT-LVL V: CPT | Mod: PBBFAC,,, | Performed by: FAMILY MEDICINE

## 2021-05-24 PROCEDURE — 81002 URINALYSIS NONAUTO W/O SCOPE: CPT | Mod: PBBFAC,PO | Performed by: FAMILY MEDICINE

## 2021-05-24 PROCEDURE — 87077 CULTURE AEROBIC IDENTIFY: CPT | Performed by: FAMILY MEDICINE

## 2021-05-24 PROCEDURE — 99213 OFFICE O/P EST LOW 20 MIN: CPT | Mod: S$PBB,,, | Performed by: FAMILY MEDICINE

## 2021-05-24 RX ORDER — LEVOFLOXACIN 750 MG/1
750 TABLET ORAL DAILY
Qty: 7 TABLET | Refills: 0 | Status: SHIPPED | OUTPATIENT
Start: 2021-05-24 | End: 2021-08-17

## 2021-05-27 ENCOUNTER — TELEPHONE (OUTPATIENT)
Dept: FAMILY MEDICINE | Facility: CLINIC | Age: 71
End: 2021-05-27

## 2021-05-27 LAB — BACTERIA UR CULT: ABNORMAL

## 2021-05-31 ENCOUNTER — PES CALL (OUTPATIENT)
Dept: ADMINISTRATIVE | Facility: CLINIC | Age: 71
End: 2021-05-31

## 2021-08-11 ENCOUNTER — LAB VISIT (OUTPATIENT)
Dept: LAB | Facility: HOSPITAL | Age: 71
End: 2021-08-11
Attending: NURSE PRACTITIONER
Payer: MEDICARE

## 2021-08-11 DIAGNOSIS — I10 ESSENTIAL HYPERTENSION: ICD-10-CM

## 2021-08-11 DIAGNOSIS — E78.5 HYPERLIPIDEMIA, UNSPECIFIED HYPERLIPIDEMIA TYPE: ICD-10-CM

## 2021-08-11 LAB
ALBUMIN SERPL BCP-MCNC: 4.2 G/DL (ref 3.5–5.2)
ALP SERPL-CCNC: 65 U/L (ref 55–135)
ALT SERPL W/O P-5'-P-CCNC: 28 U/L (ref 10–44)
ANION GAP SERPL CALC-SCNC: 10 MMOL/L (ref 8–16)
AST SERPL-CCNC: 20 U/L (ref 10–40)
BASOPHILS # BLD AUTO: 0.04 K/UL (ref 0–0.2)
BASOPHILS NFR BLD: 0.8 % (ref 0–1.9)
BILIRUB SERPL-MCNC: 0.6 MG/DL (ref 0.1–1)
BUN SERPL-MCNC: 15 MG/DL (ref 8–23)
CALCIUM SERPL-MCNC: 10.1 MG/DL (ref 8.7–10.5)
CHLORIDE SERPL-SCNC: 101 MMOL/L (ref 95–110)
CHOLEST SERPL-MCNC: 188 MG/DL (ref 120–199)
CHOLEST/HDLC SERPL: 4.8 {RATIO} (ref 2–5)
CO2 SERPL-SCNC: 28 MMOL/L (ref 23–29)
CREAT SERPL-MCNC: 0.8 MG/DL (ref 0.5–1.4)
DIFFERENTIAL METHOD: NORMAL
EOSINOPHIL # BLD AUTO: 0.1 K/UL (ref 0–0.5)
EOSINOPHIL NFR BLD: 1.6 % (ref 0–8)
ERYTHROCYTE [DISTWIDTH] IN BLOOD BY AUTOMATED COUNT: 13.9 % (ref 11.5–14.5)
EST. GFR  (AFRICAN AMERICAN): >60 ML/MIN/1.73 M^2
EST. GFR  (NON AFRICAN AMERICAN): >60 ML/MIN/1.73 M^2
GLUCOSE SERPL-MCNC: 103 MG/DL (ref 70–110)
HCT VFR BLD AUTO: 38.4 % (ref 37–48.5)
HDLC SERPL-MCNC: 39 MG/DL (ref 40–75)
HDLC SERPL: 20.7 % (ref 20–50)
HGB BLD-MCNC: 12.6 G/DL (ref 12–16)
IMM GRANULOCYTES # BLD AUTO: 0.01 K/UL (ref 0–0.04)
IMM GRANULOCYTES NFR BLD AUTO: 0.2 % (ref 0–0.5)
LDLC SERPL CALC-MCNC: 106.4 MG/DL (ref 63–159)
LYMPHOCYTES # BLD AUTO: 1.5 K/UL (ref 1–4.8)
LYMPHOCYTES NFR BLD: 28.5 % (ref 18–48)
MCH RBC QN AUTO: 29.9 PG (ref 27–31)
MCHC RBC AUTO-ENTMCNC: 32.8 G/DL (ref 32–36)
MCV RBC AUTO: 91 FL (ref 82–98)
MONOCYTES # BLD AUTO: 0.5 K/UL (ref 0.3–1)
MONOCYTES NFR BLD: 9.4 % (ref 4–15)
NEUTROPHILS # BLD AUTO: 3.1 K/UL (ref 1.8–7.7)
NEUTROPHILS NFR BLD: 59.5 % (ref 38–73)
NONHDLC SERPL-MCNC: 149 MG/DL
NRBC BLD-RTO: 0 /100 WBC
PLATELET # BLD AUTO: 219 K/UL (ref 150–450)
PMV BLD AUTO: 10.1 FL (ref 9.2–12.9)
POTASSIUM SERPL-SCNC: 4 MMOL/L (ref 3.5–5.1)
PROT SERPL-MCNC: 7.3 G/DL (ref 6–8.4)
RBC # BLD AUTO: 4.22 M/UL (ref 4–5.4)
SODIUM SERPL-SCNC: 139 MMOL/L (ref 136–145)
TRIGL SERPL-MCNC: 213 MG/DL (ref 30–150)
WBC # BLD AUTO: 5.13 K/UL (ref 3.9–12.7)

## 2021-08-11 PROCEDURE — 36415 COLL VENOUS BLD VENIPUNCTURE: CPT | Mod: PO | Performed by: NURSE PRACTITIONER

## 2021-08-11 PROCEDURE — 80061 LIPID PANEL: CPT | Performed by: NURSE PRACTITIONER

## 2021-08-11 PROCEDURE — 80053 COMPREHEN METABOLIC PANEL: CPT | Performed by: NURSE PRACTITIONER

## 2021-08-11 PROCEDURE — 85025 COMPLETE CBC W/AUTO DIFF WBC: CPT | Performed by: NURSE PRACTITIONER

## 2021-08-17 ENCOUNTER — OFFICE VISIT (OUTPATIENT)
Dept: FAMILY MEDICINE | Facility: CLINIC | Age: 71
End: 2021-08-17
Payer: MEDICARE

## 2021-08-17 VITALS
WEIGHT: 171.31 LBS | BODY MASS INDEX: 33.63 KG/M2 | TEMPERATURE: 98 F | HEIGHT: 60 IN | SYSTOLIC BLOOD PRESSURE: 122 MMHG | OXYGEN SATURATION: 97 % | DIASTOLIC BLOOD PRESSURE: 64 MMHG | HEART RATE: 74 BPM | RESPIRATION RATE: 17 BRPM

## 2021-08-17 DIAGNOSIS — E78.00 PURE HYPERCHOLESTEROLEMIA: ICD-10-CM

## 2021-08-17 DIAGNOSIS — I10 ESSENTIAL HYPERTENSION: Primary | ICD-10-CM

## 2021-08-17 DIAGNOSIS — E66.9 OBESITY (BMI 30.0-34.9): ICD-10-CM

## 2021-08-17 DIAGNOSIS — E87.6 HYPOKALEMIA: ICD-10-CM

## 2021-08-17 DIAGNOSIS — Z12.31 ENCOUNTER FOR SCREENING MAMMOGRAM FOR MALIGNANT NEOPLASM OF BREAST: ICD-10-CM

## 2021-08-17 DIAGNOSIS — M81.0 OSTEOPOROSIS, UNSPECIFIED OSTEOPOROSIS TYPE, UNSPECIFIED PATHOLOGICAL FRACTURE PRESENCE: ICD-10-CM

## 2021-08-17 DIAGNOSIS — E55.9 VITAMIN D DEFICIENCY: ICD-10-CM

## 2021-08-17 DIAGNOSIS — J30.1 SEASONAL ALLERGIC RHINITIS DUE TO POLLEN: ICD-10-CM

## 2021-08-17 PROCEDURE — 99215 OFFICE O/P EST HI 40 MIN: CPT | Mod: PBBFAC,PO | Performed by: FAMILY MEDICINE

## 2021-08-17 PROCEDURE — 99999 PR PBB SHADOW E&M-EST. PATIENT-LVL V: ICD-10-PCS | Mod: PBBFAC,,, | Performed by: FAMILY MEDICINE

## 2021-08-17 PROCEDURE — 99999 PR PBB SHADOW E&M-EST. PATIENT-LVL V: CPT | Mod: PBBFAC,,, | Performed by: FAMILY MEDICINE

## 2021-08-17 PROCEDURE — 99214 OFFICE O/P EST MOD 30 MIN: CPT | Mod: S$PBB,,, | Performed by: FAMILY MEDICINE

## 2021-08-17 PROCEDURE — 99214 PR OFFICE/OUTPT VISIT, EST, LEVL IV, 30-39 MIN: ICD-10-PCS | Mod: S$PBB,,, | Performed by: FAMILY MEDICINE

## 2021-08-17 RX ORDER — ALENDRONATE SODIUM 70 MG/1
TABLET ORAL
Qty: 12 TABLET | Refills: 3 | Status: SHIPPED | OUTPATIENT
Start: 2021-08-17 | End: 2022-09-28

## 2021-08-17 RX ORDER — FLUTICASONE PROPIONATE 50 MCG
2 SPRAY, SUSPENSION (ML) NASAL DAILY
Qty: 48 G | Refills: 3 | Status: SHIPPED | OUTPATIENT
Start: 2021-08-17 | End: 2022-08-03

## 2021-08-18 ENCOUNTER — HOSPITAL ENCOUNTER (OUTPATIENT)
Dept: RADIOLOGY | Facility: CLINIC | Age: 71
Discharge: HOME | End: 2021-08-18
Attending: FAMILY MEDICINE
Payer: MEDICARE

## 2021-08-18 DIAGNOSIS — Z12.31 ENCOUNTER FOR SCREENING MAMMOGRAM FOR MALIGNANT NEOPLASM OF BREAST: ICD-10-CM

## 2021-08-18 PROCEDURE — 77063 MAMMO DIGITAL SCREENING BILAT WITH TOMO: ICD-10-PCS | Mod: 26,,, | Performed by: RADIOLOGY

## 2021-08-18 PROCEDURE — 77067 MAMMO DIGITAL SCREENING BILAT WITH TOMO: ICD-10-PCS | Mod: 26,,, | Performed by: RADIOLOGY

## 2021-08-18 PROCEDURE — 77063 BREAST TOMOSYNTHESIS BI: CPT | Mod: 26,,, | Performed by: RADIOLOGY

## 2021-08-18 PROCEDURE — 77067 SCR MAMMO BI INCL CAD: CPT | Mod: TC,PO

## 2021-08-18 PROCEDURE — 77067 SCR MAMMO BI INCL CAD: CPT | Mod: 26,,, | Performed by: RADIOLOGY

## 2021-10-01 ENCOUNTER — PES CALL (OUTPATIENT)
Dept: ADMINISTRATIVE | Facility: CLINIC | Age: 71
End: 2021-10-01

## 2021-12-30 ENCOUNTER — TELEPHONE (OUTPATIENT)
Dept: FAMILY MEDICINE | Facility: CLINIC | Age: 71
End: 2021-12-30
Payer: MEDICARE

## 2021-12-30 DIAGNOSIS — E87.6 HYPOKALEMIA: ICD-10-CM

## 2021-12-30 DIAGNOSIS — I10 ESSENTIAL HYPERTENSION: ICD-10-CM

## 2021-12-30 RX ORDER — LOSARTAN POTASSIUM 100 MG/1
100 TABLET ORAL DAILY
Qty: 90 TABLET | Refills: 3 | Status: SHIPPED | OUTPATIENT
Start: 2021-12-30 | End: 2023-01-24 | Stop reason: SDUPTHER

## 2021-12-30 RX ORDER — POTASSIUM CHLORIDE 750 MG/1
20 TABLET, EXTENDED RELEASE ORAL DAILY
Qty: 180 TABLET | Refills: 3 | Status: SHIPPED | OUTPATIENT
Start: 2021-12-30 | End: 2023-01-24 | Stop reason: SDUPTHER

## 2021-12-30 RX ORDER — HYDROCHLOROTHIAZIDE 25 MG/1
25 TABLET ORAL DAILY
Qty: 90 TABLET | Refills: 3 | Status: SHIPPED | OUTPATIENT
Start: 2021-12-30 | End: 2023-01-24 | Stop reason: SDUPTHER

## 2022-02-10 ENCOUNTER — LAB VISIT (OUTPATIENT)
Dept: LAB | Facility: HOSPITAL | Age: 72
End: 2022-02-10
Attending: FAMILY MEDICINE
Payer: MEDICARE

## 2022-02-10 DIAGNOSIS — E55.9 VITAMIN D DEFICIENCY: ICD-10-CM

## 2022-02-10 DIAGNOSIS — E78.00 PURE HYPERCHOLESTEROLEMIA: ICD-10-CM

## 2022-02-10 LAB
25(OH)D3+25(OH)D2 SERPL-MCNC: 53 NG/ML (ref 30–96)
ALBUMIN SERPL BCP-MCNC: 4.1 G/DL (ref 3.5–5.2)
ALP SERPL-CCNC: 65 U/L (ref 55–135)
ALT SERPL W/O P-5'-P-CCNC: 26 U/L (ref 10–44)
ANION GAP SERPL CALC-SCNC: 10 MMOL/L (ref 8–16)
AST SERPL-CCNC: 23 U/L (ref 10–40)
BASOPHILS # BLD AUTO: 0.04 K/UL (ref 0–0.2)
BASOPHILS NFR BLD: 0.8 % (ref 0–1.9)
BILIRUB SERPL-MCNC: 0.5 MG/DL (ref 0.1–1)
BUN SERPL-MCNC: 11 MG/DL (ref 8–23)
CALCIUM SERPL-MCNC: 9.7 MG/DL (ref 8.7–10.5)
CHLORIDE SERPL-SCNC: 103 MMOL/L (ref 95–110)
CHOLEST SERPL-MCNC: 191 MG/DL (ref 120–199)
CHOLEST/HDLC SERPL: 4.2 {RATIO} (ref 2–5)
CO2 SERPL-SCNC: 29 MMOL/L (ref 23–29)
CREAT SERPL-MCNC: 0.8 MG/DL (ref 0.5–1.4)
DIFFERENTIAL METHOD: ABNORMAL
EOSINOPHIL # BLD AUTO: 0.1 K/UL (ref 0–0.5)
EOSINOPHIL NFR BLD: 2.5 % (ref 0–8)
ERYTHROCYTE [DISTWIDTH] IN BLOOD BY AUTOMATED COUNT: 13.2 % (ref 11.5–14.5)
EST. GFR  (AFRICAN AMERICAN): >60 ML/MIN/1.73 M^2
EST. GFR  (NON AFRICAN AMERICAN): >60 ML/MIN/1.73 M^2
GLUCOSE SERPL-MCNC: 104 MG/DL (ref 70–110)
HCT VFR BLD AUTO: 39.8 % (ref 37–48.5)
HDLC SERPL-MCNC: 45 MG/DL (ref 40–75)
HDLC SERPL: 23.6 % (ref 20–50)
HGB BLD-MCNC: 12.6 G/DL (ref 12–16)
IMM GRANULOCYTES # BLD AUTO: 0.02 K/UL (ref 0–0.04)
IMM GRANULOCYTES NFR BLD AUTO: 0.4 % (ref 0–0.5)
LDLC SERPL CALC-MCNC: 108.6 MG/DL (ref 63–159)
LYMPHOCYTES # BLD AUTO: 1.6 K/UL (ref 1–4.8)
LYMPHOCYTES NFR BLD: 33.1 % (ref 18–48)
MCH RBC QN AUTO: 29.5 PG (ref 27–31)
MCHC RBC AUTO-ENTMCNC: 31.7 G/DL (ref 32–36)
MCV RBC AUTO: 93 FL (ref 82–98)
MONOCYTES # BLD AUTO: 0.5 K/UL (ref 0.3–1)
MONOCYTES NFR BLD: 10.5 % (ref 4–15)
NEUTROPHILS # BLD AUTO: 2.6 K/UL (ref 1.8–7.7)
NEUTROPHILS NFR BLD: 52.7 % (ref 38–73)
NONHDLC SERPL-MCNC: 146 MG/DL
NRBC BLD-RTO: 0 /100 WBC
PLATELET # BLD AUTO: 241 K/UL (ref 150–450)
PMV BLD AUTO: 9.9 FL (ref 9.2–12.9)
POTASSIUM SERPL-SCNC: 3.9 MMOL/L (ref 3.5–5.1)
PROT SERPL-MCNC: 7.4 G/DL (ref 6–8.4)
RBC # BLD AUTO: 4.27 M/UL (ref 4–5.4)
SODIUM SERPL-SCNC: 142 MMOL/L (ref 136–145)
TRIGL SERPL-MCNC: 187 MG/DL (ref 30–150)
WBC # BLD AUTO: 4.84 K/UL (ref 3.9–12.7)

## 2022-02-10 PROCEDURE — 80053 COMPREHEN METABOLIC PANEL: CPT | Performed by: FAMILY MEDICINE

## 2022-02-10 PROCEDURE — 36415 COLL VENOUS BLD VENIPUNCTURE: CPT | Mod: PO | Performed by: FAMILY MEDICINE

## 2022-02-10 PROCEDURE — 82306 VITAMIN D 25 HYDROXY: CPT | Performed by: FAMILY MEDICINE

## 2022-02-10 PROCEDURE — 85025 COMPLETE CBC W/AUTO DIFF WBC: CPT | Performed by: FAMILY MEDICINE

## 2022-02-10 PROCEDURE — 80061 LIPID PANEL: CPT | Performed by: FAMILY MEDICINE

## 2022-02-17 ENCOUNTER — OFFICE VISIT (OUTPATIENT)
Dept: FAMILY MEDICINE | Facility: CLINIC | Age: 72
End: 2022-02-17
Payer: MEDICARE

## 2022-02-17 VITALS
HEIGHT: 60 IN | WEIGHT: 171.31 LBS | HEART RATE: 76 BPM | SYSTOLIC BLOOD PRESSURE: 130 MMHG | TEMPERATURE: 98 F | DIASTOLIC BLOOD PRESSURE: 72 MMHG | RESPIRATION RATE: 14 BRPM | OXYGEN SATURATION: 95 % | BODY MASS INDEX: 33.63 KG/M2

## 2022-02-17 DIAGNOSIS — E55.9 VITAMIN D DEFICIENCY: ICD-10-CM

## 2022-02-17 DIAGNOSIS — E87.6 HYPOKALEMIA: ICD-10-CM

## 2022-02-17 DIAGNOSIS — E66.9 OBESITY (BMI 30.0-34.9): ICD-10-CM

## 2022-02-17 DIAGNOSIS — Z86.010 HISTORY OF COLON POLYPS: ICD-10-CM

## 2022-02-17 DIAGNOSIS — M81.0 OSTEOPOROSIS, UNSPECIFIED OSTEOPOROSIS TYPE, UNSPECIFIED PATHOLOGICAL FRACTURE PRESENCE: ICD-10-CM

## 2022-02-17 DIAGNOSIS — E78.00 PURE HYPERCHOLESTEROLEMIA: ICD-10-CM

## 2022-02-17 DIAGNOSIS — I10 ESSENTIAL HYPERTENSION: Primary | ICD-10-CM

## 2022-02-17 PROCEDURE — 99999 PR PBB SHADOW E&M-EST. PATIENT-LVL IV: CPT | Mod: PBBFAC,,, | Performed by: FAMILY MEDICINE

## 2022-02-17 PROCEDURE — 99999 PR PBB SHADOW E&M-EST. PATIENT-LVL IV: ICD-10-PCS | Mod: PBBFAC,,, | Performed by: FAMILY MEDICINE

## 2022-02-17 PROCEDURE — 99214 PR OFFICE/OUTPT VISIT, EST, LEVL IV, 30-39 MIN: ICD-10-PCS | Mod: S$PBB,,, | Performed by: FAMILY MEDICINE

## 2022-02-17 PROCEDURE — 99214 OFFICE O/P EST MOD 30 MIN: CPT | Mod: PBBFAC,PO | Performed by: FAMILY MEDICINE

## 2022-02-17 PROCEDURE — 99214 OFFICE O/P EST MOD 30 MIN: CPT | Mod: S$PBB,,, | Performed by: FAMILY MEDICINE

## 2022-02-17 NOTE — PROGRESS NOTES
Subjective:       Patient ID: Ella Canales is a 71 y.o. female.    Chief Complaint: Follow-up (6mth f/u hypertension)    HPI  Review of Systems   Constitutional: Negative for fatigue and unexpected weight change.   Respiratory: Negative for chest tightness and shortness of breath.    Cardiovascular: Negative for chest pain, palpitations and leg swelling.   Gastrointestinal: Negative for abdominal pain.   Musculoskeletal: Negative for arthralgias.   Neurological: Negative for dizziness, syncope, light-headedness and headaches.       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    Reviewed labs 2/2022  mammo 8/2021 neg    GI Dr. Benitez h/o colon polyps 2019 on 3 year surveillance    Ortho Dr. Coley right tkr-has healed very well  Eye Dr. Matthews cataract  CArd Dr. Ellis  Urology NP O Dimitrios-recurrent uti  Derm Dr. Weil -h/o basal cell ca     On fosomax since 6/2018. 2020-increase spine and slight decrease hip  Objective:      Physical Exam  Vitals and nursing note reviewed.   Constitutional:       Appearance: She is well-developed and well-nourished.   Cardiovascular:      Rate and Rhythm: Normal rate and regular rhythm.      Heart sounds: Normal heart sounds.   Pulmonary:      Effort: Pulmonary effort is normal.      Breath sounds: Normal breath sounds.   Musculoskeletal:         General: No edema.   Skin:     General: Skin is warm and dry.   Neurological:      Mental Status: She is alert and oriented to person, place, and  time.   Psychiatric:         Mood and Affect: Mood and affect normal.         Assessment:       1. Essential hypertension    2. Pure hypercholesterolemia    3. Obesity (BMI 30.0-34.9)    4. Vitamin D deficiency    5. Osteoporosis, unspecified osteoporosis type, unspecified pathological fracture presence    6. Hypokalemia    7. History of colon polyps        Plan:         1. Essential hypertension  Controlled on current medications.  Continue current medications.      2. Pure hypercholesterolemia  Chol at goal. Your triglycerides are borderline high. I recommend a heart healthy diet rich in fiber, fresh vegetables and fruit and low in saturated fats (fried foods, red meat, etc.).  I also recommend regular exercise including a minimum of 150 minutes of cardio exercise per week and 2-30 minute workouts of strength training like light weights, yoga, pilates, etc.  You should work toward a body mass index of < 25.      - CBC Auto Differential; Future  - Comprehensive Metabolic Panel; Future  - Lipid Panel; Future    3. Obesity (BMI 30.0-34.9)  Counseled patient on his ideal body weight, health consequences of being obese and current recommendations including weekly exercise and a heart healthy diet.  Current BMI is:Estimated body mass index is 33.45 kg/m² as calculated from the following:    Height as of this encounter: 5' (1.524 m).    Weight as of this encounter: 77.7 kg (171 lb 4.8 oz)..  Patient is aware that ideal BMI < 25 or Weight in (lb) to have BMI = 25: 127.7.        4. Vitamin D deficiency  Controlled on current medications.  Continue current medications.      5. Osteoporosis, unspecified osteoporosis type, unspecified pathological fracture presence  Cont fosomax and monitor. Repeat Dexa and consider drug holiday after 5 years therapy 2023    6. Hypokalemia  Normal. monitor    7. History of colon polyps  Cont gi surveillance as recommend 3-5 years        Time spent with patient: 20 minutes    Patient with be  reevaluated in 1 year or sooner prn    Greater than 50% of this visit was spent counseling as described in above documentation:Yes

## 2022-02-28 NOTE — TELEPHONE ENCOUNTER
----- Message from Albania Gould sent at 2/16/2017  9:23 AM CST -----  Contact: self@ home   Pt is calling to f/u on her rx xarelto .   ED

## 2022-04-04 DIAGNOSIS — K21.9 GASTROESOPHAGEAL REFLUX DISEASE WITHOUT ESOPHAGITIS: ICD-10-CM

## 2022-04-04 RX ORDER — OMEPRAZOLE 20 MG/1
20 CAPSULE, DELAYED RELEASE ORAL DAILY
Qty: 90 CAPSULE | Refills: 3 | Status: SHIPPED | OUTPATIENT
Start: 2022-04-04 | End: 2023-02-14 | Stop reason: SDUPTHER

## 2022-04-04 RX ORDER — OMEPRAZOLE 20 MG/1
20 CAPSULE, DELAYED RELEASE ORAL DAILY
Qty: 90 CAPSULE | Refills: 3 | Status: CANCELLED | OUTPATIENT
Start: 2022-04-04

## 2022-04-04 NOTE — TELEPHONE ENCOUNTER
----- Message from Priscila Lyons sent at 4/4/2022  3:46 PM CDT -----  Regarding: pharmacy  Contact: Ronaldo with [facility]  Type:  Pharmacy Calling to Clarify an RX    Name of Caller:  Ronaldo   Pharmacy Name:  [facility]  Prescription Name:  Omeprazole 20 mg   What do they need to clarify?:  2 set of instructions  Best Call Back Number:  [phone]  Additional Information:  Please call Ronaldo to advise of correct instructions. Thanks!

## 2022-04-04 NOTE — TELEPHONE ENCOUNTER
No new care gaps identified.  Powered by [employer]. Reference number: [id number].   4/04/2022 3:55:36 PM CDT

## 2022-06-02 ENCOUNTER — TELEPHONE (OUTPATIENT)
Dept: FAMILY MEDICINE | Facility: CLINIC | Age: 72
End: 2022-06-02
Payer: MEDICARE

## 2022-06-02 NOTE — TELEPHONE ENCOUNTER
----- Message from Joanna Landon sent at 6/2/2022  8:40 AM CDT -----  Contact: Patient  Type: Patient Call Back         Who called: Patient          What is the request in detail: thinks she has a bladder infection; requesting urinary analysis and a Rx; please advise         Best call back number: [phone]         Additional Information:    [id number] - [place] - [address] [facility]  [address] [place] [address]  Phone: [phone] Fax: [phone]  m           Thank You

## 2022-06-02 NOTE — TELEPHONE ENCOUNTER
----- Message from Mignon Carbajal sent at 6/2/2022  1:55 PM CDT -----  Regarding: pt called  Name of Who is Calling: RAN OSMAN [[id number]]      What is the request in detail: pt has UTI symptoms and would like to speak with a nurse. Please advise      Can the clinic reply by [facility]: NO      What [facility] to [facility] Back [facility]: [phone]

## 2022-06-03 ENCOUNTER — OFFICE VISIT (OUTPATIENT)
Dept: FAMILY MEDICINE | Facility: CLINIC | Age: 72
End: 2022-06-03
Payer: MEDICARE

## 2022-06-03 VITALS
HEART RATE: 77 BPM | BODY MASS INDEX: 33.98 KG/M2 | TEMPERATURE: 98 F | WEIGHT: 173.06 LBS | DIASTOLIC BLOOD PRESSURE: 70 MMHG | SYSTOLIC BLOOD PRESSURE: 130 MMHG | RESPIRATION RATE: 16 BRPM | HEIGHT: 60 IN | OXYGEN SATURATION: 96 %

## 2022-06-03 DIAGNOSIS — R39.15 URGENCY OF URINATION: ICD-10-CM

## 2022-06-03 DIAGNOSIS — N39.0 URINARY TRACT INFECTION WITHOUT HEMATURIA, SITE UNSPECIFIED: Primary | ICD-10-CM

## 2022-06-03 LAB
BILIRUB SERPL-MCNC: NEGATIVE MG/DL
BLOOD URINE, POC: NEGATIVE
CLARITY, POC UA: ABNORMAL
COLOR, POC UA: YELLOW
GLUCOSE UR QL STRIP: NORMAL
KETONES UR QL STRIP: NEGATIVE
LEUKOCYTE ESTERASE URINE, POC: ABNORMAL
NITRITE, POC UA: NEGATIVE
PH, POC UA: 5
PROTEIN, POC: ABNORMAL
SPECIFIC GRAVITY, POC UA: 1.01
UROBILINOGEN, POC UA: NORMAL

## 2022-06-03 PROCEDURE — 99999 PR PBB SHADOW E&M-EST. PATIENT-LVL V: ICD-10-PCS | Mod: PBBFAC,,, | Performed by: NURSE PRACTITIONER

## 2022-06-03 PROCEDURE — 99999 PR PBB SHADOW E&M-EST. PATIENT-LVL V: CPT | Mod: PBBFAC,,, | Performed by: NURSE PRACTITIONER

## 2022-06-03 PROCEDURE — 99213 PR OFFICE/OUTPT VISIT, EST, LEVL III, 20-29 MIN: ICD-10-PCS | Mod: S$PBB,,, | Performed by: NURSE PRACTITIONER

## 2022-06-03 PROCEDURE — 81002 URINALYSIS NONAUTO W/O SCOPE: CPT | Mod: PBBFAC,PO | Performed by: NURSE PRACTITIONER

## 2022-06-03 PROCEDURE — 99213 OFFICE O/P EST LOW 20 MIN: CPT | Mod: S$PBB,,, | Performed by: NURSE PRACTITIONER

## 2022-06-03 PROCEDURE — 87086 URINE CULTURE/COLONY COUNT: CPT | Performed by: NURSE PRACTITIONER

## 2022-06-03 PROCEDURE — 99215 OFFICE O/P EST HI 40 MIN: CPT | Mod: PBBFAC,PO | Performed by: NURSE PRACTITIONER

## 2022-06-03 RX ORDER — CIPROFLOXACIN 500 MG/1
500 TABLET ORAL 2 TIMES DAILY
Qty: 14 TABLET | Refills: 0 | Status: SHIPPED | OUTPATIENT
Start: 2022-06-03 | End: 2022-06-10

## 2022-06-03 NOTE — PROGRESS NOTES
Subjective:       Patient ID: Ella Canales is a 71 y.o. female.    Chief Complaint: Possible UTI    HPI   70 y/o female patient presents for burning on urination and urgency since yesterday. Denies chest pain, sob, abdominal or suprapubic pain, nausea, vomiting, fever, chills, hematuria. States had UTI a year ago.     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    JOINT REPLACEMENT      left knee    KNEE ARTHROPLASTY Right 8/13/2019    Procedure: ARTHROPLASTY, KNEE;  Surgeon: Paolo Singh MD;  Location: [facility] OR;  Service: Orthopedics;  Laterality: Right;    TOTAL KNEE ARTHROPLASTY Left     TUBAL LIGATION          Current Outpatient Medications:     alendronate (FOSAMAX) 70 MG tablet, TAKE 1 TABLET(70 MG) BY MOUTH 1 TIME A WEEK, Disp: 12 tablet, Rfl: 3    CALCIUM ORAL, Take by mouth., Disp: , Rfl:     diclofenac sodium (VOLTAREN) 1 % Gel, Apply 2 g topically once daily., Disp: 100 g, Rfl: prn    fexofenadine (ALLEGRA) 60 MG tablet, Take 60 mg by mouth once daily., Disp: , Rfl:     fluticasone propionate (FLONASE) 50 mcg/actuation nasal  spray, 2 sprays (100 mcg total) by Each Nostril route once daily., Disp: 48 g, Rfl: 3    glucosamine-chondroit-vit C-Mn 500-400 mg capsule, Take by mouth 2 (two) times daily. Twice a day, Disp: , Rfl:     hydroCHLOROthiazide (HYDRODIURIL) 25 MG tablet, Take 1 tablet (25 mg total) by mouth once daily., Disp: 90 tablet, Rfl: 3    LACTOBACILLUS COMBO NO.6 (PROBIOTIC COMPLEX ORAL), Take 1 tablet by mouth once daily., Disp: , Rfl:     losartan (COZAAR) 100 MG tablet, Take 1 tablet (100 mg total) by mouth once daily., Disp: 90 tablet, Rfl: 3    metoprolol succinate (TOPROL-XL) 50 MG 24 hr tablet, TAKE 1 TABLET(50 MG) BY MOUTH EVERY DAY, Disp: 90 tablet, Rfl: 3    multivitamin (THERAGRAN) per tablet, Take 1 tablet by mouth once daily., Disp: , Rfl:     omeprazole (PRILOSEC) 20 MG capsule, Take 1 capsule (20 mg total) by mouth once daily. TAKE 1 CAPSULE(20 MG) BY MOUTH TWICE DAILY, Disp: 90 capsule, Rfl: 3    potassium chloride SA (K-DUR,KLOR-CON) 10 MEQ tablet, Take 2 tablets (20 mEq total) by mouth once daily., Disp: 180 tablet, Rfl: 3    simvastatin (ZOCOR) 40 MG tablet, TAKE 1 TABLET(40 MG) BY MOUTH EVERY EVENING, Disp: 90 tablet, Rfl: 3    TURMERIC ROOT EXTRACT ORAL, Take by mouth once daily. , Disp: , Rfl:     ciprofloxacin HCl (CIPRO) 500 MG tablet, Take 1 tablet (500 mg total) by mouth 2 (two) times daily. for 7 days, Disp: 14 tablet, Rfl: 0    Lab Results   Component Value Date    WBC 4.84 02/10/2022    HGB 12.6 02/10/2022    HCT 39.8 02/10/2022     02/10/2022    CHOL 191 02/10/2022    TRIG 187 (H) 02/10/2022    HDL 45 02/10/2022    ALT 26 02/10/2022    AST 23 02/10/2022     02/10/2022    K 3.9 02/10/2022     02/10/2022    CREATININE 0.8 02/10/2022    BUN 11 02/10/2022    CO2 29 02/10/2022    TSH 2.387 12/10/2018    INR 1.0 02/24/2017     Review of Systems   Constitutional: Negative for appetite change, chills, diaphoresis, fever and unexpected weight change.   Respiratory: Negative  for cough, chest tightness and shortness of breath.    Cardiovascular: Negative for chest pain and palpitations.   Gastrointestinal: Negative for abdominal pain, nausea and vomiting.   Genitourinary: Positive for dysuria and urgency. Negative for flank pain and hematuria.   Neurological: Negative for dizziness and headaches.       Objective:   /70 (BP Location: Right arm, Patient Position: Sitting, BP Method: Small (Manual))   Pulse 77   Temp 98.2 °F (36.8 °C) (Oral)   Resp 16   Ht 5' (1.524 m)   Wt 78.5 kg (173 lb 1 oz)   LMP 04/03/2012   SpO2 96%   BMI 33.80 kg/m²       Physical Exam  Constitutional:       General: She is not in acute distress.     Appearance: Normal appearance.   HENT:      Head: Atraumatic.      Mouth/Throat:      Mouth: Mucous membranes are moist.   Cardiovascular:      Rate and Rhythm: Normal rate and regular rhythm.      Pulses: Normal pulses.      Heart sounds: Normal heart sounds.   Pulmonary:      Effort: Pulmonary effort is normal.      Breath sounds: Normal breath sounds.   Abdominal:      General: Abdomen is flat. Bowel sounds are normal.      Palpations: Abdomen is soft.      Tenderness: There is no abdominal tenderness. There is no right CVA tenderness or left CVA tenderness.   Skin:     General: Skin is warm and dry.   Neurological:      General: No focal deficit present.      Mental Status: She is alert and oriented to person, place, and time.   Psychiatric:         Mood and Affect: Mood normal.         Assessment:       1. Urinary tract infection without hematuria, site unspecified    2. Urgency of urination        Plan:   1. Urinary tract infection without hematuria, site unspecified    - POCT urine dipstick without microscope  - Urinalysis  - Urine culture  - ciprofloxacin HCl (CIPRO) 500 MG tablet; Take 1 tablet (500 mg total) by mouth 2 (two) times daily. for 7 days  Dispense: 14 tablet; Refill: 0   - Advised to stay well hydrated  - Will follow with urine culture  result    2. Urgency of urination    Marlon CLAROS

## 2022-06-05 LAB — BACTERIA UR CULT: NORMAL

## 2022-08-03 DIAGNOSIS — J30.1 SEASONAL ALLERGIC RHINITIS DUE TO POLLEN: ICD-10-CM

## 2022-08-03 RX ORDER — FLUTICASONE PROPIONATE 50 MCG
SPRAY, SUSPENSION (ML) NASAL
Qty: 48 G | Refills: 2 | Status: SHIPPED | OUTPATIENT
Start: 2022-08-03 | End: 2023-02-14 | Stop reason: SDUPTHER

## 2022-08-03 NOTE — TELEPHONE ENCOUNTER
No new care gaps identified.  [facility] Embedded Care Gaps. Reference number: [id number]. 8/03/2022   9:57:34 AM SHIMAT

## 2022-08-08 ENCOUNTER — TELEPHONE (OUTPATIENT)
Dept: FAMILY MEDICINE | Facility: CLINIC | Age: 72
End: 2022-08-08
Payer: MEDICARE

## 2022-08-08 NOTE — TELEPHONE ENCOUNTER
----- Message from Balbir Pickard sent at 8/8/2022  8:16 AM CDT -----  Contact: Pt  Type: Needs Medical Advice    Who Called:pt  Best Call Back Number:[phone]    Additional Information Requesting a call back regarding Pt was calling to see if it would be okay to come in and get a urinalysis done pt states they believe they have a UTI please call when available Thank you  Please Advise-Thank you

## 2022-08-08 NOTE — TELEPHONE ENCOUNTER
----- Message from Hodan Schilling sent at 8/8/2022  2:15 PM CDT -----  Contact: Patient  Type:  Patient Returning Call    Who Called: Patient\    Who Left Message for Patient: Radha     Does the patient know what this is regarding?:  Medication       Would the patient rather a call back or a response via [facility]?  Call    Best Call Back Number:[phone]      Additional Information:

## 2022-08-08 NOTE — TELEPHONE ENCOUNTER
Returned patients call in regards needing a urinalysis. No answer , left voicemail to return call.

## 2022-08-09 ENCOUNTER — TELEPHONE (OUTPATIENT)
Dept: FAMILY MEDICINE | Facility: CLINIC | Age: 72
End: 2022-08-09
Payer: MEDICARE

## 2022-08-09 DIAGNOSIS — R30.0 DYSURIA: Primary | ICD-10-CM

## 2022-08-09 NOTE — TELEPHONE ENCOUNTER
----- Message from Hodan Schilling sent at 8/9/2022  8:29 AM CDT -----  Contact: Patient  Type:  Patient Returning Call    Who Called: Patient    Who Left Message for Patient: Janae     Does the patient know what this is regarding?: UTI     Would the patient rather a call back or a response via [facility]?  Call    Best Call Back Number: [phone]     Additional Information:

## 2022-08-10 ENCOUNTER — OFFICE VISIT (OUTPATIENT)
Dept: FAMILY MEDICINE | Facility: CLINIC | Age: 72
End: 2022-08-10
Payer: MEDICARE

## 2022-08-10 VITALS
WEIGHT: 166.44 LBS | TEMPERATURE: 98 F | SYSTOLIC BLOOD PRESSURE: 100 MMHG | HEIGHT: 60 IN | HEART RATE: 56 BPM | OXYGEN SATURATION: 95 % | DIASTOLIC BLOOD PRESSURE: 60 MMHG | BODY MASS INDEX: 32.68 KG/M2

## 2022-08-10 DIAGNOSIS — R30.0 DYSURIA: Primary | ICD-10-CM

## 2022-08-10 LAB
BACTERIA #/AREA URNS AUTO: ABNORMAL /HPF
BILIRUB SERPL-MCNC: NORMAL MG/DL
BILIRUB UR QL STRIP: NEGATIVE
BLOOD URINE, POC: NORMAL
CLARITY UR REFRACT.AUTO: CLEAR
CLARITY, POC UA: CLEAR
COLOR UR AUTO: YELLOW
COLOR, POC UA: YELLOW
GLUCOSE UR QL STRIP: NEGATIVE
GLUCOSE UR QL STRIP: NORMAL
HGB UR QL STRIP: NEGATIVE
KETONES UR QL STRIP: NEGATIVE
KETONES UR QL STRIP: NORMAL
LEUKOCYTE ESTERASE UR QL STRIP: ABNORMAL
LEUKOCYTE ESTERASE URINE, POC: NORMAL
MICROSCOPIC COMMENT: ABNORMAL
NITRITE UR QL STRIP: NEGATIVE
NITRITE, POC UA: NORMAL
PH UR STRIP: 6 [PH] (ref 5–8)
PH, POC UA: 5
PROT UR QL STRIP: NEGATIVE
PROTEIN, POC: NORMAL
RBC #/AREA URNS AUTO: 2 /HPF (ref 0–4)
SP GR UR STRIP: 1.01 (ref 1–1.03)
SPECIFIC GRAVITY, POC UA: 1.01
SQUAMOUS #/AREA URNS AUTO: 1 /HPF
URN SPEC COLLECT METH UR: ABNORMAL
UROBILINOGEN, POC UA: NORMAL
WBC #/AREA URNS AUTO: 82 /HPF (ref 0–5)
WBC CLUMPS UR QL AUTO: ABNORMAL

## 2022-08-10 PROCEDURE — 99213 PR OFFICE/OUTPT VISIT, EST, LEVL III, 20-29 MIN: ICD-10-PCS | Mod: S$PBB,,, | Performed by: NURSE PRACTITIONER

## 2022-08-10 PROCEDURE — 99999 PR PBB SHADOW E&M-EST. PATIENT-LVL IV: ICD-10-PCS | Mod: PBBFAC,,, | Performed by: NURSE PRACTITIONER

## 2022-08-10 PROCEDURE — 87088 URINE BACTERIA CULTURE: CPT | Mod: 59 | Performed by: NURSE PRACTITIONER

## 2022-08-10 PROCEDURE — 87086 URINE CULTURE/COLONY COUNT: CPT | Mod: 59 | Performed by: NURSE PRACTITIONER

## 2022-08-10 PROCEDURE — 87186 SC STD MICRODIL/AGAR DIL: CPT | Mod: 59 | Performed by: NURSE PRACTITIONER

## 2022-08-10 PROCEDURE — 99213 OFFICE O/P EST LOW 20 MIN: CPT | Mod: S$PBB,,, | Performed by: NURSE PRACTITIONER

## 2022-08-10 PROCEDURE — 81001 URINALYSIS AUTO W/SCOPE: CPT | Mod: 91 | Performed by: NURSE PRACTITIONER

## 2022-08-10 PROCEDURE — 99999 PR PBB SHADOW E&M-EST. PATIENT-LVL IV: CPT | Mod: PBBFAC,,, | Performed by: NURSE PRACTITIONER

## 2022-08-10 PROCEDURE — 99214 OFFICE O/P EST MOD 30 MIN: CPT | Mod: PBBFAC,PO | Performed by: NURSE PRACTITIONER

## 2022-08-10 PROCEDURE — 81002 URINALYSIS NONAUTO W/O SCOPE: CPT | Mod: 59,PBBFAC,PO | Performed by: NURSE PRACTITIONER

## 2022-08-10 PROCEDURE — 87077 CULTURE AEROBIC IDENTIFY: CPT | Mod: 59 | Performed by: NURSE PRACTITIONER

## 2022-08-10 RX ORDER — CIPROFLOXACIN 500 MG/1
500 TABLET ORAL 2 TIMES DAILY
Qty: 10 TABLET | Refills: 0 | Status: SHIPPED | OUTPATIENT
Start: 2022-08-10 | End: 2022-08-15

## 2022-08-10 NOTE — PROGRESS NOTES
Subjective:       Patient ID: Ella Canales is a 71 y.o. female.    Chief Complaint: Urinary Tract Infection    HPI   72 y/o female patient with medical problems listed below presents for urinary frequency. Last UTI was in 6/2022. Patient states recently returned from [place] on 7/31. On the plane, patient states did not drink enough water on the plane since she did not want to use the bathroom. States she has had urinary frequency since then. Denies nausea, abdominal pain, fever, chills, hematuria. Denies smoking. Denies hx of kidney stone.     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    JOINT REPLACEMENT      left knee    KNEE ARTHROPLASTY Right 8/13/2019    Procedure: ARTHROPLASTY, KNEE;  Surgeon: Paolo Singh MD;  Location: [facility] OR;  Service: Orthopedics;  Laterality: Right;    TOTAL KNEE ARTHROPLASTY Left     TUBAL LIGATION          Current Outpatient Medications:     alendronate (FOSAMAX) 70 MG tablet, TAKE 1 TABLET(70 MG) BY MOUTH 1 TIME A WEEK, Disp: 12 tablet, Rfl: 3    CALCIUM ORAL, Take by mouth., Disp: , Rfl:     diclofenac  sodium (VOLTAREN) 1 % Gel, Apply 2 g topically once daily., Disp: 100 g, Rfl: prn    fexofenadine (ALLEGRA) 60 MG tablet, Take 60 mg by mouth once daily., Disp: , Rfl:     fluticasone propionate (FLONASE) 50 mcg/actuation nasal spray, SHAKE LIQUID AND USE 2 SPRAYS(100 MCG) IN EACH NOSTRIL EVERY DAY, Disp: 48 g, Rfl: 2    glucosamine-chondroit-vit C-Mn 500-400 mg capsule, Take by mouth 2 (two) times daily. Twice a day, Disp: , Rfl:     hydroCHLOROthiazide (HYDRODIURIL) 25 MG tablet, Take 1 tablet (25 mg total) by mouth once daily., Disp: 90 tablet, Rfl: 3    LACTOBACILLUS COMBO NO.6 (PROBIOTIC COMPLEX ORAL), Take 1 tablet by mouth once daily., Disp: , Rfl:     losartan (COZAAR) 100 MG tablet, Take 1 tablet (100 mg total) by mouth once daily., Disp: 90 tablet, Rfl: 3    metoprolol succinate (TOPROL-XL) 50 MG 24 hr tablet, TAKE 1 TABLET(50 MG) BY MOUTH EVERY DAY, Disp: 90 tablet, Rfl: 3    multivitamin (THERAGRAN) per tablet, Take 1 tablet by mouth once daily., Disp: , Rfl:     omeprazole (PRILOSEC) 20 MG capsule, Take 1 capsule (20 mg total) by mouth once daily. TAKE 1 CAPSULE(20 MG) BY MOUTH TWICE DAILY, Disp: 90 capsule, Rfl: 3    potassium chloride SA (K-DUR,KLOR-CON) 10 MEQ tablet, Take 2 tablets (20 mEq total) by mouth once daily., Disp: 180 tablet, Rfl: 3    simvastatin (ZOCOR) 40 MG tablet, TAKE 1 TABLET(40 MG) BY MOUTH EVERY EVENING, Disp: 90 tablet, Rfl: 3    TURMERIC ROOT EXTRACT ORAL, Take by mouth once daily. , Disp: , Rfl:     ciprofloxacin HCl (CIPRO) 500 MG tablet, Take 1 tablet (500 mg total) by mouth 2 (two) times daily. for 5 days, Disp: 10 tablet, Rfl: 0    Lab Results   Component Value Date    WBC 4.84 02/10/2022    HGB 12.6 02/10/2022    HCT 39.8 02/10/2022     02/10/2022    CHOL 191 02/10/2022    TRIG 187 (H) 02/10/2022    HDL 45 02/10/2022    ALT 26 02/10/2022    AST 23 02/10/2022     02/10/2022    K 3.9 02/10/2022     02/10/2022    CREATININE 0.8 02/10/2022     BUN 11 02/10/2022    CO2 29 02/10/2022    TSH 2.387 12/10/2018    INR 1.0 02/24/2017     Above labs reviewed    Review of Systems   Constitutional: Negative for chills and fever.   Respiratory: Negative for chest tightness and shortness of breath.    Cardiovascular: Negative for chest pain and palpitations.   Gastrointestinal: Negative for abdominal pain.   Genitourinary: Positive for frequency. Negative for flank pain and hematuria.   Neurological: Negative for dizziness and headaches.       Objective:   /60 (BP Location: Left arm, Patient Position: Sitting, BP Method: Medium (Manual))   Pulse (!) 56   Temp 98.4 °F (36.9 °C) (Oral)   Ht 5' (1.524 m)   Wt 75.5 kg (166 lb 7.2 oz)   LMP 04/03/2012   SpO2 95%   BMI 32.51 kg/m²       Physical Exam  Constitutional:       Appearance: Normal appearance.   HENT:      Head: Atraumatic.   Cardiovascular:      Rate and Rhythm: Normal rate and regular rhythm.      Pulses: Normal pulses.      Heart sounds: Normal heart sounds.   Pulmonary:      Effort: Pulmonary effort is normal.      Breath sounds: Normal breath sounds.   Abdominal:      General: Abdomen is flat. Bowel sounds are normal.      Palpations: Abdomen is soft.   Skin:     General: Skin is warm and dry.   Neurological:      General: No focal deficit present.      Mental Status: She is alert and oriented to person, place, and time.   Psychiatric:         Mood and Affect: Mood normal.         Assessment:       1. Dysuria        Plan:       1. Dysuria  - POCT urine dipstick without microscope  - Urinalysis  - Urine culture  - ciprofloxacin HCl (CIPRO) 500 MG tablet; Take 1 tablet (500 mg total) by mouth 2 (two) times daily. for 5 days  Dispense: 10 tablet; Refill: 0  - Will follow up urine culture    Patient with be reevaluated in as scheduled or sooner renny Mason NP

## 2022-08-11 NOTE — PLAN OF CARE
Pt. Awake and alert, vital signs stable.  Tolerated liquids without nausea.  Denies pain. Ambulates readily. IV removed per policy, catheter intact. Discharge instructions reviewed with patient and friend written instructions given to patient, and both verbalized understanding.  Dr. Benitez  Spoke with pt. And answered questions and states pt. Is ready to discharge. Discharge home with family per wheelchair to lobby.   2 = difficulty swallowing liquids/foods

## 2022-08-13 LAB — BACTERIA UR CULT: ABNORMAL

## 2022-08-16 ENCOUNTER — NURSE TRIAGE (OUTPATIENT)
Dept: ADMINISTRATIVE | Facility: CLINIC | Age: 72
End: 2022-08-16
Payer: MEDICARE

## 2022-08-16 DIAGNOSIS — U07.1 COVID-19: Primary | ICD-10-CM

## 2022-08-16 NOTE — TELEPHONE ENCOUNTER
Positive covid home test today. Sinuses are runny. Slight headache like a sinus headache. Pt set up covid home symptom monitoring. Care advice recommend pt receives a call from Md office within 1 hour. Please call and advise pt.     Reason for Disposition   HIGH RISK for severe COVID complications (e.g., weak immune system, age > 64 years, obesity with BMI > 25, pregnant, chronic lung disease or other chronic medical condition) (Exception: Already seen by PCP and no new or worsening symptoms.)    Additional Information   Negative: SEVERE difficulty breathing (e.g., struggling for each breath, speaks in single words)   Negative: Difficult to awaken or acting confused (e.g., disoriented, slurred speech)   Negative: Bluish (or gray) lips or face now   Negative: Shock suspected (e.g., cold/pale/clammy skin, too weak to stand, low BP, rapid pulse)   Negative: Sounds like a life-threatening emergency to the triager   Negative: SEVERE or constant chest pain or pressure  (Exception: Mild central chest pain, present only when coughing.)   Negative: MODERATE difficulty breathing (e.g., speaks in phrases, SOB even at rest, pulse 100-120)   Negative: Headache and stiff neck (can't touch chin to chest)   Negative: Oxygen level (e.g., pulse oximetry) 90 percent or lower   Negative: Chest pain or pressure   Negative: Patient sounds very sick or weak to the triager   Negative: MILD difficulty breathing (e.g., minimal/no SOB at rest, SOB with walking, pulse <100)   Negative: Fever > 103 F (39.4 C)   Negative: [1] Fever > 101 F (38.3 C) AND [2] over 60 years of age   Negative: [1] Fever > 100.0 F (37.8 C) AND [2] bedridden (e.g., nursing home patient, CVA, chronic illness, recovering from surgery)    Protocols used: CORONAVIRUS (COVID-19) DIAGNOSED OR [id number]

## 2022-08-17 ENCOUNTER — PATIENT MESSAGE (OUTPATIENT)
Dept: FAMILY MEDICINE | Facility: CLINIC | Age: 72
End: 2022-08-17
Payer: MEDICARE

## 2022-08-17 ENCOUNTER — NURSE TRIAGE (OUTPATIENT)
Dept: ADMINISTRATIVE | Facility: CLINIC | Age: 72
End: 2022-08-17
Payer: MEDICARE

## 2022-08-17 NOTE — TELEPHONE ENCOUNTER
[place]    PCP:  Dr. Adry Damian    C/O sore throat, thick nasal mucus, cough, and sinus HA.  T=98.1.  Denies CP and SOB.  Covid risk score is 4.  Per protocol, care advised is discuss with PCP and callback by nurse within 1 hr d/t high risk for severe Covid complications.  Advised may try warm salt water gargles or Chloraseptic spray per package instructions as needed for sore throat pain.  Instructed to call for worsening/questions/concerns.  VU.    Reason for Disposition   HIGH RISK for severe COVID complications (e.g., weak immune system, age > 64 years, obesity with BMI > 25, pregnant, chronic lung disease or other chronic medical condition) (Exception: Already seen by PCP and no new or worsening symptoms.)    Additional Information   Negative: SEVERE difficulty breathing (e.g., struggling for each breath, speaks in single words)   Negative: Difficult to awaken or acting confused (e.g., disoriented, slurred speech)   Negative: Bluish (or gray) lips or face now   Negative: Shock suspected (e.g., cold/pale/clammy skin, too weak to stand, low BP, rapid pulse)   Negative: Sounds like a life-threatening emergency to the triager   Negative: SEVERE or constant chest pain or pressure  (Exception: Mild central chest pain, present only when coughing.)   Negative: MODERATE difficulty breathing (e.g., speaks in phrases, SOB even at rest, pulse 100-120)   Negative: Headache and stiff neck (can't touch chin to chest)   Negative: Chest pain or pressure   Negative: Patient sounds very sick or weak to the triager   Negative: MILD difficulty breathing (e.g., minimal/no SOB at rest, SOB with walking, pulse <100)   Negative: Fever > 103 F (39.4 C)   Negative: [1] Fever > 101 F (38.3 C) AND [2] over 60 years of age   Negative: [1] Fever > 100.0 F (37.8 C) AND [2] bedridden (e.g., nursing home patient, CVA, chronic illness, recovering from surgery)    Protocols used: CORONAVIRUS (COVID-19) DIAGNOSED OR [id number]

## 2022-08-18 ENCOUNTER — NURSE TRIAGE (OUTPATIENT)
Dept: ADMINISTRATIVE | Facility: CLINIC | Age: 72
End: 2022-08-18
Payer: MEDICARE

## 2022-08-18 NOTE — TELEPHONE ENCOUNTER
Since you fall into a higher risk category I have called in a prescription to your pharmacy for Paxlovid, an antiviral oral treatment for COVID 19. If you test positive and are more likely to get very sick from COVID-19, treatments are available that can reduce your chances of being hospitalized or dying from the disease. Medications to treat COVID-19 must be prescribed by a healthcare provider and started as soon as possible after diagnosis to be effective. YOU WILL NEED TO HOLD YOUR STATIN CHOLESTEROL MEDICATION WHILE TAKING THIS MEDICATION.    People who are more likely to get very sick include older adults (ages 50 years or more, with risk increasing with older age), people who are unvaccinated, and people with certain medical conditions, such as a weakened immune system. Being vaccinated makes you much less likely to get very sick. Still, some vaccinated people, especially those ages 65 years or older or who have other risk factors for severe disease, may benefit from treatment if they get COVID-19.      I have enrolled you in the COVID monitoring program and you will receive a text asking about your symptoms daily for 14 days. If you should indicate worsening symptoms then a triage nurse will call to give you further instructions.           Most cases of COVID-19 cause a common cold type of illness that is self-limited.  You are advised to act as if you have COVID-19 if sick and self-quarantine in the home until 5 days after respiratory symptoms and 1 days after resolution of fever.  Household contacts should avoid contact. If possible limit activity to 1 closed room and use  separate bathroom from asymptomatic household contacts.  Monitor for symptoms of worsening illness such as shortness of breath, labored breathing, dehydration, sudden confusion or lethargy (difficulty rousing).  If occur seek medical attention immediately in emergency room.   You are welcome to contact us for advice at any time.      General home care guidelines for cough and congestion:increase fluid intake, get plenty of rest, and use OTC cough and cold medications for relief of symptoms.  I recommended guafenesin for congestion and dextromethorphan as directed for cough.  Brands like Mucinex DM or Chloricidin HBP or similar are recommended.  Avoidance of decongestants is recommended for patients with heart problems and hypertension.  Extra vitamin C may also benefit.  Return to clinic or the ED or urgent care if symptoms last longer than 10 days or sooner if worsen with symptoms like fever > 100.4, severe sinus pain or headache, thick yellow nasal discharge or sputum, dehydration, sudden confusion or mental status changes, shortness of breath, labored breathing, or lethargy. Anti-fever medications can be alternated like OTC ibuprofen or tylenol as needed and as directed unless told to avoid these by your doctor.

## 2022-08-18 NOTE — TELEPHONE ENCOUNTER
1st attempt made to contact Pt for enrollment into cc program.    Pt c/o cough, post nasal gtts, afebrile not above 100F. Covid protocol followed and pt advised to tx at home. Education provided on covid, isolation, tx, medications like coricidin, avoid dextromethorphan, when to call ooc at 1 [phone] for further assistance. Pt alert and oriented, agrees with above. Encounter routed to PCP and staff.    Pt said she is having trouble with her cell phone but gets texts. Enrolled Pt into hsm program. Education provided.    Reason for Disposition   [1] COVID-19 diagnosed by positive lab test (e.g., PCR, rapid self-test kit) AND [2] mild symptoms (e.g., cough, fever, others) AND [3] no complications or SOB    Additional Information   Negative: SEVERE difficulty breathing (e.g., struggling for each breath, speaks in single words)   Negative: Difficult to awaken or acting confused (e.g., disoriented, slurred speech)   Negative: Bluish (or gray) lips or face now   Negative: Shock suspected (e.g., cold/pale/clammy skin, too weak to stand, low BP, rapid pulse)   Negative: Sounds like a life-threatening emergency to the triager   Negative: SEVERE or constant chest pain or pressure  (Exception: Mild central chest pain, present only when coughing.)   Negative: MODERATE difficulty breathing (e.g., speaks in phrases, SOB even at rest, pulse 100-120)   Negative: Headache and stiff neck (can't touch chin to chest)   Negative: Oxygen level (e.g., pulse oximetry) 90 percent or lower   Negative: Chest pain or pressure   Negative: Patient sounds very sick or weak to the triager   Negative: MILD difficulty breathing (e.g., minimal/no SOB at rest, SOB with walking, pulse <100)   Negative: Fever > 103 F (39.4 C)   Negative: [1] Fever > 101 F (38.3 C) AND [2] over 60 years of age   Negative: [1] Fever > 100.0 F (37.8 C) AND [2] bedridden (e.g., nursing home patient, CVA, chronic illness, recovering from surgery)    Negative: HIGH RISK for severe COVID complications (e.g., weak immune system, age > 64 years, obesity with BMI > 25, pregnant, chronic lung disease or other chronic medical condition) (Exception: Already seen by PCP and no new or worsening symptoms.)   Negative: [1] HIGH RISK patient AND [2] influenza is widespread in the community AND [3] ONE OR MORE respiratory symptoms: cough, sore throat, runny or stuffy nose   Negative: [1] HIGH RISK patient AND [2] influenza exposure within the last 7 days AND [3] ONE OR MORE respiratory symptoms: cough, sore throat, runny or stuffy nose   Negative: Oxygen level (e.g., pulse oximetry) 91 to 94 percent   Negative: [1] COVID-19 infection suspected by caller or triager AND [2] mild symptoms (cough, fever, or others) AND [3] negative COVID-19 rapid test   Negative: Fever present > 3 days (72 hours)   Negative: [1] Fever returns after gone for over 24 hours AND [2] symptoms worse or not improved   Negative: [1] Continuous (nonstop) coughing interferes with work or school AND [2] no improvement using cough treatment per Care Advice   Negative: Cough present > 3 weeks   Negative: [1] COVID-19 diagnosed by positive lab test (e.g., PCR, rapid self-test kit) AND [2] NO symptoms (e.g., cough, fever, others)    Protocols used: CORONAVIRUS (COVID-19) DIAGNOSED OR [id number]

## 2022-08-22 ENCOUNTER — TELEPHONE (OUTPATIENT)
Dept: FAMILY MEDICINE | Facility: CLINIC | Age: 72
End: 2022-08-22
Payer: MEDICARE

## 2022-08-22 DIAGNOSIS — Z12.31 VISIT FOR SCREENING MAMMOGRAM: Primary | ICD-10-CM

## 2022-08-22 NOTE — TELEPHONE ENCOUNTER
----- Message from Dayan Omer, Patient Care Assistant sent at 8/22/2022  3:34 PM CDT -----  Contact: Pt  Type: Needs Medical Advice    Who Called: Pt  Best Call Back Number: [phone]  Inquiry/Question: Pt is calling to get orders for a mammo. Pt was scheduled for tomorrow but due to no orders the appt was cancelled. Please call back and advise Thank you~

## 2022-08-23 ENCOUNTER — HOSPITAL ENCOUNTER (OUTPATIENT)
Dept: RADIOLOGY | Facility: CLINIC | Age: 72
Discharge: HOME | End: 2022-08-23
Attending: FAMILY MEDICINE
Payer: MEDICARE

## 2022-08-23 DIAGNOSIS — Z12.31 VISIT FOR SCREENING MAMMOGRAM: ICD-10-CM

## 2022-08-23 PROCEDURE — 77063 BREAST TOMOSYNTHESIS BI: CPT | Mod: TC,PO

## 2022-08-23 PROCEDURE — 77067 MAMMO DIGITAL SCREENING BILAT WITH TOMO: ICD-10-PCS | Mod: 26,,, | Performed by: RADIOLOGY

## 2022-08-23 PROCEDURE — 77063 BREAST TOMOSYNTHESIS BI: CPT | Mod: 26,,, | Performed by: RADIOLOGY

## 2022-08-23 PROCEDURE — 77063 MAMMO DIGITAL SCREENING BILAT WITH TOMO: ICD-10-PCS | Mod: 26,,, | Performed by: RADIOLOGY

## 2022-08-23 PROCEDURE — 77067 SCR MAMMO BI INCL CAD: CPT | Mod: 26,,, | Performed by: RADIOLOGY

## 2022-08-23 PROCEDURE — 77067 SCR MAMMO BI INCL CAD: CPT | Mod: TC,PO

## 2022-09-15 NOTE — SUBJECTIVE & OBJECTIVE
Interval History: doing well; complains of rash and itching to her back; afebrile; has knee pain with movement.     HPI:  Patient is a 66 y.o. female s/p L TKA by Dr. Mulligan 2/14/17 presents with worsening LLE redness and pain since surgery.  Pt reports having low grade fever or 100.1 2 days after surgery. Denies having any other acute symptoms. Pt was seen by orthopedic surgery and had a joint aspiration which showed WBC 8.3k, but 90% segs. CRP and ESR are elevated. It was there to proceed with arthrotomy with I&D which she had 2/25/17. Pt now s/p arthrotomy, had a dermal graft which was removed. Hardware still in place.     Review of Systems   Constitutional: Negative for chills and fever.   Respiratory: Negative for cough and shortness of breath.    Gastrointestinal: Negative for abdominal pain, diarrhea and vomiting.   Genitourinary: Negative for dysuria.   Musculoskeletal:        Left knee pain   Skin: Positive for rash (back) and wound.     Objective:     Vital Signs (Most Recent):  Temp: 98.2 °F (36.8 °C) (02/27/17 0740)  Pulse: 88 (02/27/17 0740)  Resp: 16 (02/27/17 0740)  BP: (!) 124/56 (02/27/17 0740)  SpO2: 95 % (02/27/17 0740) Vital Signs (24h Range):  Temp:  [98.1 °F (36.7 °C)-98.9 °F (37.2 °C)] 98.2 °F (36.8 °C)  Pulse:  [] 88  Resp:  [14-19] 16  SpO2:  [94 %-96 %] 95 %  BP: ()/(55-78) 124/56     Weight: 68.3 kg (150 lb 8 oz)  Body mass index is 29.39 kg/(m^2).    Estimated Creatinine Clearance: 66.3 mL/min (based on Cr of 0.7).    Physical Exam   Constitutional: She appears well-developed and well-nourished. No distress.   HENT:   Head: Normocephalic.   Eyes: Pupils are equal, round, and reactive to light.   Cardiovascular: Normal rate, regular rhythm and normal heart sounds.  Exam reveals no gallop and no friction rub.    No murmur heard.  Pulmonary/Chest: Effort normal. No respiratory distress. She has no wheezes. She has no rales. She exhibits no tenderness.   Musculoskeletal:   Left  REASON FOR CONSULTATION/CC:    Chief Complaint   Patient presents with    Follow-up    COPD           PCP: Mari Ramirez MD    HISTORY OF PRESENT ILLNESS: Yuly German is a 48y.o. year old female with a history of JOSE who presents :           allergic rhinitis   Claritin and Singulair. Started Theraflu. Copd    albuterol   budesonide via nose. She was on Trelegy and this worked well. Tobacco abuse  Continues to smoke. JOSE  She has been without device for > 1 year. Did not get new device             Objective:   PHYSICAL EXAM:  Blood pressure 115/75, pulse 90, temperature 96.9 °F (36.1 °C), resp. rate 21, height 5' 4\" (1.626 m), weight (!) 318 lb 3.2 oz (144.3 kg), SpO2 98 %, not currently breastfeeding.'    Gen: No distress. Body mass index is 54.62 kg/m². ENT:   Resp: No accessory muscle use. No crackles. No wheezes. No rhonchi. CV: Regular rate. Regular rhythm. No murmur or rub. No edema. Skin: Warm, dry, normal texture and turgor. No nodule on exposed extremities. M/S: No cyanosis. No clubbing. No joint deformity. Psych: Oriented x 3. No anxiety. Awake. Alert. Intact judgement and insight. Good Mood / Affect. Memory appears in tact   . Data Reviewed:        Assessment:      JOSE  Obesity Body mass index is 54.62 kg/m². allergic rhinitis   COPD, mild, FEV1 76%. Decreased DLCO. Goiter         Plan:      Problem List Items Addressed This Visit       Tobacco abuse counseling      Continues to smoke but not ready to quit. Other allergic rhinitis      Increase symptoms with Claritin and Singulair and using Theraflu. Relevant Medications    fluticasone-salmeterol (ADVAIR DISKUS) 250-50 MCG/ACT AEPB diskus inhaler    JOSE (obstructive sleep apnea)      It is not clear why she has not received the device. Order was sent Dec 2021. MA calling DME. COPD, mild ([address] - Primary      Discussed control with albuterol / nebulized budesonide vs Trelegy. Trelegy sent in but appears to not be on formulary (as apposed to Epic). Change to Advair.           Relevant Medications    fluticasone-salmeterol (ADVAIR DISKUS) 250-50 MCG/ACT AEPB diskus inhaler              [facility] [place] [id number] [facility]  [phone] knee dressed; drain in place   Neurological: She is alert.   Skin: Skin is warm and dry. Rash (erythematous rash noted to entire back) noted. She is not diaphoretic.   Psychiatric: She has a normal mood and affect.   Nursing note and vitals reviewed.      Significant Labs:   Blood Culture: No results for input(s): LABBLOO in the last 4320 hours.  CBC:   Recent Labs  Lab 02/27/17  0913   WBC 5.00   HGB 9.1*   HCT 28.6*   *     CMP: No results for input(s): NA, K, CL, CO2, GLU, BUN, CREATININE, CALCIUM, PROT, ALBUMIN, BILITOT, ALKPHOS, AST, ALT, ANIONGAP, EGFRNONAA in the last 48 hours.    Invalid input(s): ESTGFAFRICA    Significant Imaging: I have reviewed all pertinent imaging results/findings within the past 24 hours.

## 2022-10-04 ENCOUNTER — TELEPHONE (OUTPATIENT)
Dept: FAMILY MEDICINE | Facility: CLINIC | Age: 72
End: 2022-10-04
Payer: MEDICARE

## 2022-10-04 ENCOUNTER — PATIENT MESSAGE (OUTPATIENT)
Dept: FAMILY MEDICINE | Facility: CLINIC | Age: 72
End: 2022-10-04
Payer: MEDICARE

## 2022-10-04 NOTE — TELEPHONE ENCOUNTER
Called patient to schedule an appointment in regards to possible UTI no answer , left voicemail to return call.

## 2022-10-04 NOTE — TELEPHONE ENCOUNTER
----- Message from Marcela Pedro sent at 10/4/2022  4:01 PM CDT -----  Contact: self  Type: Needs Medical Advice    Who Called:  patient  Symptoms (please be specific):  possible UTI       Best Call Back Number: [phone]   Additional Information: The pt stated that she would like to get a urine sample for possible UTI. Please call pt back once the order has been sent over. Thanks.

## 2022-10-04 NOTE — TELEPHONE ENCOUNTER
----- Message from Deshawnsadie Daniel sent at 10/4/2022  9:19 AM CDT -----  Contact: patient  Type:  Patient Call          Who Called: patient         Does the patient know what this is regarding?: requesting a call back ;Pt said she have a urine infection again and would like to have a same day appointment ;please advise           Would the patient rather a call back or a response via [facility]? Call           Best Call Back Number:[phone]             Additional Information:  [id number] - [place] - [address] AT [facility]  [address] [place] [address]  Phone: [phone] Fax: [phone]

## 2022-10-05 ENCOUNTER — LAB VISIT (OUTPATIENT)
Dept: LAB | Facility: HOSPITAL | Age: 72
End: 2022-10-05
Attending: FAMILY MEDICINE
Payer: MEDICARE

## 2022-10-05 ENCOUNTER — TELEPHONE (OUTPATIENT)
Dept: FAMILY MEDICINE | Facility: CLINIC | Age: 72
End: 2022-10-05
Payer: MEDICARE

## 2022-10-05 DIAGNOSIS — R39.9 UTI SYMPTOMS: Primary | ICD-10-CM

## 2022-10-05 DIAGNOSIS — R39.9 UTI SYMPTOMS: ICD-10-CM

## 2022-10-05 LAB
BACTERIA #/AREA URNS AUTO: ABNORMAL /HPF
BILIRUB UR QL STRIP: NEGATIVE
CLARITY UR REFRACT.AUTO: ABNORMAL
COLOR UR AUTO: YELLOW
GLUCOSE UR QL STRIP: NEGATIVE
HGB UR QL STRIP: ABNORMAL
KETONES UR QL STRIP: NEGATIVE
LEUKOCYTE ESTERASE UR QL STRIP: ABNORMAL
MICROSCOPIC COMMENT: ABNORMAL
NITRITE UR QL STRIP: POSITIVE
NON-SQ EPI CELLS #/AREA URNS AUTO: 5 /HPF
PH UR STRIP: 6 [PH] (ref 5–8)
PROT UR QL STRIP: NEGATIVE
RBC #/AREA URNS AUTO: 11 /HPF (ref 0–4)
SP GR UR STRIP: 1.01 (ref 1–1.03)
SQUAMOUS #/AREA URNS AUTO: 0 /HPF
URN SPEC COLLECT METH UR: ABNORMAL
WBC #/AREA URNS AUTO: >100 /HPF (ref 0–5)
WBC CLUMPS UR QL AUTO: ABNORMAL

## 2022-10-05 PROCEDURE — 81001 URINALYSIS AUTO W/SCOPE: CPT | Performed by: FAMILY MEDICINE

## 2022-10-05 PROCEDURE — 87077 CULTURE AEROBIC IDENTIFY: CPT | Performed by: FAMILY MEDICINE

## 2022-10-05 PROCEDURE — 87088 URINE BACTERIA CULTURE: CPT | Performed by: FAMILY MEDICINE

## 2022-10-05 PROCEDURE — 87086 URINE CULTURE/COLONY COUNT: CPT | Performed by: FAMILY MEDICINE

## 2022-10-05 PROCEDURE — 87186 SC STD MICRODIL/AGAR DIL: CPT | Performed by: FAMILY MEDICINE

## 2022-10-05 NOTE — TELEPHONE ENCOUNTER
----- Message from Rachana Fernandez sent at 10/5/2022  8:26 AM CDT -----  Regarding: UTI  Contact: Patient  Patient want to speak with a nurse regarding urine test today, please call back at [phone] (home) [phone]

## 2022-10-06 ENCOUNTER — TELEPHONE (OUTPATIENT)
Dept: FAMILY MEDICINE | Facility: CLINIC | Age: 72
End: 2022-10-06
Payer: MEDICARE

## 2022-10-06 ENCOUNTER — PATIENT MESSAGE (OUTPATIENT)
Dept: FAMILY MEDICINE | Facility: CLINIC | Age: 72
End: 2022-10-06
Payer: MEDICARE

## 2022-10-06 DIAGNOSIS — R39.9 UTI SYMPTOMS: Primary | ICD-10-CM

## 2022-10-06 RX ORDER — NITROFURANTOIN 25; 75 MG/1; MG/1
100 CAPSULE ORAL 2 TIMES DAILY
Qty: 14 CAPSULE | Refills: 0 | Status: SHIPPED | OUTPATIENT
Start: 2022-10-06 | End: 2023-02-05

## 2022-10-06 NOTE — TELEPHONE ENCOUNTER
----- Message from Harsh Smallwood sent at 10/6/2022  2:06 PM CDT -----  Contact: pt at  [phone] or [phone]  Type: Needs Medical Advice  Who Called:  pt  Best Call Back Number: [phone] or [phone]  Additional Information: pt is calling the office requesting a callback from the nurse regarding a UTI. Please call back and advise.

## 2022-10-07 NOTE — TELEPHONE ENCOUNTER
Patient never made an e-visit.  Urine appears infected.  I called in macrobid 100mg to take twice a day for 7 days. Monitor for worsening symptoms and return to clinic or go to the ER if these occur: fever > 100.4, severe abdominal pain, intractable vomiting, bleeding from the rectum or black tarry stools, dehydration, lethargy or other worsening symptoms.  Culture is pending.

## 2022-10-08 LAB — BACTERIA UR CULT: ABNORMAL

## 2022-10-17 ENCOUNTER — OFFICE VISIT (OUTPATIENT)
Dept: FAMILY MEDICINE | Facility: CLINIC | Age: 72
End: 2022-10-17
Payer: MEDICARE

## 2022-10-17 VITALS
WEIGHT: 166.69 LBS | TEMPERATURE: 99 F | HEIGHT: 60 IN | DIASTOLIC BLOOD PRESSURE: 52 MMHG | SYSTOLIC BLOOD PRESSURE: 110 MMHG | BODY MASS INDEX: 32.73 KG/M2 | HEART RATE: 69 BPM | OXYGEN SATURATION: 96 %

## 2022-10-17 DIAGNOSIS — R35.0 URINARY FREQUENCY: Primary | ICD-10-CM

## 2022-10-17 PROCEDURE — 99213 OFFICE O/P EST LOW 20 MIN: CPT | Mod: S$PBB,,, | Performed by: NURSE PRACTITIONER

## 2022-10-17 PROCEDURE — 99213 PR OFFICE/OUTPT VISIT, EST, LEVL III, 20-29 MIN: ICD-10-PCS | Mod: S$PBB,,, | Performed by: NURSE PRACTITIONER

## 2022-10-17 PROCEDURE — 81001 URINALYSIS AUTO W/SCOPE: CPT | Performed by: NURSE PRACTITIONER

## 2022-10-17 PROCEDURE — 87086 URINE CULTURE/COLONY COUNT: CPT | Performed by: NURSE PRACTITIONER

## 2022-10-17 PROCEDURE — 99214 OFFICE O/P EST MOD 30 MIN: CPT | Mod: PBBFAC,PO | Performed by: NURSE PRACTITIONER

## 2022-10-17 PROCEDURE — 99999 PR PBB SHADOW E&M-EST. PATIENT-LVL IV: ICD-10-PCS | Mod: PBBFAC,,, | Performed by: NURSE PRACTITIONER

## 2022-10-17 PROCEDURE — 99999 PR PBB SHADOW E&M-EST. PATIENT-LVL IV: CPT | Mod: PBBFAC,,, | Performed by: NURSE PRACTITIONER

## 2022-10-17 NOTE — PROGRESS NOTES
Subjective:       Patient ID: Ella Canales is a 72 y.o. female.    Chief Complaint: Urinary Tract Infection     HPI   71 y/o female patient with medical problems listed below presents for urinary frequency. Patient completed Macrobid for UTI on 10/14. Patient states no longer feels dysuria, pressure, flank pain but still has urinary frequency. States drinks plenty of water. Denies nausea, abdominal pain, fever, chills, flank pain. Patient has had UTI x 3 this year.     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    JOINT REPLACEMENT      left knee    KNEE ARTHROPLASTY Right 8/13/2019    Procedure: ARTHROPLASTY, KNEE;  Surgeon: Paolo Singh MD;  Location: [facility] OR;  Service: Orthopedics;  Laterality: Right;    TOTAL KNEE ARTHROPLASTY Left     TUBAL LIGATION            Current Outpatient Medications:     alendronate (FOSAMAX) 70 MG tablet, TAKE 1 TABLET(70 MG) BY MOUTH 1 TIME A WEEK, Disp: 12 tablet, Rfl: 3    CALCIUM ORAL, Take by mouth., Disp: , Rfl:     diclofenac sodium (VOLTAREN) 1 % Gel, Apply 2 g topically once daily., Disp: 100 g, Rfl: prn    fexofenadine  (ALLEGRA) 60 MG tablet, Take 60 mg by mouth once daily., Disp: , Rfl:     fluticasone propionate (FLONASE) 50 mcg/actuation nasal spray, SHAKE LIQUID AND USE 2 SPRAYS(100 MCG) IN EACH NOSTRIL EVERY DAY, Disp: 48 g, Rfl: 2    glucosamine-chondroit-vit C-Mn 500-400 mg capsule, Take by mouth 2 (two) times daily. Twice a day, Disp: , Rfl:     hydroCHLOROthiazide (HYDRODIURIL) 25 MG tablet, Take 1 tablet (25 mg total) by mouth once daily., Disp: 90 tablet, Rfl: 3    LACTOBACILLUS COMBO NO.6 (PROBIOTIC COMPLEX ORAL), Take 1 tablet by mouth once daily., Disp: , Rfl:     losartan (COZAAR) 100 MG tablet, Take 1 tablet (100 mg total) by mouth once daily., Disp: 90 tablet, Rfl: 3    metoprolol succinate (TOPROL-XL) 50 MG 24 hr tablet, TAKE 1 TABLET(50 MG) BY MOUTH EVERY DAY, Disp: 90 tablet, Rfl: 3    multivitamin (THERAGRAN) per tablet, Take 1 tablet by mouth once daily., Disp: , Rfl:     nitrofurantoin, macrocrystal-monohydrate, (MACROBID) 100 MG capsule, Take 1 capsule (100 mg total) by mouth 2 (two) times daily., Disp: 14 capsule, Rfl: 0    omeprazole (PRILOSEC) 20 MG capsule, Take 1 capsule (20 mg total) by mouth once daily. TAKE 1 CAPSULE(20 MG) BY MOUTH TWICE DAILY, Disp: 90 capsule, Rfl: 3    potassium chloride SA (K-DUR,KLOR-CON) 10 MEQ tablet, Take 2 tablets (20 mEq total) by mouth once daily., Disp: 180 tablet, Rfl: 3    pulse oximeter (PULSE OXIMETER) device, by Apply Externally route 2 (two) times a day. Use twice daily at 8 AM and 3 PM and [facility]., Disp: 1 each, Rfl: 0    simvastatin (ZOCOR) 40 MG tablet, TAKE 1 TABLET(40 MG) BY MOUTH EVERY EVENING, Disp: 90 tablet, Rfl: 3    TURMERIC ROOT EXTRACT ORAL, Take by mouth once daily. , Disp: , Rfl:     Lab Results   Component Value Date    WBC 4.84 02/10/2022    HGB 12.6 02/10/2022    HCT 39.8 02/10/2022     02/10/2022    CHOL 191 02/10/2022    TRIG 187 (H) 02/10/2022    HDL 45 02/10/2022    ALT 26 02/10/2022    AST 23  02/10/2022     02/10/2022    K 3.9 02/10/2022     02/10/2022    CREATININE 0.8 02/10/2022    BUN 11 02/10/2022    CO2 29 02/10/2022    TSH 2.387 12/10/2018    INR 1.0 02/24/2017     Above labs reviewed    Review of Systems   Constitutional:  Negative for chills and fever.   Respiratory:  Negative for chest tightness and shortness of breath.    Cardiovascular:  Negative for chest pain and palpitations.   Gastrointestinal:  Negative for abdominal pain.   Genitourinary:  Positive for frequency. Negative for dysuria, flank pain, hematuria and urgency.   Neurological:  Negative for dizziness and headaches.       Objective:   BP (!) 110/52 (BP Location: Left arm, Patient Position: Sitting, BP Method: Medium (Manual))   Pulse 69   Temp 98.5 °F (36.9 °C) (Oral)   Ht 5' (1.524 m)   Wt 75.6 kg (166 lb 10.7 oz)   LMP 04/03/2012   SpO2 96%   BMI 32.55 kg/m²       Physical Exam  Constitutional:       General: She is not in acute distress.     Appearance: Normal appearance.   HENT:      Head: Atraumatic.   Cardiovascular:      Rate and Rhythm: Normal rate and regular rhythm.      Pulses: Normal pulses.      Heart sounds: Normal heart sounds.   Pulmonary:      Effort: Pulmonary effort is normal.      Breath sounds: Normal breath sounds.   Abdominal:      General: Abdomen is flat. Bowel sounds are normal.      Palpations: Abdomen is soft.   Skin:     General: Skin is warm and dry.   Neurological:      General: No focal deficit present.      Mental Status: She is alert and oriented to person, place, and time.   Psychiatric:         Mood and Affect: Mood normal.       Assessment:       1. Urinary frequency        Plan:       1. Urinary frequency  - Urinalysis  - POCT urine dipstick without microscope: negative for nit and for blood, trace for leuk  - Urine culture   - advised to drink plenty of water    Patient with be reevaluated in  as needed  or sooner renny Mason NP

## 2022-10-18 LAB
BACTERIA #/AREA URNS AUTO: NORMAL /HPF
BILIRUB UR QL STRIP: NEGATIVE
CLARITY UR REFRACT.AUTO: ABNORMAL
COLOR UR AUTO: YELLOW
GLUCOSE UR QL STRIP: NEGATIVE
HGB UR QL STRIP: NEGATIVE
KETONES UR QL STRIP: NEGATIVE
LEUKOCYTE ESTERASE UR QL STRIP: NEGATIVE
MICROSCOPIC COMMENT: NORMAL
NITRITE UR QL STRIP: NEGATIVE
PH UR STRIP: 5 [PH] (ref 5–8)
PROT UR QL STRIP: NEGATIVE
RBC #/AREA URNS AUTO: 1 /HPF (ref 0–4)
SP GR UR STRIP: 1.02 (ref 1–1.03)
SQUAMOUS #/AREA URNS AUTO: 0 /HPF
URN SPEC COLLECT METH UR: ABNORMAL
WBC #/AREA URNS AUTO: 4 /HPF (ref 0–5)

## 2022-10-19 LAB — BACTERIA UR CULT: NORMAL

## 2023-01-20 DIAGNOSIS — I10 ESSENTIAL HYPERTENSION: ICD-10-CM

## 2023-01-20 DIAGNOSIS — E87.6 HYPOKALEMIA: ICD-10-CM

## 2023-01-20 RX ORDER — POTASSIUM CHLORIDE 750 MG/1
20 TABLET, EXTENDED RELEASE ORAL DAILY
Qty: 180 TABLET | Refills: 3 | Status: CANCELLED | OUTPATIENT
Start: 2023-01-20

## 2023-01-20 RX ORDER — LOSARTAN POTASSIUM 100 MG/1
100 TABLET ORAL DAILY
Qty: 90 TABLET | Refills: 3 | Status: CANCELLED | OUTPATIENT
Start: 2023-01-20 | End: 2024-01-20

## 2023-01-20 RX ORDER — HYDROCHLOROTHIAZIDE 25 MG/1
25 TABLET ORAL DAILY
Qty: 90 TABLET | Refills: 3 | Status: CANCELLED | OUTPATIENT
Start: 2023-01-20

## 2023-01-20 NOTE — TELEPHONE ENCOUNTER
----- Message from Ce Dallas sent at 1/20/2023 11:10 AM CST -----  Regarding: refills  Please refill the medication(s) listed below. Please call the patient when the prescription(s) is ready for  at this phone number             Medication #1 losartan (COZAAR) 100 MG tablet        Medication #2 hydroCHLOROthiazide (HYDRODIURIL) 25 MG tablet    Medication 3 potassium chloride SA (K-DUR,KLOR-CON) 10 MEQ           Preferred Pharmacy:       [id number] - [place] - [address] AT [address] [facility] [address] [facility]  [address]  [place] [address]  Phone: [phone] Fax: [phone]

## 2023-01-20 NOTE — TELEPHONE ENCOUNTER
No new care gaps identified.  [facility] Embedded Care Gaps. Reference number: [id number]. 1/20/2023   11:58:12 AM CST

## 2023-01-24 DIAGNOSIS — E87.6 HYPOKALEMIA: ICD-10-CM

## 2023-01-24 DIAGNOSIS — I10 ESSENTIAL HYPERTENSION: ICD-10-CM

## 2023-01-24 RX ORDER — POTASSIUM CHLORIDE 750 MG/1
20 TABLET, EXTENDED RELEASE ORAL DAILY
Qty: 180 TABLET | Refills: 3 | Status: SHIPPED | OUTPATIENT
Start: 2023-01-24 | End: 2023-02-14 | Stop reason: SDUPTHER

## 2023-01-24 RX ORDER — HYDROCHLOROTHIAZIDE 25 MG/1
25 TABLET ORAL DAILY
Qty: 90 TABLET | Refills: 3 | Status: SHIPPED | OUTPATIENT
Start: 2023-01-24 | End: 2023-02-14 | Stop reason: SDUPTHER

## 2023-01-24 RX ORDER — LOSARTAN POTASSIUM 100 MG/1
100 TABLET ORAL DAILY
Qty: 90 TABLET | Refills: 3 | Status: SHIPPED | OUTPATIENT
Start: 2023-01-24 | End: 2023-02-14 | Stop reason: SDUPTHER

## 2023-01-24 NOTE — TELEPHONE ENCOUNTER
No new care gaps identified.  [facility] Embedded Care Gaps. Reference number: [id number]. 1/24/2023   11:53:21 AM CST

## 2023-01-24 NOTE — TELEPHONE ENCOUNTER
----- Message from Yokasta Do sent at 1/24/2023  8:39 AM CST -----  Contact: pt  Type:  RX Refill Request    Who Called:  pt   Refill or New Rx:  refill  RX Name and Strength:  losartan (COZAAR) 100 MG tablet and hydroCHLOROthiazide (HYDRODIURIL) 25 MG tablet , potassium chloride SA (K-DUR,KLOR-CON) 10 MEQ tablet          How is the patient currently taking it? (ex. 1XDay):  1 x day   Is this a 30 day or 90 day RX:  90  Preferred Pharmacy with phone number:    [facility] #[id number] - [place], [place] - [address] [facility]  [address]  [place] [address]  Phone: [phone] Fax: [phone]      Local or Mail Order:  local   Ordering Provider:  Dr. Nati Maldonado Call Back Number:  [phone]    Additional Information:  pt would like a refill. Please advise.

## 2023-02-02 ENCOUNTER — TELEPHONE (OUTPATIENT)
Dept: FAMILY MEDICINE | Facility: CLINIC | Age: 73
End: 2023-02-02
Payer: MEDICARE

## 2023-02-02 ENCOUNTER — OFFICE VISIT (OUTPATIENT)
Dept: URGENT CARE | Facility: CLINIC | Age: 73
End: 2023-02-02
Payer: MEDICARE

## 2023-02-02 VITALS
DIASTOLIC BLOOD PRESSURE: 65 MMHG | WEIGHT: 167 LBS | BODY MASS INDEX: 32.79 KG/M2 | SYSTOLIC BLOOD PRESSURE: 121 MMHG | RESPIRATION RATE: 16 BRPM | HEIGHT: 60 IN | OXYGEN SATURATION: 97 % | HEART RATE: 52 BPM | TEMPERATURE: 97 F

## 2023-02-02 DIAGNOSIS — R05.9 COUGH, UNSPECIFIED TYPE: Primary | ICD-10-CM

## 2023-02-02 DIAGNOSIS — J06.9 UPPER RESPIRATORY TRACT INFECTION, UNSPECIFIED TYPE: ICD-10-CM

## 2023-02-02 LAB
CTP QC/QA: YES
CTP QC/QA: YES
FLUAV AG NPH QL: NEGATIVE
FLUBV AG NPH QL: NEGATIVE
SARS-COV-2 AG RESP QL IA.RAPID: NEGATIVE

## 2023-02-02 PROCEDURE — 96372 THER/PROPH/DIAG INJ SC/IM: CPT | Mod: S$GLB,,, | Performed by: STUDENT IN AN ORGANIZED HEALTH CARE EDUCATION/TRAINING PROGRAM

## 2023-02-02 PROCEDURE — 87804 INFLUENZA ASSAY W/OPTIC: CPT | Mod: 59,QW,, | Performed by: STUDENT IN AN ORGANIZED HEALTH CARE EDUCATION/TRAINING PROGRAM

## 2023-02-02 PROCEDURE — 87804 POCT INFLUENZA A/B: ICD-10-PCS | Mod: 59,QW,, | Performed by: STUDENT IN AN ORGANIZED HEALTH CARE EDUCATION/TRAINING PROGRAM

## 2023-02-02 PROCEDURE — 99214 OFFICE O/P EST MOD 30 MIN: CPT | Mod: 25,S$GLB,CS, | Performed by: STUDENT IN AN ORGANIZED HEALTH CARE EDUCATION/TRAINING PROGRAM

## 2023-02-02 PROCEDURE — 99214 PR OFFICE/OUTPT VISIT, EST, LEVL IV, 30-39 MIN: ICD-10-PCS | Mod: 25,S$GLB,CS, | Performed by: STUDENT IN AN ORGANIZED HEALTH CARE EDUCATION/TRAINING PROGRAM

## 2023-02-02 PROCEDURE — 96372 PR INJECTION,THERAP/PROPH/DIAG2ST, IM OR SUBCUT: ICD-10-PCS | Mod: S$GLB,,, | Performed by: STUDENT IN AN ORGANIZED HEALTH CARE EDUCATION/TRAINING PROGRAM

## 2023-02-02 PROCEDURE — 87811 SARS-COV-2 COVID19 W/OPTIC: CPT | Mod: QW,S$GLB,, | Performed by: STUDENT IN AN ORGANIZED HEALTH CARE EDUCATION/TRAINING PROGRAM

## 2023-02-02 PROCEDURE — 87811 SARS CORONAVIRUS 2 ANTIGEN POCT, MANUAL READ: ICD-10-PCS | Mod: QW,S$GLB,, | Performed by: STUDENT IN AN ORGANIZED HEALTH CARE EDUCATION/TRAINING PROGRAM

## 2023-02-02 RX ORDER — LEVOCETIRIZINE DIHYDROCHLORIDE 5 MG/1
5 TABLET, FILM COATED ORAL NIGHTLY
Qty: 30 TABLET | Refills: 11 | Status: SHIPPED | OUTPATIENT
Start: 2023-02-02 | End: 2023-02-14 | Stop reason: SDUPTHER

## 2023-02-02 RX ORDER — DEXAMETHASONE SODIUM PHOSPHATE 4 MG/ML
8 INJECTION, SOLUTION INTRA-ARTICULAR; INTRALESIONAL; INTRAMUSCULAR; INTRAVENOUS; SOFT TISSUE
Status: COMPLETED | OUTPATIENT
Start: 2023-02-02 | End: 2023-02-02

## 2023-02-02 RX ORDER — BENZONATATE 100 MG/1
100 CAPSULE ORAL 3 TIMES DAILY PRN
Qty: 30 CAPSULE | Refills: 0 | Status: SHIPPED | OUTPATIENT
Start: 2023-02-02 | End: 2023-02-12

## 2023-02-02 RX ADMIN — DEXAMETHASONE SODIUM PHOSPHATE 8 MG: 4 INJECTION, SOLUTION INTRA-ARTICULAR; INTRALESIONAL; INTRAMUSCULAR; INTRAVENOUS; SOFT TISSUE at 02:02

## 2023-02-02 NOTE — TELEPHONE ENCOUNTER
Returned patients call in regards to needing an appointment for possible sinus infection. No answer , left voicemail to return call.

## 2023-02-02 NOTE — TELEPHONE ENCOUNTER
----- Message from Sylvia Louise, Patient Care Assistant sent at 2/2/2023 10:53 AM CST -----  Regarding: appointment  Contact: pt  Type:  Sooner Appointment Request    Caller is requesting a sooner appointment.  Caller declined first available appointment listed below.  Caller will not accept being placed on the waitlist and is requesting a message be sent to doctor.    Name of Caller:  pt     When is the first available appointment?  5/2023    Symptoms:  strep throat sinus infection     Best Call Back Number:  [phone] (home)     Additional Information:  please call pt to advise. Thanks!

## 2023-02-02 NOTE — PROGRESS NOTES
Subjective:       Patient ID: Ella Canales is a 72 y.o. female.    Vitals:  height is 5' (1.524 m) and weight is 75.8 kg (167 lb). Her temperature is 97 °F (36.1 °C). Her blood pressure is 121/65 and her pulse is 52 (abnormal). Her respiration is 16 and oxygen saturation is 97%.     Chief Complaint: Cough    Pt states she is having sinus pressure, post nasal drip, hoarseness, coughing, sneezing X 2 days.     Constitution: Negative.   HENT:  Positive for congestion and sinus pressure.    Neck: neck negative.   Cardiovascular: Negative.    Eyes: Negative.    Respiratory:  Positive for cough.    Gastrointestinal: Negative.    Genitourinary: Negative.    Musculoskeletal: Negative.    Skin: Negative.    Allergic/Immunologic: Negative.    Neurological: Negative.    Psychiatric/Behavioral: Negative.       Objective:      Physical Exam   Constitutional: She is oriented to person, place, and time. She appears well-developed. She is cooperative.  Non-toxic appearance. She does not appear ill. No distress.   HENT:   Head: Normocephalic and atraumatic.   Ears:   Right Ear: Hearing, tympanic membrane, external ear and ear canal normal.   Left Ear: Hearing, tympanic membrane, external ear and ear canal normal.   Nose: Nose normal. No mucosal edema, rhinorrhea or nasal deformity. No epistaxis. Right sinus exhibits no maxillary sinus tenderness and no frontal sinus tenderness. Left sinus exhibits no maxillary sinus tenderness and no frontal sinus tenderness.   Mouth/Throat: Uvula is midline, oropharynx is clear and moist and mucous membranes are normal. No trismus in the jaw. Normal dentition. No uvula swelling. No oropharyngeal exudate, posterior oropharyngeal edema or posterior oropharyngeal erythema.   Eyes: Conjunctivae and lids are normal. No scleral icterus.   Neck: Trachea normal and phonation normal. Neck supple. No edema present. No erythema present. No neck rigidity present.   Cardiovascular: Normal rate, regular  rhythm, normal heart sounds and normal pulses.   Pulmonary/Chest: Effort normal and breath sounds normal. No respiratory distress. She has no decreased breath sounds. She has no rhonchi.   Abdominal: Normal appearance.   Musculoskeletal: Normal range of motion.         General: No deformity. Normal range of motion.   Neurological: She is alert and oriented to person, place, and time. She exhibits normal muscle tone. Coordination normal.   Skin: Skin is warm, dry, intact, not diaphoretic and not pale.   Psychiatric: Her speech is normal and behavior is normal. Judgment and thought content normal.   Nursing note and vitals reviewed.      Assessment:       1. Cough, unspecified type    2. Upper respiratory tract infection, unspecified type          Plan:         Cough, unspecified type  -     SARS Coronavirus 2 Antigen, POCT Manual Read  -     POCT Influenza A/B Rapid Antigen  -     benzonatate (TESSALON) 100 MG capsule; Take 1 capsule (100 mg total) by mouth 3 (three) times daily as needed for Cough.  Dispense: 30 capsule; Refill: 0    Upper respiratory tract infection, unspecified type  -     levocetirizine (XYZAL) 5 MG tablet; Take 1 tablet (5 mg total) by mouth every evening.  Dispense: 30 tablet; Refill: 11  -     benzonatate (TESSALON) 100 MG capsule; Take 1 capsule (100 mg total) by mouth 3 (three) times daily as needed for Cough.  Dispense: 30 capsule; Refill: 0    Other orders  -     dexAMETHasone injection 8 mg               COVID and flu negative

## 2023-02-05 ENCOUNTER — OFFICE VISIT (OUTPATIENT)
Dept: URGENT CARE | Facility: CLINIC | Age: 73
End: 2023-02-05
Payer: MEDICARE

## 2023-02-05 VITALS
OXYGEN SATURATION: 97 % | SYSTOLIC BLOOD PRESSURE: 112 MMHG | RESPIRATION RATE: 20 BRPM | WEIGHT: 167.81 LBS | DIASTOLIC BLOOD PRESSURE: 59 MMHG | TEMPERATURE: 98 F | HEIGHT: 60 IN | BODY MASS INDEX: 32.95 KG/M2 | HEART RATE: 90 BPM

## 2023-02-05 DIAGNOSIS — Z86.79 HISTORY OF HYPERTENSION: Primary | ICD-10-CM

## 2023-02-05 DIAGNOSIS — B96.89 ACUTE BACTERIAL SINUSITIS: ICD-10-CM

## 2023-02-05 DIAGNOSIS — J01.90 ACUTE BACTERIAL SINUSITIS: ICD-10-CM

## 2023-02-05 PROCEDURE — 99213 PR OFFICE/OUTPT VISIT, EST, LEVL III, 20-29 MIN: ICD-10-PCS | Mod: S$GLB,,, | Performed by: NURSE PRACTITIONER

## 2023-02-05 PROCEDURE — 99213 OFFICE O/P EST LOW 20 MIN: CPT | Mod: S$GLB,,, | Performed by: NURSE PRACTITIONER

## 2023-02-05 RX ORDER — PROMETHAZINE HYDROCHLORIDE AND DEXTROMETHORPHAN HYDROBROMIDE 6.25; 15 MG/5ML; MG/5ML
5 SYRUP ORAL EVERY 4 HOURS PRN
Qty: 118 ML | Refills: 0 | Status: SHIPPED | OUTPATIENT
Start: 2023-02-05 | End: 2023-02-14

## 2023-02-05 RX ORDER — AMOXICILLIN AND CLAVULANATE POTASSIUM 875; 125 MG/1; MG/1
1 TABLET, FILM COATED ORAL EVERY 12 HOURS
Qty: 14 TABLET | Refills: 0 | Status: SHIPPED | OUTPATIENT
Start: 2023-02-05 | End: 2023-02-14

## 2023-02-05 RX ORDER — AZELASTINE 1 MG/ML
1 SPRAY, METERED NASAL 2 TIMES DAILY
Qty: 30 ML | Refills: 0 | Status: SHIPPED | OUTPATIENT
Start: 2023-02-05

## 2023-02-05 NOTE — PROGRESS NOTES
"Subjective:       Patient ID: Ella Canales is a 72 y.o. female.    Vitals:  height is 5' (1.524 m) and weight is 76.1 kg (167 lb 12.8 oz). Her temperature is 97.8 °F (36.6 °C). Her blood pressure is 112/59 (abnormal) and her pulse is 90. Her respiration is 20 and oxygen saturation is 97%.     Chief Complaint: Follow-up    Pt states" c/o sinus pressure, hoarse voice, cough, thick green mucus that has been going on for 5 days. Took tessalon pearls and     Follow-up  Associated symptoms include congestion (thick, green), coughing (worse at night, lying down) and headaches. Pertinent negatives include no abdominal pain, chills, fatigue, fever, myalgias, nausea, sore throat or vomiting.   Constitution: Negative for appetite change, chills, fatigue and fever.   HENT:  Positive for congestion (thick, green), postnasal drip, sinus pressure and voice change. Negative for ear pain and sore throat.    Respiratory:  Positive for cough (worse at night, lying down). Negative for chest tightness, sputum production, shortness of breath, wheezing and asthma.    Gastrointestinal:  Negative for abdominal pain, nausea, vomiting and diarrhea.   Musculoskeletal:  Negative for muscle ache.   Allergic/Immunologic: Negative for asthma.   Neurological:  Positive for headaches.     Objective:      Physical Exam   Constitutional: She is oriented to person, place, and time. She appears well-developed.  Non-toxic appearance. She does not appear ill. No distress. obesity  HENT:   Head: Normocephalic and atraumatic.   Ears:   Right Ear: Tympanic membrane, external ear and ear canal normal.   Left Ear: Tympanic membrane, external ear and ear canal normal.   Nose: Rhinorrhea and congestion present. Right sinus exhibits frontal sinus tenderness. Left sinus exhibits frontal sinus tenderness.   Mouth/Throat: Uvula is midline and oropharynx is clear and moist. Mucous membranes are moist. No oropharyngeal exudate or posterior oropharyngeal " erythema.   Eyes: Conjunctivae and EOM are normal.   Neck: Neck supple.   Cardiovascular: Normal rate, regular rhythm and normal heart sounds.   Pulmonary/Chest: Effort normal and breath sounds normal. No respiratory distress. She has no wheezes. She has no rhonchi. She has no rales.   Abdominal: Normal appearance.   Neurological: no focal deficit. She is alert and oriented to person, place, and time.   Skin: Skin is warm, dry and not diaphoretic. Capillary refill takes 2 to 3 seconds.   Psychiatric: Her behavior is normal. Mood normal.   Nursing note and vitals reviewed.      Assessment:       1. History of hypertension    2. Acute bacterial sinusitis          Plan:         History of hypertension    Acute bacterial sinusitis  -     azelastine (ASTELIN) 137 mcg (0.1 %) nasal spray; 1 spray (137 mcg total) by Nasal route 2 (two) times daily.  Dispense: 30 mL; Refill: 0  -     amoxicillin-clavulanate 875-125mg (AUGMENTIN) 875-125 mg per tablet; Take 1 tablet by mouth every 12 (twelve) hours. for 7 days  Dispense: 14 tablet; Refill: 0  -     promethazine-dextromethorphan (PROMETHAZINE-DM) 6.25-15 mg/5 mL Syrp; Take 5 mLs by mouth every 4 (four) hours as needed (cough).  Dispense: 118 mL; Refill: 0    Symptomatic treatment to include:    Rest, increase fluid intake to thin mucus, include electrolyte replacement  Tylenol as directed for fever, sore throat, body aches  Continue allegra as already prescribed  Continue Flonase daily as already prescribed  Astelin twice daily until congestion resolves, then just as needed  guaifenesin 1200 mg twice daily for 10 days to thin mucus  Tessalon Perles cough pills best use for cough caused by postnasal drip/throat irritation  Phenergan cough syrup at night only.  Can take together with Tessalon Perles for added benefit  Refrain from using Afrin nasal spray or any decongestants as this will thicken mucous   Nasal saline spray several times a day  or nasal wash systems  Warm, salt  water gargles, over the counter throat lozenges or sprays for sore throat.       Augmentin, twice a day for 7 days

## 2023-02-05 NOTE — PATIENT INSTRUCTIONS
Symptomatic treatment to include:    Rest, increase fluid intake to thin mucus, include electrolyte replacement  Tylenol as directed for fever, sore throat, body aches  Continue allegra as already prescribed  Continue Flonase daily as already prescribed  Astelin twice daily until congestion resolves, then just as needed  guaifenesin 1200 mg twice daily for 10 days to thin mucus  Tessalon Perles cough pills best use for cough caused by postnasal drip/throat irritation  Phenergan cough syrup at night only.  Can take together with Tessalon Perles for added benefit  Refrain from using Afrin nasal spray or any decongestants as this will thicken mucous   Nasal saline spray several times a day  or nasal wash systems  Warm, salt water gargles, over the counter throat lozenges or sprays for sore throat.       Augmentin, twice a day for 7 days

## 2023-02-07 ENCOUNTER — LAB VISIT (OUTPATIENT)
Dept: LAB | Facility: HOSPITAL | Age: 73
End: 2023-02-07
Attending: FAMILY MEDICINE
Payer: MEDICARE

## 2023-02-07 DIAGNOSIS — E78.00 PURE HYPERCHOLESTEROLEMIA: ICD-10-CM

## 2023-02-07 LAB
ALBUMIN SERPL BCP-MCNC: 3.6 G/DL (ref 3.5–5.2)
ALP SERPL-CCNC: 81 U/L (ref 55–135)
ALT SERPL W/O P-5'-P-CCNC: 27 U/L (ref 10–44)
ANION GAP SERPL CALC-SCNC: 15 MMOL/L (ref 8–16)
AST SERPL-CCNC: 23 U/L (ref 10–40)
BASOPHILS # BLD AUTO: 0.05 K/UL (ref 0–0.2)
BASOPHILS NFR BLD: 0.9 % (ref 0–1.9)
BILIRUB SERPL-MCNC: 0.4 MG/DL (ref 0.1–1)
BUN SERPL-MCNC: 14 MG/DL (ref 8–23)
CALCIUM SERPL-MCNC: 9.8 MG/DL (ref 8.7–10.5)
CHLORIDE SERPL-SCNC: 102 MMOL/L (ref 95–110)
CHOLEST SERPL-MCNC: 173 MG/DL (ref 120–199)
CHOLEST/HDLC SERPL: 5.2 {RATIO} (ref 2–5)
CO2 SERPL-SCNC: 23 MMOL/L (ref 23–29)
CREAT SERPL-MCNC: 0.8 MG/DL (ref 0.5–1.4)
DIFFERENTIAL METHOD: ABNORMAL
EOSINOPHIL # BLD AUTO: 0.1 K/UL (ref 0–0.5)
EOSINOPHIL NFR BLD: 2 % (ref 0–8)
ERYTHROCYTE [DISTWIDTH] IN BLOOD BY AUTOMATED COUNT: 12.9 % (ref 11.5–14.5)
EST. GFR  (NO RACE VARIABLE): >60 ML/MIN/1.73 M^2
GLUCOSE SERPL-MCNC: 104 MG/DL (ref 70–110)
HCT VFR BLD AUTO: 37.8 % (ref 37–48.5)
HDLC SERPL-MCNC: 33 MG/DL (ref 40–75)
HDLC SERPL: 19.1 % (ref 20–50)
HGB BLD-MCNC: 12.2 G/DL (ref 12–16)
IMM GRANULOCYTES # BLD AUTO: 0.04 K/UL (ref 0–0.04)
IMM GRANULOCYTES NFR BLD AUTO: 0.7 % (ref 0–0.5)
LDLC SERPL CALC-MCNC: 102.2 MG/DL (ref 63–159)
LYMPHOCYTES # BLD AUTO: 1.7 K/UL (ref 1–4.8)
LYMPHOCYTES NFR BLD: 29.7 % (ref 18–48)
MCH RBC QN AUTO: 29.6 PG (ref 27–31)
MCHC RBC AUTO-ENTMCNC: 32.3 G/DL (ref 32–36)
MCV RBC AUTO: 92 FL (ref 82–98)
MONOCYTES # BLD AUTO: 0.5 K/UL (ref 0.3–1)
MONOCYTES NFR BLD: 9.4 % (ref 4–15)
NEUTROPHILS # BLD AUTO: 3.2 K/UL (ref 1.8–7.7)
NEUTROPHILS NFR BLD: 57.3 % (ref 38–73)
NONHDLC SERPL-MCNC: 140 MG/DL
NRBC BLD-RTO: 0 /100 WBC
PLATELET # BLD AUTO: 255 K/UL (ref 150–450)
PMV BLD AUTO: 9.7 FL (ref 9.2–12.9)
POTASSIUM SERPL-SCNC: 3.7 MMOL/L (ref 3.5–5.1)
PROT SERPL-MCNC: 7.2 G/DL (ref 6–8.4)
RBC # BLD AUTO: 4.12 M/UL (ref 4–5.4)
SODIUM SERPL-SCNC: 140 MMOL/L (ref 136–145)
TRIGL SERPL-MCNC: 189 MG/DL (ref 30–150)
WBC # BLD AUTO: 5.63 K/UL (ref 3.9–12.7)

## 2023-02-07 PROCEDURE — 85025 COMPLETE CBC W/AUTO DIFF WBC: CPT | Performed by: FAMILY MEDICINE

## 2023-02-07 PROCEDURE — 80053 COMPREHEN METABOLIC PANEL: CPT | Performed by: FAMILY MEDICINE

## 2023-02-07 PROCEDURE — 36415 COLL VENOUS BLD VENIPUNCTURE: CPT | Mod: PO | Performed by: FAMILY MEDICINE

## 2023-02-07 PROCEDURE — 80061 LIPID PANEL: CPT | Performed by: FAMILY MEDICINE

## 2023-02-14 ENCOUNTER — OFFICE VISIT (OUTPATIENT)
Dept: FAMILY MEDICINE | Facility: CLINIC | Age: 73
End: 2023-02-14
Payer: MEDICARE

## 2023-02-14 VITALS
DIASTOLIC BLOOD PRESSURE: 70 MMHG | RESPIRATION RATE: 14 BRPM | TEMPERATURE: 98 F | SYSTOLIC BLOOD PRESSURE: 120 MMHG | HEIGHT: 60 IN | OXYGEN SATURATION: 96 % | HEART RATE: 82 BPM | BODY MASS INDEX: 32.89 KG/M2 | WEIGHT: 167.56 LBS

## 2023-02-14 DIAGNOSIS — E78.5 HYPERLIPIDEMIA, UNSPECIFIED HYPERLIPIDEMIA TYPE: ICD-10-CM

## 2023-02-14 DIAGNOSIS — N95.9 MENOPAUSAL AND POSTMENOPAUSAL DISORDER: Primary | ICD-10-CM

## 2023-02-14 DIAGNOSIS — J06.9 UPPER RESPIRATORY TRACT INFECTION, UNSPECIFIED TYPE: ICD-10-CM

## 2023-02-14 DIAGNOSIS — E66.9 OBESITY (BMI 30.0-34.9): ICD-10-CM

## 2023-02-14 DIAGNOSIS — I10 PRIMARY HYPERTENSION: ICD-10-CM

## 2023-02-14 DIAGNOSIS — E87.6 HYPOKALEMIA: ICD-10-CM

## 2023-02-14 DIAGNOSIS — M81.0 OSTEOPOROSIS, UNSPECIFIED OSTEOPOROSIS TYPE, UNSPECIFIED PATHOLOGICAL FRACTURE PRESENCE: ICD-10-CM

## 2023-02-14 DIAGNOSIS — I10 ESSENTIAL HYPERTENSION: ICD-10-CM

## 2023-02-14 DIAGNOSIS — Z86.010 HX OF COLONIC POLYP: ICD-10-CM

## 2023-02-14 DIAGNOSIS — J30.1 SEASONAL ALLERGIC RHINITIS DUE TO POLLEN: ICD-10-CM

## 2023-02-14 DIAGNOSIS — E78.00 PURE HYPERCHOLESTEROLEMIA: ICD-10-CM

## 2023-02-14 DIAGNOSIS — K21.9 GASTROESOPHAGEAL REFLUX DISEASE WITHOUT ESOPHAGITIS: ICD-10-CM

## 2023-02-14 PROCEDURE — 99214 PR OFFICE/OUTPT VISIT, EST, LEVL IV, 30-39 MIN: ICD-10-PCS | Mod: S$PBB,,, | Performed by: FAMILY MEDICINE

## 2023-02-14 PROCEDURE — 99999 PR PBB SHADOW E&M-EST. PATIENT-LVL IV: ICD-10-PCS | Mod: PBBFAC,,, | Performed by: FAMILY MEDICINE

## 2023-02-14 PROCEDURE — 99999 PR PBB SHADOW E&M-EST. PATIENT-LVL IV: CPT | Mod: PBBFAC,,, | Performed by: FAMILY MEDICINE

## 2023-02-14 PROCEDURE — 99214 OFFICE O/P EST MOD 30 MIN: CPT | Mod: S$PBB,,, | Performed by: FAMILY MEDICINE

## 2023-02-14 PROCEDURE — 99214 OFFICE O/P EST MOD 30 MIN: CPT | Mod: PBBFAC,PO | Performed by: FAMILY MEDICINE

## 2023-02-14 RX ORDER — POTASSIUM CHLORIDE 750 MG/1
20 TABLET, EXTENDED RELEASE ORAL DAILY
Qty: 180 TABLET | Refills: 3 | Status: SHIPPED | OUTPATIENT
Start: 2023-02-14 | End: 2024-02-15 | Stop reason: SDUPTHER

## 2023-02-14 RX ORDER — METOPROLOL SUCCINATE 50 MG/1
50 TABLET, EXTENDED RELEASE ORAL DAILY
Qty: 90 TABLET | Refills: 3 | Status: SHIPPED | OUTPATIENT
Start: 2023-02-14 | End: 2024-02-15 | Stop reason: SDUPTHER

## 2023-02-14 RX ORDER — LEVOCETIRIZINE DIHYDROCHLORIDE 5 MG/1
5 TABLET, FILM COATED ORAL NIGHTLY
Qty: 90 TABLET | Status: SHIPPED | OUTPATIENT
Start: 2023-02-14 | End: 2024-02-14

## 2023-02-14 RX ORDER — SIMVASTATIN 40 MG/1
40 TABLET, FILM COATED ORAL NIGHTLY
Qty: 90 TABLET | Refills: 3 | Status: SHIPPED | OUTPATIENT
Start: 2023-02-14 | End: 2024-02-15 | Stop reason: SDUPTHER

## 2023-02-14 RX ORDER — LOSARTAN POTASSIUM 100 MG/1
100 TABLET ORAL DAILY
Qty: 90 TABLET | Refills: 3 | Status: SHIPPED | OUTPATIENT
Start: 2023-02-14 | End: 2024-02-15 | Stop reason: SDUPTHER

## 2023-02-14 RX ORDER — OMEPRAZOLE 20 MG/1
20 CAPSULE, DELAYED RELEASE ORAL DAILY
Qty: 90 CAPSULE | Refills: 3 | Status: SHIPPED | OUTPATIENT
Start: 2023-02-14 | End: 2024-02-15 | Stop reason: SDUPTHER

## 2023-02-14 RX ORDER — HYDROCHLOROTHIAZIDE 25 MG/1
25 TABLET ORAL DAILY
Qty: 90 TABLET | Refills: 3 | Status: SHIPPED | OUTPATIENT
Start: 2023-02-14 | End: 2024-02-15 | Stop reason: SDUPTHER

## 2023-02-14 RX ORDER — FLUTICASONE PROPIONATE 50 MCG
1 SPRAY, SUSPENSION (ML) NASAL DAILY
Qty: 48 G | Refills: 3 | Status: SHIPPED | OUTPATIENT
Start: 2023-02-14 | End: 2024-02-15 | Stop reason: SDUPTHER

## 2023-02-14 NOTE — PROGRESS NOTES
Subjective:       Patient ID: Ella Canales is a 72 y.o. female.    Chief Complaint: Annual Exam    HPI  Review of Systems   Constitutional:  Negative for fatigue and unexpected weight change.   Respiratory:  Negative for chest tightness and shortness of breath.    Cardiovascular:  Negative for chest pain, palpitations and leg swelling.   Gastrointestinal:  Negative for abdominal pain.   Musculoskeletal:  Negative for arthralgias.   Neurological:  Negative for dizziness, syncope, light-headedness and headaches.     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    Reviewed labs 2/2023  mammo 8/2022 neg     GI Dr. Benitez h/o colon polyps 2019 on 3-5 year surveillance     Ortho Dr. Coley right tkr-has healed very well  Eye Dr. Matthews/giulia  cataract  CArd Dr. Ellis  Urology NP O Dimitrios-recurrent uti  Derm Dr. Weil -h/o basal cell ca     On fosomax since 6/2018. 2020-increase spine and slight decrease hip  Objective:      Physical Exam  Vitals and nursing note reviewed.   Constitutional:       Appearance: She is well-developed.   Cardiovascular:      Rate and Rhythm: Normal rate and regular rhythm.      Heart sounds: Normal heart sounds.   Pulmonary:      Effort: Pulmonary effort is normal.      Breath sounds: Normal breath sounds.   Skin:     General: Skin is warm and dry.   Neurological:      Mental Status: She is alert and oriented to person, place, and time.       Assessment:       1. Menopausal and postmenopausal disorder    2. Primary  hypertension    3. Pure hypercholesterolemia    4. Obesity (BMI 30.0-34.9)    5. Hx of colonic polyp    6. Osteoporosis, unspecified osteoporosis type, unspecified pathological fracture presence    7. Essential hypertension    8. Gastroesophageal reflux disease without esophagitis    9. Hypokalemia    10. Hyperlipidemia, unspecified hyperlipidemia type    11. Seasonal allergic rhinitis due to pollen    12. Upper respiratory tract infection, unspecified type        Plan:         1. Menopausal and postmenopausal disorder  Screen and treat as indicated:    - DXA Bone Density Spine And Hip; Future    2. Primary hypertension  Controlled on current medications.  Continue current medications.      3. Pure hypercholesterolemia  Stable condition.  Continue current medications.  Will adjust based on lab findings or if condition changes.    - CBC Auto Differential; Future  - Comprehensive Metabolic Panel; Future  - Lipid Panel; Future    4. Obesity (BMI 30.0-34.9)  Counseled patient on his ideal body weight, health consequences of being obese and current recommendations including weekly exercise and a heart healthy diet.  Current BMI is:Estimated body mass index is 32.72 kg/m² as calculated from the following:    Height as of this encounter: 5' (1.524 m).    Weight as of this encounter: 76 kg (167 lb 8.8 oz)..  Patient is aware that ideal BMI < 25 or Weight in (lb) to have BMI = 25: 127.7.      5. Hx of colonic polyp  Cont gi surveillance    6. Osteoporosis, unspecified osteoporosis type, unspecified pathological fracture presence  Cont monitoring and treat as indicated    7. Essential hypertension  Controlled on current medications.  Continue current medications.    - metoprolol succinate (TOPROL-XL) 50 MG 24 hr tablet; Take 1 tablet (50 mg total) by mouth once daily.  Dispense: 90 tablet; Refill: 3  - losartan (COZAAR) 100 MG tablet; Take 1 tablet (100 mg total) by mouth once daily.  Dispense: 90 tablet; Refill: 3  -  hydroCHLOROthiazide (HYDRODIURIL) 25 MG tablet; Take 1 tablet (25 mg total) by mouth once daily.  Dispense: 90 tablet; Refill: 3    8. Gastroesophageal reflux disease without esophagitis  Counseled patient on prevention of reflux with changes in diet and behavior.  I recommended avoidance of greasy and spicy foods, caffeine and eating within 3 hours of bedtime.  I counseled the patient to avoid eating large meals and instead eating more frequent small meals.  I also recommended weight loss and elevation of the head of the bed by 6 inches.  If symptoms persist after these changes medication may be needed to control GERD.    - omeprazole (PRILOSEC) 20 MG capsule; Take 1 capsule (20 mg total) by mouth once daily. TAKE 1 CAPSULE(20 MG) BY MOUTH TWICE DAILY  Dispense: 90 capsule; Refill: 3    9. Hypokalemia  Screen and treat as indicated:    - potassium chloride SA (K-DUR,KLOR-CON M) 10 MEQ tablet; Take 2 tablets (20 mEq total) by mouth once daily.  Dispense: 180 tablet; Refill: 3    10. Hyperlipidemia, unspecified hyperlipidemia type  Stable condition.  Continue current medications.  Will adjust based on lab findings or if condition changes.    - simvastatin (ZOCOR) 40 MG tablet; Take 1 tablet (40 mg total) by mouth every evening.  Dispense: 90 tablet; Refill: 3    11. Seasonal allergic rhinitis due to pollen  Recommend otc non-sedating antihistamine such as Loratadine and/or steroid nasal spray such as Flonase as directed and as needed.  Please return to clinic if symptoms persist after these interventions.    - fluticasone propionate (FLONASE) 50 mcg/actuation nasal spray; 1 spray (50 mcg total) by Each Nostril route once daily.  Dispense: 48 g; Refill: 3    12. Upper respiratory tract infection, unspecified type  General home care guidelines for cough and congestion:increase fluid intake, get plenty of rest, and use OTC cough and cold medications for relief of symptoms.  I recommended guafenesin for congestion and  dextromethorphan as directed for cough.  Brands like Mucinex DM or Chloricidin HBP or similar are recommended.  Avoidance of decongestants is recommended for patients with heart problems and hypertension.  Extra vitamin C may also benefit.  Return to clinic if symptoms last longer than 10 days or sooner if worsen with symptoms like fever > 100.4, severe sinus pain or headache, thick yellow nasal discharge or sputum, dehydration, sudden confusion or mental status changes, shortness of breath, labored breathing, or lethargy. Anti-fever medications can be alternated like OTC ibuprofen or tylenol as needed and as directed unless told to avoid these by your doctor.     - levocetirizine (XYZAL) 5 MG tablet; Take 1 tablet (5 mg total) by mouth every evening.  Dispense: 90 tablet; Refill: PRN      Time spent with patient: 20 minutes    Patient with be reevaluated in 1 year or sooner prn    Greater than 50% of this visit was spent counseling as described in above documentation:Yes

## 2023-05-22 ENCOUNTER — HOSPITAL ENCOUNTER (OUTPATIENT)
Dept: RADIOLOGY | Facility: CLINIC | Age: 73
Discharge: HOME | End: 2023-05-22
Attending: FAMILY MEDICINE
Payer: MEDICARE

## 2023-05-22 DIAGNOSIS — N95.9 MENOPAUSAL AND POSTMENOPAUSAL DISORDER: ICD-10-CM

## 2023-05-22 PROCEDURE — 77080 DEXA BONE DENSITY SPINE HIP: ICD-10-PCS | Mod: 26,,, | Performed by: RADIOLOGY

## 2023-05-22 PROCEDURE — 77080 DXA BONE DENSITY AXIAL: CPT | Mod: 26,,, | Performed by: RADIOLOGY

## 2023-05-22 PROCEDURE — 77080 DXA BONE DENSITY AXIAL: CPT | Mod: TC,PO

## 2023-08-15 ENCOUNTER — TELEPHONE (OUTPATIENT)
Dept: FAMILY MEDICINE | Facility: CLINIC | Age: 73
End: 2023-08-15
Payer: MEDICARE

## 2023-08-15 DIAGNOSIS — Z12.31 ENCOUNTER FOR SCREENING MAMMOGRAM FOR MALIGNANT NEOPLASM OF BREAST: Primary | ICD-10-CM

## 2023-08-15 NOTE — TELEPHONE ENCOUNTER
----- Message from Argeliawilma Urena sent at 8/15/2023  9:50 AM CDT -----  Regarding: Mammogram request  Contact: pt  Type:  Mammogram    Caller is requesting to schedule their annual mammogram appointment.  Order is not listed in EPIC.  Please enter order and contact patient to schedule.    Name of Caller:pt    Where would they like the mammogram performed?where she had her last one    Would the patient rather a call back or a response via [facility]? Call back    Best Call Back Number: [phone]    Additional Information: sts she would like to get an order to have a mammogram done--please advise and thank you

## 2023-08-29 ENCOUNTER — HOSPITAL ENCOUNTER (OUTPATIENT)
Dept: RADIOLOGY | Facility: CLINIC | Age: 73
Discharge: HOME | End: 2023-08-29
Attending: FAMILY MEDICINE
Payer: MEDICARE

## 2023-08-29 DIAGNOSIS — Z12.31 ENCOUNTER FOR SCREENING MAMMOGRAM FOR MALIGNANT NEOPLASM OF BREAST: ICD-10-CM

## 2023-08-29 PROCEDURE — 77063 MAMMO DIGITAL SCREENING BILAT WITH TOMO: ICD-10-PCS | Mod: 26,,, | Performed by: RADIOLOGY

## 2023-08-29 PROCEDURE — 77067 SCR MAMMO BI INCL CAD: CPT | Mod: TC,PO

## 2023-08-29 PROCEDURE — 77067 SCR MAMMO BI INCL CAD: CPT | Mod: 26,,, | Performed by: RADIOLOGY

## 2023-08-29 PROCEDURE — 77063 BREAST TOMOSYNTHESIS BI: CPT | Mod: 26,,, | Performed by: RADIOLOGY

## 2023-08-29 PROCEDURE — 77067 MAMMO DIGITAL SCREENING BILAT WITH TOMO: ICD-10-PCS | Mod: 26,,, | Performed by: RADIOLOGY

## 2023-09-26 DIAGNOSIS — M81.0 OSTEOPOROSIS, UNSPECIFIED OSTEOPOROSIS TYPE, UNSPECIFIED PATHOLOGICAL FRACTURE PRESENCE: ICD-10-CM

## 2023-09-29 RX ORDER — ALENDRONATE SODIUM 70 MG/1
TABLET ORAL
Qty: 12 TABLET | Refills: 3 | Status: SHIPPED | OUTPATIENT
Start: 2023-09-29 | End: 2024-02-15

## 2023-10-19 NOTE — DISCHARGE SUMMARY
[facility]  Discharge Summary      Admit Date: 3/9/2017    Discharge Date and Time: 3/11/2017 12:54 PM    Attending Physician: Angie Mulligan    Reason for Admission: REVISION-ARTHROPLASTY-KNEE-TOTAL; TIBIAL POLYETHYLENE EXCHANGE (Left)  INCISION AND DRAINAGE (I & D) (Left)  LYSIS-ADHESION (Left)  GRAFT (Left)  APPLICATION-WOUND DRESSING (Left)    Procedures Performed: Procedure(s) (LRB):  REVISION-ARTHROPLASTY-KNEE-TOTAL; TIBIAL POLYETHYLENE EXCHANGE (Left)  INCISION AND DRAINAGE (I & D) (Left)  LYSIS-ADHESION (Left)  GRAFT (Left)  APPLICATION-WOUND DRESSING (Left)    Hospital Course : Uncomplicated hospital stay.  She worked Awith PT, her pain was controlled she was voiding spontaneously and was ready for discharge on the above date.  Her hemoglobin was stable after receiving 2 units PRBCs    Consults: PT    Significant Diagnostic Studies: Labs: reviewed, stable H/h    Final Diagnoses:    Principal Problem: Painful orthopaedic hardware   Secondary Diagnoses:  1. Infection of prosthetic left knee joint    2. Painful orthopaedic hardware    3. Essential hypertension    4. Pure hypercholesterolemia    5. Age-related bone loss    6. Gastroesophageal reflux disease with esophagitis    7. At risk for heart disease    8. Hypokalemia    9. Obesity (BMI 30-39.9)    10. Arthritis    11. Left knee pain, unspecified chronicity    12. Genu varum of right lower extremity    13. Chronic rhinitis          Discharged Condition: good    Disposition: home with home health IV abx via PICC and PT    Follow Up/Patient Instructions:     Medications:  Reconciled Home Medications:   Discharge Medication List as of 3/11/2017 11:23 AM      START taking these medications    Details   chlorzoxazone (PARAFON FORTE) 500 mg Tab Take 1 tablet (500 mg total) by mouth 3 (three) times daily as needed (spasms)., Starting 3/10/2017, Until Mon 3/20/17, Print      docusate sodium (COLACE) 100 MG capsule Take 1 capsule (100 mg total) by  mouth 2 (two) times daily as needed for Constipation., Starting 3/10/2017, Until Mon 3/20/17, Print      ferrous sulfate 325 (65 FE) MG EC tablet Take 1 tablet (325 mg total) by mouth once daily., Starting 3/10/2017, Until Discontinued, Print      HYDROmorphone (DILAUDID) 4 MG tablet Take 1 tablet (4 mg total) by mouth every 6 (six) hours as needed for Pain., Starting 3/10/2017, Until Discontinued, Print         CONTINUE these medications which have CHANGED    Details   !! oxycodone-acetaminophen (PERCOCET)  mg per tablet Take 1 tablet by mouth every 6 (six) hours as needed for Pain., Starting 3/10/2017, Until Discontinued, Print       !! - Potential duplicate medications found. Please discuss with provider.      CONTINUE these medications which have NOT CHANGED    Details   ciprofloxacin HCl (CIPRO) 750 MG tablet Take 1 tablet (750 mg total) by mouth every 12 (twelve) hours., Starting 2/28/2017, Until Sat 4/29/17, Normal      dextrose 5 % SolP 250 mL with vancomycin 1,000 mg SolR 1,500 mg Inject 1,500 mg into the vein every 12 (twelve) hours., Starting 2/28/2017, Until Discontinued, No Print      fexofenadine (ALLEGRA) 60 MG tablet Take 60 mg by mouth once daily., Until Discontinued, Historical Med      glucosamine-chondroit-vit C-Mn (GLUCOSAMINE-CHONDROITIN MAX ST) 500-400 mg Cap Twice a day, Starting 11/25/2011, Until Discontinued, Historical Med      losartan (COZAAR) 100 MG tablet Take 1 tablet (100 mg total) by mouth once daily., Starting 1/6/2017, Until Discontinued, Normal      metoprolol succinate (TOPROL-XL) 50 MG 24 hr tablet Take 1 tablet (50 mg total) by mouth once daily., Starting 1/6/2017, Until Discontinued, Normal      omeprazole (PRILOSEC) 20 MG capsule Take 1 capsule (20 mg total) by mouth 2 (two) times daily., Starting 1/6/2017, Until Discontinued, Normal      !! oxycodone-acetaminophen (PERCOCET)  mg per tablet Take 1 tablet by mouth every 6 (six) hours as needed for Pain., Starting  3/6/2017, Until Discontinued, Normal      potassium chloride SA (K-DUR,KLOR-CON) 10 MEQ tablet Take 1 tablet (10 mEq total) by mouth 2 (two) times daily., Starting 1/6/2017, Until Discontinued, Normal      raloxifene (EVISTA) 60 mg tablet Take 1 tablet (60 mg total) by mouth once daily., Starting 1/6/2017, Until Discontinued, Normal      simvastatin (ZOCOR) 40 MG tablet Take 1 tablet (40 mg total) by mouth every evening., Starting 1/6/2017, Until Discontinued, Normal      vancomycin (VANCOCIN) 10 gram SolR Starting 3/2/2017, Until Discontinued, Historical Med       !! - Potential duplicate medications found. Please discuss with provider.      STOP taking these medications       celecoxib (CELEBREX) 200 MG capsule Comments:   Reason for Stopping:         promethazine (PHENERGAN) 25 MG tablet Comments:   Reason for Stopping:               Discharge Procedure Orders  Ambulatory referral to Home Health   Referral Priority: Routine Referral Type: Home Health   Referral Reason: Specialty Services Required    Requested Specialty: Home Health Services    Number of Visits Requested: 1      Diet general     Other restrictions (specify):   Order Comments: WBAT with knee brace locked in extension, ok to bend to 60 at rest     Call MD for:  increased confusion or weakness     Call MD for:  persistent dizziness, light-headedness, or visual disturbances     Call MD for:  worsening rash     Call MD for:  severe persistent headache     Call MD for:  difficulty breathing or increased cough     Call MD for:  redness, tenderness, or signs of infection (pain, swelling, redness, odor or green/yellow discharge around incision site)     Call MD for:  severe uncontrolled pain     Call MD for:  persistent nausea and vomiting or diarrhea     Call MD for:  temperature >100.4     Leave dressing on - Keep it clean, dry, and intact until clinic visit   Order Comments: Will change Monday in clinic       Follow-up Information     Follow up In 3  days.        Follow up In 3 days.        Follow up with [facility] [place].    Specialty:  Home Health Services    Why:  Home Health    Contact information:    [address]  [address]  [place] [address]  [phone]          Follow up with [facility].    Specialty:  Dialysis Center    Why:  Infusion    Contact information:    [address] [place] [id number] [phone]          Follow up with Angie Mulligan MD.    Specialties:  Sports Medicine, Orthopedic Surgery    Why:  APPOINTMENT:  March 13, 2017, Monday @ 12noon    Contact information:    [address]  [facility] [address]  [phone]          Follow up In 3 days.         persistent constipation

## 2024-01-11 DIAGNOSIS — Z00.00 ENCOUNTER FOR MEDICARE ANNUAL WELLNESS EXAM: ICD-10-CM

## 2024-01-29 ENCOUNTER — TELEPHONE (OUTPATIENT)
Dept: FAMILY MEDICINE | Facility: CLINIC | Age: 74
End: 2024-01-29
Payer: MEDICARE

## 2024-01-29 NOTE — TELEPHONE ENCOUNTER
Called patient to advise the appointment she has scheduled is the soonest with Dr Damian. No answer, left voicemail to return call.

## 2024-01-29 NOTE — TELEPHONE ENCOUNTER
----- Message from Linh Caleb sent at 1/29/2024  8:42 AM CST -----  Contact: patient  Type:  Sooner Apoointment Request    Caller is requesting a sooner appointment.  Caller declined first available appointment listed below.  Caller will not accept being placed on the waitlist and is requesting a message be sent to doctor.    Name of Caller:patient     When is the first available appointment?    Symptoms:f./u     Would the patient rather a call back or a response via [facility]? Call     Best Call Back Number:[phone] (home)      Additional Information:

## 2024-02-01 ENCOUNTER — TELEPHONE (OUTPATIENT)
Dept: FAMILY MEDICINE | Facility: CLINIC | Age: 74
End: 2024-02-01
Payer: MEDICARE

## 2024-02-01 NOTE — TELEPHONE ENCOUNTER
----- Message from Елена Kc sent at 2/1/2024  9:24 AM CST -----  Regarding: advice  Type:  Needs Medical Advice    Who Called: pt    Best Call Back Number: [phone]      Additional Information: pt wants to reschedule her annual appt. Next available date Is 11/5. She wants to know if she could get it anytime after the 15th she will be getting back into town late on the 15th.  please call to discuss.

## 2024-02-08 ENCOUNTER — LAB VISIT (OUTPATIENT)
Dept: LAB | Facility: HOSPITAL | Age: 74
End: 2024-02-08
Attending: FAMILY MEDICINE
Payer: MEDICARE

## 2024-02-08 DIAGNOSIS — E78.00 PURE HYPERCHOLESTEROLEMIA: ICD-10-CM

## 2024-02-08 LAB
ALBUMIN SERPL BCP-MCNC: 3.9 G/DL (ref 3.5–5.2)
ALP SERPL-CCNC: 59 U/L (ref 55–135)
ALT SERPL W/O P-5'-P-CCNC: 22 U/L (ref 10–44)
ANION GAP SERPL CALC-SCNC: 12 MMOL/L (ref 8–16)
AST SERPL-CCNC: 22 U/L (ref 10–40)
BASOPHILS # BLD AUTO: 0.03 K/UL (ref 0–0.2)
BASOPHILS NFR BLD: 0.7 % (ref 0–1.9)
BILIRUB SERPL-MCNC: 0.5 MG/DL (ref 0.1–1)
BUN SERPL-MCNC: 15 MG/DL (ref 8–23)
CALCIUM SERPL-MCNC: 9.4 MG/DL (ref 8.7–10.5)
CHLORIDE SERPL-SCNC: 105 MMOL/L (ref 95–110)
CHOLEST SERPL-MCNC: 193 MG/DL (ref 120–199)
CHOLEST/HDLC SERPL: 4.3 {RATIO} (ref 2–5)
CO2 SERPL-SCNC: 24 MMOL/L (ref 23–29)
CREAT SERPL-MCNC: 0.9 MG/DL (ref 0.5–1.4)
DIFFERENTIAL METHOD BLD: ABNORMAL
EOSINOPHIL # BLD AUTO: 0.1 K/UL (ref 0–0.5)
EOSINOPHIL NFR BLD: 1.7 % (ref 0–8)
ERYTHROCYTE [DISTWIDTH] IN BLOOD BY AUTOMATED COUNT: 14.1 % (ref 11.5–14.5)
EST. GFR  (NO RACE VARIABLE): >60 ML/MIN/1.73 M^2
GLUCOSE SERPL-MCNC: 95 MG/DL (ref 70–110)
HCT VFR BLD AUTO: 37.9 % (ref 37–48.5)
HDLC SERPL-MCNC: 45 MG/DL (ref 40–75)
HDLC SERPL: 23.3 % (ref 20–50)
HGB BLD-MCNC: 12.1 G/DL (ref 12–16)
IMM GRANULOCYTES # BLD AUTO: 0.01 K/UL (ref 0–0.04)
IMM GRANULOCYTES NFR BLD AUTO: 0.2 % (ref 0–0.5)
LDLC SERPL CALC-MCNC: 111 MG/DL (ref 63–159)
LYMPHOCYTES # BLD AUTO: 1.6 K/UL (ref 1–4.8)
LYMPHOCYTES NFR BLD: 38.7 % (ref 18–48)
MCH RBC QN AUTO: 29.5 PG (ref 27–31)
MCHC RBC AUTO-ENTMCNC: 31.9 G/DL (ref 32–36)
MCV RBC AUTO: 92 FL (ref 82–98)
MONOCYTES # BLD AUTO: 0.4 K/UL (ref 0.3–1)
MONOCYTES NFR BLD: 10.8 % (ref 4–15)
NEUTROPHILS # BLD AUTO: 2 K/UL (ref 1.8–7.7)
NEUTROPHILS NFR BLD: 47.9 % (ref 38–73)
NONHDLC SERPL-MCNC: 148 MG/DL
NRBC BLD-RTO: 0 /100 WBC
PLATELET # BLD AUTO: 207 K/UL (ref 150–450)
PMV BLD AUTO: 10.2 FL (ref 9.2–12.9)
POTASSIUM SERPL-SCNC: 3.8 MMOL/L (ref 3.5–5.1)
PROT SERPL-MCNC: 6.9 G/DL (ref 6–8.4)
RBC # BLD AUTO: 4.1 M/UL (ref 4–5.4)
SODIUM SERPL-SCNC: 141 MMOL/L (ref 136–145)
TRIGL SERPL-MCNC: 185 MG/DL (ref 30–150)
WBC # BLD AUTO: 4.08 K/UL (ref 3.9–12.7)

## 2024-02-08 PROCEDURE — 36415 COLL VENOUS BLD VENIPUNCTURE: CPT | Mod: PO | Performed by: FAMILY MEDICINE

## 2024-02-08 PROCEDURE — 85025 COMPLETE CBC W/AUTO DIFF WBC: CPT | Performed by: FAMILY MEDICINE

## 2024-02-08 PROCEDURE — 80061 LIPID PANEL: CPT | Performed by: FAMILY MEDICINE

## 2024-02-08 PROCEDURE — 80053 COMPREHEN METABOLIC PANEL: CPT | Performed by: FAMILY MEDICINE

## 2024-02-15 ENCOUNTER — OFFICE VISIT (OUTPATIENT)
Dept: FAMILY MEDICINE | Facility: CLINIC | Age: 74
End: 2024-02-15
Payer: MEDICARE

## 2024-02-15 VITALS
DIASTOLIC BLOOD PRESSURE: 68 MMHG | WEIGHT: 172.81 LBS | HEIGHT: 60 IN | RESPIRATION RATE: 12 BRPM | SYSTOLIC BLOOD PRESSURE: 120 MMHG | OXYGEN SATURATION: 96 % | BODY MASS INDEX: 33.93 KG/M2 | TEMPERATURE: 98 F | HEART RATE: 87 BPM

## 2024-02-15 DIAGNOSIS — E78.5 HYPERLIPIDEMIA, UNSPECIFIED HYPERLIPIDEMIA TYPE: ICD-10-CM

## 2024-02-15 DIAGNOSIS — K21.9 GASTROESOPHAGEAL REFLUX DISEASE WITHOUT ESOPHAGITIS: ICD-10-CM

## 2024-02-15 DIAGNOSIS — I10 ESSENTIAL HYPERTENSION: ICD-10-CM

## 2024-02-15 DIAGNOSIS — I10 PRIMARY HYPERTENSION: Primary | ICD-10-CM

## 2024-02-15 DIAGNOSIS — E87.6 HYPOKALEMIA: ICD-10-CM

## 2024-02-15 DIAGNOSIS — Z86.010 HISTORY OF COLON POLYPS: ICD-10-CM

## 2024-02-15 DIAGNOSIS — J30.1 SEASONAL ALLERGIC RHINITIS DUE TO POLLEN: ICD-10-CM

## 2024-02-15 DIAGNOSIS — E55.9 VITAMIN D DEFICIENCY: ICD-10-CM

## 2024-02-15 PROCEDURE — 99999 PR PBB SHADOW E&M-EST. PATIENT-LVL IV: CPT | Mod: PBBFAC,,, | Performed by: FAMILY MEDICINE

## 2024-02-15 PROCEDURE — 99214 OFFICE O/P EST MOD 30 MIN: CPT | Mod: S$PBB,,, | Performed by: FAMILY MEDICINE

## 2024-02-15 PROCEDURE — G2211 COMPLEX E/M VISIT ADD ON: HCPCS | Mod: S$PBB,,, | Performed by: FAMILY MEDICINE

## 2024-02-15 PROCEDURE — 99214 OFFICE O/P EST MOD 30 MIN: CPT | Mod: PBBFAC,PO | Performed by: FAMILY MEDICINE

## 2024-02-15 RX ORDER — POTASSIUM CHLORIDE 750 MG/1
20 TABLET, EXTENDED RELEASE ORAL DAILY
Qty: 180 TABLET | Refills: 3 | Status: SHIPPED | OUTPATIENT
Start: 2024-02-15

## 2024-02-15 RX ORDER — METOPROLOL SUCCINATE 50 MG/1
50 TABLET, EXTENDED RELEASE ORAL DAILY
Qty: 90 TABLET | Refills: 3 | Status: SHIPPED | OUTPATIENT
Start: 2024-02-15

## 2024-02-15 RX ORDER — HYDROCHLOROTHIAZIDE 25 MG/1
25 TABLET ORAL DAILY
Qty: 90 TABLET | Refills: 3 | Status: SHIPPED | OUTPATIENT
Start: 2024-02-15

## 2024-02-15 RX ORDER — OMEPRAZOLE 20 MG/1
20 CAPSULE, DELAYED RELEASE ORAL DAILY
Qty: 90 CAPSULE | Refills: 3 | Status: SHIPPED | OUTPATIENT
Start: 2024-02-15

## 2024-02-15 RX ORDER — LOSARTAN POTASSIUM 100 MG/1
100 TABLET ORAL DAILY
Qty: 90 TABLET | Refills: 3 | Status: SHIPPED | OUTPATIENT
Start: 2024-02-15 | End: 2025-02-14

## 2024-02-15 RX ORDER — SIMVASTATIN 40 MG/1
40 TABLET, FILM COATED ORAL NIGHTLY
Qty: 90 TABLET | Refills: 3 | Status: SHIPPED | OUTPATIENT
Start: 2024-02-15

## 2024-02-15 RX ORDER — FLUTICASONE PROPIONATE 50 MCG
1 SPRAY, SUSPENSION (ML) NASAL DAILY
Qty: 48 G | Refills: 3 | Status: SHIPPED | OUTPATIENT
Start: 2024-02-15

## 2024-02-15 NOTE — PROGRESS NOTES
Subjective:       Patient ID: Ella Canales is a 73 y.o. female.    Chief Complaint: Annual Exam    HPI  Review of Systems   Constitutional:  Negative for fatigue and unexpected weight change.   Respiratory:  Negative for chest tightness and shortness of breath.    Cardiovascular:  Negative for chest pain, palpitations and leg swelling.   Gastrointestinal:  Negative for abdominal pain.   Musculoskeletal:  Negative for arthralgias.   Neurological:  Negative for dizziness, syncope, light-headedness and headaches.       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    Reviewed labs 2/2024  mammo 8/2023 neg    Phys med and rehab Dr. Ruff trochanteric bursitis     GI Dr. Benitez h/o colon polyps 2019 on 3-5 year surveillance     Ortho Dr. Coley right tkr-has healed very well  Eye Dr. Matthews/giulia  cataract  CArd Dr. Ellis  Urology NP O Dimitrios-recurrent uti  Derm Dr. Weil -h/o basal cell ca     Dexa 5/23 osteopenia-with improvement On fosomax since 6/2018. 2020-increase spine and slight decrease hip  Objective:      Physical Exam  Vitals and nursing note reviewed.   Constitutional:       Appearance: She is well-developed.   Cardiovascular:      Rate and Rhythm: Normal rate and regular rhythm.      Heart sounds: Normal heart sounds.   Pulmonary:      Effort: Pulmonary effort is normal.      Breath sounds: Normal breath sounds.   Skin:     General: Skin is warm and dry.   Neurological:      Mental Status: She is alert and oriented to person,  place, and time.         Assessment:       1. Primary hypertension    2. History of colon polyps    3. Hyperlipidemia, unspecified hyperlipidemia type    4. Gastroesophageal reflux disease without esophagitis    5. Vitamin D deficiency    6. Essential hypertension    7. Hypokalemia    8. Seasonal allergic rhinitis due to pollen        Plan:         1. Primary hypertension  Controlled on current medications.  Continue current medications.      2. History of colon polyps  Refer for   - Case Request Endoscopy: COLONOSCOPY    3. Hyperlipidemia, unspecified hyperlipidemia type  Controlled on current medications.  Continue current medications.    - CBC Auto Differential; Future  - Comprehensive Metabolic Panel; Future  - Lipid Panel; Future  - simvastatin (ZOCOR) 40 MG tablet; Take 1 tablet (40 mg total) by mouth every evening.  Dispense: 90 tablet; Refill: 3    4. Gastroesophageal reflux disease without esophagitis  Controlled on current medications.  Continue current medications.    - omeprazole (PRILOSEC) 20 MG capsule; Take 1 capsule (20 mg total) by mouth once daily. TAKE 1 CAPSULE(20 MG) BY MOUTH TWICE DAILY  Dispense: 90 capsule; Refill: 3    5. Vitamin D deficiency  Cont d3 1000 units otc and monitor  - Vitamin D; Future    6. Essential hypertension  Controlled on current medications.  Continue current medications.    - losartan (COZAAR) 100 MG tablet; Take 1 tablet (100 mg total) by mouth once daily.  Dispense: 90 tablet; Refill: 3  - metoprolol succinate (TOPROL-XL) 50 MG 24 hr tablet; Take 1 tablet (50 mg total) by mouth once daily.  Dispense: 90 tablet; Refill: 3  - hydroCHLOROthiazide (HYDRODIURIL) 25 MG tablet; Take 1 tablet (25 mg total) by mouth once daily.  Dispense: 90 tablet; Refill: 3    7. Hypokalemia  Controlled on current medications.  Continue current medications.    - potassium chloride SA (K-DUR,KLOR-CON M) 10 MEQ tablet; Take 2 tablets (20 mEq total) by mouth once daily.  Dispense: 180 tablet;  Refill: 3    8. Seasonal allergic rhinitis due to pollen  Recommend otc non-sedating antihistamine such as Loratadine and/or steroid nasal spray such as Flonase as directed and as needed.  Please return to clinic if symptoms persist after these interventions.    - fluticasone propionate (FLONASE) 50 mcg/actuation nasal spray; 1 spray (50 mcg total) by Each Nostril route once daily.  Dispense: 48 g; Refill: 3      Time spent with patient: 20 minutes    Patient with be reevaluated in 1 year or sooner prn    Greater than 50% of this visit was spent counseling as described in above documentation:Yes

## 2024-02-16 ENCOUNTER — PATIENT MESSAGE (OUTPATIENT)
Dept: GASTROENTEROLOGY | Facility: CLINIC | Age: 74
End: 2024-02-16
Payer: MEDICARE

## 2024-02-21 ENCOUNTER — TELEPHONE (OUTPATIENT)
Dept: GASTROENTEROLOGY | Facility: CLINIC | Age: 74
End: 2024-02-21
Payer: MEDICARE

## 2024-04-23 ENCOUNTER — PATIENT MESSAGE (OUTPATIENT)
Dept: GASTROENTEROLOGY | Facility: CLINIC | Age: 74
End: 2024-04-23
Payer: MEDICARE

## 2024-07-17 ENCOUNTER — OFFICE VISIT (OUTPATIENT)
Dept: URGENT CARE | Facility: CLINIC | Age: 74
End: 2024-07-17
Payer: MEDICARE

## 2024-07-17 VITALS
OXYGEN SATURATION: 96 % | WEIGHT: 172 LBS | RESPIRATION RATE: 20 BRPM | HEIGHT: 60 IN | DIASTOLIC BLOOD PRESSURE: 80 MMHG | TEMPERATURE: 98 F | HEART RATE: 74 BPM | SYSTOLIC BLOOD PRESSURE: 134 MMHG | BODY MASS INDEX: 33.77 KG/M2

## 2024-07-17 DIAGNOSIS — N39.0 URINARY TRACT INFECTION WITHOUT HEMATURIA, SITE UNSPECIFIED: Primary | ICD-10-CM

## 2024-07-17 DIAGNOSIS — R35.0 FREQUENT URINATION: ICD-10-CM

## 2024-07-17 LAB
BILIRUB UR QL STRIP: NEGATIVE
GLUCOSE UR QL STRIP: NEGATIVE
KETONES UR QL STRIP: NEGATIVE
LEUKOCYTE ESTERASE UR QL STRIP: POSITIVE
PH, POC UA: 6
POC BLOOD, URINE: POSITIVE
POC NITRATES, URINE: NEGATIVE
PROT UR QL STRIP: POSITIVE
SP GR UR STRIP: 1.02 (ref 1–1.03)
UROBILINOGEN UR STRIP-ACNC: NEGATIVE (ref 0.1–1.1)

## 2024-07-17 PROCEDURE — 99204 OFFICE O/P NEW MOD 45 MIN: CPT | Mod: S$GLB,,, | Performed by: NURSE PRACTITIONER

## 2024-07-17 PROCEDURE — 81003 URINALYSIS AUTO W/O SCOPE: CPT | Mod: QW,S$GLB,, | Performed by: NURSE PRACTITIONER

## 2024-07-17 RX ORDER — CEFUROXIME AXETIL 250 MG/1
250 TABLET ORAL EVERY 12 HOURS
Qty: 10 TABLET | Refills: 0 | Status: SHIPPED | OUTPATIENT
Start: 2024-07-17 | End: 2024-07-22

## 2024-07-17 NOTE — PROGRESS NOTES
Subjective:      Patient ID: Ella Canales is a 73 y.o. female.    Vitals:  height is 5' (1.524 m) and weight is 78 kg (172 lb). Her oral temperature is 98.3 °F (36.8 °C). Her blood pressure is 134/80 and her pulse is 74. Her respiration is 20 and oxygen saturation is 96%.     Chief Complaint: Urinary Tract Infection    Ella Canales is a 73 year old female presenting to the clinic with a two day history of urgency and mild dysuria. She has had no flank pain, fever, vomiting. She is tolerating PO intake without difficulty.         Constitution: Negative.   HENT: Negative.     Neck: neck negative.   Eyes: Negative.    Respiratory: Negative.     Gastrointestinal: Negative.    Genitourinary:  Positive for dysuria and frequency.   Musculoskeletal: Negative.    Skin: Negative.    Neurological: Negative.       Objective:     Physical Exam   Constitutional: She is oriented to person, place, and time. She appears well-developed. She is cooperative.  Non-toxic appearance. She does not appear ill. No distress.   HENT:   Head: Normocephalic and atraumatic.   Ears:   Right Ear: Hearing and external ear normal.   Left Ear: Hearing and external ear normal.   Nose: Nose normal. No mucosal edema, rhinorrhea or nasal deformity. No epistaxis. Right sinus exhibits no maxillary sinus tenderness and no frontal sinus tenderness. Left sinus exhibits no maxillary sinus tenderness and no frontal sinus tenderness.   Mouth/Throat: Uvula is midline, oropharynx is clear and moist and mucous membranes are normal. No trismus in the jaw. Normal dentition. No uvula swelling. No oropharyngeal exudate, posterior oropharyngeal edema or posterior oropharyngeal erythema.   Eyes: Conjunctivae and lids are normal. No scleral icterus.   Neck: Trachea normal and phonation normal. Neck supple. No edema present. No erythema present. No neck rigidity present.   Cardiovascular: Normal rate, regular rhythm, normal heart sounds and normal pulses.    Pulmonary/Chest: Effort normal and breath sounds normal. No respiratory distress. She has no decreased breath sounds. She has no rhonchi.   Abdominal: Normal appearance. Soft. There is no abdominal tenderness. There is no guarding.   Musculoskeletal: Normal range of motion.         General: No deformity. Normal range of motion.      Comments: No CVA tenderness to percussion   Neurological: She is alert and oriented to person, place, and time. She exhibits normal muscle tone. Coordination normal.   Skin: Skin is warm, dry, intact, not diaphoretic and not pale.   Psychiatric: Her speech is normal and behavior is normal. Judgment and thought content normal.   Nursing note and vitals reviewed.      Assessment:     1. Urinary tract infection without hematuria, site unspecified    2. Frequent urination        Plan:   The patient's symptoms are consistent with a urinary tract infection.  The patient does not appear to have pyelonephritis, urinary retention, ureterolithiasis, or urosepsis.  The patient will be treated with antibiotics as an outpatient and has been given specific return precautions.       Urinary tract infection without hematuria, site unspecified  -     CULTURE, URINE    Frequent urination  -     POCT Urinalysis, Dipstick, Manual, W/O Scope  -     CULTURE, URINE    Other orders  -     cefUROXime (CEFTIN) 250 MG tablet; Take 1 tablet (250 mg total) by mouth every 12 (twelve) hours. for 5 days  Dispense: 10 tablet; Refill: 0

## 2024-07-26 ENCOUNTER — CLINICAL SUPPORT (OUTPATIENT)
Dept: FAMILY MEDICINE | Facility: CLINIC | Age: 74
End: 2024-07-26
Payer: MEDICARE

## 2024-07-26 ENCOUNTER — TELEPHONE (OUTPATIENT)
Dept: FAMILY MEDICINE | Facility: CLINIC | Age: 74
End: 2024-07-26

## 2024-07-26 ENCOUNTER — TELEPHONE (OUTPATIENT)
Dept: FAMILY MEDICINE | Facility: CLINIC | Age: 74
End: 2024-07-26
Payer: MEDICARE

## 2024-07-26 DIAGNOSIS — R39.9 UTI SYMPTOMS: ICD-10-CM

## 2024-07-26 DIAGNOSIS — R35.0 URINARY FREQUENCY: ICD-10-CM

## 2024-07-26 DIAGNOSIS — R35.0 URINARY FREQUENCY: Primary | ICD-10-CM

## 2024-07-26 LAB
BILIRUB SERPL-MCNC: ABNORMAL MG/DL
BLOOD URINE, POC: ABNORMAL
CLARITY, POC UA: ABNORMAL
COLOR, POC UA: YELLOW
GLUCOSE UR QL STRIP: ABNORMAL
KETONES UR QL STRIP: ABNORMAL
LEUKOCYTE ESTERASE URINE, POC: ABNORMAL
NITRITE, POC UA: ABNORMAL
PH, POC UA: 5.5
PROTEIN, POC: ABNORMAL
SPECIFIC GRAVITY, POC UA: 1.01
UROBILINOGEN, POC UA: 0.2

## 2024-07-26 PROCEDURE — 87086 URINE CULTURE/COLONY COUNT: CPT | Performed by: FAMILY MEDICINE

## 2024-07-26 RX ORDER — NITROFURANTOIN 25; 75 MG/1; MG/1
100 CAPSULE ORAL 2 TIMES DAILY
Qty: 14 CAPSULE | Refills: 0 | Status: SHIPPED | OUTPATIENT
Start: 2024-07-26

## 2024-07-26 NOTE — PROGRESS NOTES
Urine specimen collected via clean catch; POCT completed.  Urine specimen brought to lab for culture.

## 2024-07-26 NOTE — TELEPHONE ENCOUNTER
----- Message from Marlyn Yocasta sent at 7/26/2024  9:16 AM CDT -----  Contact: PT  Type:  Same Day Appointment Request    Caller is requesting a same day appointment.  Caller declined first available appointment listed below.      Name of Caller:   RAN   When is the first available appointment?   MONDAY - W/ NP MCDAY  Symptoms:   UTI, BURNING, URGENCY,   Best Call Back Number:  [phone]    Additional Information:   PT HAS REOCCURRING UTI - PT WENT TO [facility] WEEK AG AND WAS GIVEN ANTIBIOTICS FOR 5 DAYS AND PT STATING THAT WAS NOT LONG ENOUGH TO CLEAR THE UTI

## 2024-07-26 NOTE — TELEPHONE ENCOUNTER
Pt reporting urinary frequency and dysuria; pt to come in and provide urine sample for testing.  PCP notified.

## 2024-07-26 NOTE — TELEPHONE ENCOUNTER
----- Message from Sylvia Louise, Patient Care Assistant sent at 7/26/2024  1:34 PM CDT -----  Regarding: results  Contact: pt  Type: Needs Medical Advice    Who Called:  pt     Best Call Back Number: [phone] (home)     Additional Information: pt states she would like a callback regarding her abnormal lab results from her nurse visit on 7/26/24. Please call to advise. Thanks!

## 2024-07-26 NOTE — TELEPHONE ENCOUNTER
Pt notified of new order for macrobid as well as MD recommendations voiced understanding; no further intervention needed at this time.

## 2024-07-26 NOTE — TELEPHONE ENCOUNTER
Macrobid called in for probable uti. Cx pending. Monitor for worsening symptoms and return to clinic or go to the ER if these occur: fever > 100.4, severe abdominal pain, intractable vomiting, bleeding from the rectum or black tarry stools, dehydration, lethargy or other worsening symptoms.

## 2024-07-27 LAB — BACTERIA UR CULT: ABNORMAL

## 2024-09-03 ENCOUNTER — TELEPHONE (OUTPATIENT)
Dept: FAMILY MEDICINE | Facility: CLINIC | Age: 74
End: 2024-09-03
Payer: MEDICARE

## 2024-09-03 ENCOUNTER — E-VISIT (OUTPATIENT)
Dept: FAMILY MEDICINE | Facility: CLINIC | Age: 74
End: 2024-09-03
Payer: MEDICARE

## 2024-09-03 DIAGNOSIS — N30.00 ACUTE CYSTITIS WITHOUT HEMATURIA: Primary | ICD-10-CM

## 2024-09-03 RX ORDER — SULFAMETHOXAZOLE AND TRIMETHOPRIM 800; 160 MG/1; MG/1
1 TABLET ORAL 2 TIMES DAILY
Qty: 6 TABLET | Refills: 0 | Status: SHIPPED | OUTPATIENT
Start: 2024-09-03 | End: 2024-09-06

## 2024-09-03 NOTE — PROGRESS NOTES
Patient ID: Ella Canales is a 73 y.o. female.    Chief Complaint: General Illness (Entered automatically based on patient selection in [employer].)          274}  The patient initiated a request through [employer] on 9/3/2024 for evaluation and management with a chief complaint of General Illness (Entered automatically based on patient selection in [employer].)     I evaluated the questionnaire submission on 09/03/2024 .    Total Time (in minutes): 12     [facility] Peq Evisit Supergroup-Common Problems    9/3/2024 10:17 AM CDT - Filed by Patient   What do you need help with? Urinary Symptoms   Do you agree to participate in an E-Visit? Yes   If you have any of the following symptoms, please present to your local emergency room or call 911:  I acknowledge   What is the main issue you would like addressed today? UTI   What symptoms do you currently have? Pain while passing urine   When did your symptoms first appear? 8/31/2024   List what you have done or taken to help your symptoms. Nothing as of yet   Please indicate whether you have had the following symptoms during the past 24 hours     Urgent urination (a sudden and uncontrollable urge to urinate) Severe   Frequent urination of small amounts of urine (going to the toilet very often) Moderate   Burning pain when urinating Mild   Incomplete bladder emptying (still feel like you need to urinate again after urination) Moderate   Pain not associated with urination in the lower abdomen below the belly button) Mild   What does your urine look like? Cloudy   Blood seen in the urine None   Flank pain (pain in one or both sides of the lower back) None   Abnormal Vaginal Discharge (abnormal amount, color and/or odor) None   Discharge from the urethra (urinary opening) without urination None   High body temperature/fever? None-<99.5   Please rate how much discomfort you have experience because of the symptoms in the past 24 hours: Moderate   Please indicate how the symptoms have  interfered with your every day activities/work in the past 24 hours: Moderate   Please indicate how these symptoms have interfered with your social activities (visiting people, meeting with friends, etc.) in the past 24 hours? Moderate   Are you a diabetic? No   Please indicate whether you have the following at the time of completion of this questionnaire: Signs of menopause syndrome (hot flashes);  None of the above   Provide any additional information you feel is important.    Please attach any relevant images or files (if you have performed a home test for UTI, please submit a photo of results)    Are you able to take your vital signs? 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 08/03/2017    Internal derangement of left knee 03/06/2017    Chronic rhinitis 01/06/2017    Right knee pain 12/19/2016    Genu varum of right lower extremity 12/19/2016    Acute blood loss as cause of postoperative anemia 03/01/2016    Arthritis 03/01/2016    Hypokalemia 09/01/2015    HTN (hypertension) 09/10/2014    Hyperlipidemia 09/10/2014    Osteoporosis 09/10/2014      Recent Labs Obtained:  Lab Results   Component Value Date    WBC 4.08 02/08/2024    HGB 12.1 02/08/2024    HCT 37.9 02/08/2024    MCV 92 02/08/2024     02/08/2024     02/08/2024    K 3.8 02/08/2024    GLU 95 02/08/2024    CREATININE 0.9 02/08/2024    EGFRNORACEVR >60.0 02/08/2024    TSH 2.387 12/10/2018      Review of patient's allergies indicates:   Allergen Reactions    Ace inhibitors      Other reaction(s): cough    Latex      denies       Encounter Diagnosis   Name Primary?    Acute cystitis without hematuria Yes        Orders Placed This Encounter   Procedures    Urinalysis, Reflex to Urine Culture Urine, Clean  Catch     Standing Status:   Future     Standing Expiration Date:   11/2/2025     Order Specific Question:   Preferred Collection Type     Answer:   Urine, Clean Catch     Order Specific Question:   Specimen Source     Answer:   Urine      Medications Ordered This Encounter   Medications    sulfamethoxazole-trimethoprim 800-160mg (BACTRIM DS) 800-160 mg Tab     Sig: Take 1 tablet by mouth 2 (two) times daily. for 3 days     Dispense:  6 tablet     Refill:  0        E-Visit Time Tracking:    Day 1 Time (in minutes): 12    Total Time (in minutes): 12      274}

## 2024-09-03 NOTE — TELEPHONE ENCOUNTER
----- Message from Nayana Che sent at 9/3/2024  9:28 AM CDT -----  Contact: Patient  Type:  Same Day Appointment Request    Caller is requesting a same day appointment.  Caller declined first available appointment listed below.      Name of Caller:  Patient  When is the first available appointment?  N/A    Symptoms:  UTI    Best Call Back Number:  [phone]    Additional Information:     Symptom: Urination Pain  Outcome: Schedule a same-day appointment or talk to a nurse or provider within 1 hour.  Reason: Caller denied all higher acuity questions

## 2024-09-03 NOTE — TELEPHONE ENCOUNTER
Spoke to pt who states she had a UTI 3 weeks ago but feels the antibiotic wasn't strong enough. Pt states the symptoms started back up on Saturday. She complains of frequency, urgency, clear urine, foul odor. Denies, burning, pain, blood, and fever.  Sent evisit and pt agreed to complete

## 2025-02-05 ENCOUNTER — LAB VISIT (OUTPATIENT)
Dept: LAB | Facility: HOSPITAL | Age: 75
End: 2025-02-05
Payer: MEDICARE

## 2025-02-05 DIAGNOSIS — E78.5 HYPERLIPIDEMIA, UNSPECIFIED HYPERLIPIDEMIA TYPE: ICD-10-CM

## 2025-02-05 DIAGNOSIS — E55.9 VITAMIN D DEFICIENCY: ICD-10-CM

## 2025-02-05 LAB
25(OH)D3+25(OH)D2 SERPL-MCNC: 96 NG/ML (ref 30–96)
ALBUMIN SERPL BCP-MCNC: 4.2 G/DL (ref 3.5–5.2)
ALP SERPL-CCNC: 76 U/L (ref 40–150)
ALT SERPL W/O P-5'-P-CCNC: 23 U/L (ref 10–44)
ANION GAP SERPL CALC-SCNC: 10 MMOL/L (ref 8–16)
AST SERPL-CCNC: 22 U/L (ref 10–40)
BASOPHILS # BLD AUTO: 0.02 K/UL (ref 0–0.2)
BASOPHILS NFR BLD: 0.4 % (ref 0–1.9)
BILIRUB SERPL-MCNC: 0.6 MG/DL (ref 0.1–1)
BUN SERPL-MCNC: 16 MG/DL (ref 8–23)
CALCIUM SERPL-MCNC: 9.6 MG/DL (ref 8.7–10.5)
CHLORIDE SERPL-SCNC: 100 MMOL/L (ref 95–110)
CHOLEST SERPL-MCNC: 181 MG/DL (ref 120–199)
CHOLEST/HDLC SERPL: 4.6 {RATIO} (ref 2–5)
CO2 SERPL-SCNC: 27 MMOL/L (ref 23–29)
CREAT SERPL-MCNC: 0.8 MG/DL (ref 0.5–1.4)
DIFFERENTIAL METHOD BLD: ABNORMAL
EOSINOPHIL # BLD AUTO: 0.1 K/UL (ref 0–0.5)
EOSINOPHIL NFR BLD: 1.8 % (ref 0–8)
ERYTHROCYTE [DISTWIDTH] IN BLOOD BY AUTOMATED COUNT: 13.4 % (ref 11.5–14.5)
EST. GFR  (NO RACE VARIABLE): >60 ML/MIN/1.73 M^2
GLUCOSE SERPL-MCNC: 109 MG/DL (ref 70–110)
HCT VFR BLD AUTO: 39.4 % (ref 37–48.5)
HDLC SERPL-MCNC: 39 MG/DL (ref 40–75)
HDLC SERPL: 21.5 % (ref 20–50)
HGB BLD-MCNC: 12.8 G/DL (ref 12–16)
IMM GRANULOCYTES # BLD AUTO: 0.02 K/UL (ref 0–0.04)
IMM GRANULOCYTES NFR BLD AUTO: 0.4 % (ref 0–0.5)
LDLC SERPL CALC-MCNC: 111.2 MG/DL (ref 63–159)
LYMPHOCYTES # BLD AUTO: 0.6 K/UL (ref 1–4.8)
LYMPHOCYTES NFR BLD: 12.9 % (ref 18–48)
MCH RBC QN AUTO: 29.6 PG (ref 27–31)
MCHC RBC AUTO-ENTMCNC: 32.5 G/DL (ref 32–36)
MCV RBC AUTO: 91 FL (ref 82–98)
MONOCYTES # BLD AUTO: 0.5 K/UL (ref 0.3–1)
MONOCYTES NFR BLD: 11.1 % (ref 4–15)
NEUTROPHILS # BLD AUTO: 3.3 K/UL (ref 1.8–7.7)
NEUTROPHILS NFR BLD: 73.4 % (ref 38–73)
NONHDLC SERPL-MCNC: 142 MG/DL
NRBC BLD-RTO: 0 /100 WBC
PLATELET # BLD AUTO: 178 K/UL (ref 150–450)
PMV BLD AUTO: 9.8 FL (ref 9.2–12.9)
POTASSIUM SERPL-SCNC: 4.1 MMOL/L (ref 3.5–5.1)
PROT SERPL-MCNC: 7.6 G/DL (ref 6–8.4)
RBC # BLD AUTO: 4.32 M/UL (ref 4–5.4)
SODIUM SERPL-SCNC: 137 MMOL/L (ref 136–145)
TRIGL SERPL-MCNC: 154 MG/DL (ref 30–150)
WBC # BLD AUTO: 4.51 K/UL (ref 3.9–12.7)

## 2025-02-05 PROCEDURE — 85025 COMPLETE CBC W/AUTO DIFF WBC: CPT | Performed by: FAMILY MEDICINE

## 2025-02-05 PROCEDURE — 80061 LIPID PANEL: CPT | Performed by: FAMILY MEDICINE

## 2025-02-05 PROCEDURE — 82306 VITAMIN D 25 HYDROXY: CPT | Performed by: FAMILY MEDICINE

## 2025-02-05 PROCEDURE — 36415 COLL VENOUS BLD VENIPUNCTURE: CPT | Mod: PO | Performed by: FAMILY MEDICINE

## 2025-02-05 PROCEDURE — 80053 COMPREHEN METABOLIC PANEL: CPT | Performed by: FAMILY MEDICINE

## 2025-02-13 NOTE — TELEPHONE ENCOUNTER
I filled his medications for 30 days. I also put in labs for him. He needs an appointment.  Refill Authorization Note   Ella Canales  is requesting a refill authorization.  Brief Assessment and Rationale for Refill:  Approve     Medication Therapy Plan:       Medication Reconciliation Completed: No   Comments:     No Care Gaps recommended.     Note composed:5:57 PM 08/03/2022

## 2025-02-14 ENCOUNTER — OFFICE VISIT (OUTPATIENT)
Dept: FAMILY MEDICINE | Facility: CLINIC | Age: 75
End: 2025-02-14
Payer: MEDICARE

## 2025-02-14 VITALS
HEART RATE: 66 BPM | OXYGEN SATURATION: 97 % | DIASTOLIC BLOOD PRESSURE: 60 MMHG | RESPIRATION RATE: 14 BRPM | SYSTOLIC BLOOD PRESSURE: 130 MMHG | BODY MASS INDEX: 33.41 KG/M2 | WEIGHT: 170.19 LBS | HEIGHT: 60 IN | TEMPERATURE: 98 F

## 2025-02-14 DIAGNOSIS — Z86.0100 HISTORY OF COLON POLYPS: ICD-10-CM

## 2025-02-14 DIAGNOSIS — Z12.31 ENCOUNTER FOR SCREENING MAMMOGRAM FOR MALIGNANT NEOPLASM OF BREAST: ICD-10-CM

## 2025-02-14 DIAGNOSIS — E66.9 OBESITY (BMI 30-39.9): ICD-10-CM

## 2025-02-14 DIAGNOSIS — I10 PRIMARY HYPERTENSION: Primary | ICD-10-CM

## 2025-02-14 DIAGNOSIS — E55.9 VITAMIN D DEFICIENCY: ICD-10-CM

## 2025-02-14 DIAGNOSIS — M85.80 OSTEOPENIA, UNSPECIFIED LOCATION: ICD-10-CM

## 2025-02-14 DIAGNOSIS — Z23 FLU VACCINE NEED: ICD-10-CM

## 2025-02-14 DIAGNOSIS — K21.9 GASTROESOPHAGEAL REFLUX DISEASE WITHOUT ESOPHAGITIS: ICD-10-CM

## 2025-02-14 DIAGNOSIS — N95.9 MENOPAUSAL AND POSTMENOPAUSAL DISORDER: ICD-10-CM

## 2025-02-14 DIAGNOSIS — E78.5 HYPERLIPIDEMIA, UNSPECIFIED HYPERLIPIDEMIA TYPE: ICD-10-CM

## 2025-02-14 PROCEDURE — 99214 OFFICE O/P EST MOD 30 MIN: CPT | Mod: PBBFAC,PO | Performed by: FAMILY MEDICINE

## 2025-02-14 PROCEDURE — 99999 PR PBB SHADOW E&M-EST. PATIENT-LVL IV: CPT | Mod: PBBFAC,,, | Performed by: FAMILY MEDICINE

## 2025-02-14 NOTE — PROGRESS NOTES
Subjective:       Patient ID: Ella Canales is a 74 y.o. female.    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    Reviewed labs 2/2025  History of Present Illness    CHIEF COMPLAINT:  Ms. Canales presents today for a routine checkup.    FAMILY HISTORY:  Her 50-year-old daughter recently experienced an episode of difficulty breathing with associated panic and intermittent coughing.    PREVENTIVE CARE:  She is due for mammogram and bone density scan in June. She has not received flu vaccine this year despite previous summer scheduling, and is agreeable to receiving it today.    PHYSICAL ACTIVITY:  She reports no current exercise routine. Activity has been limited since knee replacement surgery. She has an exercise bike at home but has not used it since post-surgical recovery.      ROS:  ROS findings as noted in HPI.        Review of Systems   Constitutional:  Negative for fatigue and unexpected weight change.   Respiratory:  Negative for chest tightness and shortness of breath.    Cardiovascular:  Negative for chest pain, palpitations and leg swelling.   Gastrointestinal:  Negative for abdominal pain.   Musculoskeletal:  Negative for arthralgias.   Neurological:  Negative for dizziness, syncope, light-headedness and headaches.      Relevant History:  GYN mammo 8/2023 neg. Dexa 5/23 osteopenia-with improvement On fosomax since 6/2018. 2020-increase spine and slight decrease hip     Phys med and rehab Dr. Ruff  trochanteric bursitis     GI Dr. Benitez h/o colon polyps 2019 on 3-5 year surveillance     Ortho Dr. oCley right tkr-has healed very well    Eye Dr. Matthews/giulia  cataract    CArd Dr. Ellis    Urology NP O Dimitrios-recurrent uti    Derm Dr. Weil -h/o basal cell ca       Objective:      Physical Exam  Vitals and nursing note reviewed.   Constitutional:       Appearance: She is well-developed.   Cardiovascular:      Rate and Rhythm: Normal rate and regular rhythm.      Heart sounds: Normal heart sounds.   Pulmonary:      Effort: Pulmonary effort is normal.      Breath sounds: Normal breath sounds.   Skin:     General: Skin is warm and dry.   Neurological:      Mental Status: She is alert and oriented to person, place, and time.         Assessment:       ICD-10-CM ICD-9-CM    1. Primary hypertension  I10 401.9       2. History of colon polyps  Z86.0100 V12.72       3. Vitamin D deficiency  E55.9 268.9 Vitamin D      4. Gastroesophageal reflux disease without esophagitis  K21.9 530.81       5. Hyperlipidemia, unspecified hyperlipidemia type  E78.5 272.4 CBC Auto Differential      Comprehensive Metabolic Panel      Lipid Panel      6. Osteopenia, unspecified location  M85.80 733.90       7. Obesity (BMI 30-39.9)  E66.9 278.00       8. Encounter for screening mammogram for malignant neoplasm of breast  Z12.31 V76.12 Mammo Digital Screening Bilat w/ Zuhair (XPD)      9. Menopausal and postmenopausal disorder  N95.9 627.9 DXA Bone Density Axial Skeleton 1 or more sites      10. Flu vaccine need  Z23 V04.81 influenza (adjuvanted) (Fluad) 45 mcg/0.5 mL IM vaccine (> or = 66 yo) 0.5 mL         Plan:   1. Primary hypertension (Primary)  Controlled on current medications.  Continue current medications.      2. History of colon polyps  Cont gi surveillance    3. Vitamin D deficiency  Screen and treat as indicated:    - Vitamin D; Future    4. Gastroesophageal reflux disease without esophagitis  Controlled on current medications.   Continue current medications.  Prilosec 20mg     5. Hyperlipidemia, unspecified hyperlipidemia type  Controlled on current medications.  Continue current medications.  Zocor 40mg  - CBC Auto Differential; Future  - Comprehensive Metabolic Panel; Future  - Lipid Panel; Future    6. Osteopenia, unspecified location  .  I recommend regular exercise specifically to add bone mass such as light weight lifting for the upper body and anti-gravity exercises like walking for the lower body and spine.  I also recommend over-the-counter calcium 500 mg + vitamin D 200 units supplements-once daily if you get 2-3 servings of dairy per day in your diet or twice daily if not.  Take them with food.      7. Obesity (BMI 30-39.9)  Counseled patient on his ideal body weight, health consequences of being obese and current recommendations including weekly exercise and a heart healthy diet.  Current BMI is:Estimated body mass index is 33.24 kg/m² as calculated from the following:    Height as of this encounter: 5' (1.524 m).    Weight as of this encounter: 77.2 kg (170 lb 3.1 oz)..  Patient is aware that ideal BMI < 25 or Weight in (lb) to have BMI = 25: 127.7.      8. Encounter for screening mammogram for malignant neoplasm of breast  Screen and treat as indicated:    - Mammo Digital Screening Bilat w/ Zuhair (XPD); Future    9. Menopausal and postmenopausal disorder  Screen and treat as indicated:    - DXA Bone Density Axial Skeleton 1 or more sites; Future    10. Flu vaccine need  Immunize today.  Counseled patient on risks, benefits and side effects.  Patient elected to proceed with vaccination.    - influenza (adjuvanted) (Fluad) 45 mcg/0.5 mL IM vaccine (> or = 66 yo) 0.5 mL  Assessment & Plan    - Explained importance of regular exercise for long-term health and quality of life, discussed benefits of maintaining an active lifestyle.  - Advised on appropriate exercises post-knee replacement, suggesting low-impact options like yoga,  chair yoga, or stationary bike.  - Ms. Melan to increase daily physical activity, aim for 10,000 steps per day, and make exercise a routine rather than waiting for motivation.  - Decreased vitamin D supplement: Hold for at least 6 months to avoid excessive range.  - Administered flu vaccine in office.  - Mammogram ordered.  - Bone density scan ordered.  - Follow up in 1 year.  - Contact the office to schedule mammogram and bone density scan for June.  - Blood work, including vitamin D check, to be performed at next annual visit.         Time spent with patient: 20 minutes  Patient with be reevaluated in 1 year or sooner prn  Greater than 50% of this visit was spent counseling as described in above documentation:Yes  This note was generated with the assistance of ambient listening technology. Verbal consent was obtained by the patient and accompanying visitor(s) for the recording of patient appointment to facilitate this note. I attest to having reviewed and edited the generated note for accuracy, though some syntax or spelling errors may persist. Please contact the author of this note for any clarification.

## 2025-02-22 DIAGNOSIS — Z00.00 ENCOUNTER FOR MEDICARE ANNUAL WELLNESS EXAM: ICD-10-CM

## 2025-04-09 DIAGNOSIS — E78.5 HYPERLIPIDEMIA, UNSPECIFIED HYPERLIPIDEMIA TYPE: ICD-10-CM

## 2025-04-09 DIAGNOSIS — E87.6 HYPOKALEMIA: ICD-10-CM

## 2025-04-09 DIAGNOSIS — I10 ESSENTIAL HYPERTENSION: ICD-10-CM

## 2025-04-09 RX ORDER — METOPROLOL SUCCINATE 50 MG/1
50 TABLET, EXTENDED RELEASE ORAL
Qty: 90 TABLET | Refills: 3 | Status: SHIPPED | OUTPATIENT
Start: 2025-04-09

## 2025-04-09 RX ORDER — SIMVASTATIN 40 MG/1
40 TABLET, FILM COATED ORAL NIGHTLY
Qty: 90 TABLET | Refills: 3 | Status: SHIPPED | OUTPATIENT
Start: 2025-04-09

## 2025-04-09 RX ORDER — POTASSIUM CHLORIDE 750 MG/1
TABLET, EXTENDED RELEASE ORAL
Qty: 180 TABLET | Refills: 3 | Status: SHIPPED | OUTPATIENT
Start: 2025-04-09

## 2025-04-09 NOTE — TELEPHONE ENCOUNTER
Refill Routing Note   Medication(s) are not appropriate for processing by [facility] for the following reason(s):        No active prescription written by provider  Drug-disease interaction    [facility] action(s):  Approve  Defer        Medication Therapy Plan: LOSARTAN; EXPIRD AS OF  02/14/25; hydroCHLOROthiazide and Hypokalemia      Appointments  past 12m or future 3m with PCP    Date Provider   Last Visit   2/14/2025 Adry Damian MD   Next Visit   Visit date not found Adry Damian MD   ED visits in past 90 days: 0        Note composed:10:31 AM 04/09/2025

## 2025-04-10 RX ORDER — LOSARTAN POTASSIUM 100 MG/1
100 TABLET ORAL
Qty: 90 TABLET | Refills: 3 | Status: SHIPPED | OUTPATIENT
Start: 2025-04-10

## 2025-04-10 RX ORDER — HYDROCHLOROTHIAZIDE 25 MG/1
25 TABLET ORAL
Qty: 90 TABLET | Refills: 3 | Status: SHIPPED | OUTPATIENT
Start: 2025-04-10

## 2025-05-06 ENCOUNTER — OFFICE VISIT (OUTPATIENT)
Dept: FAMILY MEDICINE | Facility: CLINIC | Age: 75
End: 2025-05-06
Payer: MEDICARE

## 2025-05-06 VITALS
TEMPERATURE: 98 F | WEIGHT: 172.63 LBS | HEIGHT: 60 IN | DIASTOLIC BLOOD PRESSURE: 62 MMHG | OXYGEN SATURATION: 95 % | BODY MASS INDEX: 33.89 KG/M2 | HEART RATE: 74 BPM | SYSTOLIC BLOOD PRESSURE: 140 MMHG

## 2025-05-06 DIAGNOSIS — Z01.818 PRE-OP EVALUATION: Primary | ICD-10-CM

## 2025-05-06 DIAGNOSIS — I10 PRIMARY HYPERTENSION: ICD-10-CM

## 2025-05-06 DIAGNOSIS — H26.9 CATARACT, UNSPECIFIED CATARACT TYPE, UNSPECIFIED LATERALITY: ICD-10-CM

## 2025-05-06 DIAGNOSIS — E78.00 PURE HYPERCHOLESTEROLEMIA: ICD-10-CM

## 2025-05-06 PROCEDURE — G2211 COMPLEX E/M VISIT ADD ON: HCPCS | Mod: S$PBB,,, | Performed by: NURSE PRACTITIONER

## 2025-05-06 PROCEDURE — 99214 OFFICE O/P EST MOD 30 MIN: CPT | Mod: PBBFAC,PO | Performed by: NURSE PRACTITIONER

## 2025-05-06 PROCEDURE — 99214 OFFICE O/P EST MOD 30 MIN: CPT | Mod: S$PBB,,, | Performed by: NURSE PRACTITIONER

## 2025-05-06 PROCEDURE — 99999 PR PBB SHADOW E&M-EST. PATIENT-LVL IV: CPT | Mod: PBBFAC,,, | Performed by: NURSE PRACTITIONER

## 2025-05-06 NOTE — PROGRESS NOTES
Subjective:       Patient ID: Ella Canales is a 74 y.o. female.    Chief Complaint: Pre-op Exam     HPI   73 y/o female patient with medical problems listed below presents for pre op of left cataract surgery scheduled by Dr. Lazcano at [facility] on 5/28/2025 (fax # [phone]) and then right cataract surgery on 6/4/2025. Patient brought in a pre op form. No acute complaints reported. Noted BP in clinic was 140/60 and she states did not take her blood pressure medications today.     Labs reviewed from 2/2025    Problem List[1]     Review of patient's allergies indicates:   Allergen Reactions    Ace inhibitors      Other reaction(s): cough    Latex      denies     Past Surgical History:   Procedure Laterality Date    COLONOSCOPY N/A 1/22/2019    Procedure: COLONOSCOPY;  Surgeon: Milton Benitez MD;  Location: [facility] ENDO;  Service: Endoscopy;  Laterality: N/A;    ECTOPIC PREGNANCY SURGERY      fess      FRACTURE SURGERY      ORIF right arm.    JOINT REPLACEMENT      left knee    KNEE ARTHROPLASTY Right 8/13/2019    Procedure: ARTHROPLASTY, KNEE;  Surgeon: Paolo Singh MD;  Location: [facility] OR;  Service: Orthopedics;  Laterality: Right;    TOTAL KNEE ARTHROPLASTY Left     TUBAL LIGATION        Current Medications[2]    Review of Systems   Constitutional:  Negative for chills and fever.   Respiratory:  Negative for cough and shortness of breath.    Cardiovascular:  Negative for chest pain and palpitations.   Gastrointestinal:  Negative for abdominal pain.   Neurological:  Negative for dizziness and headaches.       Objective:   BP (!) 140/62 (BP Location: Left arm, Patient Position: Sitting)   Pulse 74   Temp 98.3 °F (36.8 °C) (Oral)   Ht 5' (1.524 m)   Wt 78.3 kg (172 lb 9.9 oz)   LMP 04/03/2012   SpO2 95%   BMI 33.71 kg/m²         Physical Exam  Constitutional:       General: She is not in acute distress.     Appearance: Normal appearance.   HENT:      Head: Atraumatic.   Cardiovascular:       Rate and Rhythm: Normal rate and regular rhythm.      Pulses: Normal pulses.      Heart sounds: Normal heart sounds.   Pulmonary:      Effort: Pulmonary effort is normal.      Breath sounds: Normal breath sounds.   Abdominal:      General: Abdomen is flat. Bowel sounds are normal.      Palpations: Abdomen is soft.   Neurological:      Mental Status: She is oriented to person, place, and time.         Assessment:       1. Pre-op evaluation    2. Primary hypertension    3. Pure hypercholesterolemia    4. Cataract, unspecified cataract type, unspecified laterality        Plan:       1. Primary hypertension  - Stable and continue with current regimen hydrochlorothiazide losartan toprol xl    2. Pure hypercholesterolemia  - On zocor 40    3. Cataract, unspecified cataract type, unspecified laterality    4. Pre-op evaluation (Primary)  - Patient can proceed for the planned surgery  - Pre op form completed and directly given to patient. 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 Twice a day, Disp: , Rfl:     hydroCHLOROthiazide (HYDRODIURIL) 25 MG tablet, TAKE 1 TABLET(25 MG) BY MOUTH EVERY DAY, Disp: 90 tablet, Rfl: 3    LACTOBACILLUS COMBO NO.6 (PROBIOTIC COMPLEX ORAL), Take 1 tablet by mouth once daily., Disp: , Rfl:     losartan (COZAAR) 100 MG tablet, TAKE 1 TABLET(100 MG) BY MOUTH EVERY DAY, Disp: 90 tablet, Rfl: 3    metoprolol succinate (TOPROL-XL) 50 MG 24 hr tablet, TAKE 1 TABLET(50 MG) BY MOUTH EVERY DAY, Disp: 90 tablet, Rfl: 3    multivitamin (THERAGRAN) per tablet, Take 1 tablet by mouth once daily., Disp: , Rfl:     omeprazole (PRILOSEC) 20 MG capsule, Take 1 capsule (20 mg total) by mouth once daily. TAKE 1 CAPSULE(20 MG) BY MOUTH TWICE DAILY, Disp: 90 capsule, Rfl: 3    potassium chloride SA (K-DUR,KLOR-CON M) 10 MEQ tablet, TAKE 2 TABLETS(20 MEQ) BY MOUTH EVERY DAY, Disp: 180 tablet, Rfl: 3    pulse oximeter (PULSE OXIMETER) device, by Apply Externally route 2 (two) times a day. Use twice daily at 8 AM and 3 PM and record the value in [facility] as directed., Disp: 1 each, Rfl: 0    simvastatin (ZOCOR) 40 MG tablet, TAKE 1 TABLET(40 MG) BY MOUTH EVERY EVENING, Disp: 90 tablet, Rfl: 3    TURMERIC ROOT EXTRACT ORAL, Take by mouth once daily. , Disp: , Rfl:     levocetirizine (XYZAL) 5 MG tablet, Take 1 tablet (5 mg total) by mouth every evening., Disp: 90 tablet, Rfl: PRN

## 2025-05-13 DIAGNOSIS — K21.9 GASTROESOPHAGEAL REFLUX DISEASE WITHOUT ESOPHAGITIS: ICD-10-CM

## 2025-05-13 DIAGNOSIS — J30.1 SEASONAL ALLERGIC RHINITIS DUE TO POLLEN: ICD-10-CM

## 2025-05-13 RX ORDER — OMEPRAZOLE 20 MG/1
20 CAPSULE, DELAYED RELEASE ORAL DAILY
Qty: 90 CAPSULE | Refills: 3 | Status: SHIPPED | OUTPATIENT
Start: 2025-05-13

## 2025-05-13 RX ORDER — FLUTICASONE PROPIONATE 50 MCG
1 SPRAY, SUSPENSION (ML) NASAL DAILY
Qty: 32 G | Refills: 3 | Status: SHIPPED | OUTPATIENT
Start: 2025-05-13

## 2025-05-13 NOTE — TELEPHONE ENCOUNTER
No care due was identified.  [facility] Embedded Care Due Messages. Reference number: [id number].   5/13/2025 12:10:53 PM CDT

## 2025-05-13 NOTE — TELEPHONE ENCOUNTER
Refill Decision Note   Ella Ally  is requesting a refill authorization.  Brief Assessment and Rationale for Refill:  Approve     Medication Therapy Plan:         Comments:     Note composed:2:27 PM 05/13/2025

## 2025-07-10 DIAGNOSIS — B96.89 ACUTE BACTERIAL SINUSITIS: ICD-10-CM

## 2025-07-10 DIAGNOSIS — K21.9 GASTROESOPHAGEAL REFLUX DISEASE WITHOUT ESOPHAGITIS: ICD-10-CM

## 2025-07-10 DIAGNOSIS — E78.5 HYPERLIPIDEMIA, UNSPECIFIED HYPERLIPIDEMIA TYPE: ICD-10-CM

## 2025-07-10 DIAGNOSIS — J01.90 ACUTE BACTERIAL SINUSITIS: ICD-10-CM

## 2025-07-10 DIAGNOSIS — J30.1 SEASONAL ALLERGIC RHINITIS DUE TO POLLEN: ICD-10-CM

## 2025-07-10 DIAGNOSIS — I10 ESSENTIAL HYPERTENSION: ICD-10-CM

## 2025-07-10 DIAGNOSIS — E87.6 HYPOKALEMIA: ICD-10-CM

## 2025-07-10 RX ORDER — METOPROLOL SUCCINATE 50 MG/1
50 TABLET, EXTENDED RELEASE ORAL DAILY
Qty: 90 TABLET | Refills: 3 | Status: SHIPPED | OUTPATIENT
Start: 2025-07-10

## 2025-07-10 RX ORDER — SIMVASTATIN 40 MG/1
40 TABLET, FILM COATED ORAL NIGHTLY
Qty: 90 TABLET | Refills: 3 | Status: SHIPPED | OUTPATIENT
Start: 2025-07-10

## 2025-07-10 RX ORDER — POTASSIUM CHLORIDE 750 MG/1
20 TABLET, EXTENDED RELEASE ORAL DAILY
Qty: 180 TABLET | Refills: 3 | Status: SHIPPED | OUTPATIENT
Start: 2025-07-10

## 2025-07-10 RX ORDER — AZELASTINE 1 MG/ML
1 SPRAY, METERED NASAL 2 TIMES DAILY
Qty: 30 ML | Refills: 3 | Status: SHIPPED | OUTPATIENT
Start: 2025-07-10

## 2025-07-10 RX ORDER — FLUTICASONE PROPIONATE 50 MCG
1 SPRAY, SUSPENSION (ML) NASAL DAILY
Qty: 32 G | Refills: 3 | Status: SHIPPED | OUTPATIENT
Start: 2025-07-10

## 2025-07-10 RX ORDER — OMEPRAZOLE 20 MG/1
20 CAPSULE, DELAYED RELEASE ORAL DAILY
Qty: 90 CAPSULE | Refills: 3 | Status: SHIPPED | OUTPATIENT
Start: 2025-07-10

## 2025-07-10 RX ORDER — HYDROCHLOROTHIAZIDE 25 MG/1
25 TABLET ORAL DAILY
Qty: 90 TABLET | Refills: 3 | Status: SHIPPED | OUTPATIENT
Start: 2025-07-10

## 2025-07-10 RX ORDER — LOSARTAN POTASSIUM 100 MG/1
100 TABLET ORAL DAILY
Qty: 90 TABLET | Refills: 3 | Status: SHIPPED | OUTPATIENT
Start: 2025-07-10

## 2025-07-10 NOTE — TELEPHONE ENCOUNTER
No care due was identified.  [facility] Embedded Care Due Messages. Reference number: [id number].   7/10/2025 9:23:48 AM CDT

## 2025-07-10 NOTE — TELEPHONE ENCOUNTER
Refill request sent in a different encounter.     Copied from CRM #[id number]. Topic: Medications - Medication Refill  >> Jul 10, 2025  9:11 AM Domingo wrote:  Type:  RX Refill Request    Who Called: Pt   Refill or New Rx:refill  RX Name and Strength:azelastine (ASTELIN) 137 mcg (0.1 %) nasal spray, fluticasone propionate (FLONASE) 50 mcg/actuation nasal spray, hydroCHLOROthiazide (HYDRODIURIL) 25 MG tablet, losartan (COZAAR) 100 MG tablet, metoprolol succinate (TOPROL-XL) 50 MG 24 hr tablet, omeprazole (PRILOSEC) 20 MG capsule, potassium chloride SA (K-DUR,KLOR-CON M) 10 MEQ tablet, simvastatin (ZOCOR) 40 MG tablet   How is the patient currently taking it? (ex. 1XDay): as directed  Is this a 30 day or 90 day RX:90  Preferred Pharmacy with phone number:  [id number] - [place], [place] - [address] AT [address] [facility]  [address]  [place] [address]  Phone: [phone] Fax: [phone]  Local or Mail Order:[facility]  Ordering Provider:Nati  Would the patient rather a call back or a response via [facility]? Call  Best Call Back Number:[phone]   Additional Information: Pt adv her rx were not sent in from her last appt and she needs them refilled. Thank you.

## 2025-07-21 ENCOUNTER — HOSPITAL ENCOUNTER (OUTPATIENT)
Dept: RADIOLOGY | Facility: HOSPITAL | Age: 75
Discharge: HOME | End: 2025-07-21
Attending: FAMILY MEDICINE
Payer: MEDICARE

## 2025-07-21 DIAGNOSIS — N95.9 MENOPAUSAL AND POSTMENOPAUSAL DISORDER: ICD-10-CM

## 2025-07-21 DIAGNOSIS — Z12.31 ENCOUNTER FOR SCREENING MAMMOGRAM FOR MALIGNANT NEOPLASM OF BREAST: ICD-10-CM

## 2025-07-21 PROCEDURE — 77067 SCR MAMMO BI INCL CAD: CPT | Mod: 26,,, | Performed by: RADIOLOGY

## 2025-07-21 PROCEDURE — 77063 BREAST TOMOSYNTHESIS BI: CPT | Mod: TC,PO

## 2025-07-21 PROCEDURE — 77080 DXA BONE DENSITY AXIAL: CPT | Mod: TC,PO

## 2025-07-21 PROCEDURE — 77063 BREAST TOMOSYNTHESIS BI: CPT | Mod: 26,,, | Performed by: RADIOLOGY

## 2025-07-21 PROCEDURE — 77080 DXA BONE DENSITY AXIAL: CPT | Mod: 26,,, | Performed by: RADIOLOGY

## (undated) DEVICE — HOOD T-5 TEAR AWAY STERILE

## (undated) DEVICE — SEE MEDLINE ITEM 152530

## (undated) DEVICE — GOWN B1 X-LG X-LONG

## (undated) DEVICE — ADHESIVE DERMABOND ADVANCED

## (undated) DEVICE — NDL SURGEON MAYO #4

## (undated) DEVICE — DRAPE STERI U-SHAPED 47X51IN

## (undated) DEVICE — SUT 2/0 18IN COATED VICRYL

## (undated) DEVICE — GLOVE SURG ULTRA TOUCH 8

## (undated) DEVICE — BLADE SURG CARBON STEEL #10

## (undated) DEVICE — ELECTRODE REM PLYHSV RETURN 9

## (undated) DEVICE — SEE MEDLINE ITEM 157131

## (undated) DEVICE — NDL 18GA X1 1/2 REG BEVEL

## (undated) DEVICE — UNDERGLOVES BIOGEL PI SIZE 7.5

## (undated) DEVICE — PACK LOWER EXTREMITY

## (undated) DEVICE — SYRINGE 0.9% NACL 10MIL PREFIL

## (undated) DEVICE — BLADE SAGITTAL 18 X 1.27 X 90M

## (undated) DEVICE — PAD ABD 8X10 STERILE

## (undated) DEVICE — SUT VICRYL 3-0 27 SH

## (undated) DEVICE — TUBE SUCTION YANKAUER HI CAP

## (undated) DEVICE — TOURNIQUET SB QC DP 34X4IN

## (undated) DEVICE — BRACE KNEE T SCOPE PREMIER

## (undated) DEVICE — STRAP OR TABLE 5IN X 72IN

## (undated) DEVICE — PACK BASIC

## (undated) DEVICE — DRAPE STERI INSTRUMENT 1018

## (undated) DEVICE — CONTAINER SPECIMEN STRL 4OZ

## (undated) DEVICE — APPLICATOR CHLORAPREP ORN 26ML

## (undated) DEVICE — SOL IRR NACL .9% 3000ML

## (undated) DEVICE — NDL SPINAL 18GX3.5 SPINOCAN

## (undated) DEVICE — SEE MEDLINE ITEM 157166

## (undated) DEVICE — SUT STRATAFIX PDS 1 CTX 18IN

## (undated) DEVICE — SET DECANTER MEDICHOICE

## (undated) DEVICE — PAD ELECTRODE STER 1.5X3

## (undated) DEVICE — GLOVE BIOGEL SKINSENSE PI 6.5

## (undated) DEVICE — BLADE SURG #15 CARBON STEEL

## (undated) DEVICE — GLOVE SURG ULTRA TOUCH 7.5

## (undated) DEVICE — GLOVE BIOGEL SKINSENSE PI 8.5

## (undated) DEVICE — BOWL CEMENT

## (undated) DEVICE — TRAY CATH UM FOLEY SIL W 16FR

## (undated) DEVICE — SEE MEDLINE ITEM 146271

## (undated) DEVICE — LINER SUCTION 3000CC

## (undated) DEVICE — KIT TOTAL KNEE TKOFG

## (undated) DEVICE — BLADE SAW SGTL DL STR 25X1.27X

## (undated) DEVICE — DRESSING SPONGE 16PLY 4X4 NS

## (undated) DEVICE — Device

## (undated) DEVICE — BLADE SAG DUAL 25MM

## (undated) DEVICE — TOGA FLYTE PEEL AWAY XLARGE

## (undated) DEVICE — SYR 30CC LUER LOCK

## (undated) DEVICE — SEE MEDLINE ITEM 157150

## (undated) DEVICE — SLEEVE SCD EXPRESS CALF MEDIUM

## (undated) DEVICE — SEE MEDLINE ITEM 152523

## (undated) DEVICE — DRESSING AQUACEL AG 3.5X10IN

## (undated) DEVICE — INTERPULSE SET

## (undated) DEVICE — PAD CAST SPECIALIST STRL 6

## (undated) DEVICE — PAD COLD THERAPY KNEE WRAP ON

## (undated) DEVICE — CUBE COLD THERAPY POLAR CARE

## (undated) DEVICE — TOURNIQUET SB QC SP 34X4IN

## (undated) DEVICE — UNDERGLOVE BIOGEL PI SZ 6.5 LF

## (undated) DEVICE — DRAPE INCISE IOBAN 2 23X17IN

## (undated) DEVICE — SEE MEDLINE ITEM 107746

## (undated) DEVICE — SUT VICRYL BR 1 GEN 27 CT-1

## (undated) DEVICE — SEE MEDLINE ITEM 146298

## (undated) DEVICE — BANDAGE ACE ELASTIC 6"

## (undated) DEVICE — COVER BACK TABLE 72X21

## (undated) DEVICE — SYR 50CC LL

## (undated) DEVICE — GLOVE BIOGEL SKINSENSE PI 7.0

## (undated) DEVICE — SCRUB 10% POVIDONE IODINE 4OZ

## (undated) DEVICE — SPONGE LAP 18X18 PREWASHED

## (undated) DEVICE — DRESSING AQUACEL EXTRA 10X12.5

## (undated) DEVICE — SEE MEDLINE ITEM 146345

## (undated) DEVICE — EVACUATOR WOUND BULB 100CC

## (undated) DEVICE — KIT IRR SUCTION HND PIECE

## (undated) DEVICE — TAPE SILK 3IN

## (undated) DEVICE — UNDERGLOVES BIOGEL PI SZ 7 LF

## (undated) DEVICE — ALCOHOL 70% ISOP RUBBING 4OZ

## (undated) DEVICE — SUT 2-0 ETHILON 18 FS

## (undated) DEVICE — SEE MEDLINE ITEM 152515

## (undated) DEVICE — DRESSING XEROFORM FOIL PK 1X8

## (undated) DEVICE — DRESSING N ADH OIL EMUL 3X8 3S

## (undated) DEVICE — MANIFOLD 4 PORT

## (undated) DEVICE — SEE MEDLINE ITEM 153151

## (undated) DEVICE — SOL 9P NACL IRR PIC IL

## (undated) DEVICE — PAD ADULT ELECTRODE GROUNDING

## (undated) DEVICE — SPONGE SUPER KERLIX 6X6.75IN

## (undated) DEVICE — BANDAGE ESMARK 6X12

## (undated) DEVICE — KIT PREVENA PLUS

## (undated) DEVICE — SEALER BIPOLAR TISSUE 6.0

## (undated) DEVICE — DRAIN FLAT JP 7X4MM FUL

## (undated) DEVICE — PACK CUSTOM UNIV BASIN SLI

## (undated) DEVICE — SEE MEDLINE ITEM 152622

## (undated) DEVICE — DERMABOND SKIN ADHESIVE PROPEN

## (undated) DEVICE — GOWN TOGA SYS PEELWY ZIP 2 XL

## (undated) DEVICE — SUT STRATAFIX PDS 2 CT-1 14IN

## (undated) DEVICE — GAUZE SPONGE 4X4 12PLY

## (undated) DEVICE — KNEE IMMB UNV FOAM/MESH 20IN

## (undated) DEVICE — PUMP COLD THERAPY

## (undated) DEVICE — DRAPE PLASTIC U 60X72

## (undated) DEVICE — SEE MEDLINE ITEM 146231

## (undated) DEVICE — SUT PDS II 1 CT VIL MONO 36

## (undated) DEVICE — NDL HYPO SAFETY 22 X 1 1/2

## (undated) DEVICE — PADDING CAST 6IN

## (undated) DEVICE — SUT 2/0 27IN PDS II VIO MO

## (undated) DEVICE — BLADE PEAK PLASMA

## (undated) DEVICE — WRAP PROTECTIVE LEG POS STRL